# Patient Record
Sex: MALE | Race: WHITE | Employment: OTHER | URBAN - NONMETROPOLITAN AREA
[De-identification: names, ages, dates, MRNs, and addresses within clinical notes are randomized per-mention and may not be internally consistent; named-entity substitution may affect disease eponyms.]

---

## 2020-01-01 ENCOUNTER — APPOINTMENT (OUTPATIENT)
Dept: GENERAL RADIOLOGY | Age: 75
DRG: 853 | End: 2020-01-01
Payer: MEDICARE

## 2020-01-01 ENCOUNTER — APPOINTMENT (OUTPATIENT)
Dept: INTERVENTIONAL RADIOLOGY/VASCULAR | Age: 75
DRG: 853 | End: 2020-01-01
Payer: MEDICARE

## 2020-01-01 ENCOUNTER — APPOINTMENT (OUTPATIENT)
Dept: CT IMAGING | Age: 75
DRG: 853 | End: 2020-01-01
Payer: MEDICARE

## 2020-01-01 ENCOUNTER — HOSPITAL ENCOUNTER (INPATIENT)
Age: 75
LOS: 35 days | DRG: 853 | End: 2020-10-29
Attending: EMERGENCY MEDICINE | Admitting: HOSPITALIST
Payer: MEDICARE

## 2020-01-01 ENCOUNTER — APPOINTMENT (OUTPATIENT)
Dept: MRI IMAGING | Age: 75
DRG: 853 | End: 2020-01-01
Payer: MEDICARE

## 2020-01-01 VITALS
WEIGHT: 235.9 LBS | HEIGHT: 68 IN | DIASTOLIC BLOOD PRESSURE: 19 MMHG | HEART RATE: 57 BPM | RESPIRATION RATE: 23 BRPM | TEMPERATURE: 95.2 F | SYSTOLIC BLOOD PRESSURE: 80 MMHG | BODY MASS INDEX: 35.75 KG/M2 | OXYGEN SATURATION: 69 %

## 2020-01-01 LAB
ABO: NORMAL
ACINETOBACTER CALCOACETICUS-BAUMANNII BY PCR: NOT DETECTED
ADENOVIRUS BY PCR: NOT DETECTED
ALBUMIN SERPL-MCNC: 1.2 G/DL (ref 3.5–5.1)
ALBUMIN SERPL-MCNC: 2 G/DL (ref 3.5–5.1)
ALBUMIN SERPL-MCNC: 2.2 G/DL (ref 3.5–5.1)
ALBUMIN SERPL-MCNC: 2.2 G/DL (ref 3.5–5.1)
ALBUMIN SERPL-MCNC: 2.3 G/DL (ref 3.5–5.1)
ALBUMIN SERPL-MCNC: 2.5 G/DL (ref 3.5–5.1)
ALBUMIN SERPL-MCNC: 2.6 G/DL (ref 3.5–5.1)
ALBUMIN SERPL-MCNC: 2.7 G/DL (ref 3.5–5.1)
ALBUMIN SERPL-MCNC: 2.8 G/DL (ref 3.5–5.1)
ALBUMIN SERPL-MCNC: 2.8 G/DL (ref 3.5–5.1)
ALBUMIN SERPL-MCNC: 2.9 G/DL (ref 3.5–5.1)
ALBUMIN SERPL-MCNC: 3 G/DL (ref 3.5–5.1)
ALBUMIN SERPL-MCNC: 3 G/DL (ref 3.5–5.1)
ALBUMIN SERPL-MCNC: 3.1 GM/DL (ref 3.4–5)
ALBUMIN SERPL-MCNC: 3.2 G/DL (ref 3.5–5.1)
ALDOSTERONE: 30 NG/DL
ALLEN TEST: ABNORMAL
ALLEN TEST: ABNORMAL
ALLEN TEST: POSITIVE
ALP BLD-CCNC: 107 U/L (ref 38–126)
ALP BLD-CCNC: 53 U/L (ref 38–126)
ALP BLD-CCNC: 54 U/L (ref 38–126)
ALP BLD-CCNC: 55 U/L (ref 38–126)
ALP BLD-CCNC: 57 U/L (ref 38–126)
ALP BLD-CCNC: 59 U/L (ref 38–126)
ALP BLD-CCNC: 60 U/L (ref 38–126)
ALP BLD-CCNC: 62 U/L (ref 38–126)
ALP BLD-CCNC: 63 U/L (ref 46–116)
ALP BLD-CCNC: 65 U/L (ref 38–126)
ALP BLD-CCNC: 71 U/L (ref 38–126)
ALP BLD-CCNC: 72 U/L (ref 38–126)
ALP BLD-CCNC: 74 U/L (ref 38–126)
ALP BLD-CCNC: 78 U/L (ref 38–126)
ALP BLD-CCNC: 79 U/L (ref 38–126)
ALP BLD-CCNC: 83 U/L (ref 38–126)
ALP BLD-CCNC: 84 U/L (ref 38–126)
ALP BLD-CCNC: 841 U/L (ref 38–126)
ALP BLD-CCNC: 90 U/L (ref 38–126)
ALP BLD-CCNC: 94 U/L (ref 38–126)
ALP BLD-CCNC: 94 U/L (ref 38–126)
ALT SERPL-CCNC: 102 U/L (ref 11–66)
ALT SERPL-CCNC: 106 U/L (ref 11–66)
ALT SERPL-CCNC: 114 U/L (ref 11–66)
ALT SERPL-CCNC: 124 U/L (ref 11–66)
ALT SERPL-CCNC: 126 U/L (ref 11–66)
ALT SERPL-CCNC: 172 U/L (ref 11–66)
ALT SERPL-CCNC: 236 U/L (ref 11–66)
ALT SERPL-CCNC: 28 U/L (ref 11–66)
ALT SERPL-CCNC: 35 U/L (ref 11–66)
ALT SERPL-CCNC: 46 U/L (ref 11–66)
ALT SERPL-CCNC: 52 U/L (ref 11–66)
ALT SERPL-CCNC: 56 U/L (ref 11–66)
ALT SERPL-CCNC: 57 U/L (ref 11–66)
ALT SERPL-CCNC: 64 U/L (ref 11–66)
ALT SERPL-CCNC: 65 U/L (ref 11–66)
ALT SERPL-CCNC: 66 U/L (ref 11–66)
ALT SERPL-CCNC: 74 U/L (ref 11–66)
ALT SERPL-CCNC: 78 U/L (ref 11–66)
ALT SERPL-CCNC: 90 U/L (ref 14–63)
ALT SERPL-CCNC: 93 U/L (ref 11–66)
ALT SERPL-CCNC: 95 U/L (ref 11–66)
AMORPHOUS: ABNORMAL
AMORPHOUS: ABNORMAL
ANAEROBIC CULTURE: NORMAL
ANAEROBIC CULTURE: NORMAL
ANION GAP SERPL CALCULATED.3IONS-SCNC: 10 MEQ/L (ref 8–16)
ANION GAP SERPL CALCULATED.3IONS-SCNC: 11 MEQ/L (ref 8–16)
ANION GAP SERPL CALCULATED.3IONS-SCNC: 12 MEQ/L (ref 8–16)
ANION GAP SERPL CALCULATED.3IONS-SCNC: 13 MEQ/L (ref 8–16)
ANION GAP SERPL CALCULATED.3IONS-SCNC: 14 MEQ/L (ref 8–16)
ANION GAP SERPL CALCULATED.3IONS-SCNC: 14 MEQ/L (ref 8–16)
ANION GAP SERPL CALCULATED.3IONS-SCNC: 15 MEQ/L (ref 8–16)
ANION GAP SERPL CALCULATED.3IONS-SCNC: 16 MEQ/L (ref 8–16)
ANION GAP SERPL CALCULATED.3IONS-SCNC: 17 MEQ/L (ref 8–16)
ANION GAP SERPL CALCULATED.3IONS-SCNC: 19 MEQ/L (ref 8–16)
ANION GAP SERPL CALCULATED.3IONS-SCNC: 23 MEQ/L (ref 8–16)
ANION GAP SERPL CALCULATED.3IONS-SCNC: 24 MEQ/L (ref 8–16)
ANION GAP SERPL CALCULATED.3IONS-SCNC: 6 MEQ/L (ref 8–16)
ANION GAP SERPL CALCULATED.3IONS-SCNC: 7 MEQ/L (ref 8–16)
ANION GAP SERPL CALCULATED.3IONS-SCNC: 8 MEQ/L (ref 8–16)
ANION GAP SERPL CALCULATED.3IONS-SCNC: 9 MEQ/L (ref 8–16)
ANION GAP: 10 MEQ/L (ref 8–16)
ANISOCYTOSIS: PRESENT
ANTIBODY SCREEN: NORMAL
APTT: 109.3 SECONDS (ref 22–38)
APTT: 24.6 SECONDS (ref 22–38)
APTT: 48.9 SECONDS (ref 22–38)
APTT: 56.8 SECONDS (ref 22–38)
AST SERPL-CCNC: 102 U/L (ref 5–40)
AST SERPL-CCNC: 108 U/L (ref 5–40)
AST SERPL-CCNC: 24 U/L (ref 5–40)
AST SERPL-CCNC: 25 U/L (ref 5–40)
AST SERPL-CCNC: 38 U/L (ref 5–40)
AST SERPL-CCNC: 40 U/L (ref 5–40)
AST SERPL-CCNC: 40 U/L (ref 5–40)
AST SERPL-CCNC: 42 U/L (ref 5–40)
AST SERPL-CCNC: 43 U/L (ref 5–40)
AST SERPL-CCNC: 52 U/L (ref 5–40)
AST SERPL-CCNC: 55 U/L (ref 5–40)
AST SERPL-CCNC: 58 U/L (ref 5–40)
AST SERPL-CCNC: 61 U/L (ref 5–40)
AST SERPL-CCNC: 64 U/L (ref 5–40)
AST SERPL-CCNC: 65 U/L (ref 5–40)
AST SERPL-CCNC: 672 U/L (ref 5–40)
AST SERPL-CCNC: 68 U/L (ref 5–40)
AST SERPL-CCNC: 72 U/L (ref 5–40)
AST SERPL-CCNC: 77 U/L (ref 5–40)
AST SERPL-CCNC: 90 U/L (ref 5–40)
AST SERPL-CCNC: 91 U/L (ref 15–37)
ATYPICAL LYMPHOCYTES: ABNORMAL %
AVERAGE GLUCOSE: 165 MG/DL (ref 70–126)
BACTERIA: ABNORMAL
BAL CHARACTER: ABNORMAL
BAL CHARACTER: ABNORMAL
BAL COLLECTION SITE: ABNORMAL
BAL COLLECTION SITE: ABNORMAL
BAL COLOR: ABNORMAL
BAL COLOR: ABNORMAL
BASE EXCESS (CALCULATED): -0.7 MMOL/L (ref -2.5–2.5)
BASE EXCESS (CALCULATED): -1.9 MMOL/L (ref -2.5–2.5)
BASE EXCESS (CALCULATED): -16.8 MMOL/L (ref -2.5–2.5)
BASE EXCESS (CALCULATED): -4.2 MMOL/L (ref -2.5–2.5)
BASE EXCESS (CALCULATED): -4.2 MMOL/L (ref -2.5–2.5)
BASE EXCESS (CALCULATED): 1.1 MMOL/L (ref -2.5–2.5)
BASE EXCESS (CALCULATED): 3.5 MMOL/L (ref -2.5–2.5)
BASE EXCESS (CALCULATED): 6.9 MMOL/L (ref -2.5–2.5)
BASE EXCESS MIXED: -10.6 MMOL/L (ref -2–3)
BASE EXCESS MIXED: -2.6 MMOL/L (ref -2–3)
BASE EXCESS MIXED: -2.9 MMOL/L (ref -2–3)
BASOPHILIA: ABNORMAL
BASOPHILS # BLD: 0 %
BASOPHILS # BLD: 0 %
BASOPHILS # BLD: 0.1 %
BASOPHILS # BLD: 0.2 %
BASOPHILS # BLD: 0.3 %
BASOPHILS # BLD: 0.4 %
BASOPHILS # BLD: 0.5 %
BASOPHILS # BLD: 0.5 %
BASOPHILS # BLD: 0.5 % (ref 0–3)
BASOPHILS ABSOLUTE: 0 THOU/MM3 (ref 0–0.1)
BASOPHILS ABSOLUTE: 0.1 THOU/MM3 (ref 0–0.1)
BILIRUB SERPL-MCNC: 0.3 MG/DL (ref 0.3–1.2)
BILIRUB SERPL-MCNC: 0.4 MG/DL (ref 0.3–1.2)
BILIRUB SERPL-MCNC: 0.5 MG/DL (ref 0.3–1.2)
BILIRUB SERPL-MCNC: 0.6 MG/DL (ref 0.2–1)
BILIRUB SERPL-MCNC: 0.6 MG/DL (ref 0.3–1.2)
BILIRUB SERPL-MCNC: 0.6 MG/DL (ref 0.3–1.2)
BILIRUB SERPL-MCNC: 0.7 MG/DL (ref 0.3–1.2)
BILIRUB SERPL-MCNC: 0.8 MG/DL (ref 0.3–1.2)
BILIRUB SERPL-MCNC: 0.8 MG/DL (ref 0.3–1.2)
BILIRUBIN DIRECT: 0.4 MG/DL (ref 0–0.3)
BILIRUBIN DIRECT: < 0.2 MG/DL (ref 0–0.3)
BILIRUBIN URINE: NEGATIVE
BLOOD CULTURE, ROUTINE: NORMAL
BLOOD, URINE: ABNORMAL
BODY FLUID CULTURE, STERILE: NORMAL
BODY FLUID CULTURE, STERILE: NORMAL
BODY FLUID RBC: 7000 /CUMM
BODY FLUID RBC: ABNORMAL /CUMM
BUN BLDV-MCNC: 10 MG/DL (ref 7–22)
BUN BLDV-MCNC: 10 MG/DL (ref 7–22)
BUN BLDV-MCNC: 11 MG/DL (ref 7–22)
BUN BLDV-MCNC: 11 MG/DL (ref 7–22)
BUN BLDV-MCNC: 12 MG/DL (ref 7–22)
BUN BLDV-MCNC: 13 MG/DL (ref 7–22)
BUN BLDV-MCNC: 13 MG/DL (ref 7–22)
BUN BLDV-MCNC: 14 MG/DL (ref 7–22)
BUN BLDV-MCNC: 15 MG/DL (ref 7–22)
BUN BLDV-MCNC: 16 MG/DL (ref 7–22)
BUN BLDV-MCNC: 17 MG/DL (ref 7–22)
BUN BLDV-MCNC: 18 MG/DL (ref 7–22)
BUN BLDV-MCNC: 20 MG/DL (ref 7–22)
BUN BLDV-MCNC: 20 MG/DL (ref 7–22)
BUN BLDV-MCNC: 21 MG/DL (ref 7–22)
BUN BLDV-MCNC: 21 MG/DL (ref 7–22)
BUN BLDV-MCNC: 22 MG/DL (ref 7–18)
BUN BLDV-MCNC: 22 MG/DL (ref 7–22)
BUN BLDV-MCNC: 23 MG/DL (ref 7–22)
BUN BLDV-MCNC: 24 MG/DL (ref 7–22)
BUN BLDV-MCNC: 24 MG/DL (ref 7–22)
BUN BLDV-MCNC: 26 MG/DL (ref 7–22)
BUN BLDV-MCNC: 26 MG/DL (ref 7–22)
BUN BLDV-MCNC: 29 MG/DL (ref 7–22)
BUN BLDV-MCNC: 31 MG/DL (ref 7–22)
BUN BLDV-MCNC: 34 MG/DL (ref 7–22)
BUN BLDV-MCNC: 37 MG/DL (ref 7–22)
BUN BLDV-MCNC: 38 MG/DL (ref 7–22)
BUN BLDV-MCNC: 39 MG/DL (ref 7–22)
BUN BLDV-MCNC: 4 MG/DL (ref 7–22)
BUN BLDV-MCNC: 5 MG/DL (ref 7–22)
BUN BLDV-MCNC: 6 MG/DL (ref 7–22)
BUN BLDV-MCNC: 7 MG/DL (ref 7–22)
BUN BLDV-MCNC: 8 MG/DL (ref 7–22)
BUN BLDV-MCNC: 8 MG/DL (ref 7–22)
BUN BLDV-MCNC: 9 MG/DL (ref 7–22)
BUN BLDV-MCNC: 9 MG/DL (ref 7–22)
C-REACTIVE PROTEIN: 13.42 MG/DL (ref 0–1)
C-REACTIVE PROTEIN: 16.82 MG/DL (ref 0–1)
C-REACTIVE PROTEIN: 24.8 MG/DL (ref 0–1)
CALCIUM IONIZED: 0.89 MMOL/L (ref 1.12–1.32)
CALCIUM IONIZED: 0.9 MMOL/L (ref 1.12–1.32)
CALCIUM IONIZED: 0.98 MMOL/L (ref 1.12–1.32)
CALCIUM IONIZED: 0.98 MMOL/L (ref 1.12–1.32)
CALCIUM IONIZED: 0.99 MMOL/L (ref 1.12–1.32)
CALCIUM IONIZED: 1 MMOL/L (ref 1.12–1.32)
CALCIUM IONIZED: 1.01 MMOL/L (ref 1.12–1.32)
CALCIUM IONIZED: 1.02 MMOL/L (ref 1.12–1.32)
CALCIUM IONIZED: 1.03 MMOL/L (ref 1.12–1.32)
CALCIUM IONIZED: 1.04 MMOL/L (ref 1.12–1.32)
CALCIUM IONIZED: 1.04 MMOL/L (ref 1.12–1.32)
CALCIUM IONIZED: 1.05 MMOL/L (ref 1.12–1.32)
CALCIUM IONIZED: 1.05 MMOL/L (ref 1.12–1.32)
CALCIUM IONIZED: 1.07 MMOL/L (ref 1.12–1.32)
CALCIUM IONIZED: 1.07 MMOL/L (ref 1.12–1.32)
CALCIUM IONIZED: 1.09 MMOL/L (ref 1.12–1.32)
CALCIUM SERPL-MCNC: 6.4 MG/DL (ref 8.5–10.5)
CALCIUM SERPL-MCNC: 6.6 MG/DL (ref 8.5–10.5)
CALCIUM SERPL-MCNC: 6.7 MG/DL (ref 8.5–10.5)
CALCIUM SERPL-MCNC: 6.7 MG/DL (ref 8.5–10.5)
CALCIUM SERPL-MCNC: 6.9 MG/DL (ref 8.5–10.5)
CALCIUM SERPL-MCNC: 7 MG/DL (ref 8.5–10.5)
CALCIUM SERPL-MCNC: 7 MG/DL (ref 8.5–10.5)
CALCIUM SERPL-MCNC: 7.1 MG/DL (ref 8.5–10.5)
CALCIUM SERPL-MCNC: 7.2 MG/DL (ref 8.5–10.5)
CALCIUM SERPL-MCNC: 7.3 MG/DL (ref 8.5–10.5)
CALCIUM SERPL-MCNC: 7.4 MG/DL (ref 8.5–10.5)
CALCIUM SERPL-MCNC: 7.5 MG/DL (ref 8.5–10.5)
CALCIUM SERPL-MCNC: 7.6 MG/DL (ref 8.5–10.5)
CALCIUM SERPL-MCNC: 7.6 MG/DL (ref 8.5–10.5)
CALCIUM SERPL-MCNC: 7.7 MG/DL (ref 8.5–10.5)
CALCIUM SERPL-MCNC: 7.8 MG/DL (ref 8.5–10.5)
CALCIUM SERPL-MCNC: 7.9 MG/DL (ref 8.5–10.5)
CALCIUM SERPL-MCNC: 8 MG/DL (ref 8.5–10.5)
CALCIUM SERPL-MCNC: 8.1 MG/DL (ref 8.5–10.5)
CALCIUM SERPL-MCNC: 8.1 MG/DL (ref 8.5–10.5)
CALCIUM SERPL-MCNC: 8.2 MG/DL (ref 8.5–10.5)
CALCIUM SERPL-MCNC: 8.5 MG/DL (ref 8.5–10.5)
CALCIUM SERPL-MCNC: 8.8 MG/DL (ref 8.5–10.5)
CALCIUM SERPL-MCNC: 9 MG/DL (ref 8.5–10.5)
CASTS: ABNORMAL /LPF
CHARACTER, BODY FLUID: ABNORMAL
CHARACTER, BODY FLUID: NORMAL
CHARACTER, URINE: ABNORMAL
CHARACTER, URINE: ABNORMAL
CHARACTER, URINE: CLEAR
CHLAMYDIA PNEUMONIAE BY PCR: NOT DETECTED
CHLORIDE BLD-SCNC: 100 MEQ/L (ref 98–111)
CHLORIDE BLD-SCNC: 101 MEQ/L (ref 98–111)
CHLORIDE BLD-SCNC: 102 MEQ/L (ref 98–111)
CHLORIDE BLD-SCNC: 102 MEQ/L (ref 98–111)
CHLORIDE BLD-SCNC: 103 MEQ/L (ref 98–111)
CHLORIDE BLD-SCNC: 104 MEQ/L (ref 98–111)
CHLORIDE BLD-SCNC: 104 MEQ/L (ref 98–111)
CHLORIDE BLD-SCNC: 105 MEQ/L (ref 98–111)
CHLORIDE BLD-SCNC: 107 MEQ/L (ref 98–111)
CHLORIDE BLD-SCNC: 111 MEQ/L (ref 98–111)
CHLORIDE BLD-SCNC: 87 MEQ/L (ref 98–111)
CHLORIDE BLD-SCNC: 89 MEQ/L (ref 98–111)
CHLORIDE BLD-SCNC: 89 MEQ/L (ref 98–111)
CHLORIDE BLD-SCNC: 90 MEQ/L (ref 98–111)
CHLORIDE BLD-SCNC: 90 MEQ/L (ref 98–111)
CHLORIDE BLD-SCNC: 91 MEQ/L (ref 98–111)
CHLORIDE BLD-SCNC: 92 MEQ/L (ref 98–107)
CHLORIDE BLD-SCNC: 92 MEQ/L (ref 98–111)
CHLORIDE BLD-SCNC: 93 MEQ/L (ref 98–111)
CHLORIDE BLD-SCNC: 94 MEQ/L (ref 98–111)
CHLORIDE BLD-SCNC: 94 MEQ/L (ref 98–111)
CHLORIDE BLD-SCNC: 95 MEQ/L (ref 98–111)
CHLORIDE BLD-SCNC: 96 MEQ/L (ref 98–111)
CHLORIDE BLD-SCNC: 97 MEQ/L (ref 98–111)
CHLORIDE BLD-SCNC: 98 MEQ/L (ref 98–111)
CHLORIDE BLD-SCNC: 99 MEQ/L (ref 98–111)
CHLORIDE, URINE: 50 MEQ/L
CHLORIDE, URINE: < 20 MEQ/L
CMV QUANT IU/ML: < 227 IU/ML
CMV QUANT LOG IU/ML: < 2.4 LOG IU/ML
CMV QUANTITATIVE INTERPRETATION: NOT DETECTED
CMV QUANTITATIVE LOG COPY/ML: < 2.6 LOG CPY/ML
CMVQ COPY/ML: < 390 CPY/ML
CO2: 13 MEQ/L (ref 23–33)
CO2: 14 MEQ/L (ref 23–33)
CO2: 16 MEQ/L (ref 23–33)
CO2: 19 MEQ/L (ref 23–33)
CO2: 20 MEQ/L (ref 23–33)
CO2: 21 MEQ/L (ref 23–33)
CO2: 22 MEQ/L (ref 23–33)
CO2: 23 MEQ/L (ref 23–33)
CO2: 24 MEQ/L (ref 23–33)
CO2: 25 MEQ/L (ref 23–33)
CO2: 26 MEQ/L (ref 23–33)
CO2: 27 MEQ/L (ref 23–33)
CO2: 28 MEQ/L (ref 23–33)
CO2: 28 MEQ/L (ref 23–33)
CO2: 30 MEQ/L (ref 21–32)
CO2: 30 MEQ/L (ref 23–33)
CO2: 31 MEQ/L (ref 23–33)
CO2: 32 MEQ/L (ref 23–33)
CO2: 33 MEQ/L (ref 23–33)
CO2: 34 MEQ/L (ref 23–33)
CO2: 35 MEQ/L (ref 23–33)
CO2: 35 MEQ/L (ref 23–33)
COLLECTED BY:: ABNORMAL
COLLECTED BY:: NORMAL
COLOR: ABNORMAL
COLOR: NORMAL
COLOR: YELLOW
COMMENT: ABNORMAL
CORONAVIRUS PCR: NOT DETECTED
CORTISOL COLLECTION INFO: NORMAL
CORTISOL COLLECTION INFO: NORMAL
CORTISOL: 12.93 UG/DL
CORTISOL: 20.58 UG/DL
CREAT SERPL-MCNC: 0.4 MG/DL (ref 0.4–1.2)
CREAT SERPL-MCNC: 0.5 MG/DL (ref 0.4–1.2)
CREAT SERPL-MCNC: 0.6 MG/DL (ref 0.4–1.2)
CREAT SERPL-MCNC: 0.7 MG/DL (ref 0.4–1.2)
CREAT SERPL-MCNC: 0.8 MG/DL (ref 0.4–1.2)
CREAT SERPL-MCNC: 0.9 MG/DL (ref 0.4–1.2)
CREAT SERPL-MCNC: 1 MG/DL (ref 0.4–1.2)
CREAT SERPL-MCNC: 1 MG/DL (ref 0.4–1.2)
CREAT SERPL-MCNC: 1.1 MG/DL (ref 0.4–1.2)
CREAT SERPL-MCNC: 1.4 MG/DL (ref 0.6–1.3)
CREAT SERPL-MCNC: 1.5 MG/DL (ref 0.4–1.2)
CREAT SERPL-MCNC: 1.6 MG/DL (ref 0.4–1.2)
CREAT SERPL-MCNC: 1.7 MG/DL (ref 0.4–1.2)
CREAT SERPL-MCNC: 1.8 MG/DL (ref 0.4–1.2)
CREAT SERPL-MCNC: 2.2 MG/DL (ref 0.4–1.2)
CREAT SERPL-MCNC: 2.4 MG/DL (ref 0.4–1.2)
CREAT SERPL-MCNC: 2.7 MG/DL (ref 0.4–1.2)
CREAT SERPL-MCNC: 2.8 MG/DL (ref 0.4–1.2)
CREAT SERPL-MCNC: 3.1 MG/DL (ref 0.4–1.2)
CREAT SERPL-MCNC: 3.1 MG/DL (ref 0.4–1.2)
CREATININE URINE: 104.7 MG/DL
CREATININE URINE: 90.1 MG/DL
CRYSTALS: ABNORMAL
D-DIMER QUANTITATIVE: 639 NG/ML FEU (ref 0–500)
DEVICE: ABNORMAL
DEVICE: NORMAL
EKG ATRIAL RATE: 132 BPM
EKG ATRIAL RATE: 135 BPM
EKG ATRIAL RATE: 147 BPM
EKG ATRIAL RATE: 326 BPM
EKG ATRIAL RATE: 375 BPM
EKG ATRIAL RATE: 59 BPM
EKG ATRIAL RATE: 73 BPM
EKG ATRIAL RATE: 80 BPM
EKG ATRIAL RATE: 83 BPM
EKG ATRIAL RATE: 94 BPM
EKG ATRIAL RATE: 98 BPM
EKG P AXIS: 22 DEGREES
EKG P AXIS: 45 DEGREES
EKG P AXIS: 81 DEGREES
EKG P-R INTERVAL: 136 MS
EKG P-R INTERVAL: 148 MS
EKG P-R INTERVAL: 166 MS
EKG P-R INTERVAL: 174 MS
EKG Q-T INTERVAL: 306 MS
EKG Q-T INTERVAL: 312 MS
EKG Q-T INTERVAL: 336 MS
EKG Q-T INTERVAL: 354 MS
EKG Q-T INTERVAL: 364 MS
EKG Q-T INTERVAL: 364 MS
EKG Q-T INTERVAL: 368 MS
EKG Q-T INTERVAL: 386 MS
EKG Q-T INTERVAL: 434 MS
EKG Q-T INTERVAL: 440 MS
EKG Q-T INTERVAL: 452 MS
EKG QRS DURATION: 64 MS
EKG QRS DURATION: 68 MS
EKG QRS DURATION: 70 MS
EKG QRS DURATION: 74 MS
EKG QRS DURATION: 84 MS
EKG QRS DURATION: 88 MS
EKG QRS DURATION: 90 MS
EKG QTC CALCULATION (BAZETT): 384 MS
EKG QTC CALCULATION (BAZETT): 427 MS
EKG QTC CALCULATION (BAZETT): 435 MS
EKG QTC CALCULATION (BAZETT): 442 MS
EKG QTC CALCULATION (BAZETT): 445 MS
EKG QTC CALCULATION (BAZETT): 450 MS
EKG QTC CALCULATION (BAZETT): 453 MS
EKG QTC CALCULATION (BAZETT): 478 MS
EKG QTC CALCULATION (BAZETT): 497 MS
EKG QTC CALCULATION (BAZETT): 600 MS
EKG QTC CALCULATION (BAZETT): 630 MS
EKG R AXIS: 21 DEGREES
EKG R AXIS: 21 DEGREES
EKG R AXIS: 23 DEGREES
EKG R AXIS: 24 DEGREES
EKG R AXIS: 25 DEGREES
EKG R AXIS: 26 DEGREES
EKG R AXIS: 26 DEGREES
EKG R AXIS: 46 DEGREES
EKG R AXIS: 51 DEGREES
EKG R AXIS: 52 DEGREES
EKG R AXIS: 61 DEGREES
EKG T AXIS: -4 DEGREES
EKG T AXIS: -5 DEGREES
EKG T AXIS: -57 DEGREES
EKG T AXIS: -76 DEGREES
EKG T AXIS: -80 DEGREES
EKG T AXIS: -81 DEGREES
EKG T AXIS: 1 DEGREES
EKG T AXIS: 13 DEGREES
EKG T AXIS: 26 DEGREES
EKG T AXIS: 27 DEGREES
EKG VENTRICULAR RATE: 112 BPM
EKG VENTRICULAR RATE: 117 BPM
EKG VENTRICULAR RATE: 117 BPM
EKG VENTRICULAR RATE: 132 BPM
EKG VENTRICULAR RATE: 73 BPM
EKG VENTRICULAR RATE: 83 BPM
EKG VENTRICULAR RATE: 83 BPM
EKG VENTRICULAR RATE: 88 BPM
EKG VENTRICULAR RATE: 92 BPM
EKG VENTRICULAR RATE: 94 BPM
EKG VENTRICULAR RATE: 95 BPM
ENTEROBACTER CLOACAE COMPLEX BY PCR: NOT DETECTED
EOSINOPHIL # BLD: 0 %
EOSINOPHIL # BLD: 0.1 %
EOSINOPHIL # BLD: 0.1 %
EOSINOPHIL # BLD: 0.2 %
EOSINOPHIL # BLD: 0.3 %
EOSINOPHIL # BLD: 0.6 %
EOSINOPHIL # BLD: 0.7 %
EOSINOPHIL # BLD: 0.7 %
EOSINOPHIL # BLD: 0.8 %
EOSINOPHIL # BLD: 0.9 %
EOSINOPHIL # BLD: 1.2 %
EOSINOPHIL # BLD: 1.6 %
EOSINOPHIL # BLD: 2.5 %
EOSINOPHIL # BLD: 2.6 %
EOSINOPHIL # BLD: 2.7 %
EOSINOPHIL # BLD: 3.8 %
EOSINOPHIL # BLD: 4.7 %
EOSINOPHIL # BLD: 5 %
EOSINOPHIL # BLD: 5.3 %
EOSINOPHIL # BLD: 6 %
EOSINOPHIL # BLD: 6.3 %
EOSINOPHIL # BLD: 7.4 %
EOSINOPHIL SMEAR: NORMAL
EOSINOPHILS ABSOLUTE: 0 THOU/MM3 (ref 0–0.4)
EOSINOPHILS ABSOLUTE: 0.1 THOU/MM3 (ref 0–0.4)
EOSINOPHILS ABSOLUTE: 0.3 THOU/MM3 (ref 0–0.4)
EOSINOPHILS ABSOLUTE: 0.4 THOU/MM3 (ref 0–0.4)
EOSINOPHILS ABSOLUTE: 0.4 THOU/MM3 (ref 0–0.4)
EOSINOPHILS ABSOLUTE: 0.5 THOU/MM3 (ref 0–0.4)
EOSINOPHILS ABSOLUTE: 0.5 THOU/MM3 (ref 0–0.4)
EOSINOPHILS ABSOLUTE: 0.6 THOU/MM3 (ref 0–0.4)
EOSINOPHILS ABSOLUTE: 0.8 THOU/MM3 (ref 0–0.4)
EOSINOPHILS ABSOLUTE: 0.8 THOU/MM3 (ref 0–0.4)
EOSINOPHILS RELATIVE PERCENT: 0.8 % (ref 0–4)
EPITHELIAL CELLS, UA: ABNORMAL /HPF
ERYTHROCYTE [DISTWIDTH] IN BLOOD BY AUTOMATED COUNT: 12.6 % (ref 11.5–14.5)
ERYTHROCYTE [DISTWIDTH] IN BLOOD BY AUTOMATED COUNT: 12.6 % (ref 11.5–14.5)
ERYTHROCYTE [DISTWIDTH] IN BLOOD BY AUTOMATED COUNT: 12.7 % (ref 11.5–14.5)
ERYTHROCYTE [DISTWIDTH] IN BLOOD BY AUTOMATED COUNT: 12.9 % (ref 11.5–14.5)
ERYTHROCYTE [DISTWIDTH] IN BLOOD BY AUTOMATED COUNT: 13 % (ref 11.5–14.5)
ERYTHROCYTE [DISTWIDTH] IN BLOOD BY AUTOMATED COUNT: 13.1 % (ref 11.5–14.5)
ERYTHROCYTE [DISTWIDTH] IN BLOOD BY AUTOMATED COUNT: 13.2 % (ref 11.5–14.5)
ERYTHROCYTE [DISTWIDTH] IN BLOOD BY AUTOMATED COUNT: 13.4 % (ref 11.5–14.5)
ERYTHROCYTE [DISTWIDTH] IN BLOOD BY AUTOMATED COUNT: 13.6 % (ref 11.5–14.5)
ERYTHROCYTE [DISTWIDTH] IN BLOOD BY AUTOMATED COUNT: 13.7 % (ref 11.5–14.5)
ERYTHROCYTE [DISTWIDTH] IN BLOOD BY AUTOMATED COUNT: 13.9 % (ref 11.5–14.5)
ERYTHROCYTE [DISTWIDTH] IN BLOOD BY AUTOMATED COUNT: 15.4 % (ref 11.5–14.5)
ERYTHROCYTE [DISTWIDTH] IN BLOOD BY AUTOMATED COUNT: 15.6 % (ref 11.5–14.5)
ERYTHROCYTE [DISTWIDTH] IN BLOOD BY AUTOMATED COUNT: 16.4 % (ref 11.5–14.5)
ERYTHROCYTE [DISTWIDTH] IN BLOOD BY AUTOMATED COUNT: 16.4 % (ref 11.5–14.5)
ERYTHROCYTE [DISTWIDTH] IN BLOOD BY AUTOMATED COUNT: 16.5 % (ref 11.5–14.5)
ERYTHROCYTE [DISTWIDTH] IN BLOOD BY AUTOMATED COUNT: 16.7 % (ref 11.5–14.5)
ERYTHROCYTE [DISTWIDTH] IN BLOOD BY AUTOMATED COUNT: 16.8 % (ref 11.5–14.5)
ERYTHROCYTE [DISTWIDTH] IN BLOOD BY AUTOMATED COUNT: 16.8 % (ref 11.5–14.5)
ERYTHROCYTE [DISTWIDTH] IN BLOOD BY AUTOMATED COUNT: 17 % (ref 11.5–14.5)
ERYTHROCYTE [DISTWIDTH] IN BLOOD BY AUTOMATED COUNT: 17.1 % (ref 11.5–14.5)
ERYTHROCYTE [DISTWIDTH] IN BLOOD BY AUTOMATED COUNT: 17.2 % (ref 11.5–14.5)
ERYTHROCYTE [DISTWIDTH] IN BLOOD BY AUTOMATED COUNT: 17.3 % (ref 11.5–14.5)
ERYTHROCYTE [DISTWIDTH] IN BLOOD BY AUTOMATED COUNT: 17.5 % (ref 11.5–14.5)
ERYTHROCYTE [DISTWIDTH] IN BLOOD BY AUTOMATED COUNT: 17.7 % (ref 11.5–14.5)
ERYTHROCYTE [DISTWIDTH] IN BLOOD BY AUTOMATED COUNT: 17.7 % (ref 11.5–14.5)
ERYTHROCYTE [DISTWIDTH] IN BLOOD BY AUTOMATED COUNT: 44.2 FL (ref 35–45)
ERYTHROCYTE [DISTWIDTH] IN BLOOD BY AUTOMATED COUNT: 44.7 FL (ref 35–45)
ERYTHROCYTE [DISTWIDTH] IN BLOOD BY AUTOMATED COUNT: 44.8 FL (ref 35–45)
ERYTHROCYTE [DISTWIDTH] IN BLOOD BY AUTOMATED COUNT: 44.9 FL (ref 35–45)
ERYTHROCYTE [DISTWIDTH] IN BLOOD BY AUTOMATED COUNT: 45.1 FL (ref 35–45)
ERYTHROCYTE [DISTWIDTH] IN BLOOD BY AUTOMATED COUNT: 45.1 FL (ref 35–45)
ERYTHROCYTE [DISTWIDTH] IN BLOOD BY AUTOMATED COUNT: 45.2 FL (ref 35–45)
ERYTHROCYTE [DISTWIDTH] IN BLOOD BY AUTOMATED COUNT: 45.3 FL (ref 35–45)
ERYTHROCYTE [DISTWIDTH] IN BLOOD BY AUTOMATED COUNT: 45.5 FL (ref 35–45)
ERYTHROCYTE [DISTWIDTH] IN BLOOD BY AUTOMATED COUNT: 45.5 FL (ref 35–45)
ERYTHROCYTE [DISTWIDTH] IN BLOOD BY AUTOMATED COUNT: 45.6 FL (ref 35–45)
ERYTHROCYTE [DISTWIDTH] IN BLOOD BY AUTOMATED COUNT: 46.2 FL (ref 35–45)
ERYTHROCYTE [DISTWIDTH] IN BLOOD BY AUTOMATED COUNT: 46.5 FL (ref 35–45)
ERYTHROCYTE [DISTWIDTH] IN BLOOD BY AUTOMATED COUNT: 46.6 FL (ref 35–45)
ERYTHROCYTE [DISTWIDTH] IN BLOOD BY AUTOMATED COUNT: 46.7 FL (ref 35–45)
ERYTHROCYTE [DISTWIDTH] IN BLOOD BY AUTOMATED COUNT: 47 FL (ref 35–45)
ERYTHROCYTE [DISTWIDTH] IN BLOOD BY AUTOMATED COUNT: 47 FL (ref 35–45)
ERYTHROCYTE [DISTWIDTH] IN BLOOD BY AUTOMATED COUNT: 47.1 FL (ref 35–45)
ERYTHROCYTE [DISTWIDTH] IN BLOOD BY AUTOMATED COUNT: 47.3 FL (ref 35–45)
ERYTHROCYTE [DISTWIDTH] IN BLOOD BY AUTOMATED COUNT: 48.6 FL (ref 35–45)
ERYTHROCYTE [DISTWIDTH] IN BLOOD BY AUTOMATED COUNT: 49.1 FL (ref 35–45)
ERYTHROCYTE [DISTWIDTH] IN BLOOD BY AUTOMATED COUNT: 49.1 FL (ref 35–45)
ERYTHROCYTE [DISTWIDTH] IN BLOOD BY AUTOMATED COUNT: 55.8 FL (ref 35–45)
ERYTHROCYTE [DISTWIDTH] IN BLOOD BY AUTOMATED COUNT: 56.2 FL (ref 35–45)
ERYTHROCYTE [DISTWIDTH] IN BLOOD BY AUTOMATED COUNT: 56.3 FL (ref 35–45)
ERYTHROCYTE [DISTWIDTH] IN BLOOD BY AUTOMATED COUNT: 56.7 FL (ref 35–45)
ERYTHROCYTE [DISTWIDTH] IN BLOOD BY AUTOMATED COUNT: 57 FL (ref 35–45)
ERYTHROCYTE [DISTWIDTH] IN BLOOD BY AUTOMATED COUNT: 57.2 FL (ref 35–45)
ERYTHROCYTE [DISTWIDTH] IN BLOOD BY AUTOMATED COUNT: 57.4 FL (ref 35–45)
ERYTHROCYTE [DISTWIDTH] IN BLOOD BY AUTOMATED COUNT: 57.7 FL (ref 35–45)
ERYTHROCYTE [DISTWIDTH] IN BLOOD BY AUTOMATED COUNT: 57.7 FL (ref 35–45)
ERYTHROCYTE [DISTWIDTH] IN BLOOD BY AUTOMATED COUNT: 58.4 FL (ref 35–45)
ERYTHROCYTE [DISTWIDTH] IN BLOOD BY AUTOMATED COUNT: 58.4 FL (ref 35–45)
ERYTHROCYTE [DISTWIDTH] IN BLOOD BY AUTOMATED COUNT: 58.5 FL (ref 35–45)
ERYTHROCYTE [DISTWIDTH] IN BLOOD BY AUTOMATED COUNT: 58.8 FL (ref 35–45)
ERYTHROCYTE [DISTWIDTH] IN BLOOD BY AUTOMATED COUNT: 59.1 FL (ref 35–45)
ERYTHROCYTE [DISTWIDTH] IN BLOOD BY AUTOMATED COUNT: 59.4 FL (ref 35–45)
ERYTHROCYTE [DISTWIDTH] IN BLOOD BY AUTOMATED COUNT: 59.7 FL (ref 35–45)
ERYTHROCYTE [DISTWIDTH] IN BLOOD BY AUTOMATED COUNT: 60.3 FL (ref 35–45)
ERYTHROCYTE [DISTWIDTH] IN BLOOD BY AUTOMATED COUNT: 60.4 FL (ref 35–45)
ERYTHROCYTE [DISTWIDTH] IN BLOOD BY AUTOMATED COUNT: 61.2 FL (ref 35–45)
ERYTHROCYTE [DISTWIDTH] IN BLOOD BY AUTOMATED COUNT: 61.3 FL (ref 35–45)
ERYTHROCYTE [DISTWIDTH] IN BLOOD BY AUTOMATED COUNT: 61.3 FL (ref 35–45)
ERYTHROCYTE [DISTWIDTH] IN BLOOD BY AUTOMATED COUNT: 62.9 FL (ref 35–45)
ERYTHROCYTE [DISTWIDTH] IN BLOOD BY AUTOMATED COUNT: 64.6 FL (ref 35–45)
ERYTHROCYTE [DISTWIDTH] IN BLOOD BY AUTOMATED COUNT: 66.4 FL (ref 35–45)
ESCHERICHIA COLI BY PCR: NOT DETECTED
FERRITIN: 1259 NG/ML (ref 22–322)
FERRITIN: 1470 NG/ML (ref 22–322)
FERRITIN: 1499 NG/ML (ref 22–322)
FIBRINOGEN: 388 MG/100ML (ref 155–475)
FIBRINOGEN: 616 MG/100ML (ref 155–475)
FIBRINOGEN: 616 MG/100ML (ref 155–475)
FIO2, MIXED VENOUS: 90
FOLATE: 8.9 NG/ML (ref 4.8–24.2)
GFR SERPL CREATININE-BSD FRML MDRD: 20 ML/MIN/1.73M2
GFR SERPL CREATININE-BSD FRML MDRD: 20 ML/MIN/1.73M2
GFR SERPL CREATININE-BSD FRML MDRD: 22 ML/MIN/1.73M2
GFR SERPL CREATININE-BSD FRML MDRD: 23 ML/MIN/1.73M2
GFR SERPL CREATININE-BSD FRML MDRD: 26 ML/MIN/1.73M2
GFR SERPL CREATININE-BSD FRML MDRD: 29 ML/MIN/1.73M2
GFR SERPL CREATININE-BSD FRML MDRD: 37 ML/MIN/1.73M2
GFR SERPL CREATININE-BSD FRML MDRD: 39 ML/MIN/1.73M2
GFR SERPL CREATININE-BSD FRML MDRD: 42 ML/MIN/1.73M2
GFR SERPL CREATININE-BSD FRML MDRD: 45 ML/MIN/1.73M2
GFR SERPL CREATININE-BSD FRML MDRD: 65 ML/MIN/1.73M2
GFR SERPL CREATININE-BSD FRML MDRD: 73 ML/MIN/1.73M2
GFR SERPL CREATININE-BSD FRML MDRD: 73 ML/MIN/1.73M2
GFR SERPL CREATININE-BSD FRML MDRD: 82 ML/MIN/1.73M2
GFR SERPL CREATININE-BSD FRML MDRD: > 90 ML/MIN/1.73M2
GFR, ESTIMATED: 52 ML/MIN/1.73M2
GLUCOSE BLD-MCNC: 100 MG/DL (ref 70–108)
GLUCOSE BLD-MCNC: 101 MG/DL (ref 70–108)
GLUCOSE BLD-MCNC: 104 MG/DL (ref 70–108)
GLUCOSE BLD-MCNC: 105 MG/DL (ref 70–108)
GLUCOSE BLD-MCNC: 107 MG/DL (ref 70–108)
GLUCOSE BLD-MCNC: 107 MG/DL (ref 70–108)
GLUCOSE BLD-MCNC: 110 MG/DL (ref 70–108)
GLUCOSE BLD-MCNC: 111 MG/DL (ref 70–108)
GLUCOSE BLD-MCNC: 112 MG/DL (ref 70–108)
GLUCOSE BLD-MCNC: 113 MG/DL (ref 70–108)
GLUCOSE BLD-MCNC: 113 MG/DL (ref 70–108)
GLUCOSE BLD-MCNC: 114 MG/DL (ref 70–108)
GLUCOSE BLD-MCNC: 116 MG/DL (ref 70–108)
GLUCOSE BLD-MCNC: 117 MG/DL (ref 70–108)
GLUCOSE BLD-MCNC: 118 MG/DL (ref 70–108)
GLUCOSE BLD-MCNC: 119 MG/DL (ref 70–108)
GLUCOSE BLD-MCNC: 120 MG/DL (ref 70–108)
GLUCOSE BLD-MCNC: 120 MG/DL (ref 70–108)
GLUCOSE BLD-MCNC: 121 MG/DL (ref 70–108)
GLUCOSE BLD-MCNC: 121 MG/DL (ref 70–108)
GLUCOSE BLD-MCNC: 122 MG/DL (ref 70–108)
GLUCOSE BLD-MCNC: 125 MG/DL (ref 70–108)
GLUCOSE BLD-MCNC: 127 MG/DL (ref 70–108)
GLUCOSE BLD-MCNC: 128 MG/DL (ref 70–108)
GLUCOSE BLD-MCNC: 128 MG/DL (ref 70–108)
GLUCOSE BLD-MCNC: 130 MG/DL (ref 70–108)
GLUCOSE BLD-MCNC: 132 MG/DL (ref 70–108)
GLUCOSE BLD-MCNC: 133 MG/DL (ref 70–108)
GLUCOSE BLD-MCNC: 134 MG/DL (ref 70–108)
GLUCOSE BLD-MCNC: 135 MG/DL (ref 70–108)
GLUCOSE BLD-MCNC: 135 MG/DL (ref 70–108)
GLUCOSE BLD-MCNC: 136 MG/DL (ref 70–108)
GLUCOSE BLD-MCNC: 137 MG/DL (ref 70–108)
GLUCOSE BLD-MCNC: 137 MG/DL (ref 70–108)
GLUCOSE BLD-MCNC: 139 MG/DL (ref 70–108)
GLUCOSE BLD-MCNC: 140 MG/DL (ref 70–108)
GLUCOSE BLD-MCNC: 141 MG/DL (ref 70–108)
GLUCOSE BLD-MCNC: 142 MG/DL (ref 70–108)
GLUCOSE BLD-MCNC: 142 MG/DL (ref 70–108)
GLUCOSE BLD-MCNC: 144 MG/DL (ref 70–108)
GLUCOSE BLD-MCNC: 145 MG/DL (ref 70–108)
GLUCOSE BLD-MCNC: 146 MG/DL (ref 70–108)
GLUCOSE BLD-MCNC: 147 MG/DL (ref 70–108)
GLUCOSE BLD-MCNC: 149 MG/DL (ref 70–108)
GLUCOSE BLD-MCNC: 149 MG/DL (ref 70–108)
GLUCOSE BLD-MCNC: 150 MG/DL (ref 70–108)
GLUCOSE BLD-MCNC: 151 MG/DL (ref 70–108)
GLUCOSE BLD-MCNC: 153 MG/DL (ref 70–108)
GLUCOSE BLD-MCNC: 155 MG/DL (ref 70–108)
GLUCOSE BLD-MCNC: 155 MG/DL (ref 70–108)
GLUCOSE BLD-MCNC: 156 MG/DL (ref 70–108)
GLUCOSE BLD-MCNC: 157 MG/DL (ref 70–108)
GLUCOSE BLD-MCNC: 158 MG/DL (ref 70–108)
GLUCOSE BLD-MCNC: 159 MG/DL (ref 70–108)
GLUCOSE BLD-MCNC: 160 MG/DL (ref 70–108)
GLUCOSE BLD-MCNC: 160 MG/DL (ref 70–108)
GLUCOSE BLD-MCNC: 161 MG/DL (ref 70–108)
GLUCOSE BLD-MCNC: 163 MG/DL (ref 70–108)
GLUCOSE BLD-MCNC: 163 MG/DL (ref 70–108)
GLUCOSE BLD-MCNC: 164 MG/DL (ref 70–108)
GLUCOSE BLD-MCNC: 166 MG/DL (ref 70–108)
GLUCOSE BLD-MCNC: 166 MG/DL (ref 70–108)
GLUCOSE BLD-MCNC: 168 MG/DL (ref 70–108)
GLUCOSE BLD-MCNC: 169 MG/DL (ref 70–108)
GLUCOSE BLD-MCNC: 169 MG/DL (ref 74–106)
GLUCOSE BLD-MCNC: 171 MG/DL (ref 70–108)
GLUCOSE BLD-MCNC: 171 MG/DL (ref 70–108)
GLUCOSE BLD-MCNC: 172 MG/DL (ref 70–108)
GLUCOSE BLD-MCNC: 175 MG/DL (ref 70–108)
GLUCOSE BLD-MCNC: 175 MG/DL (ref 70–108)
GLUCOSE BLD-MCNC: 177 MG/DL (ref 70–108)
GLUCOSE BLD-MCNC: 179 MG/DL (ref 70–108)
GLUCOSE BLD-MCNC: 180 MG/DL (ref 70–108)
GLUCOSE BLD-MCNC: 182 MG/DL (ref 70–108)
GLUCOSE BLD-MCNC: 183 MG/DL (ref 70–108)
GLUCOSE BLD-MCNC: 184 MG/DL (ref 70–108)
GLUCOSE BLD-MCNC: 184 MG/DL (ref 70–108)
GLUCOSE BLD-MCNC: 186 MG/DL (ref 70–108)
GLUCOSE BLD-MCNC: 187 MG/DL (ref 70–108)
GLUCOSE BLD-MCNC: 189 MG/DL (ref 70–108)
GLUCOSE BLD-MCNC: 190 MG/DL (ref 70–108)
GLUCOSE BLD-MCNC: 191 MG/DL (ref 70–108)
GLUCOSE BLD-MCNC: 193 MG/DL (ref 70–108)
GLUCOSE BLD-MCNC: 193 MG/DL (ref 70–108)
GLUCOSE BLD-MCNC: 194 MG/DL (ref 70–108)
GLUCOSE BLD-MCNC: 196 MG/DL (ref 70–108)
GLUCOSE BLD-MCNC: 196 MG/DL (ref 70–108)
GLUCOSE BLD-MCNC: 197 MG/DL (ref 70–108)
GLUCOSE BLD-MCNC: 198 MG/DL (ref 70–108)
GLUCOSE BLD-MCNC: 199 MG/DL (ref 70–108)
GLUCOSE BLD-MCNC: 200 MG/DL (ref 70–108)
GLUCOSE BLD-MCNC: 200 MG/DL (ref 70–108)
GLUCOSE BLD-MCNC: 201 MG/DL (ref 70–108)
GLUCOSE BLD-MCNC: 201 MG/DL (ref 70–108)
GLUCOSE BLD-MCNC: 202 MG/DL (ref 70–108)
GLUCOSE BLD-MCNC: 202 MG/DL (ref 70–108)
GLUCOSE BLD-MCNC: 203 MG/DL (ref 70–108)
GLUCOSE BLD-MCNC: 204 MG/DL (ref 70–108)
GLUCOSE BLD-MCNC: 204 MG/DL (ref 70–108)
GLUCOSE BLD-MCNC: 207 MG/DL (ref 70–108)
GLUCOSE BLD-MCNC: 208 MG/DL (ref 70–108)
GLUCOSE BLD-MCNC: 209 MG/DL (ref 70–108)
GLUCOSE BLD-MCNC: 209 MG/DL (ref 70–108)
GLUCOSE BLD-MCNC: 210 MG/DL (ref 70–108)
GLUCOSE BLD-MCNC: 211 MG/DL (ref 70–108)
GLUCOSE BLD-MCNC: 213 MG/DL (ref 70–108)
GLUCOSE BLD-MCNC: 213 MG/DL (ref 70–108)
GLUCOSE BLD-MCNC: 214 MG/DL (ref 70–108)
GLUCOSE BLD-MCNC: 215 MG/DL (ref 70–108)
GLUCOSE BLD-MCNC: 216 MG/DL (ref 70–108)
GLUCOSE BLD-MCNC: 217 MG/DL (ref 70–108)
GLUCOSE BLD-MCNC: 219 MG/DL (ref 70–108)
GLUCOSE BLD-MCNC: 220 MG/DL (ref 70–108)
GLUCOSE BLD-MCNC: 221 MG/DL (ref 70–108)
GLUCOSE BLD-MCNC: 222 MG/DL (ref 70–108)
GLUCOSE BLD-MCNC: 224 MG/DL (ref 70–108)
GLUCOSE BLD-MCNC: 224 MG/DL (ref 70–108)
GLUCOSE BLD-MCNC: 225 MG/DL (ref 70–108)
GLUCOSE BLD-MCNC: 226 MG/DL (ref 70–108)
GLUCOSE BLD-MCNC: 230 MG/DL (ref 70–108)
GLUCOSE BLD-MCNC: 233 MG/DL (ref 70–108)
GLUCOSE BLD-MCNC: 234 MG/DL (ref 70–108)
GLUCOSE BLD-MCNC: 236 MG/DL (ref 70–108)
GLUCOSE BLD-MCNC: 237 MG/DL (ref 70–108)
GLUCOSE BLD-MCNC: 237 MG/DL (ref 70–108)
GLUCOSE BLD-MCNC: 239 MG/DL (ref 70–108)
GLUCOSE BLD-MCNC: 239 MG/DL (ref 70–108)
GLUCOSE BLD-MCNC: 240 MG/DL (ref 70–108)
GLUCOSE BLD-MCNC: 243 MG/DL (ref 70–108)
GLUCOSE BLD-MCNC: 245 MG/DL (ref 70–108)
GLUCOSE BLD-MCNC: 250 MG/DL (ref 70–108)
GLUCOSE BLD-MCNC: 252 MG/DL (ref 70–108)
GLUCOSE BLD-MCNC: 254 MG/DL (ref 70–108)
GLUCOSE BLD-MCNC: 255 MG/DL (ref 70–108)
GLUCOSE BLD-MCNC: 256 MG/DL (ref 70–108)
GLUCOSE BLD-MCNC: 257 MG/DL (ref 70–108)
GLUCOSE BLD-MCNC: 258 MG/DL (ref 70–108)
GLUCOSE BLD-MCNC: 261 MG/DL (ref 70–108)
GLUCOSE BLD-MCNC: 266 MG/DL (ref 70–108)
GLUCOSE BLD-MCNC: 266 MG/DL (ref 70–108)
GLUCOSE BLD-MCNC: 274 MG/DL (ref 70–108)
GLUCOSE BLD-MCNC: 274 MG/DL (ref 70–108)
GLUCOSE BLD-MCNC: 283 MG/DL (ref 70–108)
GLUCOSE BLD-MCNC: 287 MG/DL (ref 70–108)
GLUCOSE BLD-MCNC: 287 MG/DL (ref 70–108)
GLUCOSE BLD-MCNC: 298 MG/DL (ref 70–108)
GLUCOSE BLD-MCNC: 314 MG/DL (ref 70–108)
GLUCOSE BLD-MCNC: 395 MG/DL (ref 70–108)
GLUCOSE BLD-MCNC: 51 MG/DL (ref 70–108)
GLUCOSE BLD-MCNC: 59 MG/DL (ref 70–108)
GLUCOSE BLD-MCNC: 67 MG/DL (ref 70–108)
GLUCOSE BLD-MCNC: 71 MG/DL (ref 70–108)
GLUCOSE BLD-MCNC: 74 MG/DL (ref 70–108)
GLUCOSE BLD-MCNC: 75 MG/DL (ref 70–108)
GLUCOSE BLD-MCNC: 75 MG/DL (ref 70–108)
GLUCOSE BLD-MCNC: 76 MG/DL (ref 70–108)
GLUCOSE BLD-MCNC: 76 MG/DL (ref 70–108)
GLUCOSE BLD-MCNC: 78 MG/DL (ref 70–108)
GLUCOSE BLD-MCNC: 79 MG/DL (ref 70–108)
GLUCOSE BLD-MCNC: 80 MG/DL (ref 70–108)
GLUCOSE BLD-MCNC: 81 MG/DL (ref 70–108)
GLUCOSE BLD-MCNC: 85 MG/DL (ref 70–108)
GLUCOSE BLD-MCNC: 92 MG/DL (ref 70–108)
GLUCOSE BLD-MCNC: 98 MG/DL (ref 70–108)
GLUCOSE, FLUID: 157 MG/DL
GLUCOSE, FLUID: 260 MG/DL
GLUCOSE, URINE: 100 MG/DL
GLUCOSE, URINE: 250 MG/DL
GLUCOSE, URINE: NEGATIVE MG/DL
GLUCOSE, WHOLE BLOOD: 109 MG/DL (ref 70–108)
GLUCOSE, WHOLE BLOOD: 112 MG/DL (ref 70–108)
GLUCOSE, WHOLE BLOOD: 115 MG/DL (ref 70–108)
GLUCOSE, WHOLE BLOOD: 119 MG/DL (ref 70–108)
GLUCOSE, WHOLE BLOOD: 133 MG/DL (ref 70–108)
GLUCOSE, WHOLE BLOOD: 152 MG/DL (ref 70–108)
GLUCOSE, WHOLE BLOOD: 172 MG/DL (ref 70–108)
GLUCOSE, WHOLE BLOOD: 174 MG/DL (ref 70–108)
GLUCOSE, WHOLE BLOOD: 192 MG/DL (ref 70–108)
GLUCOSE, WHOLE BLOOD: 208 MG/DL (ref 70–108)
GLUCOSE, WHOLE BLOOD: 211 MG/DL (ref 70–108)
GLUCOSE, WHOLE BLOOD: 231 MG/DL (ref 70–108)
GLUCOSE, WHOLE BLOOD: 56 MG/DL (ref 70–108)
GLUCOSE, WHOLE BLOOD: 60 MG/DL (ref 70–108)
GLUCOSE, WHOLE BLOOD: 63 MG/DL (ref 70–108)
GLUCOSE, WHOLE BLOOD: 65 MG/DL (ref 70–108)
GLUCOSE, WHOLE BLOOD: 80 MG/DL (ref 70–108)
GLUCOSE, WHOLE BLOOD: 98 MG/DL (ref 70–108)
GRAM STAIN RESULT: ABNORMAL
GRAM STAIN RESULT: ABNORMAL
GRAM STAIN RESULT: NORMAL
HAEMOPHILUS INFLUENZAE BY PCR: DETECTED
HAEMOPHILUS INFLUENZAE BY PCR: NOT DETECTED
HAPTOGLOBIN: 443 MG/DL (ref 30–200)
HBA1C MFR BLD: 7.5 % (ref 4.4–6.4)
HCO3, MIXED: 15 MMOL/L (ref 23–28)
HCO3, MIXED: 23 MMOL/L (ref 23–28)
HCO3, MIXED: 26 MMOL/L (ref 23–28)
HCO3: 13 MMOL/L (ref 23–28)
HCO3: 22 MMOL/L (ref 23–28)
HCO3: 24 MMOL/L (ref 23–28)
HCO3: 25 MMOL/L (ref 23–28)
HCO3: 26 MMOL/L (ref 23–28)
HCO3: 27 MMOL/L (ref 23–28)
HCO3: 27 MMOL/L (ref 23–28)
HCO3: 31 MMOL/L (ref 23–28)
HCT VFR BLD CALC: 21.9 % (ref 42–52)
HCT VFR BLD CALC: 23.4 % (ref 42–52)
HCT VFR BLD CALC: 24.5 % (ref 42–52)
HCT VFR BLD CALC: 25.7 % (ref 42–52)
HCT VFR BLD CALC: 26.4 % (ref 42–52)
HCT VFR BLD CALC: 26.7 % (ref 42–52)
HCT VFR BLD CALC: 26.9 % (ref 42–52)
HCT VFR BLD CALC: 27 % (ref 42–52)
HCT VFR BLD CALC: 27.6 % (ref 42–52)
HCT VFR BLD CALC: 27.8 % (ref 42–52)
HCT VFR BLD CALC: 28.3 % (ref 42–52)
HCT VFR BLD CALC: 28.5 % (ref 42–52)
HCT VFR BLD CALC: 28.7 % (ref 42–52)
HCT VFR BLD CALC: 28.8 % (ref 42–52)
HCT VFR BLD CALC: 28.9 % (ref 42–52)
HCT VFR BLD CALC: 29.1 % (ref 42–52)
HCT VFR BLD CALC: 29.4 % (ref 42–52)
HCT VFR BLD CALC: 29.4 % (ref 42–52)
HCT VFR BLD CALC: 29.5 % (ref 42–52)
HCT VFR BLD CALC: 29.6 % (ref 42–52)
HCT VFR BLD CALC: 29.7 % (ref 42–52)
HCT VFR BLD CALC: 29.7 % (ref 42–52)
HCT VFR BLD CALC: 29.8 % (ref 42–52)
HCT VFR BLD CALC: 29.8 % (ref 42–52)
HCT VFR BLD CALC: 30 % (ref 42–52)
HCT VFR BLD CALC: 30.3 % (ref 42–52)
HCT VFR BLD CALC: 30.5 % (ref 42–52)
HCT VFR BLD CALC: 30.6 % (ref 42–52)
HCT VFR BLD CALC: 30.8 % (ref 42–52)
HCT VFR BLD CALC: 31.3 % (ref 42–52)
HCT VFR BLD CALC: 31.6 % (ref 42–52)
HCT VFR BLD CALC: 31.7 % (ref 42–52)
HCT VFR BLD CALC: 31.9 % (ref 42–52)
HCT VFR BLD CALC: 33.5 % (ref 42–52)
HCT VFR BLD CALC: 35.2 % (ref 42–52)
HCT VFR BLD CALC: 35.3 % (ref 42–52)
HCT VFR BLD CALC: 36.4 % (ref 42–52)
HCT VFR BLD CALC: 36.8 % (ref 42–52)
HCT VFR BLD CALC: 36.8 % (ref 42–52)
HCT VFR BLD CALC: 37.5 % (ref 42–52)
HCT VFR BLD CALC: 38.2 % (ref 42–52)
HCT VFR BLD CALC: 38.3 % (ref 42–52)
HCT VFR BLD CALC: 38.9 % (ref 42–52)
HCT VFR BLD CALC: 39.4 % (ref 42–52)
HCT VFR BLD CALC: 39.9 % (ref 42–52)
HCT VFR BLD CALC: 40.4 % (ref 42–52)
HCT VFR BLD CALC: 40.6 % (ref 42–52)
HCT VFR BLD CALC: 44.2 % (ref 42–52)
HEMOGLOBIN: 10 GM/DL (ref 14–18)
HEMOGLOBIN: 10.2 GM/DL (ref 14–18)
HEMOGLOBIN: 10.3 GM/DL (ref 14–18)
HEMOGLOBIN: 11.3 GM/DL (ref 14–18)
HEMOGLOBIN: 11.6 GM/DL (ref 14–18)
HEMOGLOBIN: 11.6 GM/DL (ref 14–18)
HEMOGLOBIN: 11.7 GM/DL (ref 14–18)
HEMOGLOBIN: 12 GM/DL (ref 14–18)
HEMOGLOBIN: 12.1 GM/DL (ref 14–18)
HEMOGLOBIN: 12.2 GM/DL (ref 14–18)
HEMOGLOBIN: 12.2 GM/DL (ref 14–18)
HEMOGLOBIN: 12.8 GM/DL (ref 14–18)
HEMOGLOBIN: 12.9 GM/DL (ref 14–18)
HEMOGLOBIN: 13 GM/DL (ref 14–18)
HEMOGLOBIN: 13.3 GM/DL (ref 14–18)
HEMOGLOBIN: 13.6 GM/DL (ref 14–18)
HEMOGLOBIN: 14.8 GM/DL (ref 14–18)
HEMOGLOBIN: 6.7 GM/DL (ref 14–18)
HEMOGLOBIN: 6.8 GM/DL (ref 14–18)
HEMOGLOBIN: 7.3 GM/DL (ref 14–18)
HEMOGLOBIN: 7.4 GM/DL (ref 14–18)
HEMOGLOBIN: 7.6 GM/DL (ref 14–18)
HEMOGLOBIN: 7.9 GM/DL (ref 14–18)
HEMOGLOBIN: 8.3 GM/DL (ref 14–18)
HEMOGLOBIN: 8.3 GM/DL (ref 14–18)
HEMOGLOBIN: 8.4 GM/DL (ref 14–18)
HEMOGLOBIN: 8.4 GM/DL (ref 14–18)
HEMOGLOBIN: 8.5 GM/DL (ref 14–18)
HEMOGLOBIN: 8.5 GM/DL (ref 14–18)
HEMOGLOBIN: 8.7 GM/DL (ref 14–18)
HEMOGLOBIN: 8.7 GM/DL (ref 14–18)
HEMOGLOBIN: 8.8 GM/DL (ref 14–18)
HEMOGLOBIN: 9 GM/DL (ref 14–18)
HEMOGLOBIN: 9.1 GM/DL (ref 14–18)
HEMOGLOBIN: 9.2 GM/DL (ref 14–18)
HEMOGLOBIN: 9.2 GM/DL (ref 14–18)
HEMOGLOBIN: 9.3 GM/DL (ref 14–18)
HEMOGLOBIN: 9.3 GM/DL (ref 14–18)
HEMOGLOBIN: 9.5 GM/DL (ref 14–18)
HEMOGLOBIN: 9.7 GM/DL (ref 14–18)
HEMOGLOBIN: 9.8 GM/DL (ref 14–18)
HEMOGLOBIN: 9.9 GM/DL (ref 14–18)
HEMOGLOBIN: 9.9 GM/DL (ref 14–18)
HYPOCHROMIA: PRESENT
IFIO2: 100
IFIO2: 50
IFIO2: 65
IFIO2: 65
IFIO2: 90
IMMATURE GRANS (ABS): 0.03 THOU/MM3 (ref 0–0.07)
IMMATURE GRANS (ABS): 0.04 THOU/MM3 (ref 0–0.07)
IMMATURE GRANS (ABS): 0.04 THOU/MM3 (ref 0–0.07)
IMMATURE GRANS (ABS): 0.06 THOU/MM3 (ref 0–0.07)
IMMATURE GRANS (ABS): 0.07 THOU/MM3 (ref 0–0.07)
IMMATURE GRANS (ABS): 0.08 THOU/MM3 (ref 0–0.07)
IMMATURE GRANS (ABS): 0.08 THOU/MM3 (ref 0–0.07)
IMMATURE GRANS (ABS): 0.09 THOU/MM3 (ref 0–0.07)
IMMATURE GRANS (ABS): 0.12 THOU/MM3 (ref 0–0.07)
IMMATURE GRANS (ABS): 0.13 THOU/MM3 (ref 0–0.07)
IMMATURE GRANS (ABS): 0.13 THOU/MM3 (ref 0–0.07)
IMMATURE GRANS (ABS): 0.14 THOU/MM3 (ref 0–0.07)
IMMATURE GRANS (ABS): 0.16 THOU/MM3 (ref 0–0.07)
IMMATURE GRANS (ABS): 0.17 THOU/MM3 (ref 0–0.07)
IMMATURE GRANS (ABS): 0.18 THOU/MM3 (ref 0–0.07)
IMMATURE GRANS (ABS): 0.19 THOU/MM3 (ref 0–0.07)
IMMATURE GRANS (ABS): 0.19 THOU/MM3 (ref 0–0.07)
IMMATURE GRANS (ABS): 0.23 THOU/MM3 (ref 0–0.07)
IMMATURE GRANS (ABS): 0.23 THOU/MM3 (ref 0–0.07)
IMMATURE GRANS (ABS): 0.24 THOU/MM3 (ref 0–0.07)
IMMATURE GRANS (ABS): 0.25 THOU/MM3 (ref 0–0.07)
IMMATURE GRANS (ABS): 0.37 THOU/MM3 (ref 0–0.07)
IMMATURE GRANS (ABS): 1.11 THOU/MM3 (ref 0–0.07)
IMMATURE GRANULOCYTES: 0.4 %
IMMATURE GRANULOCYTES: 0.4 %
IMMATURE GRANULOCYTES: 0.5 %
IMMATURE GRANULOCYTES: 0.6 %
IMMATURE GRANULOCYTES: 0.7 %
IMMATURE GRANULOCYTES: 0.8 %
IMMATURE GRANULOCYTES: 0.9 %
IMMATURE GRANULOCYTES: 0.9 %
IMMATURE GRANULOCYTES: 1 %
IMMATURE GRANULOCYTES: 1.2 %
IMMATURE GRANULOCYTES: 1.3 %
IMMATURE GRANULOCYTES: 1.4 %
IMMATURE GRANULOCYTES: 1.6 %
IMMATURE GRANULOCYTES: 1.7 %
IMMATURE GRANULOCYTES: 1.8 %
IMMATURE GRANULOCYTES: 2.1 %
IMMATURE GRANULOCYTES: 2.5 %
IMMATURE GRANULOCYTES: 4.8 %
INFLUENZA A BY PCR: NOT DETECTED
INFLUENZA B BY PCR: NOT DETECTED
INR BLD: 1.13 (ref 0.85–1.13)
INR BLD: 1.41 (ref 0.85–1.13)
INR BLD: 1.6 (ref 0.85–1.13)
IRON: 20 UG/DL (ref 65–195)
KETONES, URINE: 15
KETONES, URINE: NEGATIVE
KETONES, URINE: NEGATIVE
KLEBSIELLA AEROGENES BY PCR: NOT DETECTED
KLEBSIELLA OXYTOCA BY PCR: NOT DETECTED
KLEBSIELLA PNEUMONIAE GROUP BY PCR: DETECTED
KLEBSIELLA PNEUMONIAE GROUP BY PCR: NOT DETECTED
LACTATE: 2.4 MMOL/L (ref 0.9–1.7)
LACTIC ACID, SEPSIS: 1.4 MMOL/L (ref 0.5–1.9)
LACTIC ACID, SEPSIS: 3.2 MMOL/L (ref 0.5–1.9)
LACTIC ACID, SEPSIS: 6.7 MMOL/L (ref 0.5–1.9)
LACTIC ACID, SEPSIS: 7.5 MMOL/L (ref 0.5–1.9)
LACTIC ACID: 1.3 MMOL/L (ref 0.5–2.2)
LACTIC ACID: 1.4 MMOL/L (ref 0.5–2.2)
LACTIC ACID: 1.6 MMOL/L (ref 0.5–2.2)
LACTIC ACID: 1.7 MMOL/L (ref 0.5–2.2)
LACTIC ACID: 1.9 MMOL/L (ref 0.5–2.2)
LACTIC ACID: 2 MMOL/L (ref 0.5–2.2)
LACTIC ACID: 2.1 MMOL/L (ref 0.5–2.2)
LACTIC ACID: 2.3 MMOL/L (ref 0.5–2.2)
LACTIC ACID: 4.3 MMOL/L (ref 0.5–2.2)
LACTIC ACID: 5.8 MMOL/L (ref 0.5–2.2)
LD, FLUID: 466 U/L
LD, FLUID: 535 U/L
LD: 379 U/L (ref 100–190)
LD: 389 U/L (ref 100–190)
LD: 545 U/L (ref 100–190)
LD: 546 U/L (ref 100–190)
LD: 612 U/L (ref 100–190)
LD: > 2500 U/L (ref 100–190)
LEGIONELLA PNEUMOPHILIA BY PCR: NOT DETECTED
LEUKOCYTE EST, POC: NEGATIVE
LEUKOCYTE ESTERASE, URINE: ABNORMAL
LEUKOCYTE ESTERASE, URINE: NEGATIVE
LV EF: 55 %
LVEF MODALITY: NORMAL
LYMPHOCYTES # BLD: 1.7 %
LYMPHOCYTES # BLD: 2.4 %
LYMPHOCYTES # BLD: 2.5 %
LYMPHOCYTES # BLD: 2.9 %
LYMPHOCYTES # BLD: 3 %
LYMPHOCYTES # BLD: 3.1 %
LYMPHOCYTES # BLD: 3.2 %
LYMPHOCYTES # BLD: 3.3 %
LYMPHOCYTES # BLD: 3.7 %
LYMPHOCYTES # BLD: 3.9 %
LYMPHOCYTES # BLD: 4.1 %
LYMPHOCYTES # BLD: 4.1 %
LYMPHOCYTES # BLD: 4.3 %
LYMPHOCYTES # BLD: 4.3 %
LYMPHOCYTES # BLD: 4.4 %
LYMPHOCYTES # BLD: 4.4 %
LYMPHOCYTES # BLD: 4.5 %
LYMPHOCYTES # BLD: 4.6 %
LYMPHOCYTES # BLD: 4.7 %
LYMPHOCYTES # BLD: 4.7 %
LYMPHOCYTES # BLD: 4.8 %
LYMPHOCYTES # BLD: 4.9 %
LYMPHOCYTES # BLD: 4.9 %
LYMPHOCYTES # BLD: 5 %
LYMPHOCYTES # BLD: 5.1 %
LYMPHOCYTES # BLD: 5.3 %
LYMPHOCYTES # BLD: 5.4 % (ref 15–47)
LYMPHOCYTES # BLD: 6.1 %
LYMPHOCYTES # BLD: 6.4 %
LYMPHOCYTES # BLD: 6.7 %
LYMPHOCYTES # BLD: 6.8 %
LYMPHOCYTES # BLD: 8 %
LYMPHOCYTES # BLD: 8.5 %
LYMPHOCYTES # BLD: 8.9 %
LYMPHOCYTES ABSOLUTE: 0.3 THOU/MM3 (ref 1–4.8)
LYMPHOCYTES ABSOLUTE: 0.4 THOU/MM3 (ref 1–4.8)
LYMPHOCYTES ABSOLUTE: 0.5 THOU/MM3 (ref 1–4.8)
LYMPHOCYTES ABSOLUTE: 0.6 THOU/MM3 (ref 1–4.8)
LYMPHOCYTES ABSOLUTE: 0.7 THOU/MM3 (ref 1–4.8)
LYMPHOCYTES ABSOLUTE: 0.8 THOU/MM3 (ref 1–4.8)
LYMPHOCYTES ABSOLUTE: 0.9 THOU/MM3 (ref 1–4.8)
LYMPHOCYTES ABSOLUTE: 1 THOU/MM3 (ref 1–4.8)
LYMPHOCYTES ABSOLUTE: 1 THOU/MM3 (ref 1–4.8)
LYMPHOCYTES, BAL: 1 % (ref 10–15)
LYMPHOCYTES, BAL: 4 % (ref 10–15)
MACROPHAGE/MONOCYTE BAL: 4 % (ref 86–100)
MACROPHAGE/MONOCYTE BAL: 96 % (ref 86–100)
MAGNESIUM: 1.8 MG/DL (ref 1.6–2.4)
MAGNESIUM: 1.9 MG/DL (ref 1.6–2.4)
MAGNESIUM: 1.9 MG/DL (ref 1.6–2.4)
MAGNESIUM: 2.1 MG/DL (ref 1.6–2.4)
MAGNESIUM: 2.2 MG/DL (ref 1.6–2.4)
MAGNESIUM: 2.3 MG/DL (ref 1.6–2.4)
MAGNESIUM: 2.4 MG/DL (ref 1.6–2.4)
MAGNESIUM: 2.4 MG/DL (ref 1.6–2.4)
MCH RBC QN AUTO: 28.7 PG (ref 26–33)
MCH RBC QN AUTO: 28.7 PG (ref 26–33)
MCH RBC QN AUTO: 28.8 PG (ref 26–33)
MCH RBC QN AUTO: 28.8 PG (ref 26–33)
MCH RBC QN AUTO: 28.9 PG (ref 26–33)
MCH RBC QN AUTO: 29 PG (ref 26–33)
MCH RBC QN AUTO: 29 PG (ref 26–33)
MCH RBC QN AUTO: 29.1 PG (ref 26–33)
MCH RBC QN AUTO: 29.2 PG (ref 26–33)
MCH RBC QN AUTO: 29.2 PG (ref 26–33)
MCH RBC QN AUTO: 29.3 PG (ref 26–33)
MCH RBC QN AUTO: 29.4 PG (ref 26–33)
MCH RBC QN AUTO: 29.4 PG (ref 26–33)
MCH RBC QN AUTO: 29.7 PG (ref 26–33)
MCH RBC QN AUTO: 29.8 PG (ref 26–33)
MCH RBC QN AUTO: 30 PG (ref 26–33)
MCH RBC QN AUTO: 30.4 PG (ref 26–33)
MCH RBC QN AUTO: 30.6 PG (ref 26–33)
MCH RBC QN AUTO: 30.7 PG (ref 26–33)
MCH RBC QN AUTO: 30.7 PG (ref 26–33)
MCH RBC QN AUTO: 30.8 PG (ref 26–33)
MCH RBC QN AUTO: 30.9 PG (ref 26–33)
MCH RBC QN AUTO: 30.9 PG (ref 26–33)
MCH RBC QN AUTO: 31 PG (ref 26–33)
MCH RBC QN AUTO: 31 PG (ref 26–33)
MCH RBC QN AUTO: 31.1 PG (ref 26–33)
MCH RBC QN AUTO: 31.2 PG (ref 26–33)
MCH RBC QN AUTO: 31.2 PG (ref 26–33)
MCH RBC QN AUTO: 31.3 PG (ref 26–33)
MCH RBC QN AUTO: 31.4 PG (ref 26–33)
MCH RBC QN AUTO: 31.7 PG (ref 27–31)
MCH RBC QN AUTO: 32.2 PG (ref 26–33)
MCHC RBC AUTO-ENTMCNC: 27.8 GM/DL (ref 32.2–35.5)
MCHC RBC AUTO-ENTMCNC: 28.1 GM/DL (ref 32.2–35.5)
MCHC RBC AUTO-ENTMCNC: 28.4 GM/DL (ref 32.2–35.5)
MCHC RBC AUTO-ENTMCNC: 28.8 GM/DL (ref 32.2–35.5)
MCHC RBC AUTO-ENTMCNC: 29.2 GM/DL (ref 32.2–35.5)
MCHC RBC AUTO-ENTMCNC: 29.2 GM/DL (ref 32.2–35.5)
MCHC RBC AUTO-ENTMCNC: 29.9 GM/DL (ref 32.2–35.5)
MCHC RBC AUTO-ENTMCNC: 30.2 GM/DL (ref 32.2–35.5)
MCHC RBC AUTO-ENTMCNC: 30.4 GM/DL (ref 32.2–35.5)
MCHC RBC AUTO-ENTMCNC: 30.5 GM/DL (ref 32.2–35.5)
MCHC RBC AUTO-ENTMCNC: 30.6 GM/DL (ref 32.2–35.5)
MCHC RBC AUTO-ENTMCNC: 30.6 GM/DL (ref 32.2–35.5)
MCHC RBC AUTO-ENTMCNC: 30.7 GM/DL (ref 32.2–35.5)
MCHC RBC AUTO-ENTMCNC: 30.8 GM/DL (ref 32.2–35.5)
MCHC RBC AUTO-ENTMCNC: 30.9 GM/DL (ref 32.2–35.5)
MCHC RBC AUTO-ENTMCNC: 31 GM/DL (ref 32.2–35.5)
MCHC RBC AUTO-ENTMCNC: 31 GM/DL (ref 32.2–35.5)
MCHC RBC AUTO-ENTMCNC: 31.1 GM/DL (ref 32.2–35.5)
MCHC RBC AUTO-ENTMCNC: 31.2 GM/DL (ref 32.2–35.5)
MCHC RBC AUTO-ENTMCNC: 31.4 GM/DL (ref 32.2–35.5)
MCHC RBC AUTO-ENTMCNC: 31.4 GM/DL (ref 32.2–35.5)
MCHC RBC AUTO-ENTMCNC: 31.5 GM/DL (ref 32.2–35.5)
MCHC RBC AUTO-ENTMCNC: 31.6 GM/DL (ref 32.2–35.5)
MCHC RBC AUTO-ENTMCNC: 31.8 GM/DL (ref 32.2–35.5)
MCHC RBC AUTO-ENTMCNC: 31.9 GM/DL (ref 32.2–35.5)
MCHC RBC AUTO-ENTMCNC: 32 GM/DL (ref 32.2–35.5)
MCHC RBC AUTO-ENTMCNC: 32 GM/DL (ref 32.2–35.5)
MCHC RBC AUTO-ENTMCNC: 32.1 GM/DL (ref 32.2–35.5)
MCHC RBC AUTO-ENTMCNC: 32.2 GM/DL (ref 32.2–35.5)
MCHC RBC AUTO-ENTMCNC: 32.3 GM/DL (ref 32.2–35.5)
MCHC RBC AUTO-ENTMCNC: 32.4 GM/DL (ref 32.2–35.5)
MCHC RBC AUTO-ENTMCNC: 32.6 GM/DL (ref 32.2–35.5)
MCHC RBC AUTO-ENTMCNC: 32.8 GM/DL (ref 32.2–35.5)
MCHC RBC AUTO-ENTMCNC: 32.9 GM/DL (ref 32.2–35.5)
MCHC RBC AUTO-ENTMCNC: 32.9 GM/DL (ref 32.2–35.5)
MCHC RBC AUTO-ENTMCNC: 33 GM/DL (ref 32.2–35.5)
MCHC RBC AUTO-ENTMCNC: 33 GM/DL (ref 32.2–35.5)
MCHC RBC AUTO-ENTMCNC: 33.2 GM/DL (ref 32.2–35.5)
MCHC RBC AUTO-ENTMCNC: 33.4 GM/DL (ref 33–37)
MCHC RBC AUTO-ENTMCNC: 34.1 GM/DL (ref 32.2–35.5)
MCV RBC AUTO: 101.1 FL (ref 80–94)
MCV RBC AUTO: 101.2 FL (ref 80–94)
MCV RBC AUTO: 103.8 FL (ref 80–94)
MCV RBC AUTO: 104.2 FL (ref 80–94)
MCV RBC AUTO: 93.3 FL (ref 80–94)
MCV RBC AUTO: 93.4 FL (ref 80–94)
MCV RBC AUTO: 93.4 FL (ref 80–94)
MCV RBC AUTO: 93.6 FL (ref 80–94)
MCV RBC AUTO: 93.6 FL (ref 80–94)
MCV RBC AUTO: 93.7 FL (ref 80–94)
MCV RBC AUTO: 94 FL (ref 80–94)
MCV RBC AUTO: 94.3 FL (ref 80–94)
MCV RBC AUTO: 94.4 FL (ref 80–94)
MCV RBC AUTO: 94.4 FL (ref 80–94)
MCV RBC AUTO: 94.8 FL (ref 80–94)
MCV RBC AUTO: 94.9 FL (ref 80–94)
MCV RBC AUTO: 95.1 FL (ref 80–94)
MCV RBC AUTO: 95.1 FL (ref 80–94)
MCV RBC AUTO: 95.3 FL (ref 80–94)
MCV RBC AUTO: 95.5 FL (ref 80–94)
MCV RBC AUTO: 95.7 FL (ref 80–94)
MCV RBC AUTO: 95.8 FL (ref 80–94)
MCV RBC AUTO: 96 FL (ref 80–94)
MCV RBC AUTO: 96.1 FL (ref 80–94)
MCV RBC AUTO: 96.2 FL (ref 80–94)
MCV RBC AUTO: 96.2 FL (ref 80–94)
MCV RBC AUTO: 96.5 FL (ref 80–94)
MCV RBC AUTO: 96.6 FL (ref 80–94)
MCV RBC AUTO: 96.7 FL (ref 80–94)
MCV RBC AUTO: 97.1 FL (ref 80–94)
MCV RBC AUTO: 97.1 FL (ref 80–94)
MCV RBC AUTO: 97.2 FL (ref 80–94)
MCV RBC AUTO: 97.2 FL (ref 80–94)
MCV RBC AUTO: 97.3 FL (ref 80–94)
MCV RBC AUTO: 97.5 FL (ref 80–94)
MCV RBC AUTO: 97.6 FL (ref 80–94)
MCV RBC AUTO: 97.8 FL (ref 80–94)
MCV RBC AUTO: 98.1 FL (ref 80–94)
MCV RBC AUTO: 98.1 FL (ref 80–94)
MCV RBC AUTO: 98.2 FL (ref 80–94)
MCV RBC AUTO: 98.3 FL (ref 80–94)
MCV RBC AUTO: 98.4 FL (ref 80–94)
MCV RBC AUTO: 98.4 FL (ref 80–94)
MCV RBC AUTO: 99.7 FL (ref 80–94)
MESOTHELIAL CELLS BODY FLUID: NORMAL
METAPNEUMOVIRUS BY PCR: NOT DETECTED
MISCELLANEOUS LAB TEST RESULT: ABNORMAL
MISCELLANEOUS LAB TEST RESULT: ABNORMAL
MODE: ABNORMAL
MODE: AC
MODE: AC
MONOCYTES # BLD: 1.4 %
MONOCYTES # BLD: 2.1 %
MONOCYTES # BLD: 2.1 %
MONOCYTES # BLD: 2.3 %
MONOCYTES # BLD: 2.6 %
MONOCYTES # BLD: 2.8 %
MONOCYTES # BLD: 2.9 %
MONOCYTES # BLD: 2.9 %
MONOCYTES # BLD: 3 %
MONOCYTES # BLD: 3.3 %
MONOCYTES # BLD: 3.5 %
MONOCYTES # BLD: 3.6 %
MONOCYTES # BLD: 3.8 %
MONOCYTES # BLD: 3.8 %
MONOCYTES # BLD: 3.9 %
MONOCYTES # BLD: 4.3 %
MONOCYTES # BLD: 4.4 %
MONOCYTES # BLD: 4.5 %
MONOCYTES # BLD: 4.5 %
MONOCYTES # BLD: 4.7 %
MONOCYTES # BLD: 4.8 %
MONOCYTES # BLD: 5 %
MONOCYTES # BLD: 5.2 %
MONOCYTES # BLD: 5.4 %
MONOCYTES # BLD: 5.5 %
MONOCYTES # BLD: 6.1 %
MONOCYTES # BLD: 6.2 %
MONOCYTES # BLD: 6.4 %
MONOCYTES # BLD: 7.1 %
MONOCYTES # BLD: 7.4 %
MONOCYTES ABSOLUTE: 0.2 THOU/MM3 (ref 0.4–1.3)
MONOCYTES ABSOLUTE: 0.3 THOU/MM3 (ref 0.4–1.3)
MONOCYTES ABSOLUTE: 0.4 THOU/MM3 (ref 0.4–1.3)
MONOCYTES ABSOLUTE: 0.5 THOU/MM3 (ref 0.4–1.3)
MONOCYTES ABSOLUTE: 0.6 THOU/MM3 (ref 0.4–1.3)
MONOCYTES ABSOLUTE: 0.7 THOU/MM3 (ref 0.4–1.3)
MONOCYTES ABSOLUTE: 0.8 THOU/MM3 (ref 0.4–1.3)
MONOCYTES ABSOLUTE: 0.9 THOU/MM3 (ref 0.4–1.3)
MONOCYTES ABSOLUTE: 1 THOU/MM3 (ref 0.4–1.3)
MONOCYTES: 5.6 % (ref 0–12)
MONONUCLEAR CELLS BODY FLUID: 22.9 %
MONONUCLEAR CELLS BODY FLUID: 65.4 %
MORAXELLA CATARRHALIS BY PCR: NOT DETECTED
MUCUS: ABNORMAL
MYCOPLASMA PNEUMONIAE BY PCR: NOT DETECTED
NITRITE, URINE: NEGATIVE
NT PRO BNP: 547 PG/ML (ref 0–1800)
NUCLEATED RED BLOOD CELLS: 0 /100 WBC
NUCLEATED RED BLOOD CELLS: 1 /100 WBC
NUCLEATED RED BLOOD CELLS: 3 /100 WBC
NUCLEATED RED BLOOD CELLS: 3 /100 WBC
NUCLEATED RED BLOOD CELLS: 4 /100 WBC
O2 SAT, MIXED: 60 %
O2 SAT, MIXED: 67 %
O2 SAT, MIXED: 73 %
O2 SATURATION: 46 %
O2 SATURATION: 81 %
O2 SATURATION: 84 %
O2 SATURATION: 91 %
O2 SATURATION: 94 %
O2 SATURATION: 95 %
O2 SATURATION: 96 %
O2 SATURATION: 96 %
ORGANISM: ABNORMAL
OSMOLALITY URINE: 319 MOSMOL/KG (ref 250–750)
OSMOLALITY URINE: 540 MOSMOL/KG (ref 250–750)
OSMOLALITY URINE: 663 MOSMOL/KG (ref 250–750)
OSMOLALITY: 277 MOSMOL/KG (ref 275–295)
OSMOLALITY: 282 MOSMOL/KG (ref 275–295)
PARAINFLUENZA VIRUS BY PCR: NOT DETECTED
PATHOLOGIST REVIEW: ABNORMAL
PATHOLOGIST REVIEW: NORMAL
PCO2, MIXED VENOUS: 27 MMHG (ref 41–51)
PCO2, MIXED VENOUS: 44 MMHG (ref 41–51)
PCO2, MIXED VENOUS: 64 MMHG (ref 41–51)
PCO2: 104 MMHG (ref 35–45)
PCO2: 30 MMHG (ref 35–45)
PCO2: 35 MMHG (ref 35–45)
PCO2: 37 MMHG (ref 35–45)
PCO2: 39 MMHG (ref 35–45)
PCO2: 45 MMHG (ref 35–45)
PCO2: 56 MMHG (ref 35–45)
PCO2: 76 MMHG (ref 35–45)
PDW BLD-RTO: 13.2 % (ref 11.5–14.5)
PH BLOOD GAS: 6.98 (ref 7.35–7.45)
PH BLOOD GAS: 7.03 (ref 7.35–7.45)
PH BLOOD GAS: 7.14 (ref 7.35–7.45)
PH BLOOD GAS: 7.34 (ref 7.35–7.45)
PH BLOOD GAS: 7.44 (ref 7.35–7.45)
PH BLOOD GAS: 7.48 (ref 7.35–7.45)
PH BLOOD GAS: 7.49 (ref 7.35–7.45)
PH BLOOD GAS: 7.5 (ref 7.35–7.45)
PH UA: 5 (ref 5–9)
PH UA: 5.5 (ref 5–9)
PH UA: 6 (ref 5–9)
PH, MIXED: 7.21 (ref 7.31–7.41)
PH, MIXED: 7.33 (ref 7.31–7.41)
PH, MIXED: 7.33 (ref 7.31–7.41)
PHOSPHORUS: 1.7 MG/DL (ref 2.4–4.7)
PHOSPHORUS: 1.8 MG/DL (ref 2.4–4.7)
PHOSPHORUS: 1.9 MG/DL (ref 2.4–4.7)
PHOSPHORUS: 14.3 MG/DL (ref 2.4–4.7)
PHOSPHORUS: 2 MG/DL (ref 2.4–4.7)
PHOSPHORUS: 2 MG/DL (ref 2.4–4.7)
PHOSPHORUS: 2.1 MG/DL (ref 2.4–4.7)
PHOSPHORUS: 2.2 MG/DL (ref 2.4–4.7)
PHOSPHORUS: 2.4 MG/DL (ref 2.4–4.7)
PHOSPHORUS: 2.5 MG/DL (ref 2.4–4.7)
PHOSPHORUS: 2.7 MG/DL (ref 2.4–4.7)
PHOSPHORUS: 2.7 MG/DL (ref 2.4–4.7)
PHOSPHORUS: 2.9 MG/DL (ref 2.4–4.7)
PHOSPHORUS: 3 MG/DL (ref 2.4–4.7)
PHOSPHORUS: 3.1 MG/DL (ref 2.4–4.7)
PHOSPHORUS: 3.3 MG/DL (ref 2.4–4.7)
PHOSPHORUS: 4.4 MG/DL (ref 2.4–4.7)
PHOSPHORUS: 6.2 MG/DL (ref 2.4–4.7)
PHOSPHORUS: 8.8 MG/DL (ref 2.4–4.7)
PLATELET # BLD: 151 THOU/MM3 (ref 130–400)
PLATELET # BLD: 163 THOU/MM3 (ref 130–400)
PLATELET # BLD: 168 THOU/MM3 (ref 130–400)
PLATELET # BLD: 173 THOU/MM3 (ref 130–400)
PLATELET # BLD: 174 THOU/MM3 (ref 130–400)
PLATELET # BLD: 178 THOU/MM3 (ref 130–400)
PLATELET # BLD: 187 THOU/MM3 (ref 130–400)
PLATELET # BLD: 193 THOU/MM3 (ref 130–400)
PLATELET # BLD: 194 THOU/MM3 (ref 130–400)
PLATELET # BLD: 220 THOU/MM3 (ref 130–400)
PLATELET # BLD: 222 THOU/MM3 (ref 130–400)
PLATELET # BLD: 235 THOU/MM3 (ref 130–400)
PLATELET # BLD: 240 THOU/MM3 (ref 130–400)
PLATELET # BLD: 242 THOU/MM3 (ref 130–400)
PLATELET # BLD: 242 THOU/MM3 (ref 130–400)
PLATELET # BLD: 243 THOU/MM3 (ref 130–400)
PLATELET # BLD: 259 THOU/MM3 (ref 130–400)
PLATELET # BLD: 269 THOU/MM3 (ref 130–400)
PLATELET # BLD: 274 THOU/MM3 (ref 130–400)
PLATELET # BLD: 276 THOU/MM3 (ref 130–400)
PLATELET # BLD: 283 THOU/MM3 (ref 130–400)
PLATELET # BLD: 283 THOU/MM3 (ref 130–400)
PLATELET # BLD: 286 THOU/MM3 (ref 130–400)
PLATELET # BLD: 289 THOU/MM3 (ref 130–400)
PLATELET # BLD: 294 THOU/MM3 (ref 130–400)
PLATELET # BLD: 296 THOU/MM3 (ref 130–400)
PLATELET # BLD: 298 THOU/MM3 (ref 130–400)
PLATELET # BLD: 304 THOU/MM3 (ref 130–400)
PLATELET # BLD: 306 THOU/MM3 (ref 130–400)
PLATELET # BLD: 313 THOU/MM3 (ref 130–400)
PLATELET # BLD: 319 THOU/MM3 (ref 130–400)
PLATELET # BLD: 333 THOU/MM3 (ref 130–400)
PLATELET # BLD: 334 THOU/MM3 (ref 130–400)
PLATELET # BLD: 338 THOU/MM3 (ref 130–400)
PLATELET # BLD: 338 THOU/MM3 (ref 130–400)
PLATELET # BLD: 339 THOU/MM3 (ref 130–400)
PLATELET # BLD: 348 THOU/MM3 (ref 130–400)
PLATELET # BLD: 353 THOU/MM3 (ref 130–400)
PLATELET # BLD: 353 THOU/MM3 (ref 130–400)
PLATELET # BLD: 360 THOU/MM3 (ref 130–400)
PLATELET # BLD: 366 THOU/MM3 (ref 130–400)
PLATELET # BLD: 367 THOU/MM3 (ref 130–400)
PLATELET # BLD: 383 THOU/MM3 (ref 130–400)
PLATELET # BLD: 401 THOU/MM3 (ref 130–400)
PLATELET # BLD: 404 THOU/MM3 (ref 130–400)
PLATELET # BLD: 437 THOU/MM3 (ref 130–400)
PLATELET # BLD: 472 THOU/MM3 (ref 130–400)
PLATELET # BLD: 534 THOU/MM3 (ref 130–400)
PLATELET # BLD: 549 THOU/MM3 (ref 130–400)
PLATELET # BLD: 561 THOU/MM3 (ref 130–400)
PLATELET # BLD: 575 THOU/MM3 (ref 130–400)
PLATELET # BLD: 663 THOU/MM3 (ref 130–400)
PLATELET ESTIMATE: ABNORMAL
PLATELET ESTIMATE: ADEQUATE
PMV BLD AUTO: 10.1 FL (ref 9.4–12.4)
PMV BLD AUTO: 10.2 FL (ref 9.4–12.4)
PMV BLD AUTO: 10.4 FL (ref 9.4–12.4)
PMV BLD AUTO: 10.4 FL (ref 9.4–12.4)
PMV BLD AUTO: 10.5 FL (ref 9.4–12.4)
PMV BLD AUTO: 7.2 FL (ref 7.4–10.4)
PMV BLD AUTO: 9 FL (ref 9.4–12.4)
PMV BLD AUTO: 9.1 FL (ref 9.4–12.4)
PMV BLD AUTO: 9.2 FL (ref 9.4–12.4)
PMV BLD AUTO: 9.3 FL (ref 9.4–12.4)
PMV BLD AUTO: 9.4 FL (ref 9.4–12.4)
PMV BLD AUTO: 9.5 FL (ref 9.4–12.4)
PMV BLD AUTO: 9.6 FL (ref 9.4–12.4)
PMV BLD AUTO: 9.7 FL (ref 9.4–12.4)
PMV BLD AUTO: 9.8 FL (ref 9.4–12.4)
PMV BLD AUTO: 9.8 FL (ref 9.4–12.4)
PO2 MIXED: 37 MMHG (ref 25–40)
PO2 MIXED: 38 MMHG (ref 25–40)
PO2 MIXED: 40 MMHG (ref 25–40)
PO2: 39 MMHG (ref 71–104)
PO2: 55 MMHG (ref 71–104)
PO2: 66 MMHG (ref 71–104)
PO2: 69 MMHG (ref 71–104)
PO2: 70 MMHG (ref 71–104)
PO2: 72 MMHG (ref 71–104)
PO2: 75 MMHG (ref 71–104)
PO2: 78 MMHG (ref 71–104)
POC CALCIUM: 8.6 MG/DL (ref 8.5–10.1)
POLYMORPHONUCLEAR CELLS BODY FLUID: 34.6 %
POLYMORPHONUCLEAR CELLS BODY FLUID: 77.1 %
POTASSIUM REFLEX MAGNESIUM: 3.7 MEQ/L (ref 3.5–5.2)
POTASSIUM REFLEX MAGNESIUM: 4.1 MEQ/L (ref 3.5–5.2)
POTASSIUM REFLEX MAGNESIUM: 4.5 MEQ/L (ref 3.5–5.2)
POTASSIUM REFLEX MAGNESIUM: 5.8 MEQ/L (ref 3.5–5.2)
POTASSIUM SERPL-SCNC: 2.9 MEQ/L (ref 3.5–5.2)
POTASSIUM SERPL-SCNC: 3.1 MEQ/L (ref 3.5–5.1)
POTASSIUM SERPL-SCNC: 3.3 MEQ/L (ref 3.5–5.2)
POTASSIUM SERPL-SCNC: 3.4 MEQ/L (ref 3.5–5.2)
POTASSIUM SERPL-SCNC: 3.6 MEQ/L (ref 3.5–5.2)
POTASSIUM SERPL-SCNC: 3.6 MEQ/L (ref 3.5–5.2)
POTASSIUM SERPL-SCNC: 3.7 MEQ/L (ref 3.5–5.2)
POTASSIUM SERPL-SCNC: 3.8 MEQ/L (ref 3.5–5.2)
POTASSIUM SERPL-SCNC: 3.9 MEQ/L (ref 3.5–5.2)
POTASSIUM SERPL-SCNC: 4 MEQ/L (ref 3.5–5.2)
POTASSIUM SERPL-SCNC: 4.1 MEQ/L (ref 3.5–5.2)
POTASSIUM SERPL-SCNC: 4.2 MEQ/L (ref 3.5–5.2)
POTASSIUM SERPL-SCNC: 4.3 MEQ/L (ref 3.5–5.2)
POTASSIUM SERPL-SCNC: 4.4 MEQ/L (ref 3.5–5.2)
POTASSIUM SERPL-SCNC: 4.5 MEQ/L (ref 3.5–5.2)
POTASSIUM SERPL-SCNC: 4.6 MEQ/L (ref 3.5–5.2)
POTASSIUM SERPL-SCNC: 4.6 MEQ/L (ref 3.5–5.2)
POTASSIUM SERPL-SCNC: 4.7 MEQ/L (ref 3.5–5.2)
POTASSIUM SERPL-SCNC: 4.7 MEQ/L (ref 3.5–5.2)
POTASSIUM SERPL-SCNC: 4.8 MEQ/L (ref 3.5–5.2)
POTASSIUM SERPL-SCNC: 4.9 MEQ/L (ref 3.5–5.2)
POTASSIUM SERPL-SCNC: 5 MEQ/L (ref 3.5–5.2)
POTASSIUM SERPL-SCNC: 5.1 MEQ/L (ref 3.5–5.2)
POTASSIUM SERPL-SCNC: 5.6 MEQ/L (ref 3.5–5.2)
POTASSIUM SERPL-SCNC: 5.9 MEQ/L (ref 3.5–5.2)
POTASSIUM SERPL-SCNC: 5.9 MEQ/L (ref 3.5–5.2)
POTASSIUM SERPL-SCNC: 6 MEQ/L (ref 3.5–5.2)
POTASSIUM, URINE: 41.7 MEQ/L
POTASSIUM, URINE: 55.5 MEQ/L
PREALBUMIN: < 3 MG/DL (ref 20–40)
PRO-BNP: 2595 PG/ML (ref 0–1800)
PRO-BNP: ABNORMAL PG/ML (ref 0–1800)
PROCALCITONIN: 0.11 NG/ML (ref 0.01–0.09)
PROCALCITONIN: 0.12 NG/ML (ref 0.01–0.09)
PROCALCITONIN: 0.12 NG/ML (ref 0.01–0.09)
PROCALCITONIN: 0.22 NG/ML (ref 0.01–0.09)
PROCALCITONIN: 0.23 NG/ML (ref 0.01–0.09)
PROCALCITONIN: 0.24 NG/ML (ref 0.01–0.09)
PROCALCITONIN: 0.3 NG/ML (ref 0.01–0.09)
PROCALCITONIN: 0.32 NG/ML (ref 0.01–0.09)
PROCALCITONIN: 0.35 NG/ML (ref 0.01–0.09)
PROCALCITONIN: 0.4 NG/ML (ref 0.01–0.09)
PROCALCITONIN: 0.41 NG/ML (ref 0.01–0.09)
PROCALCITONIN: 0.42 NG/ML (ref 0.01–0.09)
PROCALCITONIN: 0.44 NG/ML (ref 0.01–0.09)
PROCALCITONIN: 0.47 NG/ML (ref 0.01–0.09)
PROCALCITONIN: 0.54 NG/ML (ref 0.01–0.09)
PROCALCITONIN: 0.54 NG/ML (ref 0.01–0.09)
PROCALCITONIN: 0.55 NG/ML (ref 0.01–0.09)
PROCALCITONIN: 0.56 NG/ML (ref 0.01–0.09)
PROCALCITONIN: 0.98 NG/ML (ref 0.01–0.09)
PROCALCITONIN: 2.21 NG/ML (ref 0.01–0.09)
PROTEIN FLUID: 2.2 GM/DL
PROTEIN FLUID: 3.6 GM/DL
PROTEIN UA: 100 MG/DL
PROTEIN UA: 30 MG/DL
PROTEIN UA: 30 MG/DL
PROTEUS SPECIES BY PCR: NOT DETECTED
PSEUDOMONAS AERUGINOSA BY PCR: NOT DETECTED
PTH INTACT: 48.2 PG/ML (ref 15–65)
RBC # BLD: 2.25 MILL/MM3 (ref 4.7–6.1)
RBC # BLD: 2.36 MILL/MM3 (ref 4.7–6.1)
RBC # BLD: 2.38 MILL/MM3 (ref 4.7–6.1)
RBC # BLD: 2.54 MILL/MM3 (ref 4.7–6.1)
RBC # BLD: 2.59 MILL/MM3 (ref 4.7–6.1)
RBC # BLD: 2.73 MILL/MM3 (ref 4.7–6.1)
RBC # BLD: 2.75 MILL/MM3 (ref 4.7–6.1)
RBC # BLD: 2.76 MILL/MM3 (ref 4.7–6.1)
RBC # BLD: 2.77 MILL/MM3 (ref 4.7–6.1)
RBC # BLD: 2.83 MILL/MM3 (ref 4.7–6.1)
RBC # BLD: 2.84 MILL/MM3 (ref 4.7–6.1)
RBC # BLD: 2.85 MILL/MM3 (ref 4.7–6.1)
RBC # BLD: 2.89 MILL/MM3 (ref 4.7–6.1)
RBC # BLD: 2.93 MILL/MM3 (ref 4.7–6.1)
RBC # BLD: 2.99 MILL/MM3 (ref 4.7–6.1)
RBC # BLD: 3.04 MILL/MM3 (ref 4.7–6.1)
RBC # BLD: 3.04 MILL/MM3 (ref 4.7–6.1)
RBC # BLD: 3.1 MILL/MM3 (ref 4.7–6.1)
RBC # BLD: 3.11 MILL/MM3 (ref 4.7–6.1)
RBC # BLD: 3.12 MILL/MM3 (ref 4.7–6.1)
RBC # BLD: 3.12 MILL/MM3 (ref 4.7–6.1)
RBC # BLD: 3.13 MILL/MM3 (ref 4.7–6.1)
RBC # BLD: 3.14 MILL/MM3 (ref 4.7–6.1)
RBC # BLD: 3.14 MILL/MM3 (ref 4.7–6.1)
RBC # BLD: 3.15 MILL/MM3 (ref 4.7–6.1)
RBC # BLD: 3.15 MILL/MM3 (ref 4.7–6.1)
RBC # BLD: 3.17 MILL/MM3 (ref 4.7–6.1)
RBC # BLD: 3.18 MILL/MM3 (ref 4.7–6.1)
RBC # BLD: 3.19 MILL/MM3 (ref 4.7–6.1)
RBC # BLD: 3.21 MILL/MM3 (ref 4.7–6.1)
RBC # BLD: 3.27 MILL/MM3 (ref 4.7–6.1)
RBC # BLD: 3.28 MILL/MM3 (ref 4.7–6.1)
RBC # BLD: 3.34 MILL/MM3 (ref 4.7–6.1)
RBC # BLD: 3.49 MILL/MM3 (ref 4.7–6.1)
RBC # BLD: 3.66 MILL/MM3 (ref 4.7–6.1)
RBC # BLD: 3.71 MILL/MM3 (ref 4.7–6.1)
RBC # BLD: 3.71 MILL/MM3 (ref 4.7–6.1)
RBC # BLD: 3.77 MILL/MM3 (ref 4.7–6.1)
RBC # BLD: 3.86 MILL/MM3 (ref 4.7–6.1)
RBC # BLD: 3.88 MILL/MM3 (ref 4.7–6.1)
RBC # BLD: 3.95 MILL/MM3 (ref 4.7–6.1)
RBC # BLD: 3.98 MILL/MM3 (ref 4.7–6.1)
RBC # BLD: 4.12 MILL/MM3 (ref 4.7–6.1)
RBC # BLD: 4.15 MILL/MM3 (ref 4.7–6.1)
RBC # BLD: 4.18 MILL/MM3 (ref 4.7–6.1)
RBC # BLD: 4.23 MILL/MM3 (ref 4.7–6.1)
RBC # BLD: 4.24 MILL/MM3 (ref 4.7–6.1)
RBC # BLD: 4.66 MILL/MM3 (ref 4.7–6.1)
RBC BAL: 210 /CUMM
RBC BAL: 60 /CUMM
RBC URINE: ABNORMAL /HPF
REASON FOR REJECTION: NORMAL
REASON FOR REJECTION: NORMAL
REJECTED TEST: NORMAL
REJECTED TEST: NORMAL
RENAL EPITHELIAL, UA: ABNORMAL
RESISTANT GENE CTX-M BY PCR: ABNORMAL
RESISTANT GENE CTX-M BY PCR: NORMAL
RESISTANT GENE CTX-M BY PCR: NORMAL
RESISTANT GENE CTX-M BY PCR: NOT DETECTED
RESISTANT GENE IMP BY PCR: ABNORMAL
RESISTANT GENE IMP BY PCR: NORMAL
RESISTANT GENE IMP BY PCR: NORMAL
RESISTANT GENE IMP BY PCR: NOT DETECTED
RESISTANT GENE KPC BY PCR: ABNORMAL
RESISTANT GENE KPC BY PCR: NORMAL
RESISTANT GENE KPC BY PCR: NORMAL
RESISTANT GENE KPC BY PCR: NOT DETECTED
RESISTANT GENE MECA/C & MREJ BY PCR: ABNORMAL
RESISTANT GENE MECA/C & MREJ BY PCR: DETECTED
RESISTANT GENE MECA/C & MREJ BY PCR: NORMAL
RESISTANT GENE MECA/C & MREJ BY PCR: NORMAL
RESISTANT GENE NDM BY PCR: ABNORMAL
RESISTANT GENE NDM BY PCR: NORMAL
RESISTANT GENE NDM BY PCR: NORMAL
RESISTANT GENE NDM BY PCR: NOT DETECTED
RESISTANT GENE OXA-48-LIKE BY PCR: ABNORMAL
RESISTANT GENE OXA-48-LIKE BY PCR: NORMAL
RESISTANT GENE OXA-48-LIKE BY PCR: NORMAL
RESISTANT GENE OXA-48-LIKE BY PCR: NOT DETECTED
RESISTANT GENE VIM BY PCR: ABNORMAL
RESISTANT GENE VIM BY PCR: NORMAL
RESISTANT GENE VIM BY PCR: NORMAL
RESISTANT GENE VIM BY PCR: NOT DETECTED
RESPIRATORY CULTURE: ABNORMAL
RESPIRATORY CULTURE: NORMAL
RESPIRATORY CULTURE: NORMAL
RESPIRATORY SYNCYTIAL VIRUS BY PCR: NOT DETECTED
RH FACTOR: NORMAL
RHINOVIRUS ENTEROVIRUS PCR: NOT DETECTED
SARS-COV-2, NAAT: NOT DETECTED
SARS-COV-2: POSITIVE
SCAN OF BLOOD SMEAR: NORMAL
SEG NEUTROPHILS: 79.4 %
SEG NEUTROPHILS: 79.5 %
SEG NEUTROPHILS: 80.8 %
SEG NEUTROPHILS: 81.5 %
SEG NEUTROPHILS: 81.7 %
SEG NEUTROPHILS: 82.7 %
SEG NEUTROPHILS: 82.9 %
SEG NEUTROPHILS: 83.9 %
SEG NEUTROPHILS: 87.6 %
SEG NEUTROPHILS: 87.6 %
SEG NEUTROPHILS: 88.3 %
SEG NEUTROPHILS: 88.5 %
SEG NEUTROPHILS: 88.9 %
SEG NEUTROPHILS: 89.1 %
SEG NEUTROPHILS: 89.2 %
SEG NEUTROPHILS: 89.5 %
SEG NEUTROPHILS: 89.6 %
SEG NEUTROPHILS: 89.7 %
SEG NEUTROPHILS: 89.9 %
SEG NEUTROPHILS: 89.9 %
SEG NEUTROPHILS: 90.2 %
SEG NEUTROPHILS: 90.3 %
SEG NEUTROPHILS: 90.7 %
SEG NEUTROPHILS: 91.5 %
SEG NEUTROPHILS: 91.7 %
SEG NEUTROPHILS: 91.8 %
SEG NEUTROPHILS: 92.1 %
SEG NEUTROPHILS: 92.5 %
SEG NEUTROPHILS: 92.6 %
SEG NEUTROPHILS: 92.7 %
SEG NEUTROPHILS: 93.2 %
SEG NEUTROPHILS: 93.4 %
SEG NEUTROPHILS: 94.7 %
SEGMENTED NEUTROPHILS ABSOLUTE COUNT: 10 THOU/MM3 (ref 1.8–7.7)
SEGMENTED NEUTROPHILS ABSOLUTE COUNT: 10.3 THOU/MM3 (ref 1.8–7.7)
SEGMENTED NEUTROPHILS ABSOLUTE COUNT: 11.2 THOU/MM3 (ref 1.8–7.7)
SEGMENTED NEUTROPHILS ABSOLUTE COUNT: 11.5 THOU/MM3 (ref 1.8–7.7)
SEGMENTED NEUTROPHILS ABSOLUTE COUNT: 11.5 THOU/MM3 (ref 1.8–7.7)
SEGMENTED NEUTROPHILS ABSOLUTE COUNT: 12.7 THOU/MM3 (ref 1.8–7.7)
SEGMENTED NEUTROPHILS ABSOLUTE COUNT: 12.7 THOU/MM3 (ref 1.8–7.7)
SEGMENTED NEUTROPHILS ABSOLUTE COUNT: 13.1 THOU/MM3 (ref 1.8–7.7)
SEGMENTED NEUTROPHILS ABSOLUTE COUNT: 13.2 THOU/MM3 (ref 1.8–7.7)
SEGMENTED NEUTROPHILS ABSOLUTE COUNT: 13.2 THOU/MM3 (ref 1.8–7.7)
SEGMENTED NEUTROPHILS ABSOLUTE COUNT: 13.5 THOU/MM3 (ref 1.8–7.7)
SEGMENTED NEUTROPHILS ABSOLUTE COUNT: 15.6 THOU/MM3 (ref 1.8–7.7)
SEGMENTED NEUTROPHILS ABSOLUTE COUNT: 17.4 THOU/MM3 (ref 1.8–7.7)
SEGMENTED NEUTROPHILS ABSOLUTE COUNT: 17.8 THOU/MM3 (ref 1.8–7.7)
SEGMENTED NEUTROPHILS ABSOLUTE COUNT: 18.5 THOU/MM3 (ref 1.8–7.7)
SEGMENTED NEUTROPHILS ABSOLUTE COUNT: 18.8 THOU/MM3 (ref 1.8–7.7)
SEGMENTED NEUTROPHILS ABSOLUTE COUNT: 20.6 THOU/MM3 (ref 1.8–7.7)
SEGMENTED NEUTROPHILS ABSOLUTE COUNT: 21.1 THOU/MM3 (ref 1.8–7.7)
SEGMENTED NEUTROPHILS ABSOLUTE COUNT: 6.4 THOU/MM3 (ref 1.8–7.7)
SEGMENTED NEUTROPHILS ABSOLUTE COUNT: 6.8 THOU/MM3 (ref 1.8–7.7)
SEGMENTED NEUTROPHILS ABSOLUTE COUNT: 6.9 THOU/MM3 (ref 1.8–7.7)
SEGMENTED NEUTROPHILS ABSOLUTE COUNT: 7.1 THOU/MM3 (ref 1.8–7.7)
SEGMENTED NEUTROPHILS ABSOLUTE COUNT: 7.2 THOU/MM3 (ref 1.8–7.7)
SEGMENTED NEUTROPHILS ABSOLUTE COUNT: 7.4 THOU/MM3 (ref 1.8–7.7)
SEGMENTED NEUTROPHILS ABSOLUTE COUNT: 7.7 THOU/MM3 (ref 1.8–7.7)
SEGMENTED NEUTROPHILS ABSOLUTE COUNT: 8 THOU/MM3 (ref 1.8–7.7)
SEGMENTED NEUTROPHILS ABSOLUTE COUNT: 8.4 THOU/MM3 (ref 1.8–7.7)
SEGMENTED NEUTROPHILS ABSOLUTE COUNT: 8.5 THOU/MM3 (ref 1.8–7.7)
SEGMENTED NEUTROPHILS ABSOLUTE COUNT: 8.8 THOU/MM3 (ref 1.8–7.7)
SEGMENTED NEUTROPHILS ABSOLUTE COUNT: 9.1 THOU/MM3 (ref 1.8–7.7)
SEGMENTED NEUTROPHILS ABSOLUTE COUNT: 9.2 THOU/MM3 (ref 1.8–7.7)
SEGMENTED NEUTROPHILS ABSOLUTE COUNT: 9.4 THOU/MM3 (ref 1.8–7.7)
SEGMENTED NEUTROPHILS ABSOLUTE COUNT: 9.4 THOU/MM3 (ref 1.8–7.7)
SEGMENTED NEUTROPHILS ABSOLUTE COUNT: 9.6 THOU/MM3 (ref 1.8–7.7)
SEGMENTED NEUTROPHILS ABSOLUTE COUNT: 9.7 THOU/MM3 (ref 1.8–7.7)
SEGMENTED NEUTROPHILS, BAL: 95 % (ref 0–3)
SEGS: 87.7 % (ref 43–75)
SERRATIA MARCESCENS BY PCR: NOT DETECTED
SET PEEP: 0 MMHG
SET PEEP: 14 MMHG
SET PEEP: 6 MMHG
SET PRESS SUPP: 10 CMH2O
SET PRESS SUPP: 22 CMH2O
SET PRESS SUPP: 29 CMH2O
SET RESPIRATORY RATE: 12 BPM
SET RESPIRATORY RATE: 18 BPM
SET RESPIRATORY RATE: 25 BPM
SET TIDAL VOLUME: 400 ML
SITE: ABNORMAL
SODIUM BLD-SCNC: 126 MEQ/L (ref 135–145)
SODIUM BLD-SCNC: 126 MEQ/L (ref 135–145)
SODIUM BLD-SCNC: 127 MEQ/L (ref 135–145)
SODIUM BLD-SCNC: 128 MEQ/L (ref 135–145)
SODIUM BLD-SCNC: 129 MEQ/L (ref 135–145)
SODIUM BLD-SCNC: 130 MEQ/L (ref 135–145)
SODIUM BLD-SCNC: 131 MEQ/L (ref 135–145)
SODIUM BLD-SCNC: 132 MEQ/L (ref 135–145)
SODIUM BLD-SCNC: 132 MEQ/L (ref 136–145)
SODIUM BLD-SCNC: 133 MEQ/L (ref 135–145)
SODIUM BLD-SCNC: 134 MEQ/L (ref 135–145)
SODIUM BLD-SCNC: 135 MEQ/L (ref 135–145)
SODIUM BLD-SCNC: 136 MEQ/L (ref 135–145)
SODIUM BLD-SCNC: 136 MEQ/L (ref 135–145)
SODIUM BLD-SCNC: 137 MEQ/L (ref 135–145)
SODIUM BLD-SCNC: 138 MEQ/L (ref 135–145)
SODIUM BLD-SCNC: 139 MEQ/L (ref 135–145)
SODIUM BLD-SCNC: 139 MEQ/L (ref 135–145)
SODIUM BLD-SCNC: 140 MEQ/L (ref 135–145)
SODIUM BLD-SCNC: 140 MEQ/L (ref 135–145)
SODIUM BLD-SCNC: 141 MEQ/L (ref 135–145)
SODIUM BLD-SCNC: 142 MEQ/L (ref 135–145)
SODIUM BLD-SCNC: 143 MEQ/L (ref 135–145)
SODIUM BLD-SCNC: 143 MEQ/L (ref 135–145)
SODIUM BLD-SCNC: 144 MEQ/L (ref 135–145)
SODIUM BLD-SCNC: 145 MEQ/L (ref 135–145)
SODIUM BLD-SCNC: 145 MEQ/L (ref 135–145)
SODIUM BLD-SCNC: 148 MEQ/L (ref 135–145)
SODIUM URINE: 25 MEQ/L
SODIUM URINE: 29 MEQ/L
SODIUM URINE: < 20 MEQ/L
SOURCE, BLOOD GAS: ABNORMAL
SOURCE, BLOOD GAS: NORMAL
SOURCE: ABNORMAL
SOURCE: ABNORMAL
SOURCE: NORMAL
SOURCE: NORMAL
SPECIFIC GRAVITY UA: 1.02 (ref 1–1.03)
SPECIFIC GRAVITY UA: 1.02 (ref 1–1.03)
SPECIFIC GRAVITY UA: 1.03 (ref 1–1.03)
SPECIMEN ACCEPTABILITY: ABNORMAL
SPECIMEN ACCEPTABILITY: ABNORMAL
SPECIMEN ACCEPTABILITY: NORMAL
SPECIMEN ACCEPTABILITY: NORMAL
SPECIMEN: ABNORMAL
SPECIMEN: NORMAL
SPECIMEN: NORMAL
STAPH AUREUS BY PCR: DETECTED
STAPH AUREUS BY PCR: NOT DETECTED
STREP AGALACTIAE BY PCR: NOT DETECTED
STREP PNEUMONIAE BY PCR: NOT DETECTED
STREP PYOGENES BY PCR: NOT DETECTED
TOTAL CK: 113 U/L (ref 55–170)
TOTAL IRON BINDING CAPACITY: 100 UG/DL (ref 171–450)
TOTAL NUCLEATED CELLS BAL: 1660 /CUMM
TOTAL NUCLEATED CELLS BAL: 8410 /CUMM
TOTAL NUCLEATED CELLS BODY FLUID: 3087 /CUMM (ref 0–500)
TOTAL NUCLEATED CELLS BODY FLUID: 379 /CUMM (ref 0–500)
TOTAL PROTEIN: 4.9 G/DL (ref 6.1–8)
TOTAL PROTEIN: 5.1 G/DL (ref 6.1–8)
TOTAL PROTEIN: 5.2 G/DL (ref 6.1–8)
TOTAL PROTEIN: 5.3 G/DL (ref 6.1–8)
TOTAL PROTEIN: 5.3 G/DL (ref 6.1–8)
TOTAL PROTEIN: 5.4 G/DL (ref 6.1–8)
TOTAL PROTEIN: 5.5 G/DL (ref 6.1–8)
TOTAL PROTEIN: 5.6 G/DL (ref 6.1–8)
TOTAL PROTEIN: 5.7 G/DL (ref 6.1–8)
TOTAL PROTEIN: 5.7 G/DL (ref 6.1–8)
TOTAL PROTEIN: 5.8 G/DL (ref 6.1–8)
TOTAL PROTEIN: 6 G/DL (ref 6.1–8)
TOTAL PROTEIN: 6.1 G/DL (ref 6.1–8)
TOTAL PROTEIN: 6.2 G/DL (ref 6.1–8)
TOTAL PROTEIN: 6.3 G/DL (ref 6.1–8)
TOTAL PROTEIN: 6.5 G/DL (ref 6.1–8)
TOTAL PROTEIN: 7 G/DL (ref 6.1–8)
TOTAL PROTEIN: 7.3 GM/DL (ref 6.4–8.2)
TOTAL VOLUME RECEIVED BAL: ABNORMAL ML
TOTAL VOLUME RECEIVED BAL: ABNORMAL ML
TOTAL VOLUME RECEIVED BODY FLUID: 2 ML
TOTAL VOLUME RECEIVED BODY FLUID: 8 ML
TRANSFERRIN: 63 MG/DL (ref 200–360)
TROPONIN T: 0.05 NG/ML
TROPONIN T: < 0.01 NG/ML
TSH SERPL DL<=0.05 MIU/L-ACNC: 1.71 UIU/ML (ref 0.4–4.2)
URIC ACID: 2.7 MG/DL (ref 3.7–7)
URINE CULTURE, ROUTINE: ABNORMAL
URINE CULTURE, ROUTINE: NORMAL
UROBILINOGEN, URINE: 0.2 EU/DL (ref 0–1)
UROBILINOGEN, URINE: 1 EU/DL (ref 0–1)
UROBILINOGEN, URINE: 1 EU/DL (ref 0–1)
VANCOMYCIN TROUGH: 12 UG/ML (ref 5–15)
VANCOMYCIN TROUGH: 22.6 UG/ML (ref 5–15)
VANCOMYCIN TROUGH: 29.2 UG/ML (ref 5–15)
VITAMIN B-12: 895 PG/ML (ref 211–911)
VITAMIN D 25-HYDROXY: 47 NG/ML (ref 30–100)
WBC # BLD: 10 THOU/MM3 (ref 4.8–10.8)
WBC # BLD: 10.1 THOU/MM3 (ref 4.8–10.8)
WBC # BLD: 10.1 THOU/MM3 (ref 4.8–10.8)
WBC # BLD: 10.2 THOU/MM3 (ref 4.8–10.8)
WBC # BLD: 10.4 THOU/MM3 (ref 4.8–10.8)
WBC # BLD: 10.5 THOU/MM3 (ref 4.8–10.8)
WBC # BLD: 10.6 THOU/MM3 (ref 4.8–10.8)
WBC # BLD: 10.7 THOU/MM3 (ref 4.8–10.8)
WBC # BLD: 11.1 THOU/MM3 (ref 4.8–10.8)
WBC # BLD: 11.5 THOU/MM3 (ref 4.8–10.8)
WBC # BLD: 11.8 THOU/MM3 (ref 4.8–10.8)
WBC # BLD: 12.3 THOU/MM3 (ref 4.8–10.8)
WBC # BLD: 12.6 THOU/MM3 (ref 4.8–10.8)
WBC # BLD: 12.8 THOU/MM3 (ref 4.8–10.8)
WBC # BLD: 12.8 THOU/MM3 (ref 4.8–10.8)
WBC # BLD: 13.2 THOU/MM3 (ref 4.8–10.8)
WBC # BLD: 13.4 THOU/MM3 (ref 4.8–10.8)
WBC # BLD: 13.9 THOU/MM3 (ref 4.8–10.8)
WBC # BLD: 14.1 THOU/MM3 (ref 4.8–10.8)
WBC # BLD: 14.7 THOU/MM3 (ref 4.8–10.8)
WBC # BLD: 14.7 THOU/MM3 (ref 4.8–10.8)
WBC # BLD: 15.2 THOU/MM3 (ref 4.8–10.8)
WBC # BLD: 15.3 THOU/MM3 (ref 4.8–10.8)
WBC # BLD: 16.8 THOU/MM3 (ref 4.8–10.8)
WBC # BLD: 17.5 THOU/MM3 (ref 4.8–10.8)
WBC # BLD: 18.9 THOU/MM3 (ref 4.8–10.8)
WBC # BLD: 19 THOU/MM3 (ref 4.8–10.8)
WBC # BLD: 20 THOU/MM3 (ref 4.8–10.8)
WBC # BLD: 20.1 THOU/MM3 (ref 4.8–10.8)
WBC # BLD: 20.2 THOU/MM3 (ref 4.8–10.8)
WBC # BLD: 20.3 THOU/MM3 (ref 4.8–10.8)
WBC # BLD: 20.4 THOU/MM3 (ref 4.8–10.8)
WBC # BLD: 20.8 THOU/MM3 (ref 4.8–10.8)
WBC # BLD: 21.3 THOU/MM3 (ref 4.8–10.8)
WBC # BLD: 21.8 THOU/MM3 (ref 4.8–10.8)
WBC # BLD: 22.2 THOU/MM3 (ref 4.8–10.8)
WBC # BLD: 22.7 THOU/MM3 (ref 4.8–10.8)
WBC # BLD: 23 THOU/MM3 (ref 4.8–10.8)
WBC # BLD: 23.6 THOU/MM3 (ref 4.8–10.8)
WBC # BLD: 23.9 THOU/MM3 (ref 4.8–10.8)
WBC # BLD: 25.4 THOU/MM3 (ref 4.8–10.8)
WBC # BLD: 27 THOU/MM3 (ref 4.8–10.8)
WBC # BLD: 7 THOU/MM3 (ref 4.8–10.8)
WBC # BLD: 7.7 THOU/MM3 (ref 4.8–10.8)
WBC # BLD: 7.7 THOU/MM3 (ref 4.8–10.8)
WBC # BLD: 8.2 THOU/MM3 (ref 4.8–10.8)
WBC # BLD: 8.2 THOU/MM3 (ref 4.8–10.8)
WBC # BLD: 8.7 THOU/MM3 (ref 4.8–10.8)
WBC # BLD: 9 THOU/MM3 (ref 4.8–10.8)
WBC # BLD: 9.5 THOU/MM3 (ref 4.8–10.8)
WBC # BLD: 9.9 THOU/MM3 (ref 4.8–10.8)
WBC # BLD: 9.9 THOU/MM3 (ref 4.8–10.8)
WBC UA: ABNORMAL /HPF
YEAST: ABNORMAL

## 2020-01-01 PROCEDURE — 6360000002 HC RX W HCPCS: Performed by: STUDENT IN AN ORGANIZED HEALTH CARE EDUCATION/TRAINING PROGRAM

## 2020-01-01 PROCEDURE — 2500000003 HC RX 250 WO HCPCS: Performed by: NURSE PRACTITIONER

## 2020-01-01 PROCEDURE — 32551 INSERTION OF CHEST TUBE: CPT | Performed by: INTERNAL MEDICINE

## 2020-01-01 PROCEDURE — 71250 CT THORAX DX C-: CPT

## 2020-01-01 PROCEDURE — 94660 CPAP INITIATION&MGMT: CPT

## 2020-01-01 PROCEDURE — 85018 HEMOGLOBIN: CPT

## 2020-01-01 PROCEDURE — 6370000000 HC RX 637 (ALT 250 FOR IP): Performed by: NURSE PRACTITIONER

## 2020-01-01 PROCEDURE — 6360000002 HC RX W HCPCS: Performed by: NURSE PRACTITIONER

## 2020-01-01 PROCEDURE — 6360000002 HC RX W HCPCS

## 2020-01-01 PROCEDURE — 2500000003 HC RX 250 WO HCPCS: Performed by: INTERNAL MEDICINE

## 2020-01-01 PROCEDURE — 2580000003 HC RX 258: Performed by: NURSE PRACTITIONER

## 2020-01-01 PROCEDURE — 71045 X-RAY EXAM CHEST 1 VIEW: CPT

## 2020-01-01 PROCEDURE — 83605 ASSAY OF LACTIC ACID: CPT

## 2020-01-01 PROCEDURE — 85610 PROTHROMBIN TIME: CPT

## 2020-01-01 PROCEDURE — 83615 LACTATE (LD) (LDH) ENZYME: CPT

## 2020-01-01 PROCEDURE — 87075 CULTR BACTERIA EXCEPT BLOOD: CPT

## 2020-01-01 PROCEDURE — 2580000003 HC RX 258: Performed by: FAMILY MEDICINE

## 2020-01-01 PROCEDURE — 2580000003 HC RX 258: Performed by: INTERNAL MEDICINE

## 2020-01-01 PROCEDURE — 6370000000 HC RX 637 (ALT 250 FOR IP): Performed by: PSYCHIATRY & NEUROLOGY

## 2020-01-01 PROCEDURE — 85025 COMPLETE CBC W/AUTO DIFF WBC: CPT

## 2020-01-01 PROCEDURE — 36556 INSERT NON-TUNNEL CV CATH: CPT | Performed by: INTERNAL MEDICINE

## 2020-01-01 PROCEDURE — 6370000000 HC RX 637 (ALT 250 FOR IP): Performed by: STUDENT IN AN ORGANIZED HEALTH CARE EDUCATION/TRAINING PROGRAM

## 2020-01-01 PROCEDURE — 70498 CT ANGIOGRAPHY NECK: CPT

## 2020-01-01 PROCEDURE — 94770 HC ETCO2 MONITOR DAILY: CPT

## 2020-01-01 PROCEDURE — 2580000003 HC RX 258: Performed by: STUDENT IN AN ORGANIZED HEALTH CARE EDUCATION/TRAINING PROGRAM

## 2020-01-01 PROCEDURE — 6360000002 HC RX W HCPCS: Performed by: INTERNAL MEDICINE

## 2020-01-01 PROCEDURE — 87186 SC STD MICRODIL/AGAR DIL: CPT

## 2020-01-01 PROCEDURE — 70496 CT ANGIOGRAPHY HEAD: CPT

## 2020-01-01 PROCEDURE — 36568 INSJ PICC <5 YR W/O IMAGING: CPT

## 2020-01-01 PROCEDURE — 93010 ELECTROCARDIOGRAM REPORT: CPT | Performed by: NUCLEAR MEDICINE

## 2020-01-01 PROCEDURE — 80048 BASIC METABOLIC PNL TOTAL CA: CPT

## 2020-01-01 PROCEDURE — 84300 ASSAY OF URINE SODIUM: CPT

## 2020-01-01 PROCEDURE — 0BH17EZ INSERTION OF ENDOTRACHEAL AIRWAY INTO TRACHEA, VIA NATURAL OR ARTIFICIAL OPENING: ICD-10-PCS | Performed by: INTERNAL MEDICINE

## 2020-01-01 PROCEDURE — 99291 CRITICAL CARE FIRST HOUR: CPT | Performed by: INTERNAL MEDICINE

## 2020-01-01 PROCEDURE — 93005 ELECTROCARDIOGRAM TRACING: CPT | Performed by: INTERNAL MEDICINE

## 2020-01-01 PROCEDURE — 87798 DETECT AGENT NOS DNA AMP: CPT

## 2020-01-01 PROCEDURE — 82248 BILIRUBIN DIRECT: CPT

## 2020-01-01 PROCEDURE — 36415 COLL VENOUS BLD VENIPUNCTURE: CPT

## 2020-01-01 PROCEDURE — 36620 INSERTION CATHETER ARTERY: CPT

## 2020-01-01 PROCEDURE — 2700000000 HC OXYGEN THERAPY PER DAY

## 2020-01-01 PROCEDURE — 84157 ASSAY OF PROTEIN OTHER: CPT

## 2020-01-01 PROCEDURE — 94761 N-INVAS EAR/PLS OXIMETRY MLT: CPT

## 2020-01-01 PROCEDURE — 82945 GLUCOSE OTHER FLUID: CPT

## 2020-01-01 PROCEDURE — 94003 VENT MGMT INPAT SUBQ DAY: CPT

## 2020-01-01 PROCEDURE — 82948 REAGENT STRIP/BLOOD GLUCOSE: CPT

## 2020-01-01 PROCEDURE — 94640 AIRWAY INHALATION TREATMENT: CPT

## 2020-01-01 PROCEDURE — 94002 VENT MGMT INPAT INIT DAY: CPT

## 2020-01-01 PROCEDURE — 32561 LYSE CHEST FIBRIN INIT DAY: CPT | Performed by: INTERNAL MEDICINE

## 2020-01-01 PROCEDURE — 83540 ASSAY OF IRON: CPT

## 2020-01-01 PROCEDURE — 2580000003 HC RX 258: Performed by: HOSPITALIST

## 2020-01-01 PROCEDURE — C9113 INJ PANTOPRAZOLE SODIUM, VIA: HCPCS | Performed by: FAMILY MEDICINE

## 2020-01-01 PROCEDURE — 99232 SBSQ HOSP IP/OBS MODERATE 35: CPT | Performed by: INTERNAL MEDICINE

## 2020-01-01 PROCEDURE — 2500000003 HC RX 250 WO HCPCS: Performed by: STUDENT IN AN ORGANIZED HEALTH CARE EDUCATION/TRAINING PROGRAM

## 2020-01-01 PROCEDURE — 85014 HEMATOCRIT: CPT

## 2020-01-01 PROCEDURE — 6360000002 HC RX W HCPCS: Performed by: FAMILY MEDICINE

## 2020-01-01 PROCEDURE — 2100000000 HC CCU R&B

## 2020-01-01 PROCEDURE — 82330 ASSAY OF CALCIUM: CPT

## 2020-01-01 PROCEDURE — 36600 WITHDRAWAL OF ARTERIAL BLOOD: CPT

## 2020-01-01 PROCEDURE — 83735 ASSAY OF MAGNESIUM: CPT

## 2020-01-01 PROCEDURE — 90945 DIALYSIS ONE EVALUATION: CPT | Performed by: INTERNAL MEDICINE

## 2020-01-01 PROCEDURE — G0008 ADMIN INFLUENZA VIRUS VAC: HCPCS | Performed by: INTERNAL MEDICINE

## 2020-01-01 PROCEDURE — 85027 COMPLETE CBC AUTOMATED: CPT

## 2020-01-01 PROCEDURE — 84100 ASSAY OF PHOSPHORUS: CPT

## 2020-01-01 PROCEDURE — 88112 CYTOPATH CELL ENHANCE TECH: CPT

## 2020-01-01 PROCEDURE — 84295 ASSAY OF SERUM SODIUM: CPT

## 2020-01-01 PROCEDURE — P9016 RBC LEUKOCYTES REDUCED: HCPCS

## 2020-01-01 PROCEDURE — 2580000003 HC RX 258: Performed by: PHYSICIAN ASSISTANT

## 2020-01-01 PROCEDURE — 6370000000 HC RX 637 (ALT 250 FOR IP): Performed by: HOSPITALIST

## 2020-01-01 PROCEDURE — 99223 1ST HOSP IP/OBS HIGH 75: CPT | Performed by: INTERNAL MEDICINE

## 2020-01-01 PROCEDURE — 89051 BODY FLUID CELL COUNT: CPT

## 2020-01-01 PROCEDURE — 93010 ELECTROCARDIOGRAM REPORT: CPT | Performed by: INTERNAL MEDICINE

## 2020-01-01 PROCEDURE — 84145 PROCALCITONIN (PCT): CPT

## 2020-01-01 PROCEDURE — 86901 BLOOD TYPING SEROLOGIC RH(D): CPT

## 2020-01-01 PROCEDURE — 36592 COLLECT BLOOD FROM PICC: CPT

## 2020-01-01 PROCEDURE — 93880 EXTRACRANIAL BILAT STUDY: CPT

## 2020-01-01 PROCEDURE — 83880 ASSAY OF NATRIURETIC PEPTIDE: CPT

## 2020-01-01 PROCEDURE — 70450 CT HEAD/BRAIN W/O DYE: CPT

## 2020-01-01 PROCEDURE — 94669 MECHANICAL CHEST WALL OSCILL: CPT

## 2020-01-01 PROCEDURE — 82310 ASSAY OF CALCIUM: CPT

## 2020-01-01 PROCEDURE — 0W9B30Z DRAINAGE OF LEFT PLEURAL CAVITY WITH DRAINAGE DEVICE, PERCUTANEOUS APPROACH: ICD-10-PCS | Performed by: INTERNAL MEDICINE

## 2020-01-01 PROCEDURE — 2500000003 HC RX 250 WO HCPCS: Performed by: HOSPITALIST

## 2020-01-01 PROCEDURE — 99284 EMERGENCY DEPT VISIT MOD MDM: CPT

## 2020-01-01 PROCEDURE — 86900 BLOOD TYPING SEROLOGIC ABO: CPT

## 2020-01-01 PROCEDURE — 80053 COMPREHEN METABOLIC PANEL: CPT

## 2020-01-01 PROCEDURE — 6360000002 HC RX W HCPCS: Performed by: HOSPITALIST

## 2020-01-01 PROCEDURE — 76937 US GUIDE VASCULAR ACCESS: CPT

## 2020-01-01 PROCEDURE — 87150 DNA/RNA AMPLIFIED PROBE: CPT

## 2020-01-01 PROCEDURE — 87147 CULTURE TYPE IMMUNOLOGIC: CPT

## 2020-01-01 PROCEDURE — 99233 SBSQ HOSP IP/OBS HIGH 50: CPT | Performed by: INTERNAL MEDICINE

## 2020-01-01 PROCEDURE — 2500000003 HC RX 250 WO HCPCS: Performed by: PHYSICIAN ASSISTANT

## 2020-01-01 PROCEDURE — 82803 BLOOD GASES ANY COMBINATION: CPT

## 2020-01-01 PROCEDURE — U0002 COVID-19 LAB TEST NON-CDC: HCPCS

## 2020-01-01 PROCEDURE — 87070 CULTURE OTHR SPECIMN AEROBIC: CPT

## 2020-01-01 PROCEDURE — 82947 ASSAY GLUCOSE BLOOD QUANT: CPT

## 2020-01-01 PROCEDURE — 82533 TOTAL CORTISOL: CPT

## 2020-01-01 PROCEDURE — 97110 THERAPEUTIC EXERCISES: CPT

## 2020-01-01 PROCEDURE — 6370000000 HC RX 637 (ALT 250 FOR IP)

## 2020-01-01 PROCEDURE — 82728 ASSAY OF FERRITIN: CPT

## 2020-01-01 PROCEDURE — 89050 BODY FLUID CELL COUNT: CPT

## 2020-01-01 PROCEDURE — 86140 C-REACTIVE PROTEIN: CPT

## 2020-01-01 PROCEDURE — 84484 ASSAY OF TROPONIN QUANT: CPT

## 2020-01-01 PROCEDURE — 74176 CT ABD & PELVIS W/O CONTRAST: CPT

## 2020-01-01 PROCEDURE — 3609013300 HC EGD TUBE PLACEMENT: Performed by: SURGERY

## 2020-01-01 PROCEDURE — 99233 SBSQ HOSP IP/OBS HIGH 50: CPT | Performed by: PSYCHIATRY & NEUROLOGY

## 2020-01-01 PROCEDURE — 86850 RBC ANTIBODY SCREEN: CPT

## 2020-01-01 PROCEDURE — 82746 ASSAY OF FOLIC ACID SERUM: CPT

## 2020-01-01 PROCEDURE — P9059 PLASMA, FRZ BETWEEN 8-24HOUR: HCPCS

## 2020-01-01 PROCEDURE — 90945 DIALYSIS ONE EVALUATION: CPT

## 2020-01-01 PROCEDURE — 2140000000 HC CCU INTERMEDIATE R&B

## 2020-01-01 PROCEDURE — 87086 URINE CULTURE/COLONY COUNT: CPT

## 2020-01-01 PROCEDURE — 87486 CHLMYD PNEUM DNA AMP PROBE: CPT

## 2020-01-01 PROCEDURE — 88305 TISSUE EXAM BY PATHOLOGIST: CPT

## 2020-01-01 PROCEDURE — 97162 PT EVAL MOD COMPLEX 30 MIN: CPT

## 2020-01-01 PROCEDURE — 51798 US URINE CAPACITY MEASURE: CPT

## 2020-01-01 PROCEDURE — 2500000003 HC RX 250 WO HCPCS

## 2020-01-01 PROCEDURE — 87040 BLOOD CULTURE FOR BACTERIA: CPT

## 2020-01-01 PROCEDURE — 2500000003 HC RX 250 WO HCPCS: Performed by: FAMILY MEDICINE

## 2020-01-01 PROCEDURE — 93970 EXTREMITY STUDY: CPT

## 2020-01-01 PROCEDURE — 83935 ASSAY OF URINE OSMOLALITY: CPT

## 2020-01-01 PROCEDURE — 87631 RESP VIRUS 3-5 TARGETS: CPT

## 2020-01-01 PROCEDURE — 99223 1ST HOSP IP/OBS HIGH 75: CPT | Performed by: PSYCHIATRY & NEUROLOGY

## 2020-01-01 PROCEDURE — 85730 THROMBOPLASTIN TIME PARTIAL: CPT

## 2020-01-01 PROCEDURE — 81001 URINALYSIS AUTO W/SCOPE: CPT

## 2020-01-01 PROCEDURE — 6370000000 HC RX 637 (ALT 250 FOR IP): Performed by: INTERNAL MEDICINE

## 2020-01-01 PROCEDURE — 93307 TTE W/O DOPPLER COMPLETE: CPT

## 2020-01-01 PROCEDURE — 93005 ELECTROCARDIOGRAM TRACING: CPT | Performed by: FAMILY MEDICINE

## 2020-01-01 PROCEDURE — 87541 LEGION PNEUMO DNA AMP PROB: CPT

## 2020-01-01 PROCEDURE — 84466 ASSAY OF TRANSFERRIN: CPT

## 2020-01-01 PROCEDURE — 6370000000 HC RX 637 (ALT 250 FOR IP): Performed by: EMERGENCY MEDICINE

## 2020-01-01 PROCEDURE — 51702 INSERT TEMP BLADDER CATH: CPT

## 2020-01-01 PROCEDURE — 0BNK3ZZ RELEASE RIGHT LUNG, PERCUTANEOUS APPROACH: ICD-10-PCS | Performed by: INTERNAL MEDICINE

## 2020-01-01 PROCEDURE — 83930 ASSAY OF BLOOD OSMOLALITY: CPT

## 2020-01-01 PROCEDURE — 99222 1ST HOSP IP/OBS MODERATE 55: CPT | Performed by: INTERNAL MEDICINE

## 2020-01-01 PROCEDURE — 0W9930Z DRAINAGE OF RIGHT PLEURAL CAVITY WITH DRAINAGE DEVICE, PERCUTANEOUS APPROACH: ICD-10-PCS | Performed by: INTERNAL MEDICINE

## 2020-01-01 PROCEDURE — 31500 INSERT EMERGENCY AIRWAY: CPT | Performed by: NURSE PRACTITIONER

## 2020-01-01 PROCEDURE — 0B9F8ZX DRAINAGE OF RIGHT LOWER LUNG LOBE, VIA NATURAL OR ARTIFICIAL OPENING ENDOSCOPIC, DIAGNOSTIC: ICD-10-PCS | Performed by: INTERNAL MEDICINE

## 2020-01-01 PROCEDURE — 87077 CULTURE AEROBIC IDENTIFY: CPT

## 2020-01-01 PROCEDURE — 99221 1ST HOSP IP/OBS SF/LOW 40: CPT | Performed by: SURGERY

## 2020-01-01 PROCEDURE — 2060000000 HC ICU INTERMEDIATE R&B

## 2020-01-01 PROCEDURE — 82088 ASSAY OF ALDOSTERONE: CPT

## 2020-01-01 PROCEDURE — 87581 M.PNEUMON DNA AMP PROBE: CPT

## 2020-01-01 PROCEDURE — 89190 NASAL SMEAR FOR EOSINOPHILS: CPT

## 2020-01-01 PROCEDURE — 82436 ASSAY OF URINE CHLORIDE: CPT

## 2020-01-01 PROCEDURE — 84132 ASSAY OF SERUM POTASSIUM: CPT

## 2020-01-01 PROCEDURE — 02HV33Z INSERTION OF INFUSION DEVICE INTO SUPERIOR VENA CAVA, PERCUTANEOUS APPROACH: ICD-10-PCS | Performed by: INTERNAL MEDICINE

## 2020-01-01 PROCEDURE — 70551 MRI BRAIN STEM W/O DYE: CPT

## 2020-01-01 PROCEDURE — 84443 ASSAY THYROID STIM HORMONE: CPT

## 2020-01-01 PROCEDURE — 85385 FIBRINOGEN ANTIGEN: CPT

## 2020-01-01 PROCEDURE — 99291 CRITICAL CARE FIRST HOUR: CPT | Performed by: PSYCHIATRY & NEUROLOGY

## 2020-01-01 PROCEDURE — 6360000004 HC RX CONTRAST MEDICATION: Performed by: SURGERY

## 2020-01-01 PROCEDURE — 92526 ORAL FUNCTION THERAPY: CPT

## 2020-01-01 PROCEDURE — 99239 HOSP IP/OBS DSCHRG MGMT >30: CPT | Performed by: NURSE PRACTITIONER

## 2020-01-01 PROCEDURE — 84155 ASSAY OF PROTEIN SERUM: CPT

## 2020-01-01 PROCEDURE — 87205 SMEAR GRAM STAIN: CPT

## 2020-01-01 PROCEDURE — 36556 INSERT NON-TUNNEL CV CATH: CPT | Performed by: NURSE PRACTITIONER

## 2020-01-01 PROCEDURE — 32551 INSERTION OF CHEST TUBE: CPT

## 2020-01-01 PROCEDURE — 71260 CT THORAX DX C+: CPT

## 2020-01-01 PROCEDURE — 0WH933Z INSERTION OF INFUSION DEVICE INTO RIGHT PLEURAL CAVITY, PERCUTANEOUS APPROACH: ICD-10-PCS | Performed by: INTERNAL MEDICINE

## 2020-01-01 PROCEDURE — 80202 ASSAY OF VANCOMYCIN: CPT

## 2020-01-01 PROCEDURE — 99232 SBSQ HOSP IP/OBS MODERATE 35: CPT | Performed by: FAMILY MEDICINE

## 2020-01-01 PROCEDURE — 2580000003 HC RX 258: Performed by: EMERGENCY MEDICINE

## 2020-01-01 PROCEDURE — 6360000004 HC RX CONTRAST MEDICATION: Performed by: NURSE PRACTITIONER

## 2020-01-01 PROCEDURE — 84133 ASSAY OF URINE POTASSIUM: CPT

## 2020-01-01 PROCEDURE — 85379 FIBRIN DEGRADATION QUANT: CPT

## 2020-01-01 PROCEDURE — 31624 DX BRONCHOSCOPE/LAVAGE: CPT | Performed by: INTERNAL MEDICINE

## 2020-01-01 PROCEDURE — 74018 RADEX ABDOMEN 1 VIEW: CPT

## 2020-01-01 PROCEDURE — 93005 ELECTROCARDIOGRAM TRACING: CPT | Performed by: NURSE PRACTITIONER

## 2020-01-01 PROCEDURE — 99451 NTRPROF PH1/NTRNET/EHR 5/>: CPT | Performed by: INTERNAL MEDICINE

## 2020-01-01 PROCEDURE — 97167 OT EVAL HIGH COMPLEX 60 MIN: CPT

## 2020-01-01 PROCEDURE — 87116 MYCOBACTERIA CULTURE: CPT

## 2020-01-01 PROCEDURE — 84244 ASSAY OF RENIN: CPT

## 2020-01-01 PROCEDURE — 97530 THERAPEUTIC ACTIVITIES: CPT

## 2020-01-01 PROCEDURE — 96374 THER/PROPH/DIAG INJ IV PUSH: CPT

## 2020-01-01 PROCEDURE — 49465 FLUORO EXAM OF G/COLON TUBE: CPT

## 2020-01-01 PROCEDURE — 36620 INSERTION CATHETER ARTERY: CPT | Performed by: NURSE PRACTITIONER

## 2020-01-01 PROCEDURE — 6360000002 HC RX W HCPCS: Performed by: PHARMACIST

## 2020-01-01 PROCEDURE — 6360000004 HC RX CONTRAST MEDICATION: Performed by: STUDENT IN AN ORGANIZED HEALTH CARE EDUCATION/TRAINING PROGRAM

## 2020-01-01 PROCEDURE — 74177 CT ABD & PELVIS W/CONTRAST: CPT

## 2020-01-01 PROCEDURE — 82306 VITAMIN D 25 HYDROXY: CPT

## 2020-01-01 PROCEDURE — 6360000002 HC RX W HCPCS: Performed by: EMERGENCY MEDICINE

## 2020-01-01 PROCEDURE — 37799 UNLISTED PX VASCULAR SURGERY: CPT

## 2020-01-01 PROCEDURE — 84134 ASSAY OF PREALBUMIN: CPT

## 2020-01-01 PROCEDURE — 84550 ASSAY OF BLOOD/URIC ACID: CPT

## 2020-01-01 PROCEDURE — 99291 CRITICAL CARE FIRST HOUR: CPT | Performed by: NURSE PRACTITIONER

## 2020-01-01 PROCEDURE — 0DH68UZ INSERTION OF FEEDING DEVICE INTO STOMACH, VIA NATURAL OR ARTIFICIAL OPENING ENDOSCOPIC: ICD-10-PCS | Performed by: SURGERY

## 2020-01-01 PROCEDURE — 3E0G76Z INTRODUCTION OF NUTRITIONAL SUBSTANCE INTO UPPER GI, VIA NATURAL OR ARTIFICIAL OPENING: ICD-10-PCS | Performed by: SURGERY

## 2020-01-01 PROCEDURE — 83970 ASSAY OF PARATHORMONE: CPT

## 2020-01-01 PROCEDURE — 82570 ASSAY OF URINE CREATININE: CPT

## 2020-01-01 PROCEDURE — 86923 COMPATIBILITY TEST ELECTRIC: CPT

## 2020-01-01 PROCEDURE — 99223 1ST HOSP IP/OBS HIGH 75: CPT | Performed by: HOSPITALIST

## 2020-01-01 PROCEDURE — 83036 HEMOGLOBIN GLYCOSYLATED A1C: CPT

## 2020-01-01 PROCEDURE — 83010 ASSAY OF HAPTOGLOBIN QUANT: CPT

## 2020-01-01 PROCEDURE — 93306 TTE W/DOPPLER COMPLETE: CPT

## 2020-01-01 PROCEDURE — P9047 ALBUMIN (HUMAN), 25%, 50ML: HCPCS | Performed by: STUDENT IN AN ORGANIZED HEALTH CARE EDUCATION/TRAINING PROGRAM

## 2020-01-01 PROCEDURE — 93005 ELECTROCARDIOGRAM TRACING: CPT | Performed by: STUDENT IN AN ORGANIZED HEALTH CARE EDUCATION/TRAINING PROGRAM

## 2020-01-01 PROCEDURE — 36430 TRANSFUSION BLD/BLD COMPNT: CPT

## 2020-01-01 PROCEDURE — 2709999900 HC NON-CHARGEABLE SUPPLY

## 2020-01-01 PROCEDURE — 36584 COMPL RPLCMT PICC RS&I: CPT | Performed by: INTERNAL MEDICINE

## 2020-01-01 PROCEDURE — 82607 VITAMIN B-12: CPT

## 2020-01-01 PROCEDURE — 2720000010 HC SURG SUPPLY STERILE: Performed by: SURGERY

## 2020-01-01 PROCEDURE — 31720 CLEARANCE OF AIRWAYS: CPT

## 2020-01-01 PROCEDURE — 6360000004 HC RX CONTRAST MEDICATION: Performed by: INTERNAL MEDICINE

## 2020-01-01 PROCEDURE — 82550 ASSAY OF CK (CPK): CPT

## 2020-01-01 PROCEDURE — 97164 PT RE-EVAL EST PLAN CARE: CPT

## 2020-01-01 PROCEDURE — 90686 IIV4 VACC NO PRSV 0.5 ML IM: CPT | Performed by: INTERNAL MEDICINE

## 2020-01-01 PROCEDURE — 5A1955Z RESPIRATORY VENTILATION, GREATER THAN 96 CONSECUTIVE HOURS: ICD-10-PCS | Performed by: INTERNAL MEDICINE

## 2020-01-01 PROCEDURE — APPNB60 APP NON BILLABLE TIME 46-60 MINS: Performed by: NURSE PRACTITIONER

## 2020-01-01 PROCEDURE — 92610 EVALUATE SWALLOWING FUNCTION: CPT

## 2020-01-01 PROCEDURE — 89220 SPUTUM SPECIMEN COLLECTION: CPT

## 2020-01-01 PROCEDURE — C1751 CATH, INF, PER/CENT/MIDLINE: HCPCS

## 2020-01-01 PROCEDURE — 2709999900 HC NON-CHARGEABLE SUPPLY: Performed by: SURGERY

## 2020-01-01 PROCEDURE — 83550 IRON BINDING TEST: CPT

## 2020-01-01 PROCEDURE — 2720000010 HC SURG SUPPLY STERILE

## 2020-01-01 RX ORDER — ASPIRIN 81 MG/1
81 TABLET, CHEWABLE ORAL DAILY
Status: DISCONTINUED | OUTPATIENT
Start: 2020-01-01 | End: 2020-01-01

## 2020-01-01 RX ORDER — ONDANSETRON 2 MG/ML
4 INJECTION INTRAMUSCULAR; INTRAVENOUS EVERY 6 HOURS PRN
Status: DISCONTINUED | OUTPATIENT
Start: 2020-01-01 | End: 2020-01-01 | Stop reason: SDUPTHER

## 2020-01-01 RX ORDER — LEVOFLOXACIN 5 MG/ML
500 INJECTION, SOLUTION INTRAVENOUS EVERY 24 HOURS
Status: DISCONTINUED | OUTPATIENT
Start: 2020-01-01 | End: 2020-01-01

## 2020-01-01 RX ORDER — SODIUM CHLORIDE 9 MG/ML
INJECTION, SOLUTION INTRAVENOUS CONTINUOUS
Status: DISCONTINUED | OUTPATIENT
Start: 2020-01-01 | End: 2020-01-01

## 2020-01-01 RX ORDER — MIDAZOLAM HYDROCHLORIDE 1 MG/ML
1 INJECTION INTRAMUSCULAR; INTRAVENOUS
Status: DISCONTINUED | OUTPATIENT
Start: 2020-01-01 | End: 2020-01-01

## 2020-01-01 RX ORDER — MAGNESIUM SULFATE HEPTAHYDRATE 40 MG/ML
2000 INJECTION, SOLUTION INTRAVENOUS ONCE
Status: COMPLETED | OUTPATIENT
Start: 2020-01-01 | End: 2020-01-01

## 2020-01-01 RX ORDER — GLIMEPIRIDE 4 MG/1
4 TABLET ORAL
Status: DISCONTINUED | OUTPATIENT
Start: 2020-01-01 | End: 2020-01-01

## 2020-01-01 RX ORDER — DEXAMETHASONE SODIUM PHOSPHATE 4 MG/ML
4 INJECTION, SOLUTION INTRA-ARTICULAR; INTRALESIONAL; INTRAMUSCULAR; INTRAVENOUS; SOFT TISSUE EVERY 24 HOURS
Status: DISCONTINUED | OUTPATIENT
Start: 2020-01-01 | End: 2020-01-01

## 2020-01-01 RX ORDER — MIDAZOLAM HYDROCHLORIDE 1 MG/ML
2 INJECTION INTRAMUSCULAR; INTRAVENOUS ONCE
Status: COMPLETED | OUTPATIENT
Start: 2020-01-01 | End: 2020-01-01

## 2020-01-01 RX ORDER — PROMETHAZINE HYDROCHLORIDE 25 MG/1
12.5 TABLET ORAL EVERY 6 HOURS PRN
Status: DISCONTINUED | OUTPATIENT
Start: 2020-01-01 | End: 2020-01-01

## 2020-01-01 RX ORDER — POTASSIUM CHLORIDE 29.8 MG/ML
20 INJECTION INTRAVENOUS PRN
Status: DISCONTINUED | OUTPATIENT
Start: 2020-01-01 | End: 2020-01-01

## 2020-01-01 RX ORDER — FUROSEMIDE 10 MG/ML
40 INJECTION INTRAMUSCULAR; INTRAVENOUS 2 TIMES DAILY
Status: DISCONTINUED | OUTPATIENT
Start: 2020-01-01 | End: 2020-01-01

## 2020-01-01 RX ORDER — ETOMIDATE 2 MG/ML
INJECTION INTRAVENOUS
Status: COMPLETED | OUTPATIENT
Start: 2020-01-01 | End: 2020-01-01

## 2020-01-01 RX ORDER — 0.9 % SODIUM CHLORIDE 0.9 %
1000 INTRAVENOUS SOLUTION INTRAVENOUS ONCE
Status: COMPLETED | OUTPATIENT
Start: 2020-01-01 | End: 2020-01-01

## 2020-01-01 RX ORDER — MAGNESIUM SULFATE IN WATER 40 MG/ML
2 INJECTION, SOLUTION INTRAVENOUS PRN
Status: DISCONTINUED | OUTPATIENT
Start: 2020-01-01 | End: 2020-01-01

## 2020-01-01 RX ORDER — FENTANYL CITRATE 50 UG/ML
INJECTION, SOLUTION INTRAMUSCULAR; INTRAVENOUS
Status: DISPENSED
Start: 2020-01-01 | End: 2020-01-01

## 2020-01-01 RX ORDER — ACETAMINOPHEN 500 MG
1000 TABLET ORAL ONCE
Status: COMPLETED | OUTPATIENT
Start: 2020-01-01 | End: 2020-01-01

## 2020-01-01 RX ORDER — ACETAMINOPHEN 650 MG/1
650 SUPPOSITORY RECTAL EVERY 6 HOURS PRN
Status: DISCONTINUED | OUTPATIENT
Start: 2020-01-01 | End: 2020-01-01 | Stop reason: SDUPTHER

## 2020-01-01 RX ORDER — ATORVASTATIN CALCIUM 10 MG/1
10 TABLET, FILM COATED ORAL NIGHTLY
Status: DISCONTINUED | OUTPATIENT
Start: 2020-01-01 | End: 2020-01-01

## 2020-01-01 RX ORDER — CISATRACURIUM BESYLATE 2 MG/ML
INJECTION, SOLUTION INTRAVENOUS
Status: COMPLETED | OUTPATIENT
Start: 2020-01-01 | End: 2020-01-01

## 2020-01-01 RX ORDER — DEXTROSE MONOHYDRATE 25 G/50ML
12.5 INJECTION, SOLUTION INTRAVENOUS
Status: DISCONTINUED | OUTPATIENT
Start: 2020-01-01 | End: 2020-01-01 | Stop reason: SDUPTHER

## 2020-01-01 RX ORDER — PROPOFOL 10 MG/ML
10 INJECTION, EMULSION INTRAVENOUS
Status: DISCONTINUED | OUTPATIENT
Start: 2020-01-01 | End: 2020-01-01

## 2020-01-01 RX ORDER — CHLORHEXIDINE GLUCONATE 0.12 MG/ML
15 RINSE ORAL 2 TIMES DAILY
Status: DISCONTINUED | OUTPATIENT
Start: 2020-01-01 | End: 2020-01-01

## 2020-01-01 RX ORDER — SODIUM CHLORIDE 0.9 % (FLUSH) 0.9 %
10 SYRINGE (ML) INJECTION EVERY 12 HOURS SCHEDULED
Status: DISCONTINUED | OUTPATIENT
Start: 2020-01-01 | End: 2020-01-01 | Stop reason: HOSPADM

## 2020-01-01 RX ORDER — ALBUMIN (HUMAN) 12.5 G/50ML
25 SOLUTION INTRAVENOUS EVERY 6 HOURS
Status: DISCONTINUED | OUTPATIENT
Start: 2020-01-01 | End: 2020-01-01

## 2020-01-01 RX ORDER — FENTANYL CITRATE 50 UG/ML
50 INJECTION, SOLUTION INTRAMUSCULAR; INTRAVENOUS ONCE
Status: COMPLETED | OUTPATIENT
Start: 2020-01-01 | End: 2020-01-01

## 2020-01-01 RX ORDER — MIDAZOLAM HYDROCHLORIDE 1 MG/ML
INJECTION INTRAMUSCULAR; INTRAVENOUS
Status: DISPENSED
Start: 2020-01-01 | End: 2020-01-01

## 2020-01-01 RX ORDER — FENTANYL CITRATE 50 UG/ML
INJECTION, SOLUTION INTRAMUSCULAR; INTRAVENOUS
Status: COMPLETED | OUTPATIENT
Start: 2020-01-01 | End: 2020-01-01

## 2020-01-01 RX ORDER — SODIUM CHLORIDE 9 MG/ML
INJECTION, SOLUTION INTRAVENOUS ONCE
Status: COMPLETED | OUTPATIENT
Start: 2020-01-01 | End: 2020-01-01

## 2020-01-01 RX ORDER — MIDAZOLAM HYDROCHLORIDE 1 MG/ML
INJECTION INTRAMUSCULAR; INTRAVENOUS
Status: COMPLETED | OUTPATIENT
Start: 2020-01-01 | End: 2020-01-01

## 2020-01-01 RX ORDER — DEXMEDETOMIDINE HYDROCHLORIDE 4 UG/ML
0.2 INJECTION, SOLUTION INTRAVENOUS CONTINUOUS
Status: DISPENSED | OUTPATIENT
Start: 2020-01-01 | End: 2020-01-01

## 2020-01-01 RX ORDER — DEXAMETHASONE SODIUM PHOSPHATE 4 MG/ML
6 INJECTION, SOLUTION INTRA-ARTICULAR; INTRALESIONAL; INTRAMUSCULAR; INTRAVENOUS; SOFT TISSUE EVERY 24 HOURS
Status: DISCONTINUED | OUTPATIENT
Start: 2020-01-01 | End: 2020-01-01

## 2020-01-01 RX ORDER — FUROSEMIDE 10 MG/ML
20 INJECTION INTRAMUSCULAR; INTRAVENOUS 2 TIMES DAILY
Status: DISCONTINUED | OUTPATIENT
Start: 2020-01-01 | End: 2020-01-01

## 2020-01-01 RX ORDER — BISACODYL 10 MG
10 SUPPOSITORY, RECTAL RECTAL DAILY
Status: DISCONTINUED | OUTPATIENT
Start: 2020-01-01 | End: 2020-01-01

## 2020-01-01 RX ORDER — 0.9 % SODIUM CHLORIDE 0.9 %
50 INTRAVENOUS SOLUTION INTRAVENOUS ONCE
Status: COMPLETED | OUTPATIENT
Start: 2020-01-01 | End: 2020-01-01

## 2020-01-01 RX ORDER — ETOMIDATE 2 MG/ML
20 INJECTION INTRAVENOUS ONCE
Status: DISCONTINUED | OUTPATIENT
Start: 2020-01-01 | End: 2020-01-01

## 2020-01-01 RX ORDER — 0.9 % SODIUM CHLORIDE 0.9 %
500 INTRAVENOUS SOLUTION INTRAVENOUS ONCE
Status: COMPLETED | OUTPATIENT
Start: 2020-01-01 | End: 2020-01-01

## 2020-01-01 RX ORDER — HYDRALAZINE HYDROCHLORIDE 20 MG/ML
5 INJECTION INTRAMUSCULAR; INTRAVENOUS ONCE
Status: COMPLETED | OUTPATIENT
Start: 2020-01-01 | End: 2020-01-01

## 2020-01-01 RX ORDER — POTASSIUM CHLORIDE 29.8 MG/ML
20 INJECTION INTRAVENOUS 2 TIMES DAILY
Status: DISCONTINUED | OUTPATIENT
Start: 2020-01-01 | End: 2020-01-01

## 2020-01-01 RX ORDER — METOPROLOL TARTRATE 5 MG/5ML
5 INJECTION INTRAVENOUS EVERY 6 HOURS
Status: DISCONTINUED | OUTPATIENT
Start: 2020-01-01 | End: 2020-01-01

## 2020-01-01 RX ORDER — HEPARIN SODIUM 1000 [USP'U]/ML
40 INJECTION, SOLUTION INTRAVENOUS; SUBCUTANEOUS PRN
Status: DISCONTINUED | OUTPATIENT
Start: 2020-01-01 | End: 2020-01-01

## 2020-01-01 RX ORDER — POTASSIUM CHLORIDE 20 MEQ/1
40 TABLET, EXTENDED RELEASE ORAL PRN
Status: DISCONTINUED | OUTPATIENT
Start: 2020-01-01 | End: 2020-01-01

## 2020-01-01 RX ORDER — 0.9 % SODIUM CHLORIDE 0.9 %
30 INTRAVENOUS SOLUTION INTRAVENOUS DAILY
Status: ACTIVE | OUTPATIENT
Start: 2020-01-01 | End: 2020-01-01

## 2020-01-01 RX ORDER — ETOMIDATE 2 MG/ML
30 INJECTION INTRAVENOUS ONCE
Status: COMPLETED | OUTPATIENT
Start: 2020-01-01 | End: 2020-01-01

## 2020-01-01 RX ORDER — DEXAMETHASONE SODIUM PHOSPHATE 4 MG/ML
10 INJECTION, SOLUTION INTRA-ARTICULAR; INTRALESIONAL; INTRAMUSCULAR; INTRAVENOUS; SOFT TISSUE EVERY 6 HOURS
Status: DISCONTINUED | OUTPATIENT
Start: 2020-01-01 | End: 2020-01-01 | Stop reason: ALTCHOICE

## 2020-01-01 RX ORDER — DEXAMETHASONE SODIUM PHOSPHATE 4 MG/ML
6 INJECTION, SOLUTION INTRA-ARTICULAR; INTRALESIONAL; INTRAMUSCULAR; INTRAVENOUS; SOFT TISSUE DAILY
Status: COMPLETED | OUTPATIENT
Start: 2020-01-01 | End: 2020-01-01

## 2020-01-01 RX ORDER — CALCIUM CHLORIDE, MAGNESIUM CHLORIDE, DEXTROSE MONOHYDRATE, LACTIC ACID, SODIUM CHLORIDE, SODIUM BICARBONATE AND POTASSIUM CHLORIDE 5.15; 2.03; 22; 5.4; 6.46; 3.09; .157 G/L; G/L; G/L; G/L; G/L; G/L; G/L
INJECTION INTRAVENOUS CONTINUOUS
Status: DISCONTINUED | OUTPATIENT
Start: 2020-01-01 | End: 2020-01-01

## 2020-01-01 RX ORDER — SODIUM POLYSTYRENE SULFONATE 15 G/60ML
15 SUSPENSION ORAL; RECTAL ONCE
Status: COMPLETED | OUTPATIENT
Start: 2020-01-01 | End: 2020-01-01

## 2020-01-01 RX ORDER — HYDROCHLOROTHIAZIDE 12.5 MG/1
12.5 TABLET ORAL DAILY
COMMUNITY
Start: 2020-01-01 | End: 2021-03-08

## 2020-01-01 RX ORDER — FENTANYL CITRATE 50 UG/ML
INJECTION, SOLUTION INTRAMUSCULAR; INTRAVENOUS
Status: COMPLETED
Start: 2020-01-01 | End: 2020-01-01

## 2020-01-01 RX ORDER — 0.9 % SODIUM CHLORIDE 0.9 %
20 INTRAVENOUS SOLUTION INTRAVENOUS ONCE
Status: DISCONTINUED | OUTPATIENT
Start: 2020-01-01 | End: 2020-01-01

## 2020-01-01 RX ORDER — CLOPIDOGREL BISULFATE 75 MG/1
75 TABLET ORAL DAILY
Status: DISCONTINUED | OUTPATIENT
Start: 2020-01-01 | End: 2020-01-01

## 2020-01-01 RX ORDER — 0.9 % SODIUM CHLORIDE 0.9 %
20 INTRAVENOUS SOLUTION INTRAVENOUS ONCE
Status: COMPLETED | OUTPATIENT
Start: 2020-01-01 | End: 2020-01-01

## 2020-01-01 RX ORDER — SODIUM CHLORIDE 0.9 % (FLUSH) 0.9 %
10 SYRINGE (ML) INJECTION EVERY 12 HOURS SCHEDULED
Status: DISCONTINUED | OUTPATIENT
Start: 2020-01-01 | End: 2020-01-01 | Stop reason: SDUPTHER

## 2020-01-01 RX ORDER — INSULIN GLARGINE 100 [IU]/ML
10 INJECTION, SOLUTION SUBCUTANEOUS NIGHTLY
Status: DISCONTINUED | OUTPATIENT
Start: 2020-01-01 | End: 2020-01-01

## 2020-01-01 RX ORDER — 0.9 % SODIUM CHLORIDE 0.9 %
2000 INTRAVENOUS SOLUTION INTRAVENOUS
Status: ACTIVE | OUTPATIENT
Start: 2020-01-01 | End: 2020-01-01

## 2020-01-01 RX ORDER — FENTANYL CITRATE 50 UG/ML
25 INJECTION, SOLUTION INTRAMUSCULAR; INTRAVENOUS
Status: DISCONTINUED | OUTPATIENT
Start: 2020-01-01 | End: 2020-01-01

## 2020-01-01 RX ORDER — POTASSIUM CHLORIDE 750 MG/1
40 TABLET, FILM COATED, EXTENDED RELEASE ORAL ONCE
Status: COMPLETED | OUTPATIENT
Start: 2020-01-01 | End: 2020-01-01

## 2020-01-01 RX ORDER — PANTOPRAZOLE SODIUM 40 MG/10ML
40 INJECTION, POWDER, LYOPHILIZED, FOR SOLUTION INTRAVENOUS DAILY
Status: DISCONTINUED | OUTPATIENT
Start: 2020-01-01 | End: 2020-01-01

## 2020-01-01 RX ORDER — VITAMIN B COMPLEX
1000 TABLET ORAL DAILY
Status: DISCONTINUED | OUTPATIENT
Start: 2020-01-01 | End: 2020-01-01

## 2020-01-01 RX ORDER — ALBUTEROL SULFATE 2.5 MG/3ML
2.5 SOLUTION RESPIRATORY (INHALATION) EVERY 6 HOURS PRN
Status: DISCONTINUED | OUTPATIENT
Start: 2020-01-01 | End: 2020-01-01

## 2020-01-01 RX ORDER — AMLODIPINE BESYLATE 10 MG/1
10 TABLET ORAL DAILY
Status: DISCONTINUED | OUTPATIENT
Start: 2020-01-01 | End: 2020-01-01

## 2020-01-01 RX ORDER — ACETAMINOPHEN 325 MG/1
650 TABLET ORAL EVERY 6 HOURS PRN
Status: DISCONTINUED | OUTPATIENT
Start: 2020-01-01 | End: 2020-01-01 | Stop reason: HOSPADM

## 2020-01-01 RX ORDER — DANAZOL 200 MG/1
400 CAPSULE ORAL 2 TIMES DAILY
Status: DISCONTINUED | OUTPATIENT
Start: 2020-01-01 | End: 2020-01-01

## 2020-01-01 RX ORDER — SODIUM CHLORIDE 9 MG/ML
10 INJECTION INTRAVENOUS DAILY
Status: DISCONTINUED | OUTPATIENT
Start: 2020-01-01 | End: 2020-01-01

## 2020-01-01 RX ORDER — DEXAMETHASONE SODIUM PHOSPHATE 4 MG/ML
10 INJECTION, SOLUTION INTRA-ARTICULAR; INTRALESIONAL; INTRAMUSCULAR; INTRAVENOUS; SOFT TISSUE ONCE
Status: COMPLETED | OUTPATIENT
Start: 2020-01-01 | End: 2020-01-01

## 2020-01-01 RX ORDER — HEPARIN SODIUM 5000 [USP'U]/ML
5000 INJECTION, SOLUTION INTRAVENOUS; SUBCUTANEOUS EVERY 8 HOURS SCHEDULED
Status: DISCONTINUED | OUTPATIENT
Start: 2020-01-01 | End: 2020-01-01

## 2020-01-01 RX ORDER — DEXTROSE MONOHYDRATE 25 G/50ML
12.5 INJECTION, SOLUTION INTRAVENOUS PRN
Status: DISCONTINUED | OUTPATIENT
Start: 2020-01-01 | End: 2020-01-01

## 2020-01-01 RX ORDER — ROCURONIUM BROMIDE 10 MG/ML
50 INJECTION, SOLUTION INTRAVENOUS ONCE
Status: COMPLETED | OUTPATIENT
Start: 2020-01-01 | End: 2020-01-01

## 2020-01-01 RX ORDER — CALCIUM GLUCONATE 94 MG/ML
1 INJECTION, SOLUTION INTRAVENOUS ONCE
Status: COMPLETED | OUTPATIENT
Start: 2020-01-01 | End: 2020-01-01

## 2020-01-01 RX ORDER — DEXTROSE MONOHYDRATE 25 G/50ML
INJECTION, SOLUTION INTRAVENOUS
Status: DISPENSED
Start: 2020-01-01 | End: 2020-01-01

## 2020-01-01 RX ORDER — METOPROLOL TARTRATE 5 MG/5ML
2.5 INJECTION INTRAVENOUS ONCE
Status: COMPLETED | OUTPATIENT
Start: 2020-01-01 | End: 2020-01-01

## 2020-01-01 RX ORDER — SODIUM CHLORIDE 0.9 % (FLUSH) 0.9 %
10 SYRINGE (ML) INJECTION PRN
Status: DISCONTINUED | OUTPATIENT
Start: 2020-01-01 | End: 2020-01-01 | Stop reason: SDUPTHER

## 2020-01-01 RX ORDER — DEXAMETHASONE SODIUM PHOSPHATE 4 MG/ML
INJECTION, SOLUTION INTRA-ARTICULAR; INTRALESIONAL; INTRAMUSCULAR; INTRAVENOUS; SOFT TISSUE
Status: DISCONTINUED
Start: 2020-01-01 | End: 2020-01-01

## 2020-01-01 RX ORDER — KETAMINE HCL IN NACL, ISO-OSM 100MG/10ML
SYRINGE (ML) INJECTION
Status: COMPLETED
Start: 2020-01-01 | End: 2020-01-01

## 2020-01-01 RX ORDER — ACETAMINOPHEN 650 MG/1
650 SUPPOSITORY RECTAL EVERY 6 HOURS PRN
Status: DISCONTINUED | OUTPATIENT
Start: 2020-01-01 | End: 2020-01-01 | Stop reason: HOSPADM

## 2020-01-01 RX ORDER — POTASSIUM CHLORIDE 7.45 MG/ML
10 INJECTION INTRAVENOUS PRN
Status: DISCONTINUED | OUTPATIENT
Start: 2020-01-01 | End: 2020-01-01

## 2020-01-01 RX ORDER — SODIUM CHLORIDE 0.9 % (FLUSH) 0.9 %
10 SYRINGE (ML) INJECTION PRN
Status: DISCONTINUED | OUTPATIENT
Start: 2020-01-01 | End: 2020-01-01 | Stop reason: HOSPADM

## 2020-01-01 RX ORDER — NICOTINE POLACRILEX 4 MG
15 LOZENGE BUCCAL PRN
Status: DISCONTINUED | OUTPATIENT
Start: 2020-01-01 | End: 2020-01-01

## 2020-01-01 RX ORDER — PROMETHAZINE HYDROCHLORIDE 25 MG/1
12.5 TABLET ORAL EVERY 6 HOURS PRN
Status: DISCONTINUED | OUTPATIENT
Start: 2020-01-01 | End: 2020-01-01 | Stop reason: SDUPTHER

## 2020-01-01 RX ORDER — POLYETHYLENE GLYCOL 3350 17 G/17G
17 POWDER, FOR SOLUTION ORAL DAILY PRN
Status: DISCONTINUED | OUTPATIENT
Start: 2020-01-01 | End: 2020-01-01

## 2020-01-01 RX ORDER — PROPOFOL 10 MG/ML
INJECTION, EMULSION INTRAVENOUS CONTINUOUS PRN
Status: COMPLETED | OUTPATIENT
Start: 2020-01-01 | End: 2020-01-01

## 2020-01-01 RX ORDER — FUROSEMIDE 10 MG/ML
40 INJECTION INTRAMUSCULAR; INTRAVENOUS ONCE
Status: COMPLETED | OUTPATIENT
Start: 2020-01-01 | End: 2020-01-01

## 2020-01-01 RX ORDER — HEPARIN SODIUM 1000 [USP'U]/ML
80 INJECTION, SOLUTION INTRAVENOUS; SUBCUTANEOUS PRN
Status: DISCONTINUED | OUTPATIENT
Start: 2020-01-01 | End: 2020-01-01

## 2020-01-01 RX ORDER — CISATRACURIUM BESYLATE 2 MG/ML
10 INJECTION, SOLUTION INTRAVENOUS ONCE
Status: COMPLETED | OUTPATIENT
Start: 2020-01-01 | End: 2020-01-01

## 2020-01-01 RX ORDER — DEXAMETHASONE SODIUM PHOSPHATE 4 MG/ML
6 INJECTION, SOLUTION INTRA-ARTICULAR; INTRALESIONAL; INTRAMUSCULAR; INTRAVENOUS; SOFT TISSUE ONCE
Status: COMPLETED | OUTPATIENT
Start: 2020-01-01 | End: 2020-01-01

## 2020-01-01 RX ORDER — AMLODIPINE BESYLATE 10 MG/1
10 TABLET ORAL DAILY
COMMUNITY
Start: 2020-01-01

## 2020-01-01 RX ORDER — SODIUM CHLORIDE 450 MG/100ML
INJECTION, SOLUTION INTRAVENOUS CONTINUOUS
Status: DISCONTINUED | OUTPATIENT
Start: 2020-01-01 | End: 2020-01-01

## 2020-01-01 RX ORDER — FUROSEMIDE 10 MG/ML
60 INJECTION INTRAMUSCULAR; INTRAVENOUS 2 TIMES DAILY
Status: DISCONTINUED | OUTPATIENT
Start: 2020-01-01 | End: 2020-01-01

## 2020-01-01 RX ORDER — INSULIN GLARGINE 100 [IU]/ML
7 INJECTION, SOLUTION SUBCUTANEOUS NIGHTLY
Status: DISCONTINUED | OUTPATIENT
Start: 2020-01-01 | End: 2020-01-01

## 2020-01-01 RX ORDER — CISATRACURIUM BESYLATE 2 MG/ML
10 INJECTION, SOLUTION INTRAVENOUS ONCE
Status: DISCONTINUED | OUTPATIENT
Start: 2020-01-01 | End: 2020-01-01

## 2020-01-01 RX ORDER — HEPARIN SODIUM 1000 [USP'U]/ML
80 INJECTION, SOLUTION INTRAVENOUS; SUBCUTANEOUS ONCE
Status: DISCONTINUED | OUTPATIENT
Start: 2020-01-01 | End: 2020-01-01

## 2020-01-01 RX ORDER — ONDANSETRON 2 MG/ML
4 INJECTION INTRAMUSCULAR; INTRAVENOUS EVERY 6 HOURS PRN
Status: DISCONTINUED | OUTPATIENT
Start: 2020-01-01 | End: 2020-01-01

## 2020-01-01 RX ORDER — ETOMIDATE 2 MG/ML
30 INJECTION INTRAVENOUS ONCE
Status: DISCONTINUED | OUTPATIENT
Start: 2020-01-01 | End: 2020-01-01

## 2020-01-01 RX ORDER — GLIMEPIRIDE 2 MG/1
3 TABLET ORAL 2 TIMES DAILY
COMMUNITY
Start: 2020-01-01 | End: 2020-12-27

## 2020-01-01 RX ORDER — HEPARIN SODIUM 1000 [USP'U]/ML
INJECTION, SOLUTION INTRAVENOUS; SUBCUTANEOUS
Status: DISCONTINUED
Start: 2020-01-01 | End: 2020-01-01 | Stop reason: WASHOUT

## 2020-01-01 RX ORDER — ROSUVASTATIN CALCIUM 20 MG/1
40 TABLET, COATED ORAL NIGHTLY
Status: DISCONTINUED | OUTPATIENT
Start: 2020-01-01 | End: 2020-01-01

## 2020-01-01 RX ORDER — FENTANYL CITRATE 50 UG/ML
100 INJECTION, SOLUTION INTRAMUSCULAR; INTRAVENOUS ONCE
Status: DISCONTINUED | OUTPATIENT
Start: 2020-01-01 | End: 2020-01-01

## 2020-01-01 RX ORDER — DEXTROSE MONOHYDRATE 50 MG/ML
100 INJECTION, SOLUTION INTRAVENOUS PRN
Status: DISCONTINUED | OUTPATIENT
Start: 2020-01-01 | End: 2020-01-01

## 2020-01-01 RX ORDER — FLUDROCORTISONE ACETATE 0.1 MG/1
0.1 TABLET ORAL DAILY
Status: DISCONTINUED | OUTPATIENT
Start: 2020-01-01 | End: 2020-01-01

## 2020-01-01 RX ORDER — BISACODYL 10 MG
10 SUPPOSITORY, RECTAL RECTAL DAILY PRN
Status: DISCONTINUED | OUTPATIENT
Start: 2020-01-01 | End: 2020-01-01

## 2020-01-01 RX ORDER — LIDOCAINE HYDROCHLORIDE 10 MG/ML
5 INJECTION, SOLUTION EPIDURAL; INFILTRATION; INTRACAUDAL; PERINEURAL ONCE
Status: DISCONTINUED | OUTPATIENT
Start: 2020-01-01 | End: 2020-01-01

## 2020-01-01 RX ORDER — INSULIN GLARGINE 100 [IU]/ML
15 INJECTION, SOLUTION SUBCUTANEOUS NIGHTLY
Status: DISCONTINUED | OUTPATIENT
Start: 2020-01-01 | End: 2020-01-01

## 2020-01-01 RX ORDER — DEXTROSE MONOHYDRATE 50 MG/ML
INJECTION, SOLUTION INTRAVENOUS CONTINUOUS
Status: DISCONTINUED | OUTPATIENT
Start: 2020-01-01 | End: 2020-01-01

## 2020-01-01 RX ORDER — LINEZOLID 2 MG/ML
600 INJECTION, SOLUTION INTRAVENOUS EVERY 12 HOURS
Status: DISCONTINUED | OUTPATIENT
Start: 2020-01-01 | End: 2020-01-01

## 2020-01-01 RX ORDER — PROPOFOL 10 MG/ML
INJECTION, EMULSION INTRAVENOUS
Status: DISPENSED
Start: 2020-01-01 | End: 2020-01-01

## 2020-01-01 RX ORDER — MIDAZOLAM HYDROCHLORIDE 1 MG/ML
INJECTION INTRAMUSCULAR; INTRAVENOUS
Status: COMPLETED
Start: 2020-01-01 | End: 2020-01-01

## 2020-01-01 RX ORDER — CLOPIDOGREL 300 MG/1
300 TABLET, FILM COATED ORAL ONCE
Status: COMPLETED | OUTPATIENT
Start: 2020-01-01 | End: 2020-01-01

## 2020-01-01 RX ORDER — HEPARIN SODIUM 10000 [USP'U]/100ML
INJECTION, SOLUTION INTRAVENOUS
Status: DISCONTINUED
Start: 2020-01-01 | End: 2020-01-01

## 2020-01-01 RX ORDER — POTASSIUM CHLORIDE 7.45 MG/ML
10 INJECTION INTRAVENOUS 2 TIMES DAILY
Status: DISCONTINUED | OUTPATIENT
Start: 2020-01-01 | End: 2020-01-01

## 2020-01-01 RX ORDER — METOPROLOL TARTRATE 5 MG/5ML
5 INJECTION INTRAVENOUS EVERY 8 HOURS
Status: DISCONTINUED | OUTPATIENT
Start: 2020-01-01 | End: 2020-01-01

## 2020-01-01 RX ORDER — ALBUTEROL SULFATE 90 UG/1
2 AEROSOL, METERED RESPIRATORY (INHALATION) EVERY 6 HOURS PRN
Status: DISCONTINUED | OUTPATIENT
Start: 2020-01-01 | End: 2020-01-01

## 2020-01-01 RX ORDER — ACETAMINOPHEN 325 MG/1
650 TABLET ORAL EVERY 6 HOURS PRN
Status: DISCONTINUED | OUTPATIENT
Start: 2020-01-01 | End: 2020-01-01 | Stop reason: SDUPTHER

## 2020-01-01 RX ORDER — ATORVASTATIN CALCIUM 10 MG/1
10 TABLET, FILM COATED ORAL NIGHTLY
COMMUNITY
Start: 2020-01-01

## 2020-01-01 RX ORDER — DEXAMETHASONE SODIUM PHOSPHATE 4 MG/ML
6 INJECTION, SOLUTION INTRA-ARTICULAR; INTRALESIONAL; INTRAMUSCULAR; INTRAVENOUS; SOFT TISSUE DAILY
Status: DISCONTINUED | OUTPATIENT
Start: 2020-01-01 | End: 2020-01-01

## 2020-01-01 RX ORDER — HEPARIN SODIUM 10000 [USP'U]/100ML
10 INJECTION, SOLUTION INTRAVENOUS CONTINUOUS
Status: DISCONTINUED | OUTPATIENT
Start: 2020-01-01 | End: 2020-01-01

## 2020-01-01 RX ORDER — DEXTROSE MONOHYDRATE 100 MG/ML
INJECTION, SOLUTION INTRAVENOUS CONTINUOUS
Status: ACTIVE | OUTPATIENT
Start: 2020-01-01 | End: 2020-01-01

## 2020-01-01 RX ADMIN — MIDAZOLAM 1 MG: 1 INJECTION INTRAMUSCULAR; INTRAVENOUS at 08:13

## 2020-01-01 RX ADMIN — ACETAMINOPHEN 650 MG: 325 TABLET ORAL at 08:51

## 2020-01-01 RX ADMIN — FUROSEMIDE 60 MG: 10 INJECTION, SOLUTION INTRAMUSCULAR; INTRAVENOUS at 17:42

## 2020-01-01 RX ADMIN — SODIUM PHOSPHATE, MONOBASIC, MONOHYDRATE 18 MMOL: 276; 142 INJECTION, SOLUTION INTRAVENOUS at 02:27

## 2020-01-01 RX ADMIN — DEXTROSE MONOHYDRATE: 50 INJECTION, SOLUTION INTRAVENOUS at 22:53

## 2020-01-01 RX ADMIN — Medication: at 20:11

## 2020-01-01 RX ADMIN — Medication: at 12:50

## 2020-01-01 RX ADMIN — POTASSIUM CHLORIDE 20 MEQ: 29.8 INJECTION, SOLUTION INTRAVENOUS at 06:19

## 2020-01-01 RX ADMIN — POTASSIUM CHLORIDE 10 MEQ: 7.46 INJECTION, SOLUTION INTRAVENOUS at 20:26

## 2020-01-01 RX ADMIN — FENTANYL CITRATE 200 MCG/HR: 50 INJECTION, SOLUTION INTRAMUSCULAR; INTRAVENOUS at 14:54

## 2020-01-01 RX ADMIN — CLOPIDOGREL BISULFATE 75 MG: 75 TABLET ORAL at 08:31

## 2020-01-01 RX ADMIN — DEXMEDETOMIDINE 1.2 MCG/KG/HR: 100 INJECTION, SOLUTION, CONCENTRATE INTRAVENOUS at 09:26

## 2020-01-01 RX ADMIN — DEXMEDETOMIDINE 1.3 MCG/KG/HR: 100 INJECTION, SOLUTION, CONCENTRATE INTRAVENOUS at 10:24

## 2020-01-01 RX ADMIN — FENTANYL CITRATE 200 MCG/HR: 50 INJECTION, SOLUTION INTRAMUSCULAR; INTRAVENOUS at 08:14

## 2020-01-01 RX ADMIN — DEXMEDETOMIDINE 1.4 MCG/KG/HR: 100 INJECTION, SOLUTION, CONCENTRATE INTRAVENOUS at 04:45

## 2020-01-01 RX ADMIN — Medication 15 ML: at 20:12

## 2020-01-01 RX ADMIN — FENTANYL CITRATE 175 MCG/HR: 50 INJECTION, SOLUTION INTRAMUSCULAR; INTRAVENOUS at 00:26

## 2020-01-01 RX ADMIN — Medication 15 ML: at 09:27

## 2020-01-01 RX ADMIN — FAMOTIDINE 20 MG: 10 INJECTION INTRAVENOUS at 20:07

## 2020-01-01 RX ADMIN — POTASSIUM CHLORIDE 20 MEQ: 400 INJECTION, SOLUTION INTRAVENOUS at 09:01

## 2020-01-01 RX ADMIN — PANTOPRAZOLE SODIUM 40 MG: 40 INJECTION, POWDER, FOR SOLUTION INTRAVENOUS at 09:57

## 2020-01-01 RX ADMIN — Medication 1000 ML/HR: at 07:46

## 2020-01-01 RX ADMIN — INSULIN GLARGINE 7 UNITS: 100 INJECTION, SOLUTION SUBCUTANEOUS at 21:23

## 2020-01-01 RX ADMIN — FAMOTIDINE 20 MG: 10 INJECTION INTRAVENOUS at 21:19

## 2020-01-01 RX ADMIN — AMIODARONE HYDROCHLORIDE 0.5 MG/MIN: 1.8 INJECTION, SOLUTION INTRAVENOUS at 11:06

## 2020-01-01 RX ADMIN — LEUCINE, PHENYLALANINE, LYSINE, METHIONINE, ISOLEUCINE, VALINE, HISTIDINE, THREONINE, TRYPTOPHAN, ALANINE, GLYCINE, ARGININE, PROLINE, SERINE, TYROSINE, DEXTROSE: 365; 280; 290; 200; 300; 290; 240; 210; 90; 1035; 515; 575; 340; 250; 20; 15 INJECTION INTRAVENOUS at 17:12

## 2020-01-01 RX ADMIN — SODIUM CHLORIDE, PRESERVATIVE FREE 10 ML: 5 INJECTION INTRAVENOUS at 20:14

## 2020-01-01 RX ADMIN — ACETAMINOPHEN 650 MG: 325 TABLET ORAL at 14:04

## 2020-01-01 RX ADMIN — FENTANYL CITRATE 200 MCG/HR: 50 INJECTION, SOLUTION INTRAMUSCULAR; INTRAVENOUS at 19:26

## 2020-01-01 RX ADMIN — Medication 15 ML: at 22:40

## 2020-01-01 RX ADMIN — Medication 15 ML: at 07:53

## 2020-01-01 RX ADMIN — ASPIRIN 81 MG: 81 TABLET, CHEWABLE ORAL at 11:51

## 2020-01-01 RX ADMIN — Medication 1000 ML/HR: at 19:20

## 2020-01-01 RX ADMIN — PHENYLEPHRINE HYDROCHLORIDE 100 MCG/MIN: 10 INJECTION INTRAVENOUS at 20:03

## 2020-01-01 RX ADMIN — Medication 50 MCG/MIN: at 04:23

## 2020-01-01 RX ADMIN — SODIUM CHLORIDE: 9 INJECTION, SOLUTION INTRAVENOUS at 01:41

## 2020-01-01 RX ADMIN — SODIUM CHLORIDE: 9 INJECTION, SOLUTION INTRAVENOUS at 01:47

## 2020-01-01 RX ADMIN — Medication 1000 UNITS: at 08:42

## 2020-01-01 RX ADMIN — DEXMEDETOMIDINE 1.4 MCG/KG/HR: 100 INJECTION, SOLUTION, CONCENTRATE INTRAVENOUS at 03:45

## 2020-01-01 RX ADMIN — SODIUM CHLORIDE, PRESERVATIVE FREE 10 ML: 5 INJECTION INTRAVENOUS at 09:06

## 2020-01-01 RX ADMIN — Medication: at 14:10

## 2020-01-01 RX ADMIN — INSULIN LISPRO 2 UNITS: 100 INJECTION, SOLUTION INTRAVENOUS; SUBCUTANEOUS at 16:37

## 2020-01-01 RX ADMIN — FAMOTIDINE 20 MG: 10 INJECTION, SOLUTION INTRAVENOUS at 08:37

## 2020-01-01 RX ADMIN — PROPOFOL 50 MCG/KG/MIN: 10 INJECTION, EMULSION INTRAVENOUS at 07:29

## 2020-01-01 RX ADMIN — GLYCOPYRROLATE AND FORMOTEROL FUMARATE 2 PUFF: 9; 4.8 AEROSOL, METERED RESPIRATORY (INHALATION) at 20:26

## 2020-01-01 RX ADMIN — DEXMEDETOMIDINE 1 MCG/KG/HR: 100 INJECTION, SOLUTION, CONCENTRATE INTRAVENOUS at 22:18

## 2020-01-01 RX ADMIN — DEXMEDETOMIDINE 1.4 MCG/KG/HR: 100 INJECTION, SOLUTION, CONCENTRATE INTRAVENOUS at 06:41

## 2020-01-01 RX ADMIN — SODIUM CHLORIDE: 9 INJECTION, SOLUTION INTRAVENOUS at 03:43

## 2020-01-01 RX ADMIN — AMLODIPINE BESYLATE 10 MG: 10 TABLET ORAL at 08:47

## 2020-01-01 RX ADMIN — Medication: at 21:48

## 2020-01-01 RX ADMIN — Medication 15 ML: at 21:38

## 2020-01-01 RX ADMIN — Medication 1000 UNITS: at 08:52

## 2020-01-01 RX ADMIN — DEXMEDETOMIDINE 0.9 MCG/KG/HR: 100 INJECTION, SOLUTION, CONCENTRATE INTRAVENOUS at 13:05

## 2020-01-01 RX ADMIN — DEXMEDETOMIDINE 0.9 MCG/KG/HR: 100 INJECTION, SOLUTION, CONCENTRATE INTRAVENOUS at 01:46

## 2020-01-01 RX ADMIN — FUROSEMIDE 20 MG/HR: 10 INJECTION, SOLUTION INTRAMUSCULAR; INTRAVENOUS at 03:10

## 2020-01-01 RX ADMIN — FAMOTIDINE 20 MG: 10 INJECTION, SOLUTION INTRAVENOUS at 21:38

## 2020-01-01 RX ADMIN — Medication 1000 UNITS: at 22:50

## 2020-01-01 RX ADMIN — Medication: at 19:19

## 2020-01-01 RX ADMIN — Medication 1000 UNITS: at 09:24

## 2020-01-01 RX ADMIN — FUROSEMIDE 60 MG: 10 INJECTION, SOLUTION INTRAMUSCULAR; INTRAVENOUS at 19:21

## 2020-01-01 RX ADMIN — INSULIN GLARGINE 15 UNITS: 100 INJECTION, SOLUTION SUBCUTANEOUS at 21:19

## 2020-01-01 RX ADMIN — SODIUM BICARBONATE 50 MEQ: 84 INJECTION, SOLUTION INTRAVENOUS at 22:16

## 2020-01-01 RX ADMIN — FUROSEMIDE 20 MG: 10 INJECTION, SOLUTION INTRAMUSCULAR; INTRAVENOUS at 17:12

## 2020-01-01 RX ADMIN — PIPERACILLIN AND TAZOBACTAM 3.38 G: 3; .375 INJECTION, POWDER, LYOPHILIZED, FOR SOLUTION INTRAVENOUS at 22:14

## 2020-01-01 RX ADMIN — PIPERACILLIN AND TAZOBACTAM 3.38 G: 3; .375 INJECTION, POWDER, LYOPHILIZED, FOR SOLUTION INTRAVENOUS at 09:29

## 2020-01-01 RX ADMIN — POTASSIUM CHLORIDE 20 MEQ: 400 INJECTION, SOLUTION INTRAVENOUS at 09:30

## 2020-01-01 RX ADMIN — FENTANYL CITRATE 50 MCG: 50 INJECTION, SOLUTION INTRAMUSCULAR; INTRAVENOUS at 00:08

## 2020-01-01 RX ADMIN — Medication 100 MCG/HR: at 15:06

## 2020-01-01 RX ADMIN — IOHEXOL 50 ML: 240 INJECTION, SOLUTION INTRATHECAL; INTRAVASCULAR; INTRAVENOUS; ORAL at 20:52

## 2020-01-01 RX ADMIN — DEXMEDETOMIDINE 1.4 MCG/KG/HR: 100 INJECTION, SOLUTION, CONCENTRATE INTRAVENOUS at 08:08

## 2020-01-01 RX ADMIN — FLUDROCORTISONE ACETATE 0.1 MG: 0.1 TABLET ORAL at 12:10

## 2020-01-01 RX ADMIN — ENOXAPARIN SODIUM 100 MG: 100 INJECTION SUBCUTANEOUS at 10:52

## 2020-01-01 RX ADMIN — INSULIN LISPRO 3 UNITS: 100 INJECTION, SOLUTION INTRAVENOUS; SUBCUTANEOUS at 15:11

## 2020-01-01 RX ADMIN — Medication: at 02:11

## 2020-01-01 RX ADMIN — Medication 1000 ML/HR: at 15:08

## 2020-01-01 RX ADMIN — FAMOTIDINE 20 MG: 10 INJECTION INTRAVENOUS at 10:40

## 2020-01-01 RX ADMIN — Medication 35 MCG/MIN: at 09:07

## 2020-01-01 RX ADMIN — Medication 125 MCG/HR: at 14:05

## 2020-01-01 RX ADMIN — DEXMEDETOMIDINE 1.1 MCG/KG/HR: 100 INJECTION, SOLUTION, CONCENTRATE INTRAVENOUS at 19:39

## 2020-01-01 RX ADMIN — Medication 25 MCG/HR: at 22:30

## 2020-01-01 RX ADMIN — ACETAMINOPHEN 650 MG: 650 SUPPOSITORY RECTAL at 20:14

## 2020-01-01 RX ADMIN — DEXAMETHASONE SODIUM PHOSPHATE 6 MG: 4 INJECTION, SOLUTION INTRA-ARTICULAR; INTRALESIONAL; INTRAMUSCULAR; INTRAVENOUS; SOFT TISSUE at 09:45

## 2020-01-01 RX ADMIN — SODIUM CHLORIDE, PRESERVATIVE FREE 10 ML: 5 INJECTION INTRAVENOUS at 20:10

## 2020-01-01 RX ADMIN — SODIUM CHLORIDE: 9 INJECTION, SOLUTION INTRAVENOUS at 05:13

## 2020-01-01 RX ADMIN — LINEZOLID 600 MG: 600 INJECTION, SOLUTION INTRAVENOUS at 23:54

## 2020-01-01 RX ADMIN — Medication 10 ML: at 20:43

## 2020-01-01 RX ADMIN — PIPERACILLIN AND TAZOBACTAM 3.38 G: 3; .375 INJECTION, POWDER, LYOPHILIZED, FOR SOLUTION INTRAVENOUS at 05:46

## 2020-01-01 RX ADMIN — INSULIN LISPRO 9 UNITS: 100 INJECTION, SOLUTION INTRAVENOUS; SUBCUTANEOUS at 02:02

## 2020-01-01 RX ADMIN — Medication 15 ML: at 21:19

## 2020-01-01 RX ADMIN — SODIUM CHLORIDE 50 ML: 9 INJECTION, SOLUTION INTRAVENOUS at 06:20

## 2020-01-01 RX ADMIN — SODIUM CHLORIDE, PRESERVATIVE FREE 10 ML: 5 INJECTION INTRAVENOUS at 21:54

## 2020-01-01 RX ADMIN — GLYCOPYRROLATE AND FORMOTEROL FUMARATE 2 PUFF: 9; 4.8 AEROSOL, METERED RESPIRATORY (INHALATION) at 20:36

## 2020-01-01 RX ADMIN — CALCIUM GLUCONATE 1 G: 98 INJECTION, SOLUTION INTRAVENOUS at 08:19

## 2020-01-01 RX ADMIN — LINEZOLID 600 MG: 600 INJECTION, SOLUTION INTRAVENOUS at 01:10

## 2020-01-01 RX ADMIN — DEXMEDETOMIDINE 1 MCG/KG/HR: 100 INJECTION, SOLUTION, CONCENTRATE INTRAVENOUS at 21:08

## 2020-01-01 RX ADMIN — Medication 1000 UNITS: at 08:33

## 2020-01-01 RX ADMIN — Medication 50 MCG/MIN: at 10:39

## 2020-01-01 RX ADMIN — LEVOFLOXACIN 500 MG: 5 INJECTION, SOLUTION INTRAVENOUS at 17:25

## 2020-01-01 RX ADMIN — Medication 22 MCG/MIN: at 12:23

## 2020-01-01 RX ADMIN — ASPIRIN 81 MG: 81 TABLET, CHEWABLE ORAL at 08:31

## 2020-01-01 RX ADMIN — VANCOMYCIN HYDROCHLORIDE 1500 MG: 5 INJECTION, POWDER, LYOPHILIZED, FOR SOLUTION INTRAVENOUS at 16:15

## 2020-01-01 RX ADMIN — ATORVASTATIN CALCIUM 10 MG: 10 TABLET, FILM COATED ORAL at 20:53

## 2020-01-01 RX ADMIN — SODIUM CHLORIDE 1000 ML: 9 INJECTION, SOLUTION INTRAVENOUS at 00:15

## 2020-01-01 RX ADMIN — Medication 1000 ML/HR: at 18:29

## 2020-01-01 RX ADMIN — GLYCOPYRROLATE AND FORMOTEROL FUMARATE 2 PUFF: 9; 4.8 AEROSOL, METERED RESPIRATORY (INHALATION) at 17:26

## 2020-01-01 RX ADMIN — PIPERACILLIN AND TAZOBACTAM 3.38 G: 3; .375 INJECTION, POWDER, LYOPHILIZED, FOR SOLUTION INTRAVENOUS at 05:47

## 2020-01-01 RX ADMIN — POTASSIUM CHLORIDE 20 MEQ: 400 INJECTION, SOLUTION INTRAVENOUS at 09:18

## 2020-01-01 RX ADMIN — GLIMEPIRIDE 4 MG: 4 TABLET ORAL at 08:47

## 2020-01-01 RX ADMIN — PIPERACILLIN AND TAZOBACTAM 3.38 G: 3; .375 INJECTION, POWDER, LYOPHILIZED, FOR SOLUTION INTRAVENOUS at 17:03

## 2020-01-01 RX ADMIN — DEXMEDETOMIDINE 1.2 MCG/KG/HR: 100 INJECTION, SOLUTION, CONCENTRATE INTRAVENOUS at 18:16

## 2020-01-01 RX ADMIN — Medication 1000 ML/HR: at 11:41

## 2020-01-01 RX ADMIN — INSULIN GLARGINE 15 UNITS: 100 INJECTION, SOLUTION SUBCUTANEOUS at 22:58

## 2020-01-01 RX ADMIN — CALCIUM GLUCONATE 2 G: 98 INJECTION, SOLUTION INTRAVENOUS at 08:03

## 2020-01-01 RX ADMIN — Medication 15 ML: at 10:57

## 2020-01-01 RX ADMIN — Medication 175 MCG/HR: at 23:21

## 2020-01-01 RX ADMIN — INSULIN GLARGINE 10 UNITS: 100 INJECTION, SOLUTION SUBCUTANEOUS at 20:02

## 2020-01-01 RX ADMIN — DEXAMETHASONE SODIUM PHOSPHATE 6 MG: 4 INJECTION, SOLUTION INTRA-ARTICULAR; INTRALESIONAL; INTRAMUSCULAR; INTRAVENOUS; SOFT TISSUE at 08:36

## 2020-01-01 RX ADMIN — DEXAMETHASONE SODIUM PHOSPHATE 6 MG: 4 INJECTION, SOLUTION INTRA-ARTICULAR; INTRALESIONAL; INTRAMUSCULAR; INTRAVENOUS; SOFT TISSUE at 09:02

## 2020-01-01 RX ADMIN — Medication: at 17:57

## 2020-01-01 RX ADMIN — SODIUM BICARBONATE 50 MEQ: 84 INJECTION, SOLUTION INTRAVENOUS at 21:14

## 2020-01-01 RX ADMIN — DANAZOL 400 MG: 200 CAPSULE ORAL at 16:33

## 2020-01-01 RX ADMIN — GLYCOPYRROLATE AND FORMOTEROL FUMARATE 2 PUFF: 9; 4.8 AEROSOL, METERED RESPIRATORY (INHALATION) at 08:45

## 2020-01-01 RX ADMIN — Medication 10 ML: at 08:38

## 2020-01-01 RX ADMIN — MIDAZOLAM HYDROCHLORIDE 2 MG: 1 INJECTION INTRAMUSCULAR; INTRAVENOUS at 12:33

## 2020-01-01 RX ADMIN — ETOMIDATE 30 MG: 2 INJECTION INTRAVENOUS at 20:47

## 2020-01-01 RX ADMIN — POTASSIUM CHLORIDE 10 MEQ: 7.46 INJECTION, SOLUTION INTRAVENOUS at 11:39

## 2020-01-01 RX ADMIN — Medication 175 MCG/HR: at 04:41

## 2020-01-01 RX ADMIN — FAMOTIDINE 20 MG: 10 INJECTION, SOLUTION INTRAVENOUS at 10:52

## 2020-01-01 RX ADMIN — Medication 1000 ML/HR: at 13:03

## 2020-01-01 RX ADMIN — SODIUM CHLORIDE, PRESERVATIVE FREE 10 ML: 5 INJECTION INTRAVENOUS at 08:49

## 2020-01-01 RX ADMIN — INSULIN LISPRO 3 UNITS: 100 INJECTION, SOLUTION INTRAVENOUS; SUBCUTANEOUS at 23:17

## 2020-01-01 RX ADMIN — ENOXAPARIN SODIUM 40 MG: 40 INJECTION SUBCUTANEOUS at 05:11

## 2020-01-01 RX ADMIN — PIPERACILLIN AND TAZOBACTAM 3.38 G: 3; .375 INJECTION, POWDER, LYOPHILIZED, FOR SOLUTION INTRAVENOUS at 13:58

## 2020-01-01 RX ADMIN — Medication 15 ML: at 08:56

## 2020-01-01 RX ADMIN — ACETAMINOPHEN 650 MG: 650 SUPPOSITORY RECTAL at 13:30

## 2020-01-01 RX ADMIN — Medication: at 19:39

## 2020-01-01 RX ADMIN — CALCIUM GLUCONATE 1 G: 98 INJECTION, SOLUTION INTRAVENOUS at 09:28

## 2020-01-01 RX ADMIN — FAMOTIDINE 20 MG: 10 INJECTION INTRAVENOUS at 08:31

## 2020-01-01 RX ADMIN — Medication 24 MCG/MIN: at 19:15

## 2020-01-01 RX ADMIN — POTASSIUM CHLORIDE 20 MEQ: 400 INJECTION, SOLUTION INTRAVENOUS at 09:20

## 2020-01-01 RX ADMIN — Medication 1000 UNITS: at 11:00

## 2020-01-01 RX ADMIN — ASPIRIN 81 MG: 81 TABLET, CHEWABLE ORAL at 09:10

## 2020-01-01 RX ADMIN — ATORVASTATIN CALCIUM 10 MG: 10 TABLET, FILM COATED ORAL at 20:50

## 2020-01-01 RX ADMIN — SODIUM CHLORIDE: 9 INJECTION, SOLUTION INTRAVENOUS at 21:18

## 2020-01-01 RX ADMIN — ACETAMINOPHEN 650 MG: 650 SUPPOSITORY RECTAL at 01:50

## 2020-01-01 RX ADMIN — ACETAMINOPHEN 650 MG: 650 SUPPOSITORY RECTAL at 05:30

## 2020-01-01 RX ADMIN — ACETAMINOPHEN 650 MG: 325 TABLET ORAL at 18:20

## 2020-01-01 RX ADMIN — METOPROLOL TARTRATE 5 MG: 5 INJECTION INTRAVENOUS at 23:18

## 2020-01-01 RX ADMIN — SODIUM CHLORIDE: 9 INJECTION, SOLUTION INTRAVENOUS at 06:44

## 2020-01-01 RX ADMIN — SODIUM CHLORIDE 20 ML: 9 INJECTION, SOLUTION INTRAVENOUS at 17:15

## 2020-01-01 RX ADMIN — INSULIN LISPRO 3 UNITS: 100 INJECTION, SOLUTION INTRAVENOUS; SUBCUTANEOUS at 12:11

## 2020-01-01 RX ADMIN — Medication 10 ML: at 09:02

## 2020-01-01 RX ADMIN — INSULIN GLARGINE 15 UNITS: 100 INJECTION, SOLUTION SUBCUTANEOUS at 20:22

## 2020-01-01 RX ADMIN — HEPARIN SODIUM 5000 UNITS: 5000 INJECTION INTRAVENOUS; SUBCUTANEOUS at 14:42

## 2020-01-01 RX ADMIN — SODIUM PHOSPHATE, MONOBASIC, MONOHYDRATE AND SODIUM PHOSPHATE, DIBASIC, ANHYDROUS 16 MMOL: 276; 142 INJECTION, SOLUTION INTRAVENOUS at 15:21

## 2020-01-01 RX ADMIN — SODIUM CHLORIDE, PRESERVATIVE FREE 10 ML: 5 INJECTION INTRAVENOUS at 21:22

## 2020-01-01 RX ADMIN — SODIUM PHOSPHATE, MONOBASIC, MONOHYDRATE 6 MMOL: 276; 142 INJECTION, SOLUTION INTRAVENOUS at 00:22

## 2020-01-01 RX ADMIN — ACETAMINOPHEN 650 MG: 650 SUPPOSITORY RECTAL at 02:22

## 2020-01-01 RX ADMIN — POTASSIUM CHLORIDE 10 MEQ: 7.46 INJECTION, SOLUTION INTRAVENOUS at 20:45

## 2020-01-01 RX ADMIN — DEXMEDETOMIDINE 1 MCG/KG/HR: 100 INJECTION, SOLUTION, CONCENTRATE INTRAVENOUS at 12:10

## 2020-01-01 RX ADMIN — SODIUM CHLORIDE 1000 ML: 9 INJECTION, SOLUTION INTRAVENOUS at 10:25

## 2020-01-01 RX ADMIN — Medication 150 MCG/HR: at 06:58

## 2020-01-01 RX ADMIN — SODIUM CHLORIDE, PRESERVATIVE FREE 10 ML: 5 INJECTION INTRAVENOUS at 19:58

## 2020-01-01 RX ADMIN — PIPERACILLIN AND TAZOBACTAM 3.38 G: 3; .375 INJECTION, POWDER, LYOPHILIZED, FOR SOLUTION INTRAVENOUS at 21:52

## 2020-01-01 RX ADMIN — POTASSIUM CHLORIDE 20 MEQ: 400 INJECTION, SOLUTION INTRAVENOUS at 21:35

## 2020-01-01 RX ADMIN — ENOXAPARIN SODIUM 40 MG: 40 INJECTION SUBCUTANEOUS at 14:38

## 2020-01-01 RX ADMIN — SODIUM CHLORIDE, PRESERVATIVE FREE 10 ML: 5 INJECTION INTRAVENOUS at 08:51

## 2020-01-01 RX ADMIN — FAMOTIDINE 20 MG: 10 INJECTION, SOLUTION INTRAVENOUS at 09:57

## 2020-01-01 RX ADMIN — SODIUM CHLORIDE 20 ML: 9 INJECTION, SOLUTION INTRAVENOUS at 18:40

## 2020-01-01 RX ADMIN — Medication 1000 ML/HR: at 16:43

## 2020-01-01 RX ADMIN — DOXYCYCLINE 100 MG: 100 INJECTION, POWDER, LYOPHILIZED, FOR SOLUTION INTRAVENOUS at 09:25

## 2020-01-01 RX ADMIN — FENTANYL CITRATE 150 MCG/HR: 50 INJECTION, SOLUTION INTRAMUSCULAR; INTRAVENOUS at 17:56

## 2020-01-01 RX ADMIN — DEXMEDETOMIDINE 0.2 MCG/KG/HR: 100 INJECTION, SOLUTION, CONCENTRATE INTRAVENOUS at 03:01

## 2020-01-01 RX ADMIN — DEXMEDETOMIDINE 0.9 MCG/KG/HR: 100 INJECTION, SOLUTION, CONCENTRATE INTRAVENOUS at 12:46

## 2020-01-01 RX ADMIN — DEXMEDETOMIDINE 1.4 MCG/KG/HR: 100 INJECTION, SOLUTION, CONCENTRATE INTRAVENOUS at 09:57

## 2020-01-01 RX ADMIN — Medication 1000 ML/HR: at 05:54

## 2020-01-01 RX ADMIN — Medication 10 ML: at 08:52

## 2020-01-01 RX ADMIN — INSULIN LISPRO 6 UNITS: 100 INJECTION, SOLUTION INTRAVENOUS; SUBCUTANEOUS at 22:10

## 2020-01-01 RX ADMIN — ASPIRIN 81 MG: 81 TABLET, CHEWABLE ORAL at 09:34

## 2020-01-01 RX ADMIN — INSULIN LISPRO 3 UNITS: 100 INJECTION, SOLUTION INTRAVENOUS; SUBCUTANEOUS at 18:00

## 2020-01-01 RX ADMIN — FAMOTIDINE 20 MG: 10 INJECTION INTRAVENOUS at 09:06

## 2020-01-01 RX ADMIN — LINEZOLID 600 MG: 600 INJECTION, SOLUTION INTRAVENOUS at 11:34

## 2020-01-01 RX ADMIN — FUROSEMIDE 40 MG: 10 INJECTION, SOLUTION INTRAMUSCULAR; INTRAVENOUS at 12:11

## 2020-01-01 RX ADMIN — Medication 10 ML: at 09:21

## 2020-01-01 RX ADMIN — ALTEPLASE 8.6 MG: KIT at 05:16

## 2020-01-01 RX ADMIN — INSULIN GLARGINE 15 UNITS: 100 INJECTION, SOLUTION SUBCUTANEOUS at 20:34

## 2020-01-01 RX ADMIN — HEPARIN SODIUM 5000 UNITS: 5000 INJECTION INTRAVENOUS; SUBCUTANEOUS at 21:47

## 2020-01-01 RX ADMIN — Medication 15 ML: at 09:14

## 2020-01-01 RX ADMIN — SODIUM CHLORIDE 30 ML: 9 INJECTION, SOLUTION INTRAVENOUS at 17:19

## 2020-01-01 RX ADMIN — PROPOFOL 50 MCG/KG/MIN: 10 INJECTION, EMULSION INTRAVENOUS at 04:02

## 2020-01-01 RX ADMIN — DEXMEDETOMIDINE 1 MCG/KG/HR: 100 INJECTION, SOLUTION, CONCENTRATE INTRAVENOUS at 08:09

## 2020-01-01 RX ADMIN — LINEZOLID 600 MG: 600 INJECTION, SOLUTION INTRAVENOUS at 11:03

## 2020-01-01 RX ADMIN — FAMOTIDINE 20 MG: 10 INJECTION, SOLUTION INTRAVENOUS at 22:25

## 2020-01-01 RX ADMIN — POTASSIUM CHLORIDE 10 MEQ: 7.46 INJECTION, SOLUTION INTRAVENOUS at 11:09

## 2020-01-01 RX ADMIN — ASPIRIN 81 MG: 81 TABLET, CHEWABLE ORAL at 09:41

## 2020-01-01 RX ADMIN — VANCOMYCIN HYDROCHLORIDE 1500 MG: 5 INJECTION, POWDER, LYOPHILIZED, FOR SOLUTION INTRAVENOUS at 01:43

## 2020-01-01 RX ADMIN — Medication 10 ML: at 20:54

## 2020-01-01 RX ADMIN — Medication 45 MCG/MIN: at 16:48

## 2020-01-01 RX ADMIN — INSULIN GLARGINE 15 UNITS: 100 INJECTION, SOLUTION SUBCUTANEOUS at 20:56

## 2020-01-01 RX ADMIN — PIPERACILLIN AND TAZOBACTAM 3.38 G: 3; .375 INJECTION, POWDER, LYOPHILIZED, FOR SOLUTION INTRAVENOUS at 11:11

## 2020-01-01 RX ADMIN — HYDRALAZINE HYDROCHLORIDE 5 MG: 20 INJECTION, SOLUTION INTRAMUSCULAR; INTRAVENOUS at 23:21

## 2020-01-01 RX ADMIN — SODIUM BICARBONATE: 84 INJECTION, SOLUTION INTRAVENOUS at 20:09

## 2020-01-01 RX ADMIN — DEXMEDETOMIDINE 1 MCG/KG/HR: 100 INJECTION, SOLUTION, CONCENTRATE INTRAVENOUS at 12:41

## 2020-01-01 RX ADMIN — PIPERACILLIN AND TAZOBACTAM 3.38 G: 3; .375 INJECTION, POWDER, LYOPHILIZED, FOR SOLUTION INTRAVENOUS at 01:26

## 2020-01-01 RX ADMIN — CALCIUM GLUCONATE 1 G: 98 INJECTION, SOLUTION INTRAVENOUS at 13:57

## 2020-01-01 RX ADMIN — FENTANYL CITRATE 200 MCG/HR: 50 INJECTION, SOLUTION INTRAMUSCULAR; INTRAVENOUS at 02:06

## 2020-01-01 RX ADMIN — CLOPIDOGREL BISULFATE 75 MG: 75 TABLET ORAL at 11:51

## 2020-01-01 RX ADMIN — PHENYLEPHRINE HYDROCHLORIDE 300 MCG/MIN: 10 INJECTION INTRAVENOUS at 16:10

## 2020-01-01 RX ADMIN — Medication 1000 UNITS: at 09:09

## 2020-01-01 RX ADMIN — DEXMEDETOMIDINE 1.1 MCG/KG/HR: 100 INJECTION, SOLUTION, CONCENTRATE INTRAVENOUS at 23:18

## 2020-01-01 RX ADMIN — ASPIRIN 81 MG: 81 TABLET, CHEWABLE ORAL at 09:24

## 2020-01-01 RX ADMIN — Medication 10 ML: at 09:31

## 2020-01-01 RX ADMIN — Medication 1000 ML/HR: at 13:43

## 2020-01-01 RX ADMIN — PROPOFOL 40 MCG/KG/MIN: 10 INJECTION, EMULSION INTRAVENOUS at 01:46

## 2020-01-01 RX ADMIN — VASOPRESSIN 0.04 UNITS/MIN: 20 INJECTION INTRAVENOUS at 01:15

## 2020-01-01 RX ADMIN — DEXMEDETOMIDINE 0.9 MCG/KG/HR: 100 INJECTION, SOLUTION, CONCENTRATE INTRAVENOUS at 23:11

## 2020-01-01 RX ADMIN — POTASSIUM CHLORIDE 20 MEQ: 400 INJECTION, SOLUTION INTRAVENOUS at 08:33

## 2020-01-01 RX ADMIN — Medication 1000 ML/HR: at 20:27

## 2020-01-01 RX ADMIN — INSULIN LISPRO 3 UNITS: 100 INJECTION, SOLUTION INTRAVENOUS; SUBCUTANEOUS at 15:40

## 2020-01-01 RX ADMIN — SODIUM CHLORIDE: 9 INJECTION, SOLUTION INTRAVENOUS at 07:29

## 2020-01-01 RX ADMIN — DEXAMETHASONE SODIUM PHOSPHATE 6 MG: 4 INJECTION, SOLUTION INTRA-ARTICULAR; INTRALESIONAL; INTRAMUSCULAR; INTRAVENOUS; SOFT TISSUE at 08:04

## 2020-01-01 RX ADMIN — POTASSIUM CHLORIDE 10 MEQ: 7.46 INJECTION, SOLUTION INTRAVENOUS at 10:20

## 2020-01-01 RX ADMIN — Medication: at 21:35

## 2020-01-01 RX ADMIN — ALTEPLASE 20 MG: 2.2 INJECTION, POWDER, LYOPHILIZED, FOR SOLUTION INTRAVENOUS at 15:09

## 2020-01-01 RX ADMIN — SODIUM CHLORIDE: 9 INJECTION, SOLUTION INTRAVENOUS at 17:20

## 2020-01-01 RX ADMIN — INSULIN GLARGINE 7 UNITS: 100 INJECTION, SOLUTION SUBCUTANEOUS at 21:53

## 2020-01-01 RX ADMIN — DEXMEDETOMIDINE 1 MCG/KG/HR: 100 INJECTION, SOLUTION, CONCENTRATE INTRAVENOUS at 18:42

## 2020-01-01 RX ADMIN — SODIUM CHLORIDE: 9 INJECTION, SOLUTION INTRAVENOUS at 04:38

## 2020-01-01 RX ADMIN — ENOXAPARIN SODIUM 100 MG: 100 INJECTION SUBCUTANEOUS at 13:07

## 2020-01-01 RX ADMIN — FAMOTIDINE 20 MG: 10 INJECTION, SOLUTION INTRAVENOUS at 21:22

## 2020-01-01 RX ADMIN — PHENYLEPHRINE HYDROCHLORIDE 50 MCG/MIN: 10 INJECTION INTRAVENOUS at 04:58

## 2020-01-01 RX ADMIN — HEPARIN SODIUM 5000 UNITS: 5000 INJECTION INTRAVENOUS; SUBCUTANEOUS at 14:34

## 2020-01-01 RX ADMIN — VASOPRESSIN 0.04 UNITS/MIN: 20 INJECTION INTRAVENOUS at 19:12

## 2020-01-01 RX ADMIN — FUROSEMIDE 20 MG/HR: 10 INJECTION, SOLUTION INTRAMUSCULAR; INTRAVENOUS at 13:32

## 2020-01-01 RX ADMIN — FAMOTIDINE 20 MG: 10 INJECTION INTRAVENOUS at 08:03

## 2020-01-01 RX ADMIN — AMIODARONE HYDROCHLORIDE 150 MG: 1.5 INJECTION, SOLUTION INTRAVENOUS at 16:34

## 2020-01-01 RX ADMIN — Medication 1000 ML/HR: at 21:29

## 2020-01-01 RX ADMIN — ASPIRIN 81 MG: 81 TABLET, CHEWABLE ORAL at 09:33

## 2020-01-01 RX ADMIN — ROSUVASTATIN CALCIUM 40 MG: 20 TABLET, FILM COATED ORAL at 20:09

## 2020-01-01 RX ADMIN — ASPIRIN 81 MG: 81 TABLET, CHEWABLE ORAL at 09:45

## 2020-01-01 RX ADMIN — FENTANYL CITRATE 50 MCG: 50 INJECTION, SOLUTION INTRAMUSCULAR; INTRAVENOUS at 12:01

## 2020-01-01 RX ADMIN — Medication: at 14:46

## 2020-01-01 RX ADMIN — Medication 1000 UNITS: at 09:08

## 2020-01-01 RX ADMIN — INSULIN LISPRO 3 UNITS: 100 INJECTION, SOLUTION INTRAVENOUS; SUBCUTANEOUS at 14:06

## 2020-01-01 RX ADMIN — Medication 15 ML: at 20:54

## 2020-01-01 RX ADMIN — Medication 15 ML: at 08:18

## 2020-01-01 RX ADMIN — DEXMEDETOMIDINE 0.9 MCG/KG/HR: 100 INJECTION, SOLUTION, CONCENTRATE INTRAVENOUS at 23:33

## 2020-01-01 RX ADMIN — INSULIN GLARGINE 15 UNITS: 100 INJECTION, SOLUTION SUBCUTANEOUS at 21:36

## 2020-01-01 RX ADMIN — DEXMEDETOMIDINE 0.3 MCG/KG/HR: 100 INJECTION, SOLUTION, CONCENTRATE INTRAVENOUS at 09:12

## 2020-01-01 RX ADMIN — AMIODARONE HYDROCHLORIDE 0.5 MG/MIN: 1.8 INJECTION, SOLUTION INTRAVENOUS at 21:53

## 2020-01-01 RX ADMIN — Medication 1 MCG/MIN: at 06:25

## 2020-01-01 RX ADMIN — INSULIN LISPRO 3 UNITS: 100 INJECTION, SOLUTION INTRAVENOUS; SUBCUTANEOUS at 17:26

## 2020-01-01 RX ADMIN — POTASSIUM CHLORIDE 20 MEQ: 400 INJECTION, SOLUTION INTRAVENOUS at 23:15

## 2020-01-01 RX ADMIN — Medication 10 ML: at 23:21

## 2020-01-01 RX ADMIN — DANAZOL 400 MG: 200 CAPSULE ORAL at 11:09

## 2020-01-01 RX ADMIN — Medication 15 ML: at 20:43

## 2020-01-01 RX ADMIN — Medication 1000 UNITS: at 11:02

## 2020-01-01 RX ADMIN — Medication 10 ML: at 10:46

## 2020-01-01 RX ADMIN — INSULIN LISPRO 3 UNITS: 100 INJECTION, SOLUTION INTRAVENOUS; SUBCUTANEOUS at 06:02

## 2020-01-01 RX ADMIN — VASOPRESSIN 0.04 UNITS/MIN: 20 INJECTION INTRAVENOUS at 21:33

## 2020-01-01 RX ADMIN — DEXMEDETOMIDINE 1.1 MCG/KG/HR: 100 INJECTION, SOLUTION, CONCENTRATE INTRAVENOUS at 06:58

## 2020-01-01 RX ADMIN — VANCOMYCIN HYDROCHLORIDE 1500 MG: 5 INJECTION, POWDER, LYOPHILIZED, FOR SOLUTION INTRAVENOUS at 14:13

## 2020-01-01 RX ADMIN — ENOXAPARIN SODIUM 100 MG: 100 INJECTION SUBCUTANEOUS at 23:37

## 2020-01-01 RX ADMIN — FAMOTIDINE 20 MG: 10 INJECTION, SOLUTION INTRAVENOUS at 09:30

## 2020-01-01 RX ADMIN — Medication 100 MCG/HR: at 10:44

## 2020-01-01 RX ADMIN — Medication 1000 UNITS: at 08:17

## 2020-01-01 RX ADMIN — DEXMEDETOMIDINE 1 MCG/KG/HR: 100 INJECTION, SOLUTION, CONCENTRATE INTRAVENOUS at 06:46

## 2020-01-01 RX ADMIN — Medication: at 17:35

## 2020-01-01 RX ADMIN — METOPROLOL TARTRATE 5 MG: 5 INJECTION INTRAVENOUS at 12:11

## 2020-01-01 RX ADMIN — Medication 100 MCG/HR: at 11:44

## 2020-01-01 RX ADMIN — POTASSIUM BICARBONATE 30 MEQ: 391 TABLET, EFFERVESCENT ORAL at 11:00

## 2020-01-01 RX ADMIN — Medication 15 ML: at 09:57

## 2020-01-01 RX ADMIN — PIPERACILLIN AND TAZOBACTAM 3.38 G: 3; .375 INJECTION, POWDER, LYOPHILIZED, FOR SOLUTION INTRAVENOUS at 05:43

## 2020-01-01 RX ADMIN — FAMOTIDINE 20 MG: 10 INJECTION, SOLUTION INTRAVENOUS at 19:52

## 2020-01-01 RX ADMIN — INSULIN LISPRO 3 UNITS: 100 INJECTION, SOLUTION INTRAVENOUS; SUBCUTANEOUS at 17:33

## 2020-01-01 RX ADMIN — FAMOTIDINE 20 MG: 10 INJECTION INTRAVENOUS at 08:34

## 2020-01-01 RX ADMIN — FENTANYL CITRATE 25 MCG: 50 INJECTION, SOLUTION INTRAMUSCULAR; INTRAVENOUS at 05:35

## 2020-01-01 RX ADMIN — SODIUM PHOSPHATE, MONOBASIC, MONOHYDRATE 12 MMOL: 276; 142 INJECTION, SOLUTION INTRAVENOUS at 17:39

## 2020-01-01 RX ADMIN — INSULIN GLARGINE 7 UNITS: 100 INJECTION, SOLUTION SUBCUTANEOUS at 22:48

## 2020-01-01 RX ADMIN — CALCIUM GLUCONATE 1 G: 98 INJECTION, SOLUTION INTRAVENOUS at 09:06

## 2020-01-01 RX ADMIN — ALBUTEROL SULFATE 2.5 MG: 2.5 SOLUTION RESPIRATORY (INHALATION) at 16:41

## 2020-01-01 RX ADMIN — FAMOTIDINE 20 MG: 10 INJECTION INTRAVENOUS at 08:08

## 2020-01-01 RX ADMIN — CALCIUM GLUCONATE 1 G: 98 INJECTION, SOLUTION INTRAVENOUS at 14:32

## 2020-01-01 RX ADMIN — DEXMEDETOMIDINE 1 MCG/KG/HR: 100 INJECTION, SOLUTION, CONCENTRATE INTRAVENOUS at 03:44

## 2020-01-01 RX ADMIN — DEXMEDETOMIDINE 1.2 MCG/KG/HR: 100 INJECTION, SOLUTION, CONCENTRATE INTRAVENOUS at 08:21

## 2020-01-01 RX ADMIN — FLUDROCORTISONE ACETATE 0.1 MG: 0.1 TABLET ORAL at 10:38

## 2020-01-01 RX ADMIN — Medication 2 MCG/MIN: at 21:57

## 2020-01-01 RX ADMIN — Medication 15 ML: at 14:32

## 2020-01-01 RX ADMIN — Medication 15 ML: at 08:31

## 2020-01-01 RX ADMIN — PANTOPRAZOLE SODIUM 40 MG: 40 INJECTION, POWDER, FOR SOLUTION INTRAVENOUS at 09:24

## 2020-01-01 RX ADMIN — VASOPRESSIN 0.04 UNITS/MIN: 20 INJECTION INTRAVENOUS at 16:02

## 2020-01-01 RX ADMIN — Medication 1000 ML/HR: at 01:09

## 2020-01-01 RX ADMIN — MAGNESIUM SULFATE IN WATER 2000 MG: 40 INJECTION, SOLUTION INTRAVENOUS at 10:49

## 2020-01-01 RX ADMIN — POTASSIUM CHLORIDE 20 MEQ: 400 INJECTION, SOLUTION INTRAVENOUS at 20:05

## 2020-01-01 RX ADMIN — ETOMIDATE 30 MCG: 2 INJECTION INTRAVENOUS at 11:48

## 2020-01-01 RX ADMIN — Medication 1000 ML/HR: at 13:42

## 2020-01-01 RX ADMIN — PIPERACILLIN AND TAZOBACTAM 3.38 G: 3; .375 INJECTION, POWDER, LYOPHILIZED, FOR SOLUTION INTRAVENOUS at 14:42

## 2020-01-01 RX ADMIN — MIDAZOLAM 1 MG: 1 INJECTION INTRAMUSCULAR; INTRAVENOUS at 06:13

## 2020-01-01 RX ADMIN — Medication 37 MCG/MIN: at 22:48

## 2020-01-01 RX ADMIN — AMLODIPINE BESYLATE 10 MG: 10 TABLET ORAL at 09:34

## 2020-01-01 RX ADMIN — Medication 10 ML: at 20:29

## 2020-01-01 RX ADMIN — FAMOTIDINE 20 MG: 10 INJECTION INTRAVENOUS at 09:33

## 2020-01-01 RX ADMIN — AMIODARONE HYDROCHLORIDE 0.5 MG/MIN: 1.8 INJECTION, SOLUTION INTRAVENOUS at 11:21

## 2020-01-01 RX ADMIN — LEVOFLOXACIN 500 MG: 5 INJECTION, SOLUTION INTRAVENOUS at 17:14

## 2020-01-01 RX ADMIN — SODIUM CHLORIDE 1000 ML: 9 INJECTION, SOLUTION INTRAVENOUS at 05:24

## 2020-01-01 RX ADMIN — CLOPIDOGREL BISULFATE 75 MG: 75 TABLET ORAL at 09:33

## 2020-01-01 RX ADMIN — Medication: at 19:56

## 2020-01-01 RX ADMIN — Medication 10 ML: at 19:56

## 2020-01-01 RX ADMIN — INSULIN LISPRO 3 UNITS: 100 INJECTION, SOLUTION INTRAVENOUS; SUBCUTANEOUS at 10:02

## 2020-01-01 RX ADMIN — Medication 10 ML: at 17:35

## 2020-01-01 RX ADMIN — PIPERACILLIN AND TAZOBACTAM 3.38 G: 3; .375 INJECTION, POWDER, LYOPHILIZED, FOR SOLUTION INTRAVENOUS at 14:30

## 2020-01-01 RX ADMIN — SODIUM BICARBONATE 50 MEQ: 84 INJECTION, SOLUTION INTRAVENOUS at 22:15

## 2020-01-01 RX ADMIN — Medication 175 MCG/HR: at 13:47

## 2020-01-01 RX ADMIN — Medication 150 MCG/HR: at 00:43

## 2020-01-01 RX ADMIN — POTASSIUM CHLORIDE 10 MEQ: 7.46 INJECTION, SOLUTION INTRAVENOUS at 10:30

## 2020-01-01 RX ADMIN — ACETAMINOPHEN 650 MG: 325 TABLET ORAL at 12:56

## 2020-01-01 RX ADMIN — ENOXAPARIN SODIUM 100 MG: 100 INJECTION SUBCUTANEOUS at 23:01

## 2020-01-01 RX ADMIN — Medication 22 MCG/MIN: at 12:02

## 2020-01-01 RX ADMIN — DEXMEDETOMIDINE 1.3 MCG/KG/HR: 100 INJECTION, SOLUTION, CONCENTRATE INTRAVENOUS at 17:37

## 2020-01-01 RX ADMIN — POTASSIUM CHLORIDE 20 MEQ: 400 INJECTION, SOLUTION INTRAVENOUS at 08:51

## 2020-01-01 RX ADMIN — Medication 150 MCG/HR: at 14:06

## 2020-01-01 RX ADMIN — INSULIN LISPRO 3 UNITS: 100 INJECTION, SOLUTION INTRAVENOUS; SUBCUTANEOUS at 11:06

## 2020-01-01 RX ADMIN — POTASSIUM CHLORIDE 10 MEQ: 7.46 INJECTION, SOLUTION INTRAVENOUS at 09:01

## 2020-01-01 RX ADMIN — Medication: at 18:22

## 2020-01-01 RX ADMIN — GLYCOPYRROLATE AND FORMOTEROL FUMARATE 2 PUFF: 9; 4.8 AEROSOL, METERED RESPIRATORY (INHALATION) at 18:02

## 2020-01-01 RX ADMIN — ALBUMIN (HUMAN) 25 G: 0.25 INJECTION, SOLUTION INTRAVENOUS at 17:57

## 2020-01-01 RX ADMIN — SODIUM CHLORIDE 20 ML: 9 INJECTION, SOLUTION INTRAVENOUS at 22:03

## 2020-01-01 RX ADMIN — PANTOPRAZOLE SODIUM 40 MG: 40 INJECTION, POWDER, FOR SOLUTION INTRAVENOUS at 09:29

## 2020-01-01 RX ADMIN — ENOXAPARIN SODIUM 40 MG: 40 INJECTION SUBCUTANEOUS at 14:14

## 2020-01-01 RX ADMIN — Medication 125 MCG/HR: at 03:38

## 2020-01-01 RX ADMIN — ROSUVASTATIN CALCIUM 40 MG: 20 TABLET, FILM COATED ORAL at 20:54

## 2020-01-01 RX ADMIN — POTASSIUM BICARBONATE 30 MEQ: 391 TABLET, EFFERVESCENT ORAL at 08:52

## 2020-01-01 RX ADMIN — Medication 100 MCG/HR: at 13:09

## 2020-01-01 RX ADMIN — SODIUM PHOSPHATE, MONOBASIC, MONOHYDRATE 6 MMOL: 276; 142 INJECTION, SOLUTION INTRAVENOUS at 08:36

## 2020-01-01 RX ADMIN — FUROSEMIDE 60 MG: 10 INJECTION, SOLUTION INTRAMUSCULAR; INTRAVENOUS at 09:41

## 2020-01-01 RX ADMIN — DEXTROSE MONOHYDRATE 12.5 G: 500 INJECTION PARENTERAL at 17:47

## 2020-01-01 RX ADMIN — Medication: at 13:41

## 2020-01-01 RX ADMIN — SODIUM CHLORIDE, PRESERVATIVE FREE 10 ML: 5 INJECTION INTRAVENOUS at 19:52

## 2020-01-01 RX ADMIN — Medication 125 MCG/HR: at 08:18

## 2020-01-01 RX ADMIN — GLYCOPYRROLATE AND FORMOTEROL FUMARATE 2 PUFF: 9; 4.8 AEROSOL, METERED RESPIRATORY (INHALATION) at 07:55

## 2020-01-01 RX ADMIN — Medication 10 ML: at 20:03

## 2020-01-01 RX ADMIN — FENTANYL CITRATE 150 MCG/HR: 50 INJECTION, SOLUTION INTRAMUSCULAR; INTRAVENOUS at 12:19

## 2020-01-01 RX ADMIN — REMDESIVIR 100 MG: 100 INJECTION, POWDER, LYOPHILIZED, FOR SOLUTION INTRAVENOUS at 17:19

## 2020-01-01 RX ADMIN — ACETAMINOPHEN 650 MG: 325 TABLET ORAL at 12:43

## 2020-01-01 RX ADMIN — POTASSIUM CHLORIDE 20 MEQ: 400 INJECTION, SOLUTION INTRAVENOUS at 23:30

## 2020-01-01 RX ADMIN — FENTANYL CITRATE 50 MCG: 50 INJECTION INTRAMUSCULAR; INTRAVENOUS at 21:41

## 2020-01-01 RX ADMIN — DOXYCYCLINE 100 MG: 100 INJECTION, POWDER, LYOPHILIZED, FOR SOLUTION INTRAVENOUS at 09:57

## 2020-01-01 RX ADMIN — DEXMEDETOMIDINE 0.6 MCG/KG/HR: 100 INJECTION, SOLUTION, CONCENTRATE INTRAVENOUS at 01:31

## 2020-01-01 RX ADMIN — METOPROLOL TARTRATE 5 MG: 5 INJECTION INTRAVENOUS at 06:37

## 2020-01-01 RX ADMIN — FAMOTIDINE 20 MG: 10 INJECTION, SOLUTION INTRAVENOUS at 20:36

## 2020-01-01 RX ADMIN — ROSUVASTATIN CALCIUM 40 MG: 20 TABLET, FILM COATED ORAL at 21:22

## 2020-01-01 RX ADMIN — DEXMEDETOMIDINE 1.2 MCG/KG/HR: 100 INJECTION, SOLUTION, CONCENTRATE INTRAVENOUS at 06:16

## 2020-01-01 RX ADMIN — ENOXAPARIN SODIUM 100 MG: 100 INJECTION SUBCUTANEOUS at 10:46

## 2020-01-01 RX ADMIN — Medication 15 ML: at 20:09

## 2020-01-01 RX ADMIN — GLYCOPYRROLATE AND FORMOTEROL FUMARATE 2 PUFF: 9; 4.8 AEROSOL, METERED RESPIRATORY (INHALATION) at 20:54

## 2020-01-01 RX ADMIN — CALCIUM GLUCONATE 2 G: 98 INJECTION, SOLUTION INTRAVENOUS at 15:21

## 2020-01-01 RX ADMIN — INSULIN GLARGINE 15 UNITS: 100 INJECTION, SOLUTION SUBCUTANEOUS at 20:33

## 2020-01-01 RX ADMIN — Medication 125 MCG/HR: at 18:45

## 2020-01-01 RX ADMIN — VANCOMYCIN HYDROCHLORIDE 1500 MG: 5 INJECTION, POWDER, LYOPHILIZED, FOR SOLUTION INTRAVENOUS at 02:03

## 2020-01-01 RX ADMIN — MIDAZOLAM 1 MG: 1 INJECTION INTRAMUSCULAR; INTRAVENOUS at 04:38

## 2020-01-01 RX ADMIN — ACETAMINOPHEN 650 MG: 325 TABLET ORAL at 10:33

## 2020-01-01 RX ADMIN — FENTANYL CITRATE 150 MCG/HR: 50 INJECTION, SOLUTION INTRAMUSCULAR; INTRAVENOUS at 19:10

## 2020-01-01 RX ADMIN — ENOXAPARIN SODIUM 40 MG: 40 INJECTION SUBCUTANEOUS at 14:43

## 2020-01-01 RX ADMIN — DEXMEDETOMIDINE 1.4 MCG/KG/HR: 100 INJECTION, SOLUTION, CONCENTRATE INTRAVENOUS at 08:06

## 2020-01-01 RX ADMIN — Medication 10 ML: at 08:43

## 2020-01-01 RX ADMIN — CALCIUM GLUCONATE 1 G: 98 INJECTION, SOLUTION INTRAVENOUS at 20:04

## 2020-01-01 RX ADMIN — ASPIRIN 81 MG: 81 TABLET, CHEWABLE ORAL at 09:09

## 2020-01-01 RX ADMIN — MIDAZOLAM 1 MG: 1 INJECTION INTRAMUSCULAR; INTRAVENOUS at 21:50

## 2020-01-01 RX ADMIN — ACETAMINOPHEN 650 MG: 325 TABLET ORAL at 18:24

## 2020-01-01 RX ADMIN — ROSUVASTATIN CALCIUM 40 MG: 20 TABLET, FILM COATED ORAL at 23:47

## 2020-01-01 RX ADMIN — Medication: at 16:18

## 2020-01-01 RX ADMIN — Medication: at 22:03

## 2020-01-01 RX ADMIN — PIPERACILLIN AND TAZOBACTAM 3.38 G: 3; .375 INJECTION, POWDER, LYOPHILIZED, FOR SOLUTION INTRAVENOUS at 08:56

## 2020-01-01 RX ADMIN — Medication 1000 UNITS: at 21:38

## 2020-01-01 RX ADMIN — PROPOFOL 40 MCG/KG/MIN: 10 INJECTION, EMULSION INTRAVENOUS at 00:30

## 2020-01-01 RX ADMIN — Medication 1000 UNITS: at 10:37

## 2020-01-01 RX ADMIN — DEXMEDETOMIDINE 1.4 MCG/KG/HR: 100 INJECTION, SOLUTION, CONCENTRATE INTRAVENOUS at 17:05

## 2020-01-01 RX ADMIN — FENTANYL CITRATE 200 MCG/HR: 50 INJECTION, SOLUTION INTRAMUSCULAR; INTRAVENOUS at 21:07

## 2020-01-01 RX ADMIN — DEXMEDETOMIDINE 0.8 MCG/KG/HR: 100 INJECTION, SOLUTION, CONCENTRATE INTRAVENOUS at 02:57

## 2020-01-01 RX ADMIN — PROPOFOL 40 MCG/KG/MIN: 10 INJECTION, EMULSION INTRAVENOUS at 20:59

## 2020-01-01 RX ADMIN — GLYCOPYRROLATE AND FORMOTEROL FUMARATE 2 PUFF: 9; 4.8 AEROSOL, METERED RESPIRATORY (INHALATION) at 09:43

## 2020-01-01 RX ADMIN — Medication 175 MCG/HR: at 17:21

## 2020-01-01 RX ADMIN — AMIODARONE HYDROCHLORIDE 0.5 MG/MIN: 1.8 INJECTION, SOLUTION INTRAVENOUS at 23:03

## 2020-01-01 RX ADMIN — PIPERACILLIN AND TAZOBACTAM 3.38 G: 3; .375 INJECTION, POWDER, LYOPHILIZED, FOR SOLUTION INTRAVENOUS at 22:44

## 2020-01-01 RX ADMIN — SODIUM CHLORIDE, PRESERVATIVE FREE 10 ML: 5 INJECTION INTRAVENOUS at 19:36

## 2020-01-01 RX ADMIN — FENTANYL CITRATE 200 MCG/HR: 50 INJECTION, SOLUTION INTRAMUSCULAR; INTRAVENOUS at 11:39

## 2020-01-01 RX ADMIN — SODIUM CHLORIDE: 4.5 INJECTION, SOLUTION INTRAVENOUS at 06:40

## 2020-01-01 RX ADMIN — ACETAMINOPHEN 650 MG: 325 TABLET ORAL at 22:14

## 2020-01-01 RX ADMIN — VANCOMYCIN HYDROCHLORIDE 1500 MG: 5 INJECTION, POWDER, LYOPHILIZED, FOR SOLUTION INTRAVENOUS at 15:37

## 2020-01-01 RX ADMIN — Medication: at 15:04

## 2020-01-01 RX ADMIN — Medication 15 ML: at 08:38

## 2020-01-01 RX ADMIN — Medication 10 ML: at 07:45

## 2020-01-01 RX ADMIN — DEXAMETHASONE SODIUM PHOSPHATE 6 MG: 4 INJECTION, SOLUTION INTRA-ARTICULAR; INTRALESIONAL; INTRAMUSCULAR; INTRAVENOUS; SOFT TISSUE at 08:34

## 2020-01-01 RX ADMIN — SODIUM PHOSPHATE, MONOBASIC, MONOHYDRATE AND SODIUM PHOSPHATE, DIBASIC, ANHYDROUS 15 MMOL: 276; 142 INJECTION, SOLUTION INTRAVENOUS at 15:35

## 2020-01-01 RX ADMIN — Medication 15 ML: at 08:48

## 2020-01-01 RX ADMIN — PROPOFOL 40 MCG/KG/MIN: 10 INJECTION, EMULSION INTRAVENOUS at 08:36

## 2020-01-01 RX ADMIN — FLUDROCORTISONE ACETATE 0.1 MG: 0.1 TABLET ORAL at 08:49

## 2020-01-01 RX ADMIN — PROPOFOL 20 MCG/KG/MIN: 10 INJECTION, EMULSION INTRAVENOUS at 21:30

## 2020-01-01 RX ADMIN — INSULIN LISPRO 6 UNITS: 100 INJECTION, SOLUTION INTRAVENOUS; SUBCUTANEOUS at 21:22

## 2020-01-01 RX ADMIN — ACETAMINOPHEN 650 MG: 650 SUPPOSITORY RECTAL at 02:11

## 2020-01-01 RX ADMIN — SODIUM BICARBONATE 50 MEQ: 84 INJECTION, SOLUTION INTRAVENOUS at 20:18

## 2020-01-01 RX ADMIN — Medication 15 ML: at 10:53

## 2020-01-01 RX ADMIN — PIPERACILLIN AND TAZOBACTAM 3.38 G: 3; .375 INJECTION, POWDER, LYOPHILIZED, FOR SOLUTION INTRAVENOUS at 21:19

## 2020-01-01 RX ADMIN — DEXMEDETOMIDINE 1.4 MCG/KG/HR: 100 INJECTION, SOLUTION, CONCENTRATE INTRAVENOUS at 16:19

## 2020-01-01 RX ADMIN — ASPIRIN 81 MG: 81 TABLET, CHEWABLE ORAL at 08:59

## 2020-01-01 RX ADMIN — DEXMEDETOMIDINE 1.1 MCG/KG/HR: 100 INJECTION, SOLUTION, CONCENTRATE INTRAVENOUS at 03:07

## 2020-01-01 RX ADMIN — ENOXAPARIN SODIUM 100 MG: 100 INJECTION SUBCUTANEOUS at 13:30

## 2020-01-01 RX ADMIN — VASOPRESSIN 0.04 UNITS/MIN: 20 INJECTION INTRAVENOUS at 12:51

## 2020-01-01 RX ADMIN — Medication: at 10:11

## 2020-01-01 RX ADMIN — Medication 1000 ML/HR: at 22:56

## 2020-01-01 RX ADMIN — GLYCOPYRROLATE AND FORMOTEROL FUMARATE 2 PUFF: 9; 4.8 AEROSOL, METERED RESPIRATORY (INHALATION) at 20:45

## 2020-01-01 RX ADMIN — HEPARIN SODIUM 8.7 UNITS/KG/HR: 10000 INJECTION, SOLUTION INTRAVENOUS at 17:46

## 2020-01-01 RX ADMIN — ACETAMINOPHEN 650 MG: 325 TABLET ORAL at 22:25

## 2020-01-01 RX ADMIN — DEXMEDETOMIDINE 0.9 MCG/KG/HR: 100 INJECTION, SOLUTION, CONCENTRATE INTRAVENOUS at 15:24

## 2020-01-01 RX ADMIN — INSULIN LISPRO 6 UNITS: 100 INJECTION, SOLUTION INTRAVENOUS; SUBCUTANEOUS at 19:40

## 2020-01-01 RX ADMIN — POTASSIUM CHLORIDE 10 MEQ: 7.46 INJECTION, SOLUTION INTRAVENOUS at 09:53

## 2020-01-01 RX ADMIN — Medication 15 ML: at 08:03

## 2020-01-01 RX ADMIN — ACETAMINOPHEN 650 MG: 325 TABLET ORAL at 15:07

## 2020-01-01 RX ADMIN — INSULIN GLARGINE 7 UNITS: 100 INJECTION, SOLUTION SUBCUTANEOUS at 21:59

## 2020-01-01 RX ADMIN — SODIUM POLYSTYRENE SULFONATE 15 G: 15 SUSPENSION ORAL; RECTAL at 08:06

## 2020-01-01 RX ADMIN — POTASSIUM CHLORIDE 10 MEQ: 7.46 INJECTION, SOLUTION INTRAVENOUS at 20:13

## 2020-01-01 RX ADMIN — ENOXAPARIN SODIUM 100 MG: 100 INJECTION SUBCUTANEOUS at 23:36

## 2020-01-01 RX ADMIN — GLYCOPYRROLATE AND FORMOTEROL FUMARATE 2 PUFF: 9; 4.8 AEROSOL, METERED RESPIRATORY (INHALATION) at 20:01

## 2020-01-01 RX ADMIN — CALCIUM CHLORIDE, MAGNESIUM CHLORIDE, DEXTROSE MONOHYDRATE, LACTIC ACID, SODIUM CHLORIDE, SODIUM BICARBONATE AND POTASSIUM CHLORIDE 1000 ML/HR: 5.15; 2.03; 22; 5.4; 6.46; 3.09; .157 INJECTION INTRAVENOUS at 15:04

## 2020-01-01 RX ADMIN — INSULIN LISPRO 2 UNITS: 100 INJECTION, SOLUTION INTRAVENOUS; SUBCUTANEOUS at 13:57

## 2020-01-01 RX ADMIN — GLYCOPYRROLATE AND FORMOTEROL FUMARATE 2 PUFF: 9; 4.8 AEROSOL, METERED RESPIRATORY (INHALATION) at 09:44

## 2020-01-01 RX ADMIN — Medication: at 12:45

## 2020-01-01 RX ADMIN — Medication 1000 ML/HR: at 02:35

## 2020-01-01 RX ADMIN — Medication 15 ML: at 21:59

## 2020-01-01 RX ADMIN — FENTANYL CITRATE 150 MCG/HR: 50 INJECTION, SOLUTION INTRAMUSCULAR; INTRAVENOUS at 09:30

## 2020-01-01 RX ADMIN — FUROSEMIDE 60 MG: 10 INJECTION, SOLUTION INTRAMUSCULAR; INTRAVENOUS at 10:40

## 2020-01-01 RX ADMIN — METOPROLOL TARTRATE 5 MG: 5 INJECTION INTRAVENOUS at 18:25

## 2020-01-01 RX ADMIN — POTASSIUM BICARBONATE 30 MEQ: 391 TABLET, EFFERVESCENT ORAL at 20:12

## 2020-01-01 RX ADMIN — POTASSIUM CHLORIDE 20 MEQ: 400 INJECTION, SOLUTION INTRAVENOUS at 21:19

## 2020-01-01 RX ADMIN — Medication 10 ML: at 08:47

## 2020-01-01 RX ADMIN — MIDAZOLAM 3 MG/HR: 5 INJECTION INTRAMUSCULAR; INTRAVENOUS at 07:29

## 2020-01-01 RX ADMIN — Medication 1000 UNITS: at 09:41

## 2020-01-01 RX ADMIN — ENOXAPARIN SODIUM 100 MG: 100 INJECTION SUBCUTANEOUS at 12:47

## 2020-01-01 RX ADMIN — Medication 1000 ML/HR: at 07:48

## 2020-01-01 RX ADMIN — DEXMEDETOMIDINE 1.3 MCG/KG/HR: 100 INJECTION, SOLUTION, CONCENTRATE INTRAVENOUS at 20:55

## 2020-01-01 RX ADMIN — Medication 100 MCG/HR: at 21:42

## 2020-01-01 RX ADMIN — PIPERACILLIN AND TAZOBACTAM 3.38 G: 3; .375 INJECTION, POWDER, LYOPHILIZED, FOR SOLUTION INTRAVENOUS at 06:04

## 2020-01-01 RX ADMIN — ATORVASTATIN CALCIUM 10 MG: 10 TABLET, FILM COATED ORAL at 20:25

## 2020-01-01 RX ADMIN — FUROSEMIDE 40 MG: 10 INJECTION, SOLUTION INTRAMUSCULAR; INTRAVENOUS at 10:42

## 2020-01-01 RX ADMIN — Medication 100 MCG/HR: at 07:58

## 2020-01-01 RX ADMIN — FENTANYL CITRATE 25 MCG: 50 INJECTION, SOLUTION INTRAMUSCULAR; INTRAVENOUS at 22:14

## 2020-01-01 RX ADMIN — HEPARIN SODIUM: 1000 INJECTION INTRAVENOUS; SUBCUTANEOUS at 13:08

## 2020-01-01 RX ADMIN — CLOPIDOGREL BISULFATE 75 MG: 75 TABLET ORAL at 09:29

## 2020-01-01 RX ADMIN — Medication: at 05:54

## 2020-01-01 RX ADMIN — FENTANYL CITRATE 50 MCG/HR: 50 INJECTION, SOLUTION INTRAMUSCULAR; INTRAVENOUS at 00:09

## 2020-01-01 RX ADMIN — DEXMEDETOMIDINE 1.3 MCG/KG/HR: 100 INJECTION, SOLUTION, CONCENTRATE INTRAVENOUS at 01:07

## 2020-01-01 RX ADMIN — METOPROLOL TARTRATE 5 MG: 5 INJECTION INTRAVENOUS at 17:03

## 2020-01-01 RX ADMIN — CISATRACURIUM BESYLATE 10 MG: 2 INJECTION INTRAVENOUS at 20:48

## 2020-01-01 RX ADMIN — INFLUENZA A VIRUS A/VICTORIA/2454/2019 IVR-207 (H1N1) ANTIGEN (PROPIOLACTONE INACTIVATED), INFLUENZA A VIRUS A/HONG KONG/2671/2019 IVR-208 (H3N2) ANTIGEN (PROPIOLACTONE INACTIVATED), INFLUENZA B VIRUS B/VICTORIA/705/2018 BVR-11 ANTIGEN (PROPIOLACTONE INACTIVATED), INFLUENZA B VIRUS B/PHUKET/3073/2013 BVR-1B ANTIGEN (PROPIOLACTONE INACTIVATED) 0.5 ML: 15; 15; 15; 15 INJECTION, SUSPENSION INTRAMUSCULAR at 23:57

## 2020-01-01 RX ADMIN — VASOPRESSIN 0.04 UNITS/MIN: 20 INJECTION INTRAVENOUS at 12:15

## 2020-01-01 RX ADMIN — INSULIN LISPRO 9 UNITS: 100 INJECTION, SOLUTION INTRAVENOUS; SUBCUTANEOUS at 03:49

## 2020-01-01 RX ADMIN — INSULIN LISPRO 6 UNITS: 100 INJECTION, SOLUTION INTRAVENOUS; SUBCUTANEOUS at 05:38

## 2020-01-01 RX ADMIN — GLYCOPYRROLATE AND FORMOTEROL FUMARATE 2 PUFF: 9; 4.8 AEROSOL, METERED RESPIRATORY (INHALATION) at 18:14

## 2020-01-01 RX ADMIN — Medication 10 ML: at 20:26

## 2020-01-01 RX ADMIN — INSULIN LISPRO 3 UNITS: 100 INJECTION, SOLUTION INTRAVENOUS; SUBCUTANEOUS at 22:48

## 2020-01-01 RX ADMIN — GLYCOPYRROLATE AND FORMOTEROL FUMARATE 2 PUFF: 9; 4.8 AEROSOL, METERED RESPIRATORY (INHALATION) at 09:46

## 2020-01-01 RX ADMIN — POTASSIUM BICARBONATE 30 MEQ: 391 TABLET, EFFERVESCENT ORAL at 11:03

## 2020-01-01 RX ADMIN — DEXMEDETOMIDINE 1.4 MCG/KG/HR: 100 INJECTION, SOLUTION, CONCENTRATE INTRAVENOUS at 04:47

## 2020-01-01 RX ADMIN — DEXTROSE MONOHYDRATE: 100 INJECTION, SOLUTION INTRAVENOUS at 09:15

## 2020-01-01 RX ADMIN — FAMOTIDINE 20 MG: 10 INJECTION, SOLUTION INTRAVENOUS at 09:09

## 2020-01-01 RX ADMIN — SODIUM CHLORIDE, PRESERVATIVE FREE 10 ML: 5 INJECTION INTRAVENOUS at 21:40

## 2020-01-01 RX ADMIN — PROPOFOL 20 MCG/KG/MIN: 10 INJECTION, EMULSION INTRAVENOUS at 09:10

## 2020-01-01 RX ADMIN — SODIUM CHLORIDE, PRESERVATIVE FREE 10 ML: 5 INJECTION INTRAVENOUS at 09:16

## 2020-01-01 RX ADMIN — Medication 1000 ML/HR: at 15:16

## 2020-01-01 RX ADMIN — FENTANYL CITRATE 50 MCG: 50 INJECTION INTRAMUSCULAR; INTRAVENOUS at 22:08

## 2020-01-01 RX ADMIN — Medication 15 ML: at 22:24

## 2020-01-01 RX ADMIN — LEVOFLOXACIN 500 MG: 5 INJECTION, SOLUTION INTRAVENOUS at 20:13

## 2020-01-01 RX ADMIN — Medication 100 MCG/HR: at 04:57

## 2020-01-01 RX ADMIN — FENTANYL CITRATE 150 MCG/HR: 50 INJECTION, SOLUTION INTRAMUSCULAR; INTRAVENOUS at 07:09

## 2020-01-01 RX ADMIN — REMDESIVIR 100 MG: 100 INJECTION, POWDER, LYOPHILIZED, FOR SOLUTION INTRAVENOUS at 17:51

## 2020-01-01 RX ADMIN — PIPERACILLIN AND TAZOBACTAM 3.38 G: 3; .375 INJECTION, POWDER, LYOPHILIZED, FOR SOLUTION INTRAVENOUS at 13:43

## 2020-01-01 RX ADMIN — LINEZOLID 600 MG: 600 INJECTION, SOLUTION INTRAVENOUS at 00:16

## 2020-01-01 RX ADMIN — HEPARIN SODIUM 5000 UNITS: 5000 INJECTION INTRAVENOUS; SUBCUTANEOUS at 15:07

## 2020-01-01 RX ADMIN — POTASSIUM CHLORIDE 20 MEQ: 400 INJECTION, SOLUTION INTRAVENOUS at 10:41

## 2020-01-01 RX ADMIN — GLYCOPYRROLATE AND FORMOTEROL FUMARATE 2 PUFF: 9; 4.8 AEROSOL, METERED RESPIRATORY (INHALATION) at 21:11

## 2020-01-01 RX ADMIN — HEPARIN SODIUM: 1000 INJECTION INTRAVENOUS; SUBCUTANEOUS at 15:17

## 2020-01-01 RX ADMIN — Medication 1000 UNITS: at 08:47

## 2020-01-01 RX ADMIN — HEPARIN SODIUM: 1000 INJECTION INTRAVENOUS; SUBCUTANEOUS at 10:47

## 2020-01-01 RX ADMIN — HEPARIN SODIUM: 1000 INJECTION INTRAVENOUS; SUBCUTANEOUS at 10:25

## 2020-01-01 RX ADMIN — CALCIUM CHLORIDE, MAGNESIUM CHLORIDE, DEXTROSE MONOHYDRATE, LACTIC ACID, SODIUM CHLORIDE, SODIUM BICARBONATE AND POTASSIUM CHLORIDE 1000 ML/HR: 5.15; 2.03; 22; 5.4; 6.46; 3.09; .157 INJECTION INTRAVENOUS at 15:07

## 2020-01-01 RX ADMIN — DEXMEDETOMIDINE 1 MCG/KG/HR: 100 INJECTION, SOLUTION, CONCENTRATE INTRAVENOUS at 03:13

## 2020-01-01 RX ADMIN — SODIUM PHOSPHATE, MONOBASIC, MONOHYDRATE 6 MMOL: 276; 142 INJECTION, SOLUTION INTRAVENOUS at 14:04

## 2020-01-01 RX ADMIN — INSULIN LISPRO 6 UNITS: 100 INJECTION, SOLUTION INTRAVENOUS; SUBCUTANEOUS at 06:26

## 2020-01-01 RX ADMIN — SODIUM CHLORIDE 1000 ML: 9 INJECTION, SOLUTION INTRAVENOUS at 22:51

## 2020-01-01 RX ADMIN — ASPIRIN 81 MG: 81 TABLET, CHEWABLE ORAL at 11:07

## 2020-01-01 RX ADMIN — DEXMEDETOMIDINE 1 MCG/KG/HR: 100 INJECTION, SOLUTION, CONCENTRATE INTRAVENOUS at 19:40

## 2020-01-01 RX ADMIN — SODIUM BICARBONATE: 84 INJECTION, SOLUTION INTRAVENOUS at 08:29

## 2020-01-01 RX ADMIN — Medication 125 MCG/HR: at 20:32

## 2020-01-01 RX ADMIN — PROPOFOL 15 MCG/KG/MIN: 10 INJECTION, EMULSION INTRAVENOUS at 13:27

## 2020-01-01 RX ADMIN — Medication 10 ML: at 20:21

## 2020-01-01 RX ADMIN — Medication 125 MCG/HR: at 10:27

## 2020-01-01 RX ADMIN — FAMOTIDINE 20 MG: 10 INJECTION INTRAVENOUS at 09:46

## 2020-01-01 RX ADMIN — POTASSIUM CHLORIDE 10 MEQ: 7.46 INJECTION, SOLUTION INTRAVENOUS at 21:08

## 2020-01-01 RX ADMIN — SODIUM BICARBONATE: 84 INJECTION, SOLUTION INTRAVENOUS at 18:15

## 2020-01-01 RX ADMIN — FAMOTIDINE 20 MG: 10 INJECTION, SOLUTION INTRAVENOUS at 21:59

## 2020-01-01 RX ADMIN — IOPAMIDOL 80 ML: 755 INJECTION, SOLUTION INTRAVENOUS at 20:50

## 2020-01-01 RX ADMIN — SODIUM CHLORIDE, PRESERVATIVE FREE 10 ML: 5 INJECTION INTRAVENOUS at 09:40

## 2020-01-01 RX ADMIN — GLIMEPIRIDE 4 MG: 4 TABLET ORAL at 16:35

## 2020-01-01 RX ADMIN — DANAZOL 400 MG: 200 CAPSULE ORAL at 20:55

## 2020-01-01 RX ADMIN — PIPERACILLIN AND TAZOBACTAM 3.38 G: 3; .375 INJECTION, POWDER, LYOPHILIZED, FOR SOLUTION INTRAVENOUS at 17:58

## 2020-01-01 RX ADMIN — Medication 1000 ML/HR: at 00:27

## 2020-01-01 RX ADMIN — VASOPRESSIN 0.04 UNITS/MIN: 20 INJECTION INTRAVENOUS at 00:24

## 2020-01-01 RX ADMIN — PIPERACILLIN AND TAZOBACTAM 3.38 G: 3; .375 INJECTION, POWDER, LYOPHILIZED, FOR SOLUTION INTRAVENOUS at 01:10

## 2020-01-01 RX ADMIN — ENOXAPARIN SODIUM 100 MG: 100 INJECTION SUBCUTANEOUS at 22:43

## 2020-01-01 RX ADMIN — Medication 15 ML: at 08:05

## 2020-01-01 RX ADMIN — Medication 15 ML: at 09:10

## 2020-01-01 RX ADMIN — BISACODYL 10 MG: 10 SUPPOSITORY RECTAL at 12:11

## 2020-01-01 RX ADMIN — DEXMEDETOMIDINE 1.4 MCG/KG/HR: 100 INJECTION, SOLUTION, CONCENTRATE INTRAVENOUS at 20:02

## 2020-01-01 RX ADMIN — GLYCOPYRROLATE AND FORMOTEROL FUMARATE 2 PUFF: 9; 4.8 AEROSOL, METERED RESPIRATORY (INHALATION) at 11:26

## 2020-01-01 RX ADMIN — Medication 10 ML: at 09:47

## 2020-01-01 RX ADMIN — POTASSIUM CHLORIDE 20 MEQ: 29.8 INJECTION, SOLUTION INTRAVENOUS at 05:23

## 2020-01-01 RX ADMIN — PIPERACILLIN AND TAZOBACTAM 3.38 G: 3; .375 INJECTION, POWDER, LYOPHILIZED, FOR SOLUTION INTRAVENOUS at 09:49

## 2020-01-01 RX ADMIN — PIPERACILLIN AND TAZOBACTAM 3.38 G: 3; .375 INJECTION, POWDER, LYOPHILIZED, FOR SOLUTION INTRAVENOUS at 18:23

## 2020-01-01 RX ADMIN — FAMOTIDINE 20 MG: 10 INJECTION, SOLUTION INTRAVENOUS at 19:34

## 2020-01-01 RX ADMIN — METOROPROLOL TARTRATE 2.5 MG: 5 INJECTION, SOLUTION INTRAVENOUS at 21:37

## 2020-01-01 RX ADMIN — Medication 1000 UNITS: at 08:49

## 2020-01-01 RX ADMIN — Medication 1000 UNITS: at 08:35

## 2020-01-01 RX ADMIN — INSULIN LISPRO 6 UNITS: 100 INJECTION, SOLUTION INTRAVENOUS; SUBCUTANEOUS at 17:44

## 2020-01-01 RX ADMIN — INSULIN LISPRO 3 UNITS: 100 INJECTION, SOLUTION INTRAVENOUS; SUBCUTANEOUS at 23:08

## 2020-01-01 RX ADMIN — Medication 1000 ML/HR: at 01:10

## 2020-01-01 RX ADMIN — SODIUM PHOSPHATE, MONOBASIC, MONOHYDRATE AND SODIUM PHOSPHATE, DIBASIC, ANHYDROUS 16 MMOL: 276; 142 INJECTION, SOLUTION INTRAVENOUS at 12:41

## 2020-01-01 RX ADMIN — Medication 150 MCG/HR: at 20:56

## 2020-01-01 RX ADMIN — METOPROLOL TARTRATE 5 MG: 5 INJECTION INTRAVENOUS at 01:44

## 2020-01-01 RX ADMIN — Medication: at 23:39

## 2020-01-01 RX ADMIN — Medication 1000 ML/HR: at 08:24

## 2020-01-01 RX ADMIN — REMDESIVIR 100 MG: 100 INJECTION, POWDER, LYOPHILIZED, FOR SOLUTION INTRAVENOUS at 17:04

## 2020-01-01 RX ADMIN — REMDESIVIR 100 MG: 100 INJECTION, POWDER, LYOPHILIZED, FOR SOLUTION INTRAVENOUS at 17:15

## 2020-01-01 RX ADMIN — MIDAZOLAM 1 MG/HR: 5 INJECTION INTRAMUSCULAR; INTRAVENOUS at 08:07

## 2020-01-01 RX ADMIN — SODIUM CHLORIDE: 9 INJECTION, SOLUTION INTRAVENOUS at 10:30

## 2020-01-01 RX ADMIN — INSULIN LISPRO 6 UNITS: 100 INJECTION, SOLUTION INTRAVENOUS; SUBCUTANEOUS at 13:04

## 2020-01-01 RX ADMIN — PANTOPRAZOLE SODIUM 40 MG: 40 INJECTION, POWDER, FOR SOLUTION INTRAVENOUS at 08:53

## 2020-01-01 RX ADMIN — ATORVASTATIN CALCIUM 10 MG: 10 TABLET, FILM COATED ORAL at 20:54

## 2020-01-01 RX ADMIN — SODIUM CHLORIDE, PRESERVATIVE FREE 10 ML: 5 INJECTION INTRAVENOUS at 09:27

## 2020-01-01 RX ADMIN — FAMOTIDINE 20 MG: 10 INJECTION INTRAVENOUS at 09:10

## 2020-01-01 RX ADMIN — Medication 1000 ML/HR: at 13:02

## 2020-01-01 RX ADMIN — Medication 30 MCG/MIN: at 12:40

## 2020-01-01 RX ADMIN — Medication 50 MCG/MIN: at 22:21

## 2020-01-01 RX ADMIN — Medication: at 14:22

## 2020-01-01 RX ADMIN — Medication 150 MCG/HR: at 17:25

## 2020-01-01 RX ADMIN — DEXMEDETOMIDINE 1 MCG/KG/HR: 100 INJECTION, SOLUTION, CONCENTRATE INTRAVENOUS at 15:28

## 2020-01-01 RX ADMIN — HEPARIN SODIUM: 1000 INJECTION INTRAVENOUS; SUBCUTANEOUS at 04:29

## 2020-01-01 RX ADMIN — ACETAMINOPHEN 650 MG: 325 TABLET ORAL at 03:10

## 2020-01-01 RX ADMIN — CISATRACURIUM BESYLATE 10 MG: 2 INJECTION INTRAVENOUS at 11:50

## 2020-01-01 RX ADMIN — SODIUM CHLORIDE, PRESERVATIVE FREE 10 ML: 5 INJECTION INTRAVENOUS at 23:24

## 2020-01-01 RX ADMIN — INSULIN LISPRO 3 UNITS: 100 INJECTION, SOLUTION INTRAVENOUS; SUBCUTANEOUS at 17:37

## 2020-01-01 RX ADMIN — PIPERACILLIN AND TAZOBACTAM 3.38 G: 3; .375 INJECTION, POWDER, LYOPHILIZED, FOR SOLUTION INTRAVENOUS at 06:09

## 2020-01-01 RX ADMIN — POTASSIUM CHLORIDE 20 MEQ: 400 INJECTION, SOLUTION INTRAVENOUS at 22:36

## 2020-01-01 RX ADMIN — ACETAMINOPHEN 650 MG: 325 TABLET ORAL at 00:34

## 2020-01-01 RX ADMIN — VANCOMYCIN HYDROCHLORIDE 1500 MG: 5 INJECTION, POWDER, LYOPHILIZED, FOR SOLUTION INTRAVENOUS at 16:14

## 2020-01-01 RX ADMIN — FUROSEMIDE 60 MG: 10 INJECTION, SOLUTION INTRAMUSCULAR; INTRAVENOUS at 17:34

## 2020-01-01 RX ADMIN — PIPERACILLIN AND TAZOBACTAM 3.38 G: 3; .375 INJECTION, POWDER, LYOPHILIZED, FOR SOLUTION INTRAVENOUS at 17:01

## 2020-01-01 RX ADMIN — DORNASE ALFA 5 MG: 1 SOLUTION RESPIRATORY (INHALATION) at 15:09

## 2020-01-01 RX ADMIN — LINEZOLID 600 MG: 600 INJECTION, SOLUTION INTRAVENOUS at 23:11

## 2020-01-01 RX ADMIN — CALCIUM GLUCONATE: 98 INJECTION, SOLUTION INTRAVENOUS at 17:32

## 2020-01-01 RX ADMIN — PIPERACILLIN AND TAZOBACTAM 3.38 G: 3; .375 INJECTION, POWDER, LYOPHILIZED, FOR SOLUTION INTRAVENOUS at 05:23

## 2020-01-01 RX ADMIN — Medication 1 MCG/MIN: at 14:38

## 2020-01-01 RX ADMIN — ENOXAPARIN SODIUM 40 MG: 40 INJECTION SUBCUTANEOUS at 15:13

## 2020-01-01 RX ADMIN — GLYCOPYRROLATE AND FORMOTEROL FUMARATE 2 PUFF: 9; 4.8 AEROSOL, METERED RESPIRATORY (INHALATION) at 08:35

## 2020-01-01 RX ADMIN — POTASSIUM CHLORIDE 10 MEQ: 7.46 INJECTION, SOLUTION INTRAVENOUS at 22:50

## 2020-01-01 RX ADMIN — DIATRIZOATE MEGLUMINE AND DIATRIZOATE SODIUM 30 ML: 600; 100 SOLUTION ORAL; RECTAL at 09:50

## 2020-01-01 RX ADMIN — PROPOFOL 35 MCG/KG/MIN: 10 INJECTION, EMULSION INTRAVENOUS at 12:45

## 2020-01-01 RX ADMIN — Medication: at 17:34

## 2020-01-01 RX ADMIN — SODIUM CHLORIDE, PRESERVATIVE FREE 10 ML: 5 INJECTION INTRAVENOUS at 09:10

## 2020-01-01 RX ADMIN — DEXMEDETOMIDINE 1.4 MCG/KG/HR: 100 INJECTION, SOLUTION, CONCENTRATE INTRAVENOUS at 13:57

## 2020-01-01 RX ADMIN — PROPOFOL 40 MCG/KG/MIN: 10 INJECTION, EMULSION INTRAVENOUS at 12:23

## 2020-01-01 RX ADMIN — CLOPIDOGREL BISULFATE 75 MG: 75 TABLET ORAL at 09:12

## 2020-01-01 RX ADMIN — CALCIUM GLUCONATE 1 G: 98 INJECTION, SOLUTION INTRAVENOUS at 03:55

## 2020-01-01 RX ADMIN — VASOPRESSIN 0.04 UNITS/MIN: 20 INJECTION INTRAVENOUS at 04:28

## 2020-01-01 RX ADMIN — DEXAMETHASONE SODIUM PHOSPHATE 6 MG: 4 INJECTION, SOLUTION INTRA-ARTICULAR; INTRALESIONAL; INTRAMUSCULAR; INTRAVENOUS; SOFT TISSUE at 08:42

## 2020-01-01 RX ADMIN — Medication 10 ML: at 09:01

## 2020-01-01 RX ADMIN — DEXMEDETOMIDINE 1 MCG/KG/HR: 100 INJECTION, SOLUTION, CONCENTRATE INTRAVENOUS at 16:40

## 2020-01-01 RX ADMIN — METOPROLOL TARTRATE 5 MG: 5 INJECTION INTRAVENOUS at 15:51

## 2020-01-01 RX ADMIN — Medication: at 03:52

## 2020-01-01 RX ADMIN — SODIUM PHOSPHATE, MONOBASIC, MONOHYDRATE 6 MMOL: 276; 142 INJECTION, SOLUTION INTRAVENOUS at 04:07

## 2020-01-01 RX ADMIN — ALTEPLASE 77.8 MG: KIT at 05:17

## 2020-01-01 RX ADMIN — Medication 1000 ML/HR: at 02:36

## 2020-01-01 RX ADMIN — HEPARIN SODIUM 5000 UNITS: 5000 INJECTION INTRAVENOUS; SUBCUTANEOUS at 13:33

## 2020-01-01 RX ADMIN — LEVOFLOXACIN 500 MG: 5 INJECTION, SOLUTION INTRAVENOUS at 17:15

## 2020-01-01 RX ADMIN — FAMOTIDINE 20 MG: 10 INJECTION, SOLUTION INTRAVENOUS at 21:53

## 2020-01-01 RX ADMIN — FAMOTIDINE 20 MG: 10 INJECTION INTRAVENOUS at 14:32

## 2020-01-01 RX ADMIN — ROSUVASTATIN CALCIUM 40 MG: 20 TABLET, FILM COATED ORAL at 22:42

## 2020-01-01 RX ADMIN — POTASSIUM BICARBONATE 30 MEQ: 391 TABLET, EFFERVESCENT ORAL at 23:13

## 2020-01-01 RX ADMIN — PIPERACILLIN AND TAZOBACTAM 3.38 G: 3; .375 INJECTION, POWDER, LYOPHILIZED, FOR SOLUTION INTRAVENOUS at 14:57

## 2020-01-01 RX ADMIN — ATORVASTATIN CALCIUM 10 MG: 10 TABLET, FILM COATED ORAL at 21:00

## 2020-01-01 RX ADMIN — POTASSIUM CHLORIDE 20 MEQ: 400 INJECTION, SOLUTION INTRAVENOUS at 09:47

## 2020-01-01 RX ADMIN — FENTANYL CITRATE 150 MCG/HR: 50 INJECTION, SOLUTION INTRAMUSCULAR; INTRAVENOUS at 21:47

## 2020-01-01 RX ADMIN — CALCIUM GLUCONATE 1 G: 98 INJECTION, SOLUTION INTRAVENOUS at 02:27

## 2020-01-01 RX ADMIN — MIDAZOLAM 1 MG: 1 INJECTION INTRAMUSCULAR; INTRAVENOUS at 16:41

## 2020-01-01 RX ADMIN — ENOXAPARIN SODIUM 40 MG: 40 INJECTION SUBCUTANEOUS at 15:21

## 2020-01-01 RX ADMIN — CLOPIDOGREL BISULFATE 75 MG: 75 TABLET ORAL at 08:48

## 2020-01-01 RX ADMIN — MIDAZOLAM 2 MG: 1 INJECTION INTRAMUSCULAR; INTRAVENOUS at 21:10

## 2020-01-01 RX ADMIN — Medication 200 MCG/HR: at 09:27

## 2020-01-01 RX ADMIN — VASOPRESSIN 0.04 UNITS/MIN: 20 INJECTION INTRAVENOUS at 16:32

## 2020-01-01 RX ADMIN — ALBUMIN (HUMAN) 25 G: 0.25 INJECTION, SOLUTION INTRAVENOUS at 21:08

## 2020-01-01 RX ADMIN — IOPAMIDOL 80 ML: 755 INJECTION, SOLUTION INTRAVENOUS at 10:08

## 2020-01-01 RX ADMIN — DEXMEDETOMIDINE 1.2 MCG/KG/HR: 100 INJECTION, SOLUTION, CONCENTRATE INTRAVENOUS at 12:10

## 2020-01-01 RX ADMIN — LINEZOLID 600 MG: 600 INJECTION, SOLUTION INTRAVENOUS at 11:40

## 2020-01-01 RX ADMIN — SODIUM PHOSPHATE, MONOBASIC, MONOHYDRATE 12 MMOL: 276; 142 INJECTION, SOLUTION INTRAVENOUS at 02:14

## 2020-01-01 RX ADMIN — DEXMEDETOMIDINE 1.3 MCG/KG/HR: 100 INJECTION, SOLUTION, CONCENTRATE INTRAVENOUS at 04:54

## 2020-01-01 RX ADMIN — Medication 15 ML: at 19:35

## 2020-01-01 RX ADMIN — Medication 10 ML: at 10:54

## 2020-01-01 RX ADMIN — AMLODIPINE BESYLATE 10 MG: 10 TABLET ORAL at 08:42

## 2020-01-01 RX ADMIN — HEPARIN SODIUM 5000 UNITS: 5000 INJECTION INTRAVENOUS; SUBCUTANEOUS at 18:17

## 2020-01-01 RX ADMIN — LINEZOLID 600 MG: 600 INJECTION, SOLUTION INTRAVENOUS at 14:25

## 2020-01-01 RX ADMIN — INSULIN LISPRO 3 UNITS: 100 INJECTION, SOLUTION INTRAVENOUS; SUBCUTANEOUS at 19:28

## 2020-01-01 RX ADMIN — Medication 15 ML: at 09:29

## 2020-01-01 RX ADMIN — DEXMEDETOMIDINE 1 MCG/HR: 100 INJECTION, SOLUTION, CONCENTRATE INTRAVENOUS at 01:32

## 2020-01-01 RX ADMIN — Medication 15 ML: at 20:02

## 2020-01-01 RX ADMIN — POTASSIUM CHLORIDE 10 MEQ: 7.46 INJECTION, SOLUTION INTRAVENOUS at 09:50

## 2020-01-01 RX ADMIN — CALCIUM GLUCONATE 1 G: 98 INJECTION, SOLUTION INTRAVENOUS at 13:28

## 2020-01-01 RX ADMIN — FAMOTIDINE 20 MG: 10 INJECTION INTRAVENOUS at 08:48

## 2020-01-01 RX ADMIN — ATORVASTATIN CALCIUM 10 MG: 10 TABLET, FILM COATED ORAL at 20:43

## 2020-01-01 RX ADMIN — ASPIRIN 81 MG: 81 TABLET, CHEWABLE ORAL at 09:08

## 2020-01-01 RX ADMIN — PROPOFOL 20 MCG/KG/MIN: 10 INJECTION, EMULSION INTRAVENOUS at 06:31

## 2020-01-01 RX ADMIN — Medication 150 MCG/HR: at 00:41

## 2020-01-01 RX ADMIN — POTASSIUM BICARBONATE 30 MEQ: 391 TABLET, EFFERVESCENT ORAL at 20:10

## 2020-01-01 RX ADMIN — POTASSIUM CHLORIDE 20 MEQ: 400 INJECTION, SOLUTION INTRAVENOUS at 09:14

## 2020-01-01 RX ADMIN — SODIUM CHLORIDE 500 ML: 9 INJECTION, SOLUTION INTRAVENOUS at 18:05

## 2020-01-01 RX ADMIN — DEXMEDETOMIDINE 1.1 MCG/KG/HR: 100 INJECTION, SOLUTION, CONCENTRATE INTRAVENOUS at 23:29

## 2020-01-01 RX ADMIN — FAMOTIDINE 20 MG: 10 INJECTION, SOLUTION INTRAVENOUS at 20:54

## 2020-01-01 RX ADMIN — INSULIN LISPRO 3 UNITS: 100 INJECTION, SOLUTION INTRAVENOUS; SUBCUTANEOUS at 03:21

## 2020-01-01 RX ADMIN — ENOXAPARIN SODIUM 40 MG: 40 INJECTION SUBCUTANEOUS at 15:02

## 2020-01-01 RX ADMIN — Medication 10 ML: at 10:59

## 2020-01-01 RX ADMIN — VASOPRESSIN 0.04 UNITS/MIN: 20 INJECTION INTRAVENOUS at 21:20

## 2020-01-01 RX ADMIN — PROPOFOL 10 MCG/KG/MIN: 10 INJECTION, EMULSION INTRAVENOUS at 09:52

## 2020-01-01 RX ADMIN — Medication 1000 ML/HR: at 18:30

## 2020-01-01 RX ADMIN — ENOXAPARIN SODIUM 100 MG: 100 INJECTION SUBCUTANEOUS at 22:22

## 2020-01-01 RX ADMIN — Medication: at 01:49

## 2020-01-01 RX ADMIN — Medication: at 08:25

## 2020-01-01 RX ADMIN — GLYCOPYRROLATE AND FORMOTEROL FUMARATE 2 PUFF: 9; 4.8 AEROSOL, METERED RESPIRATORY (INHALATION) at 08:22

## 2020-01-01 RX ADMIN — Medication 1000 UNITS: at 11:51

## 2020-01-01 RX ADMIN — Medication 10 ML: at 08:03

## 2020-01-01 RX ADMIN — CLOPIDOGREL BISULFATE 75 MG: 75 TABLET ORAL at 12:40

## 2020-01-01 RX ADMIN — PIPERACILLIN AND TAZOBACTAM 3.38 G: 3; .375 INJECTION, POWDER, LYOPHILIZED, FOR SOLUTION INTRAVENOUS at 09:37

## 2020-01-01 RX ADMIN — POTASSIUM CHLORIDE 10 MEQ: 7.46 INJECTION, SOLUTION INTRAVENOUS at 10:33

## 2020-01-01 RX ADMIN — FENTANYL CITRATE 150 MCG/HR: 50 INJECTION, SOLUTION INTRAMUSCULAR; INTRAVENOUS at 15:57

## 2020-01-01 RX ADMIN — LINEZOLID 600 MG: 600 INJECTION, SOLUTION INTRAVENOUS at 11:33

## 2020-01-01 RX ADMIN — DEXMEDETOMIDINE 1.4 MCG/KG/HR: 100 INJECTION, SOLUTION, CONCENTRATE INTRAVENOUS at 19:56

## 2020-01-01 RX ADMIN — FUROSEMIDE 60 MG: 10 INJECTION, SOLUTION INTRAMUSCULAR; INTRAVENOUS at 08:47

## 2020-01-01 RX ADMIN — CALCIUM GLUCONATE 1 G: 98 INJECTION, SOLUTION INTRAVENOUS at 15:04

## 2020-01-01 RX ADMIN — LINEZOLID 600 MG: 600 INJECTION, SOLUTION INTRAVENOUS at 12:45

## 2020-01-01 RX ADMIN — INSULIN LISPRO 9 UNITS: 100 INJECTION, SOLUTION INTRAVENOUS; SUBCUTANEOUS at 00:23

## 2020-01-01 RX ADMIN — SODIUM CHLORIDE: 9 INJECTION, SOLUTION INTRAVENOUS at 11:44

## 2020-01-01 RX ADMIN — SODIUM CHLORIDE, PRESERVATIVE FREE 10 ML: 5 INJECTION INTRAVENOUS at 21:00

## 2020-01-01 RX ADMIN — GLYCOPYRROLATE AND FORMOTEROL FUMARATE 2 PUFF: 9; 4.8 AEROSOL, METERED RESPIRATORY (INHALATION) at 08:26

## 2020-01-01 RX ADMIN — PIPERACILLIN AND TAZOBACTAM 3.38 G: 3; .375 INJECTION, POWDER, LYOPHILIZED, FOR SOLUTION INTRAVENOUS at 06:02

## 2020-01-01 RX ADMIN — LINEZOLID 600 MG: 600 INJECTION, SOLUTION INTRAVENOUS at 00:22

## 2020-01-01 RX ADMIN — ROCURONIUM BROMIDE 25 MG: 10 INJECTION, SOLUTION INTRAVENOUS at 15:02

## 2020-01-01 RX ADMIN — PROPOFOL 50 MCG/KG/MIN: 10 INJECTION, EMULSION INTRAVENOUS at 00:34

## 2020-01-01 RX ADMIN — Medication: at 16:47

## 2020-01-01 RX ADMIN — METOPROLOL TARTRATE 5 MG: 5 INJECTION INTRAVENOUS at 10:36

## 2020-01-01 RX ADMIN — Medication 150 MCG/HR: at 17:37

## 2020-01-01 RX ADMIN — HEPARIN SODIUM 5000 UNITS: 5000 INJECTION INTRAVENOUS; SUBCUTANEOUS at 23:32

## 2020-01-01 RX ADMIN — CALCIUM GLUCONATE 1 G: 98 INJECTION, SOLUTION INTRAVENOUS at 15:09

## 2020-01-01 RX ADMIN — ALBUTEROL SULFATE 10 MG: 2.5 SOLUTION RESPIRATORY (INHALATION) at 17:19

## 2020-01-01 RX ADMIN — SODIUM CHLORIDE, PRESERVATIVE FREE 10 ML: 5 INJECTION INTRAVENOUS at 09:30

## 2020-01-01 RX ADMIN — Medication 15 ML: at 20:28

## 2020-01-01 RX ADMIN — AMIODARONE HYDROCHLORIDE 1 MG/MIN: 1.8 INJECTION, SOLUTION INTRAVENOUS at 17:03

## 2020-01-01 RX ADMIN — FUROSEMIDE 60 MG: 10 INJECTION, SOLUTION INTRAMUSCULAR; INTRAVENOUS at 09:01

## 2020-01-01 RX ADMIN — POTASSIUM CHLORIDE 10 MEQ: 7.46 INJECTION, SOLUTION INTRAVENOUS at 22:36

## 2020-01-01 RX ADMIN — Medication 1000 ML/HR: at 05:43

## 2020-01-01 RX ADMIN — CLOPIDOGREL BISULFATE 75 MG: 75 TABLET ORAL at 08:05

## 2020-01-01 RX ADMIN — PROPOFOL 20 MCG/KG/MIN: 10 INJECTION, EMULSION INTRAVENOUS at 05:13

## 2020-01-01 RX ADMIN — PIPERACILLIN AND TAZOBACTAM 3.38 G: 3; .375 INJECTION, POWDER, LYOPHILIZED, FOR SOLUTION INTRAVENOUS at 22:22

## 2020-01-01 RX ADMIN — Medication 10 ML: at 09:29

## 2020-01-01 RX ADMIN — FENTANYL CITRATE 50 MCG: 50 INJECTION, SOLUTION INTRAMUSCULAR; INTRAVENOUS at 12:00

## 2020-01-01 RX ADMIN — AMIODARONE HYDROCHLORIDE 0.5 MG/MIN: 1.8 INJECTION, SOLUTION INTRAVENOUS at 22:07

## 2020-01-01 RX ADMIN — POTASSIUM CHLORIDE 40 MEQ: 750 TABLET, FILM COATED, EXTENDED RELEASE ORAL at 11:00

## 2020-01-01 RX ADMIN — POTASSIUM CHLORIDE 20 MEQ: 400 INJECTION, SOLUTION INTRAVENOUS at 10:06

## 2020-01-01 RX ADMIN — GLYCOPYRROLATE AND FORMOTEROL FUMARATE 2 PUFF: 9; 4.8 AEROSOL, METERED RESPIRATORY (INHALATION) at 18:38

## 2020-01-01 RX ADMIN — FENTANYL CITRATE 200 MCG/HR: 50 INJECTION, SOLUTION INTRAMUSCULAR; INTRAVENOUS at 08:39

## 2020-01-01 RX ADMIN — Medication 175 MCG/HR: at 10:35

## 2020-01-01 RX ADMIN — LINEZOLID 600 MG: 600 INJECTION, SOLUTION INTRAVENOUS at 23:25

## 2020-01-01 RX ADMIN — ENOXAPARIN SODIUM 100 MG: 100 INJECTION SUBCUTANEOUS at 10:55

## 2020-01-01 RX ADMIN — DEXMEDETOMIDINE 1.2 MCG/KG/HR: 100 INJECTION, SOLUTION, CONCENTRATE INTRAVENOUS at 15:28

## 2020-01-01 RX ADMIN — ALBUTEROL SULFATE 5 MG: 2.5 SOLUTION RESPIRATORY (INHALATION) at 06:37

## 2020-01-01 RX ADMIN — Medication 2 MCG/MIN: at 08:18

## 2020-01-01 RX ADMIN — PHENYLEPHRINE HYDROCHLORIDE 250 MCG/MIN: 10 INJECTION INTRAVENOUS at 22:58

## 2020-01-01 RX ADMIN — Medication 15 ML: at 23:22

## 2020-01-01 RX ADMIN — METOPROLOL TARTRATE 5 MG: 5 INJECTION INTRAVENOUS at 05:11

## 2020-01-01 RX ADMIN — MIDAZOLAM HYDROCHLORIDE 1 MG/HR: 5 INJECTION, SOLUTION INTRAMUSCULAR; INTRAVENOUS at 13:30

## 2020-01-01 RX ADMIN — DEXAMETHASONE SODIUM PHOSPHATE 6 MG: 4 INJECTION, SOLUTION INTRAMUSCULAR; INTRAVENOUS at 09:41

## 2020-01-01 RX ADMIN — INSULIN GLARGINE 7 UNITS: 100 INJECTION, SOLUTION SUBCUTANEOUS at 23:07

## 2020-01-01 RX ADMIN — Medication 1000 ML/HR: at 16:34

## 2020-01-01 RX ADMIN — GLYCOPYRROLATE AND FORMOTEROL FUMARATE 2 PUFF: 9; 4.8 AEROSOL, METERED RESPIRATORY (INHALATION) at 19:50

## 2020-01-01 RX ADMIN — ENOXAPARIN SODIUM 40 MG: 40 INJECTION SUBCUTANEOUS at 14:37

## 2020-01-01 RX ADMIN — PIPERACILLIN AND TAZOBACTAM 3.38 G: 3; .375 INJECTION, POWDER, LYOPHILIZED, FOR SOLUTION INTRAVENOUS at 11:01

## 2020-01-01 RX ADMIN — Medication 175 MCG/HR: at 07:36

## 2020-01-01 RX ADMIN — DEXMEDETOMIDINE 1 MCG/KG/HR: 100 INJECTION, SOLUTION, CONCENTRATE INTRAVENOUS at 04:09

## 2020-01-01 RX ADMIN — Medication 27 MCG/MIN: at 23:42

## 2020-01-01 RX ADMIN — Medication 75 MCG/HR: at 04:44

## 2020-01-01 RX ADMIN — INSULIN GLARGINE 10 UNITS: 100 INJECTION, SOLUTION SUBCUTANEOUS at 21:34

## 2020-01-01 RX ADMIN — PHENYLEPHRINE HYDROCHLORIDE 50 MCG/MIN: 10 INJECTION INTRAVENOUS at 12:13

## 2020-01-01 RX ADMIN — CALCIUM GLUCONATE 1 G: 98 INJECTION, SOLUTION INTRAVENOUS at 17:32

## 2020-01-01 RX ADMIN — SODIUM BICARBONATE: 84 INJECTION, SOLUTION INTRAVENOUS at 12:07

## 2020-01-01 RX ADMIN — POTASSIUM BICARBONATE 30 MEQ: 391 TABLET, EFFERVESCENT ORAL at 10:49

## 2020-01-01 RX ADMIN — Medication 75 MCG/HR: at 03:03

## 2020-01-01 RX ADMIN — INSULIN LISPRO 1 UNITS: 100 INJECTION, SOLUTION INTRAVENOUS; SUBCUTANEOUS at 08:56

## 2020-01-01 RX ADMIN — POTASSIUM CHLORIDE 10 MEQ: 7.46 INJECTION, SOLUTION INTRAVENOUS at 10:41

## 2020-01-01 RX ADMIN — ACETAMINOPHEN 650 MG: 325 TABLET ORAL at 11:10

## 2020-01-01 RX ADMIN — ACETAMINOPHEN 650 MG: 325 TABLET ORAL at 21:04

## 2020-01-01 RX ADMIN — MIDAZOLAM 2 MG: 1 INJECTION INTRAMUSCULAR; INTRAVENOUS at 13:48

## 2020-01-01 RX ADMIN — DEXMEDETOMIDINE 0.9 MCG/KG/HR: 100 INJECTION, SOLUTION, CONCENTRATE INTRAVENOUS at 22:17

## 2020-01-01 RX ADMIN — SODIUM CHLORIDE, PRESERVATIVE FREE 10 ML: 5 INJECTION INTRAVENOUS at 11:02

## 2020-01-01 RX ADMIN — METOPROLOL TARTRATE 5 MG: 5 INJECTION INTRAVENOUS at 12:37

## 2020-01-01 RX ADMIN — SODIUM CHLORIDE, PRESERVATIVE FREE 10 ML: 5 INJECTION INTRAVENOUS at 20:55

## 2020-01-01 RX ADMIN — Medication 10 ML: at 08:59

## 2020-01-01 RX ADMIN — FENTANYL CITRATE 50 MCG: 50 INJECTION, SOLUTION INTRAMUSCULAR; INTRAVENOUS at 22:08

## 2020-01-01 RX ADMIN — INSULIN LISPRO 3 UNITS: 100 INJECTION, SOLUTION INTRAVENOUS; SUBCUTANEOUS at 02:13

## 2020-01-01 RX ADMIN — FAMOTIDINE 20 MG: 10 INJECTION INTRAVENOUS at 20:55

## 2020-01-01 RX ADMIN — DEXAMETHASONE SODIUM PHOSPHATE 6 MG: 4 INJECTION, SOLUTION INTRAMUSCULAR; INTRAVENOUS at 09:33

## 2020-01-01 RX ADMIN — SODIUM BICARBONATE: 84 INJECTION, SOLUTION INTRAVENOUS at 18:25

## 2020-01-01 RX ADMIN — Medication 175 MCG/HR: at 05:39

## 2020-01-01 RX ADMIN — SODIUM CHLORIDE, PRESERVATIVE FREE 10 ML: 5 INJECTION INTRAVENOUS at 22:17

## 2020-01-01 RX ADMIN — CALCIUM GLUCONATE 1 G: 98 INJECTION, SOLUTION INTRAVENOUS at 22:04

## 2020-01-01 RX ADMIN — Medication 10 ML: at 00:05

## 2020-01-01 RX ADMIN — PIPERACILLIN AND TAZOBACTAM 3.38 G: 3; .375 INJECTION, POWDER, LYOPHILIZED, FOR SOLUTION INTRAVENOUS at 15:45

## 2020-01-01 RX ADMIN — CALCIUM GLUCONATE 1 G: 98 INJECTION, SOLUTION INTRAVENOUS at 21:33

## 2020-01-01 RX ADMIN — GLYCOPYRROLATE AND FORMOTEROL FUMARATE 2 PUFF: 9; 4.8 AEROSOL, METERED RESPIRATORY (INHALATION) at 09:21

## 2020-01-01 RX ADMIN — ROSUVASTATIN CALCIUM 40 MG: 20 TABLET, FILM COATED ORAL at 20:39

## 2020-01-01 RX ADMIN — CALCIUM GLUCONATE 1 G: 98 INJECTION, SOLUTION INTRAVENOUS at 21:50

## 2020-01-01 RX ADMIN — MIDAZOLAM 1 MG: 1 INJECTION INTRAMUSCULAR; INTRAVENOUS at 01:01

## 2020-01-01 RX ADMIN — ROSUVASTATIN CALCIUM 40 MG: 20 TABLET, FILM COATED ORAL at 19:58

## 2020-01-01 RX ADMIN — DEXMEDETOMIDINE 1 MCG/KG/HR: 100 INJECTION, SOLUTION, CONCENTRATE INTRAVENOUS at 11:25

## 2020-01-01 RX ADMIN — POTASSIUM CHLORIDE 20 MEQ: 400 INJECTION, SOLUTION INTRAVENOUS at 11:34

## 2020-01-01 RX ADMIN — HEPARIN SODIUM 5000 UNITS: 5000 INJECTION INTRAVENOUS; SUBCUTANEOUS at 09:33

## 2020-01-01 RX ADMIN — ACETAMINOPHEN 650 MG: 325 TABLET ORAL at 12:53

## 2020-01-01 RX ADMIN — Medication 1000 UNITS: at 10:52

## 2020-01-01 RX ADMIN — ASPIRIN 81 MG: 81 TABLET, CHEWABLE ORAL at 08:52

## 2020-01-01 RX ADMIN — SODIUM CHLORIDE, PRESERVATIVE FREE 10 ML: 5 INJECTION INTRAVENOUS at 09:57

## 2020-01-01 RX ADMIN — ASPIRIN 81 MG: 81 TABLET, CHEWABLE ORAL at 09:29

## 2020-01-01 RX ADMIN — Medication 10 ML: at 00:16

## 2020-01-01 RX ADMIN — SODIUM POLYSTYRENE SULFONATE 15 G: 15 SUSPENSION ORAL; RECTAL at 18:15

## 2020-01-01 RX ADMIN — Medication 1000 UNITS: at 08:03

## 2020-01-01 RX ADMIN — Medication 1000 ML/HR: at 01:12

## 2020-01-01 RX ADMIN — SODIUM CHLORIDE 54 MCG: 9 INJECTION, SOLUTION INTRAVENOUS at 21:25

## 2020-01-01 RX ADMIN — INSULIN LISPRO 3 UNITS: 100 INJECTION, SOLUTION INTRAVENOUS; SUBCUTANEOUS at 05:59

## 2020-01-01 RX ADMIN — FENTANYL CITRATE 25 MCG: 50 INJECTION, SOLUTION INTRAMUSCULAR; INTRAVENOUS at 23:21

## 2020-01-01 RX ADMIN — Medication 10 ML: at 15:55

## 2020-01-01 RX ADMIN — DEXAMETHASONE SODIUM PHOSPHATE 10 MG: 4 INJECTION, SOLUTION INTRA-ARTICULAR; INTRALESIONAL; INTRAMUSCULAR; INTRAVENOUS; SOFT TISSUE at 22:24

## 2020-01-01 RX ADMIN — Medication 10 ML: at 20:45

## 2020-01-01 RX ADMIN — DEXMEDETOMIDINE 1 MCG/KG/HR: 100 INJECTION, SOLUTION, CONCENTRATE INTRAVENOUS at 09:14

## 2020-01-01 RX ADMIN — DEXAMETHASONE SODIUM PHOSPHATE 6 MG: 4 INJECTION, SOLUTION INTRAMUSCULAR; INTRAVENOUS at 11:00

## 2020-01-01 RX ADMIN — DEXMEDETOMIDINE 0.5 MCG/KG/HR: 100 INJECTION, SOLUTION, CONCENTRATE INTRAVENOUS at 12:52

## 2020-01-01 RX ADMIN — VASOPRESSIN 0.04 UNITS/MIN: 20 INJECTION INTRAVENOUS at 08:40

## 2020-01-01 RX ADMIN — FAMOTIDINE 20 MG: 10 INJECTION, SOLUTION INTRAVENOUS at 09:29

## 2020-01-01 RX ADMIN — Medication: at 00:08

## 2020-01-01 RX ADMIN — PHENYLEPHRINE HYDROCHLORIDE 300 MCG/MIN: 10 INJECTION INTRAVENOUS at 22:38

## 2020-01-01 RX ADMIN — AMIODARONE HYDROCHLORIDE 0.5 MG/MIN: 1.8 INJECTION, SOLUTION INTRAVENOUS at 22:51

## 2020-01-01 RX ADMIN — Medication 1000 UNITS: at 08:37

## 2020-01-01 RX ADMIN — ENOXAPARIN SODIUM 40 MG: 40 INJECTION SUBCUTANEOUS at 16:38

## 2020-01-01 RX ADMIN — PROPOFOL 50 MCG/KG/MIN: 10 INJECTION, EMULSION INTRAVENOUS at 21:32

## 2020-01-01 RX ADMIN — PIPERACILLIN AND TAZOBACTAM 3.38 G: 3; .375 INJECTION, POWDER, LYOPHILIZED, FOR SOLUTION INTRAVENOUS at 01:33

## 2020-01-01 RX ADMIN — VASOPRESSIN 0.04 UNITS/MIN: 20 INJECTION INTRAVENOUS at 09:57

## 2020-01-01 RX ADMIN — Medication 15 ML: at 20:50

## 2020-01-01 RX ADMIN — ACETAMINOPHEN 650 MG: 650 SUPPOSITORY RECTAL at 21:32

## 2020-01-01 RX ADMIN — Medication 10 MCG/MIN: at 19:28

## 2020-01-01 RX ADMIN — Medication 15 ML: at 09:01

## 2020-01-01 RX ADMIN — SODIUM CHLORIDE: 9 INJECTION, SOLUTION INTRAVENOUS at 12:45

## 2020-01-01 RX ADMIN — DEXMEDETOMIDINE 1.4 MCG/KG/HR: 100 INJECTION, SOLUTION, CONCENTRATE INTRAVENOUS at 01:14

## 2020-01-01 RX ADMIN — Medication 1000 ML/HR: at 11:40

## 2020-01-01 RX ADMIN — ACETAMINOPHEN 650 MG: 650 SUPPOSITORY RECTAL at 04:32

## 2020-01-01 RX ADMIN — FAMOTIDINE 20 MG: 10 INJECTION INTRAVENOUS at 20:25

## 2020-01-01 RX ADMIN — SODIUM CHLORIDE, PRESERVATIVE FREE 10 ML: 5 INJECTION INTRAVENOUS at 09:34

## 2020-01-01 RX ADMIN — DEXMEDETOMIDINE 1.2 MCG/KG/HR: 100 INJECTION, SOLUTION, CONCENTRATE INTRAVENOUS at 19:23

## 2020-01-01 RX ADMIN — LINEZOLID 600 MG: 600 INJECTION, SOLUTION INTRAVENOUS at 01:01

## 2020-01-01 RX ADMIN — PANTOPRAZOLE SODIUM 40 MG: 40 INJECTION, POWDER, FOR SOLUTION INTRAVENOUS at 10:57

## 2020-01-01 RX ADMIN — HEPARIN SODIUM 5000 UNITS: 5000 INJECTION INTRAVENOUS; SUBCUTANEOUS at 00:18

## 2020-01-01 RX ADMIN — DEXMEDETOMIDINE 1.3 MCG/KG/HR: 100 INJECTION, SOLUTION, CONCENTRATE INTRAVENOUS at 01:41

## 2020-01-01 RX ADMIN — ASPIRIN 81 MG: 81 TABLET, CHEWABLE ORAL at 10:57

## 2020-01-01 RX ADMIN — AMIODARONE HYDROCHLORIDE 0.5 MG/MIN: 1.8 INJECTION, SOLUTION INTRAVENOUS at 23:21

## 2020-01-01 RX ADMIN — DEXMEDETOMIDINE 1.3 MCG/KG/HR: 100 INJECTION, SOLUTION, CONCENTRATE INTRAVENOUS at 21:32

## 2020-01-01 RX ADMIN — INSULIN GLARGINE 15 UNITS: 100 INJECTION, SOLUTION SUBCUTANEOUS at 20:44

## 2020-01-01 RX ADMIN — SODIUM CHLORIDE, PRESERVATIVE FREE 10 ML: 5 INJECTION INTRAVENOUS at 21:59

## 2020-01-01 RX ADMIN — PROPOFOL 20 MCG/KG/MIN: 10 INJECTION, EMULSION INTRAVENOUS at 12:01

## 2020-01-01 RX ADMIN — POTASSIUM CHLORIDE 10 MEQ: 7.46 INJECTION, SOLUTION INTRAVENOUS at 10:43

## 2020-01-01 RX ADMIN — Medication 100 MCG/HR: at 16:08

## 2020-01-01 RX ADMIN — LEUCINE, PHENYLALANINE, LYSINE, METHIONINE, ISOLEUCINE, VALINE, HISTIDINE, THREONINE, TRYPTOPHAN, ALANINE, GLYCINE, ARGININE, PROLINE, SERINE, TYROSINE, DEXTROSE: 365; 280; 290; 200; 300; 290; 240; 210; 90; 1035; 515; 575; 340; 250; 20; 15 INJECTION INTRAVENOUS at 17:05

## 2020-01-01 RX ADMIN — FAMOTIDINE 20 MG: 10 INJECTION INTRAVENOUS at 20:29

## 2020-01-01 RX ADMIN — MIDAZOLAM 1 MG: 1 INJECTION INTRAMUSCULAR; INTRAVENOUS at 04:58

## 2020-01-01 RX ADMIN — SODIUM PHOSPHATE, MONOBASIC, MONOHYDRATE 12 MMOL: 276; 142 INJECTION, SOLUTION INTRAVENOUS at 17:37

## 2020-01-01 RX ADMIN — METOPROLOL TARTRATE 5 MG: 5 INJECTION INTRAVENOUS at 11:10

## 2020-01-01 RX ADMIN — HEPARIN SODIUM 5000 UNITS: 5000 INJECTION INTRAVENOUS; SUBCUTANEOUS at 06:33

## 2020-01-01 RX ADMIN — DOXYCYCLINE 100 MG: 100 INJECTION, POWDER, LYOPHILIZED, FOR SOLUTION INTRAVENOUS at 20:57

## 2020-01-01 RX ADMIN — INSULIN GLARGINE 15 UNITS: 100 INJECTION, SOLUTION SUBCUTANEOUS at 21:37

## 2020-01-01 RX ADMIN — CLOPIDOGREL BISULFATE 75 MG: 75 TABLET ORAL at 10:52

## 2020-01-01 RX ADMIN — PIPERACILLIN AND TAZOBACTAM 3.38 G: 3; .375 INJECTION, POWDER, LYOPHILIZED, FOR SOLUTION INTRAVENOUS at 15:01

## 2020-01-01 RX ADMIN — Medication 100 MCG/HR: at 17:52

## 2020-01-01 RX ADMIN — Medication: at 11:26

## 2020-01-01 RX ADMIN — Medication 15 ML: at 21:22

## 2020-01-01 RX ADMIN — Medication 3 MCG/MIN: at 09:03

## 2020-01-01 RX ADMIN — DEXMEDETOMIDINE 1 MCG/KG/HR: 100 INJECTION, SOLUTION, CONCENTRATE INTRAVENOUS at 05:51

## 2020-01-01 RX ADMIN — Medication 15 ML: at 19:58

## 2020-01-01 RX ADMIN — ENOXAPARIN SODIUM 100 MG: 100 INJECTION SUBCUTANEOUS at 22:59

## 2020-01-01 RX ADMIN — INSULIN LISPRO 1 UNITS: 100 INJECTION, SOLUTION INTRAVENOUS; SUBCUTANEOUS at 16:50

## 2020-01-01 RX ADMIN — Medication 15 ML: at 20:48

## 2020-01-01 RX ADMIN — Medication 50 MCG/MIN: at 16:09

## 2020-01-01 RX ADMIN — Medication 1000 ML/HR: at 10:52

## 2020-01-01 RX ADMIN — Medication 1000 ML/HR: at 06:10

## 2020-01-01 RX ADMIN — ENOXAPARIN SODIUM 40 MG: 40 INJECTION SUBCUTANEOUS at 14:55

## 2020-01-01 RX ADMIN — FAMOTIDINE 20 MG: 10 INJECTION INTRAVENOUS at 09:17

## 2020-01-01 RX ADMIN — FUROSEMIDE 60 MG: 10 INJECTION, SOLUTION INTRAMUSCULAR; INTRAVENOUS at 08:03

## 2020-01-01 RX ADMIN — Medication 10 ML: at 09:11

## 2020-01-01 RX ADMIN — FAMOTIDINE 20 MG: 10 INJECTION, SOLUTION INTRAVENOUS at 20:12

## 2020-01-01 RX ADMIN — FAMOTIDINE 20 MG: 10 INJECTION INTRAVENOUS at 08:36

## 2020-01-01 RX ADMIN — CLOPIDOGREL BISULFATE 75 MG: 75 TABLET ORAL at 09:24

## 2020-01-01 RX ADMIN — DEXTROSE MONOHYDRATE 12.5 G: 500 INJECTION PARENTERAL at 06:15

## 2020-01-01 RX ADMIN — POTASSIUM CHLORIDE 20 MEQ: 400 INJECTION, SOLUTION INTRAVENOUS at 07:46

## 2020-01-01 RX ADMIN — SODIUM BICARBONATE: 84 INJECTION, SOLUTION INTRAVENOUS at 08:38

## 2020-01-01 RX ADMIN — MIDAZOLAM 3 MG/HR: 5 INJECTION INTRAMUSCULAR; INTRAVENOUS at 13:46

## 2020-01-01 RX ADMIN — ENOXAPARIN SODIUM 100 MG: 100 INJECTION SUBCUTANEOUS at 10:59

## 2020-01-01 RX ADMIN — PIPERACILLIN AND TAZOBACTAM 3.38 G: 3; .375 INJECTION, POWDER, LYOPHILIZED, FOR SOLUTION INTRAVENOUS at 15:21

## 2020-01-01 RX ADMIN — PROPOFOL 20 MCG/KG/MIN: 10 INJECTION, EMULSION INTRAVENOUS at 00:03

## 2020-01-01 RX ADMIN — CALCIUM GLUCONATE 1 G: 98 INJECTION, SOLUTION INTRAVENOUS at 00:24

## 2020-01-01 RX ADMIN — ACETAMINOPHEN 650 MG: 325 TABLET ORAL at 12:47

## 2020-01-01 RX ADMIN — PANTOPRAZOLE SODIUM 40 MG: 40 INJECTION, POWDER, FOR SOLUTION INTRAVENOUS at 08:43

## 2020-01-01 RX ADMIN — AMLODIPINE BESYLATE 10 MG: 10 TABLET ORAL at 09:41

## 2020-01-01 RX ADMIN — PROPOFOL 20 MCG/KG/MIN: 10 INJECTION, EMULSION INTRAVENOUS at 18:45

## 2020-01-01 RX ADMIN — FENTANYL CITRATE 200 MCG/KG/HR: 50 INJECTION, SOLUTION INTRAMUSCULAR; INTRAVENOUS at 03:17

## 2020-01-01 RX ADMIN — DEXMEDETOMIDINE 0.9 MCG/KG/HR: 100 INJECTION, SOLUTION, CONCENTRATE INTRAVENOUS at 17:41

## 2020-01-01 RX ADMIN — ACETAMINOPHEN 650 MG: 325 TABLET ORAL at 03:41

## 2020-01-01 RX ADMIN — ACETAMINOPHEN 650 MG: 325 TABLET ORAL at 21:38

## 2020-01-01 RX ADMIN — GLYCOPYRROLATE AND FORMOTEROL FUMARATE 2 PUFF: 9; 4.8 AEROSOL, METERED RESPIRATORY (INHALATION) at 22:10

## 2020-01-01 RX ADMIN — Medication 10 ML: at 20:07

## 2020-01-01 RX ADMIN — Medication 1000 UNITS: at 08:59

## 2020-01-01 RX ADMIN — INSULIN LISPRO 3 UNITS: 100 INJECTION, SOLUTION INTRAVENOUS; SUBCUTANEOUS at 22:01

## 2020-01-01 RX ADMIN — ASPIRIN 81 MG: 81 TABLET, CHEWABLE ORAL at 08:03

## 2020-01-01 RX ADMIN — DEXMEDETOMIDINE 0.9 MCG/KG/HR: 100 INJECTION, SOLUTION, CONCENTRATE INTRAVENOUS at 08:30

## 2020-01-01 RX ADMIN — VANCOMYCIN HYDROCHLORIDE 1500 MG: 5 INJECTION, POWDER, LYOPHILIZED, FOR SOLUTION INTRAVENOUS at 03:18

## 2020-01-01 RX ADMIN — Medication: at 06:10

## 2020-01-01 RX ADMIN — DEXMEDETOMIDINE 1.4 MCG/KG/HR: 100 INJECTION, SOLUTION, CONCENTRATE INTRAVENOUS at 13:03

## 2020-01-01 RX ADMIN — ACETAMINOPHEN 650 MG: 650 SUPPOSITORY RECTAL at 13:58

## 2020-01-01 RX ADMIN — DEXMEDETOMIDINE 1 MCG/KG/HR: 100 INJECTION, SOLUTION, CONCENTRATE INTRAVENOUS at 15:32

## 2020-01-01 RX ADMIN — FUROSEMIDE 20 MG: 10 INJECTION, SOLUTION INTRAMUSCULAR; INTRAVENOUS at 08:54

## 2020-01-01 RX ADMIN — GLYCOPYRROLATE AND FORMOTEROL FUMARATE 2 PUFF: 9; 4.8 AEROSOL, METERED RESPIRATORY (INHALATION) at 08:34

## 2020-01-01 RX ADMIN — Medication 8 MCG/MIN: at 08:16

## 2020-01-01 RX ADMIN — Medication 10 ML: at 20:14

## 2020-01-01 RX ADMIN — PHENYLEPHRINE HYDROCHLORIDE 300 MCG/MIN: 10 INJECTION INTRAVENOUS at 12:56

## 2020-01-01 RX ADMIN — FAMOTIDINE 20 MG: 10 INJECTION, SOLUTION INTRAVENOUS at 08:49

## 2020-01-01 RX ADMIN — Medication 1000 ML/HR: at 20:10

## 2020-01-01 RX ADMIN — PROPOFOL 50 MCG/KG/MIN: 10 INJECTION, EMULSION INTRAVENOUS at 17:00

## 2020-01-01 RX ADMIN — INSULIN LISPRO 9 UNITS: 100 INJECTION, SOLUTION INTRAVENOUS; SUBCUTANEOUS at 06:58

## 2020-01-01 RX ADMIN — GLYCOPYRROLATE AND FORMOTEROL FUMARATE 2 PUFF: 9; 4.8 AEROSOL, METERED RESPIRATORY (INHALATION) at 20:14

## 2020-01-01 RX ADMIN — PIPERACILLIN AND TAZOBACTAM 3.38 G: 3; .375 INJECTION, POWDER, LYOPHILIZED, FOR SOLUTION INTRAVENOUS at 01:00

## 2020-01-01 RX ADMIN — PROPOFOL 50 MCG/KG/MIN: 10 INJECTION, EMULSION INTRAVENOUS at 18:20

## 2020-01-01 RX ADMIN — PROPOFOL 40 MCG/KG/MIN: 10 INJECTION, EMULSION INTRAVENOUS at 17:24

## 2020-01-01 RX ADMIN — POTASSIUM CHLORIDE 20 MEQ: 400 INJECTION, SOLUTION INTRAVENOUS at 20:03

## 2020-01-01 RX ADMIN — PIPERACILLIN AND TAZOBACTAM 3.38 G: 3; .375 INJECTION, POWDER, LYOPHILIZED, FOR SOLUTION INTRAVENOUS at 22:08

## 2020-01-01 RX ADMIN — INSULIN LISPRO 6 UNITS: 100 INJECTION, SOLUTION INTRAVENOUS; SUBCUTANEOUS at 01:37

## 2020-01-01 RX ADMIN — MIDAZOLAM 2 MG: 1 INJECTION INTRAMUSCULAR; INTRAVENOUS at 12:06

## 2020-01-01 RX ADMIN — Medication: at 23:43

## 2020-01-01 RX ADMIN — POTASSIUM CHLORIDE 10 MEQ: 7.46 INJECTION, SOLUTION INTRAVENOUS at 21:52

## 2020-01-01 RX ADMIN — LINEZOLID 600 MG: 600 INJECTION, SOLUTION INTRAVENOUS at 12:10

## 2020-01-01 RX ADMIN — DEXMEDETOMIDINE 0.9 MCG/KG/HR: 100 INJECTION, SOLUTION, CONCENTRATE INTRAVENOUS at 18:41

## 2020-01-01 RX ADMIN — LEVOFLOXACIN 500 MG: 5 INJECTION, SOLUTION INTRAVENOUS at 17:40

## 2020-01-01 RX ADMIN — Medication 1000 ML/HR: at 05:42

## 2020-01-01 RX ADMIN — INSULIN GLARGINE 10 UNITS: 100 INJECTION, SOLUTION SUBCUTANEOUS at 20:53

## 2020-01-01 RX ADMIN — Medication 30 MCG/MIN: at 01:19

## 2020-01-01 RX ADMIN — DOXYCYCLINE 100 MG: 100 INJECTION, POWDER, LYOPHILIZED, FOR SOLUTION INTRAVENOUS at 21:38

## 2020-01-01 RX ADMIN — Medication 100 MCG/HR: at 00:30

## 2020-01-01 RX ADMIN — GLYCOPYRROLATE AND FORMOTEROL FUMARATE 2 PUFF: 9; 4.8 AEROSOL, METERED RESPIRATORY (INHALATION) at 19:15

## 2020-01-01 RX ADMIN — DEXMEDETOMIDINE 1 MCG/KG/HR: 100 INJECTION, SOLUTION, CONCENTRATE INTRAVENOUS at 08:24

## 2020-01-01 RX ADMIN — LINEZOLID 600 MG: 600 INJECTION, SOLUTION INTRAVENOUS at 13:07

## 2020-01-01 RX ADMIN — GLYCOPYRROLATE AND FORMOTEROL FUMARATE 2 PUFF: 9; 4.8 AEROSOL, METERED RESPIRATORY (INHALATION) at 08:21

## 2020-01-01 RX ADMIN — Medication 1000 ML/HR: at 16:45

## 2020-01-01 RX ADMIN — PROPOFOL 40 MCG/KG/MIN: 10 INJECTION, EMULSION INTRAVENOUS at 10:16

## 2020-01-01 RX ADMIN — FUROSEMIDE 60 MG: 10 INJECTION, SOLUTION INTRAMUSCULAR; INTRAVENOUS at 17:39

## 2020-01-01 RX ADMIN — HEPARIN SODIUM: 1000 INJECTION INTRAVENOUS; SUBCUTANEOUS at 15:00

## 2020-01-01 RX ADMIN — Medication 15 ML: at 22:01

## 2020-01-01 RX ADMIN — FUROSEMIDE 20 MG/HR: 10 INJECTION, SOLUTION INTRAMUSCULAR; INTRAVENOUS at 07:18

## 2020-01-01 RX ADMIN — POTASSIUM CHLORIDE 10 MEQ: 7.46 INJECTION, SOLUTION INTRAVENOUS at 21:00

## 2020-01-01 RX ADMIN — Medication: at 10:09

## 2020-01-01 RX ADMIN — Medication 50 MCG/MIN: at 23:07

## 2020-01-01 RX ADMIN — Medication 10 ML: at 20:55

## 2020-01-01 RX ADMIN — Medication 50 MEQ: at 21:14

## 2020-01-01 RX ADMIN — PROPOFOL 20 MCG/KG/MIN: 10 INJECTION, EMULSION INTRAVENOUS at 15:32

## 2020-01-01 RX ADMIN — SODIUM CHLORIDE 20 ML: 9 INJECTION, SOLUTION INTRAVENOUS at 06:43

## 2020-01-01 RX ADMIN — Medication 30 MCG/MIN: at 22:31

## 2020-01-01 RX ADMIN — Medication: at 15:45

## 2020-01-01 RX ADMIN — Medication 175 MCG/HR: at 00:54

## 2020-01-01 RX ADMIN — DEXMEDETOMIDINE 1.4 MCG/KG/HR: 100 INJECTION, SOLUTION, CONCENTRATE INTRAVENOUS at 10:39

## 2020-01-01 RX ADMIN — POTASSIUM CHLORIDE 10 MEQ: 7.46 INJECTION, SOLUTION INTRAVENOUS at 22:34

## 2020-01-01 RX ADMIN — DEXMEDETOMIDINE 1.3 MCG/KG/HR: 100 INJECTION, SOLUTION, CONCENTRATE INTRAVENOUS at 00:08

## 2020-01-01 RX ADMIN — CLOPIDOGREL BISULFATE 75 MG: 75 TABLET ORAL at 09:08

## 2020-01-01 RX ADMIN — FUROSEMIDE 60 MG: 10 INJECTION, SOLUTION INTRAMUSCULAR; INTRAVENOUS at 08:05

## 2020-01-01 RX ADMIN — ACETAMINOPHEN 650 MG: 650 SUPPOSITORY RECTAL at 22:50

## 2020-01-01 RX ADMIN — DEXTROSE MONOHYDRATE 12.5 G: 500 INJECTION PARENTERAL at 20:43

## 2020-01-01 RX ADMIN — CALCIUM GLUCONATE 1 G: 98 INJECTION, SOLUTION INTRAVENOUS at 04:07

## 2020-01-01 RX ADMIN — Medication: at 16:49

## 2020-01-01 RX ADMIN — Medication 1000 ML/HR: at 16:32

## 2020-01-01 RX ADMIN — PANTOPRAZOLE SODIUM 40 MG: 40 INJECTION, POWDER, FOR SOLUTION INTRAVENOUS at 11:20

## 2020-01-01 RX ADMIN — Medication 1000 UNITS: at 09:39

## 2020-01-01 RX ADMIN — Medication 15 ML: at 20:37

## 2020-01-01 RX ADMIN — PIPERACILLIN AND TAZOBACTAM 3.38 G: 3; .375 INJECTION, POWDER, LYOPHILIZED, FOR SOLUTION INTRAVENOUS at 00:41

## 2020-01-01 RX ADMIN — ACETAMINOPHEN 650 MG: 650 SUPPOSITORY RECTAL at 07:30

## 2020-01-01 RX ADMIN — Medication 100 MG: at 06:10

## 2020-01-01 RX ADMIN — SODIUM BICARBONATE 50 MEQ: 84 INJECTION, SOLUTION INTRAVENOUS at 14:44

## 2020-01-01 RX ADMIN — Medication 150 MCG/HR: at 10:41

## 2020-01-01 RX ADMIN — INSULIN LISPRO 6 UNITS: 100 INJECTION, SOLUTION INTRAVENOUS; SUBCUTANEOUS at 17:51

## 2020-01-01 RX ADMIN — ASPIRIN 81 MG: 81 TABLET, CHEWABLE ORAL at 08:17

## 2020-01-01 RX ADMIN — Medication 1000 UNITS: at 16:15

## 2020-01-01 RX ADMIN — ASPIRIN 81 MG: 81 TABLET, CHEWABLE ORAL at 08:05

## 2020-01-01 RX ADMIN — MIDAZOLAM 1 MG: 1 INJECTION INTRAMUSCULAR; INTRAVENOUS at 20:18

## 2020-01-01 RX ADMIN — FUROSEMIDE 60 MG: 10 INJECTION, SOLUTION INTRAMUSCULAR; INTRAVENOUS at 18:29

## 2020-01-01 RX ADMIN — DEXAMETHASONE SODIUM PHOSPHATE 6 MG: 4 INJECTION, SOLUTION INTRAMUSCULAR; INTRAVENOUS at 08:47

## 2020-01-01 RX ADMIN — POTASSIUM CHLORIDE 20 MEQ: 400 INJECTION, SOLUTION INTRAVENOUS at 20:54

## 2020-01-01 RX ADMIN — Medication 1000 ML/HR: at 20:25

## 2020-01-01 RX ADMIN — Medication 1000 ML/HR: at 08:23

## 2020-01-01 RX ADMIN — SODIUM CHLORIDE, PRESERVATIVE FREE 10 ML: 5 INJECTION INTRAVENOUS at 08:30

## 2020-01-01 RX ADMIN — Medication 15 ML: at 09:24

## 2020-01-01 RX ADMIN — Medication: at 03:07

## 2020-01-01 RX ADMIN — DEXMEDETOMIDINE 0.6 MCG/KG/HR: 100 INJECTION, SOLUTION, CONCENTRATE INTRAVENOUS at 18:28

## 2020-01-01 RX ADMIN — ACETAMINOPHEN 650 MG: 325 TABLET ORAL at 05:09

## 2020-01-01 RX ADMIN — ASPIRIN 81 MG: 81 TABLET, CHEWABLE ORAL at 16:49

## 2020-01-01 RX ADMIN — Medication 15 ML: at 09:05

## 2020-01-01 RX ADMIN — Medication 175 MCG/HR: at 08:11

## 2020-01-01 RX ADMIN — Medication 175 MCG/HR: at 07:32

## 2020-01-01 RX ADMIN — POTASSIUM BICARBONATE 30 MEQ: 391 TABLET, EFFERVESCENT ORAL at 19:52

## 2020-01-01 RX ADMIN — PHENYLEPHRINE HYDROCHLORIDE 250 MCG/MIN: 10 INJECTION INTRAVENOUS at 03:36

## 2020-01-01 RX ADMIN — ENOXAPARIN SODIUM 40 MG: 40 INJECTION SUBCUTANEOUS at 06:35

## 2020-01-01 RX ADMIN — Medication 1000 UNITS: at 09:03

## 2020-01-01 RX ADMIN — ASPIRIN 81 MG: 81 TABLET, CHEWABLE ORAL at 10:52

## 2020-01-01 RX ADMIN — PROPOFOL 40 MCG/KG/MIN: 10 INJECTION, EMULSION INTRAVENOUS at 22:26

## 2020-01-01 RX ADMIN — PIPERACILLIN AND TAZOBACTAM 3.38 G: 3; .375 INJECTION, POWDER, LYOPHILIZED, FOR SOLUTION INTRAVENOUS at 21:34

## 2020-01-01 RX ADMIN — ROSUVASTATIN CALCIUM 40 MG: 20 TABLET, FILM COATED ORAL at 20:37

## 2020-01-01 RX ADMIN — ALBUTEROL SULFATE 2.5 MG: 2.5 SOLUTION RESPIRATORY (INHALATION) at 01:36

## 2020-01-01 RX ADMIN — PIPERACILLIN AND TAZOBACTAM 3.38 G: 3; .375 INJECTION, POWDER, LYOPHILIZED, FOR SOLUTION INTRAVENOUS at 17:28

## 2020-01-01 RX ADMIN — Medication 150 MCG/HR: at 03:43

## 2020-01-01 RX ADMIN — DEXMEDETOMIDINE 1 MCG/KG/HR: 100 INJECTION, SOLUTION, CONCENTRATE INTRAVENOUS at 16:39

## 2020-01-01 RX ADMIN — DEXMEDETOMIDINE 1 MCG/KG/HR: 100 INJECTION, SOLUTION, CONCENTRATE INTRAVENOUS at 10:00

## 2020-01-01 RX ADMIN — Medication 150 MCG/HR: at 21:03

## 2020-01-01 RX ADMIN — Medication 10 ML: at 08:28

## 2020-01-01 RX ADMIN — Medication 125 MCG/HR: at 22:18

## 2020-01-01 RX ADMIN — PIPERACILLIN AND TAZOBACTAM 3.38 G: 3; .375 INJECTION, POWDER, LYOPHILIZED, FOR SOLUTION INTRAVENOUS at 14:18

## 2020-01-01 RX ADMIN — DEXMEDETOMIDINE 1 MCG/KG/HR: 100 INJECTION, SOLUTION, CONCENTRATE INTRAVENOUS at 14:35

## 2020-01-01 RX ADMIN — SODIUM CHLORIDE, PRESERVATIVE FREE 10 ML: 5 INJECTION INTRAVENOUS at 20:49

## 2020-01-01 RX ADMIN — DEXMEDETOMIDINE 1.3 MCG/KG/HR: 100 INJECTION, SOLUTION, CONCENTRATE INTRAVENOUS at 15:21

## 2020-01-01 RX ADMIN — Medication 175 MCG/HR: at 20:30

## 2020-01-01 RX ADMIN — PIPERACILLIN AND TAZOBACTAM 3.38 G: 3; .375 INJECTION, POWDER, LYOPHILIZED, FOR SOLUTION INTRAVENOUS at 22:52

## 2020-01-01 RX ADMIN — ASPIRIN 81 MG: 81 TABLET, CHEWABLE ORAL at 08:48

## 2020-01-01 RX ADMIN — AMIODARONE HYDROCHLORIDE 0.5 MG/MIN: 1.8 INJECTION, SOLUTION INTRAVENOUS at 09:08

## 2020-01-01 RX ADMIN — DEXMEDETOMIDINE 1.2 MCG/KG/HR: 100 INJECTION, SOLUTION, CONCENTRATE INTRAVENOUS at 11:49

## 2020-01-01 RX ADMIN — POTASSIUM BICARBONATE 30 MEQ: 391 TABLET, EFFERVESCENT ORAL at 09:29

## 2020-01-01 RX ADMIN — AMLODIPINE BESYLATE 10 MG: 10 TABLET ORAL at 08:17

## 2020-01-01 RX ADMIN — ASPIRIN 81 MG: 81 TABLET, CHEWABLE ORAL at 08:37

## 2020-01-01 RX ADMIN — Medication 15 ML: at 20:55

## 2020-01-01 RX ADMIN — ACETAMINOPHEN 1000 MG: 500 TABLET ORAL at 11:00

## 2020-01-01 RX ADMIN — INSULIN LISPRO 3 UNITS: 100 INJECTION, SOLUTION INTRAVENOUS; SUBCUTANEOUS at 18:32

## 2020-01-01 RX ADMIN — HEPARIN SODIUM: 1000 INJECTION INTRAVENOUS; SUBCUTANEOUS at 04:24

## 2020-01-01 RX ADMIN — DEXAMETHASONE SODIUM PHOSPHATE 4 MG: 4 INJECTION, SOLUTION INTRAMUSCULAR; INTRAVENOUS at 17:32

## 2020-01-01 RX ADMIN — Medication 10 ML: at 15:52

## 2020-01-01 RX ADMIN — Medication 150 MCG/HR: at 00:02

## 2020-01-01 RX ADMIN — ROSUVASTATIN CALCIUM 40 MG: 20 TABLET, FILM COATED ORAL at 20:21

## 2020-01-01 RX ADMIN — ASPIRIN 81 MG: 81 TABLET, CHEWABLE ORAL at 09:00

## 2020-01-01 RX ADMIN — DEXMEDETOMIDINE HYDROCHLORIDE 0.2 MCG/KG/HR: 4 INJECTION, SOLUTION INTRAVENOUS at 13:28

## 2020-01-01 RX ADMIN — METOPROLOL TARTRATE 5 MG: 5 INJECTION INTRAVENOUS at 09:10

## 2020-01-01 RX ADMIN — SODIUM CHLORIDE: 9 INJECTION, SOLUTION INTRAVENOUS at 22:51

## 2020-01-01 RX ADMIN — SODIUM CHLORIDE 1000 ML: 9 INJECTION, SOLUTION INTRAVENOUS at 21:50

## 2020-01-01 RX ADMIN — CALCIUM GLUCONATE 1 G: 98 INJECTION, SOLUTION INTRAVENOUS at 10:02

## 2020-01-01 RX ADMIN — GLYCOPYRROLATE AND FORMOTEROL FUMARATE 2 PUFF: 9; 4.8 AEROSOL, METERED RESPIRATORY (INHALATION) at 08:02

## 2020-01-01 RX ADMIN — Medication 1000 ML/HR: at 20:11

## 2020-01-01 RX ADMIN — DEXMEDETOMIDINE 0.9 MCG/KG/HR: 100 INJECTION, SOLUTION, CONCENTRATE INTRAVENOUS at 19:46

## 2020-01-01 RX ADMIN — Medication 175 MCG/HR: at 02:35

## 2020-01-01 RX ADMIN — HEPARIN SODIUM: 1000 INJECTION INTRAVENOUS; SUBCUTANEOUS at 11:10

## 2020-01-01 RX ADMIN — Medication 15 ML: at 08:39

## 2020-01-01 RX ADMIN — Medication 1000 UNITS: at 09:33

## 2020-01-01 RX ADMIN — DEXMEDETOMIDINE 1.3 MCG/KG/HR: 100 INJECTION, SOLUTION, CONCENTRATE INTRAVENOUS at 22:32

## 2020-01-01 RX ADMIN — Medication 1000 ML/HR: at 10:54

## 2020-01-01 RX ADMIN — FAMOTIDINE 20 MG: 10 INJECTION, SOLUTION INTRAVENOUS at 20:09

## 2020-01-01 RX ADMIN — ENOXAPARIN SODIUM 40 MG: 40 INJECTION SUBCUTANEOUS at 15:00

## 2020-01-01 RX ADMIN — INSULIN LISPRO 6 UNITS: 100 INJECTION, SOLUTION INTRAVENOUS; SUBCUTANEOUS at 21:59

## 2020-01-01 RX ADMIN — FAMOTIDINE 20 MG: 10 INJECTION INTRAVENOUS at 20:54

## 2020-01-01 RX ADMIN — FUROSEMIDE 60 MG: 10 INJECTION, SOLUTION INTRAMUSCULAR; INTRAVENOUS at 18:07

## 2020-01-01 RX ADMIN — POTASSIUM CHLORIDE 20 MEQ: 400 INJECTION, SOLUTION INTRAVENOUS at 21:10

## 2020-01-01 RX ADMIN — Medication: at 10:50

## 2020-01-01 RX ADMIN — POTASSIUM BICARBONATE 30 MEQ: 391 TABLET, EFFERVESCENT ORAL at 09:09

## 2020-01-01 RX ADMIN — AMLODIPINE BESYLATE 10 MG: 10 TABLET ORAL at 08:59

## 2020-01-01 RX ADMIN — FUROSEMIDE 60 MG: 10 INJECTION, SOLUTION INTRAMUSCULAR; INTRAVENOUS at 09:49

## 2020-01-01 RX ADMIN — PIPERACILLIN AND TAZOBACTAM 3.38 G: 3; .375 INJECTION, POWDER, LYOPHILIZED, FOR SOLUTION INTRAVENOUS at 21:30

## 2020-01-01 RX ADMIN — FUROSEMIDE 60 MG: 10 INJECTION, SOLUTION INTRAMUSCULAR; INTRAVENOUS at 08:35

## 2020-01-01 RX ADMIN — INSULIN LISPRO 3 UNITS: 100 INJECTION, SOLUTION INTRAVENOUS; SUBCUTANEOUS at 06:28

## 2020-01-01 RX ADMIN — REMDESIVIR 200 MG: 100 INJECTION, POWDER, LYOPHILIZED, FOR SOLUTION INTRAVENOUS at 17:23

## 2020-01-01 RX ADMIN — MIDAZOLAM 2 MG: 1 INJECTION INTRAMUSCULAR; INTRAVENOUS at 12:33

## 2020-01-01 RX ADMIN — PANTOPRAZOLE SODIUM 40 MG: 40 INJECTION, POWDER, FOR SOLUTION INTRAVENOUS at 09:09

## 2020-01-01 RX ADMIN — SODIUM CHLORIDE: 9 INJECTION, SOLUTION INTRAVENOUS at 17:52

## 2020-01-01 RX ADMIN — ENOXAPARIN SODIUM 100 MG: 100 INJECTION SUBCUTANEOUS at 22:33

## 2020-01-01 RX ADMIN — LINEZOLID 600 MG: 600 INJECTION, SOLUTION INTRAVENOUS at 00:29

## 2020-01-01 RX ADMIN — FAMOTIDINE 20 MG: 10 INJECTION, SOLUTION INTRAVENOUS at 09:24

## 2020-01-01 RX ADMIN — DEXMEDETOMIDINE 0.2 MCG/KG/HR: 100 INJECTION, SOLUTION, CONCENTRATE INTRAVENOUS at 21:32

## 2020-01-01 RX ADMIN — ROSUVASTATIN CALCIUM 40 MG: 20 TABLET, FILM COATED ORAL at 21:59

## 2020-01-01 RX ADMIN — DEXMEDETOMIDINE 1.4 MCG/KG/HR: 100 INJECTION, SOLUTION, CONCENTRATE INTRAVENOUS at 22:44

## 2020-01-01 RX ADMIN — FUROSEMIDE 40 MG: 10 INJECTION, SOLUTION INTRAMUSCULAR; INTRAVENOUS at 17:35

## 2020-01-01 RX ADMIN — ENOXAPARIN SODIUM 40 MG: 40 INJECTION SUBCUTANEOUS at 14:19

## 2020-01-01 RX ADMIN — MIDAZOLAM 1 MG: 1 INJECTION INTRAMUSCULAR; INTRAVENOUS at 03:10

## 2020-01-01 RX ADMIN — AMIODARONE HYDROCHLORIDE 0.5 MG/MIN: 1.8 INJECTION, SOLUTION INTRAVENOUS at 10:42

## 2020-01-01 RX ADMIN — INSULIN GLARGINE 15 UNITS: 100 INJECTION, SOLUTION SUBCUTANEOUS at 20:26

## 2020-01-01 RX ADMIN — ACETAMINOPHEN 650 MG: 325 TABLET ORAL at 21:30

## 2020-01-01 RX ADMIN — POTASSIUM CHLORIDE 10 MEQ: 7.46 INJECTION, SOLUTION INTRAVENOUS at 23:15

## 2020-01-01 RX ADMIN — FLUDROCORTISONE ACETATE 0.1 MG: 0.1 TABLET ORAL at 09:09

## 2020-01-01 RX ADMIN — ASPIRIN 81 MG: 81 TABLET, CHEWABLE ORAL at 12:40

## 2020-01-01 RX ADMIN — FLUDROCORTISONE ACETATE 0.1 MG: 0.1 TABLET ORAL at 11:51

## 2020-01-01 RX ADMIN — ACETAMINOPHEN 650 MG: 325 TABLET ORAL at 09:32

## 2020-01-01 RX ADMIN — DEXAMETHASONE SODIUM PHOSPHATE 6 MG: 4 INJECTION, SOLUTION INTRAMUSCULAR; INTRAVENOUS at 07:57

## 2020-01-01 RX ADMIN — ASPIRIN 81 MG: 81 TABLET, CHEWABLE ORAL at 16:16

## 2020-01-01 RX ADMIN — PHENYLEPHRINE HYDROCHLORIDE 300 MCG/MIN: 10 INJECTION INTRAVENOUS at 09:57

## 2020-01-01 RX ADMIN — DEXMEDETOMIDINE 0.2 MCG/KG/HR: 100 INJECTION, SOLUTION, CONCENTRATE INTRAVENOUS at 08:14

## 2020-01-01 RX ADMIN — INSULIN LISPRO 6 UNITS: 100 INJECTION, SOLUTION INTRAVENOUS; SUBCUTANEOUS at 10:28

## 2020-01-01 RX ADMIN — SODIUM CHLORIDE, PRESERVATIVE FREE 10 ML: 5 INJECTION INTRAVENOUS at 08:38

## 2020-01-01 RX ADMIN — Medication: at 22:19

## 2020-01-01 RX ADMIN — Medication: at 20:53

## 2020-01-01 RX ADMIN — ATORVASTATIN CALCIUM 10 MG: 10 TABLET, FILM COATED ORAL at 22:22

## 2020-01-01 RX ADMIN — INSULIN LISPRO 3 UNITS: 100 INJECTION, SOLUTION INTRAVENOUS; SUBCUTANEOUS at 05:48

## 2020-01-01 RX ADMIN — FENTANYL CITRATE 25 MCG: 50 INJECTION, SOLUTION INTRAMUSCULAR; INTRAVENOUS at 23:19

## 2020-01-01 RX ADMIN — POLYETHYLENE GLYCOL 3350 17 G: 17 POWDER, FOR SOLUTION ORAL at 09:32

## 2020-01-01 RX ADMIN — IOPAMIDOL 80 ML: 755 INJECTION, SOLUTION INTRAVENOUS at 04:53

## 2020-01-01 RX ADMIN — PHENYLEPHRINE HYDROCHLORIDE 300 MCG/MIN: 10 INJECTION INTRAVENOUS at 06:53

## 2020-01-01 RX ADMIN — ENOXAPARIN SODIUM 40 MG: 40 INJECTION SUBCUTANEOUS at 15:06

## 2020-01-01 RX ADMIN — Medication 150 MCG/HR: at 04:11

## 2020-01-01 RX ADMIN — PANTOPRAZOLE SODIUM 40 MG: 40 INJECTION, POWDER, FOR SOLUTION INTRAVENOUS at 15:55

## 2020-01-01 RX ADMIN — FAMOTIDINE 20 MG: 10 INJECTION INTRAVENOUS at 09:01

## 2020-01-01 RX ADMIN — FAMOTIDINE 20 MG: 10 INJECTION, SOLUTION INTRAVENOUS at 08:53

## 2020-01-01 RX ADMIN — Medication 10 ML: at 10:45

## 2020-01-01 RX ADMIN — INSULIN GLARGINE 7 UNITS: 100 INJECTION, SOLUTION SUBCUTANEOUS at 22:09

## 2020-01-01 RX ADMIN — SODIUM PHOSPHATE, MONOBASIC, MONOHYDRATE 6 MMOL: 276; 142 INJECTION, SOLUTION INTRAVENOUS at 22:05

## 2020-01-01 RX ADMIN — SODIUM CHLORIDE, PRESERVATIVE FREE 10 ML: 5 INJECTION INTRAVENOUS at 20:36

## 2020-01-01 RX ADMIN — DEXMEDETOMIDINE 1.4 MCG/KG/HR: 100 INJECTION, SOLUTION, CONCENTRATE INTRAVENOUS at 23:23

## 2020-01-01 RX ADMIN — Medication 20 MCG/MIN: at 14:41

## 2020-01-01 RX ADMIN — DEXAMETHASONE SODIUM PHOSPHATE 10 MG: 4 INJECTION, SOLUTION INTRAMUSCULAR; INTRAVENOUS at 20:20

## 2020-01-01 RX ADMIN — AMIODARONE HYDROCHLORIDE 0.5 MG/MIN: 1.8 INJECTION, SOLUTION INTRAVENOUS at 11:11

## 2020-01-01 RX ADMIN — Medication 150 MCG/HR: at 10:24

## 2020-01-01 RX ADMIN — Medication: at 05:14

## 2020-01-01 RX ADMIN — MIDAZOLAM 1 MG: 1 INJECTION INTRAMUSCULAR; INTRAVENOUS at 23:48

## 2020-01-01 RX ADMIN — VASOPRESSIN 0.04 UNITS/MIN: 20 INJECTION INTRAVENOUS at 06:33

## 2020-01-01 RX ADMIN — FUROSEMIDE 60 MG: 10 INJECTION, SOLUTION INTRAMUSCULAR; INTRAVENOUS at 17:23

## 2020-01-01 RX ADMIN — POTASSIUM CHLORIDE 20 MEQ: 400 INJECTION, SOLUTION INTRAVENOUS at 20:13

## 2020-01-01 RX ADMIN — SODIUM CHLORIDE: 9 INJECTION, SOLUTION INTRAVENOUS at 15:25

## 2020-01-01 RX ADMIN — CLOPIDOGREL BISULFATE 75 MG: 75 TABLET ORAL at 09:09

## 2020-01-01 RX ADMIN — Medication 10 ML: at 10:49

## 2020-01-01 RX ADMIN — VASOPRESSIN 0.04 UNITS/MIN: 20 INJECTION INTRAVENOUS at 05:53

## 2020-01-01 RX ADMIN — DEXMEDETOMIDINE 1.4 MCG/KG/HR: 100 INJECTION, SOLUTION, CONCENTRATE INTRAVENOUS at 08:51

## 2020-01-01 RX ADMIN — ACETAMINOPHEN 650 MG: 325 TABLET ORAL at 08:48

## 2020-01-01 RX ADMIN — POTASSIUM BICARBONATE 30 MEQ: 391 TABLET, EFFERVESCENT ORAL at 20:37

## 2020-01-01 RX ADMIN — Medication 10 ML: at 10:05

## 2020-01-01 RX ADMIN — INSULIN GLARGINE 10 UNITS: 100 INJECTION, SOLUTION SUBCUTANEOUS at 20:42

## 2020-01-01 RX ADMIN — ASPIRIN 81 MG: 81 TABLET, CHEWABLE ORAL at 08:33

## 2020-01-01 RX ADMIN — Medication 1000 UNITS: at 09:46

## 2020-01-01 RX ADMIN — DEXTROSE MONOHYDRATE 12.5 G: 500 INJECTION PARENTERAL at 07:52

## 2020-01-01 RX ADMIN — GLYCOPYRROLATE AND FORMOTEROL FUMARATE 2 PUFF: 9; 4.8 AEROSOL, METERED RESPIRATORY (INHALATION) at 20:31

## 2020-01-01 RX ADMIN — DEXMEDETOMIDINE 1.1 MCG/KG/HR: 100 INJECTION, SOLUTION, CONCENTRATE INTRAVENOUS at 02:14

## 2020-01-01 RX ADMIN — METOPROLOL TARTRATE 5 MG: 5 INJECTION INTRAVENOUS at 17:27

## 2020-01-01 RX ADMIN — LEVOFLOXACIN 500 MG: 5 INJECTION, SOLUTION INTRAVENOUS at 17:48

## 2020-01-01 RX ADMIN — GLIMEPIRIDE 4 MG: 4 TABLET ORAL at 09:40

## 2020-01-01 RX ADMIN — METOPROLOL TARTRATE 5 MG: 5 INJECTION INTRAVENOUS at 22:33

## 2020-01-01 RX ADMIN — PIPERACILLIN AND TAZOBACTAM 3.38 G: 3; .375 INJECTION, POWDER, LYOPHILIZED, FOR SOLUTION INTRAVENOUS at 10:45

## 2020-01-01 RX ADMIN — ATORVASTATIN CALCIUM 10 MG: 10 TABLET, FILM COATED ORAL at 22:50

## 2020-01-01 RX ADMIN — CLOPIDOGREL BISULFATE 300 MG: 300 TABLET, FILM COATED ORAL at 01:40

## 2020-01-01 RX ADMIN — Medication 1000 UNITS: at 09:29

## 2020-01-01 RX ADMIN — ACETAMINOPHEN 650 MG: 650 SUPPOSITORY RECTAL at 13:14

## 2020-01-01 RX ADMIN — MIDAZOLAM HYDROCHLORIDE 1 MG/HR: 5 INJECTION, SOLUTION INTRAMUSCULAR; INTRAVENOUS at 17:33

## 2020-01-01 RX ADMIN — MIDAZOLAM 3 MG/HR: 5 INJECTION INTRAMUSCULAR; INTRAVENOUS at 00:08

## 2020-01-01 RX ADMIN — INSULIN LISPRO 6 UNITS: 100 INJECTION, SOLUTION INTRAVENOUS; SUBCUTANEOUS at 17:48

## 2020-01-01 RX ADMIN — PROPOFOL 15 MCG/KG/MIN: 10 INJECTION, EMULSION INTRAVENOUS at 01:40

## 2020-01-01 RX ADMIN — Medication 175 MCG/HR: at 07:26

## 2020-01-01 RX ADMIN — ATORVASTATIN CALCIUM 10 MG: 10 TABLET, FILM COATED ORAL at 20:44

## 2020-01-01 RX ADMIN — Medication: at 00:45

## 2020-01-01 RX ADMIN — PROPOFOL 20 MCG/KG/MIN: 10 INJECTION, EMULSION INTRAVENOUS at 04:10

## 2020-01-01 RX ADMIN — Medication: at 15:06

## 2020-01-01 RX ADMIN — ROSUVASTATIN CALCIUM 40 MG: 20 TABLET, FILM COATED ORAL at 19:52

## 2020-01-01 RX ADMIN — Medication: at 09:30

## 2020-01-01 RX ADMIN — PROPOFOL 35 MCG/KG/MIN: 10 INJECTION, EMULSION INTRAVENOUS at 06:34

## 2020-01-01 RX ADMIN — CLOPIDOGREL BISULFATE 75 MG: 75 TABLET ORAL at 11:00

## 2020-01-01 RX ADMIN — ATORVASTATIN CALCIUM 10 MG: 10 TABLET, FILM COATED ORAL at 20:55

## 2020-01-01 RX ADMIN — VASOPRESSIN 0.04 UNITS/MIN: 20 INJECTION INTRAVENOUS at 04:07

## 2020-01-01 RX ADMIN — DEXMEDETOMIDINE 0.8 MCG/KG/HR: 100 INJECTION, SOLUTION, CONCENTRATE INTRAVENOUS at 06:56

## 2020-01-01 RX ADMIN — POTASSIUM BICARBONATE 30 MEQ: 391 TABLET, EFFERVESCENT ORAL at 20:39

## 2020-01-01 RX ADMIN — AMIODARONE HYDROCHLORIDE 0.5 MG/MIN: 1.8 INJECTION, SOLUTION INTRAVENOUS at 00:02

## 2020-01-01 RX ADMIN — POTASSIUM CHLORIDE 20 MEQ: 400 INJECTION, SOLUTION INTRAVENOUS at 22:52

## 2020-01-01 RX ADMIN — CALCIUM GLUCONATE 1 G: 98 INJECTION, SOLUTION INTRAVENOUS at 20:49

## 2020-01-01 RX ADMIN — Medication 1000 UNITS: at 09:37

## 2020-01-01 RX ADMIN — FUROSEMIDE 60 MG: 10 INJECTION, SOLUTION INTRAMUSCULAR; INTRAVENOUS at 19:01

## 2020-01-01 RX ADMIN — Medication: at 07:46

## 2020-01-01 RX ADMIN — LINEZOLID 600 MG: 600 INJECTION, SOLUTION INTRAVENOUS at 14:27

## 2020-01-01 RX ADMIN — ACETAMINOPHEN 650 MG: 325 TABLET ORAL at 20:58

## 2020-01-01 RX ADMIN — SODIUM CHLORIDE 500 ML: 9 INJECTION, SOLUTION INTRAVENOUS at 16:22

## 2020-01-01 RX ADMIN — POTASSIUM CHLORIDE 20 MEQ: 29.8 INJECTION, SOLUTION INTRAVENOUS at 08:04

## 2020-01-01 RX ADMIN — CALCIUM GLUCONATE 1 G: 98 INJECTION, SOLUTION INTRAVENOUS at 08:12

## 2020-01-01 RX ADMIN — PIPERACILLIN AND TAZOBACTAM 3.38 G: 3; .375 INJECTION, POWDER, LYOPHILIZED, FOR SOLUTION INTRAVENOUS at 05:37

## 2020-01-01 RX ADMIN — INSULIN LISPRO 9 UNITS: 100 INJECTION, SOLUTION INTRAVENOUS; SUBCUTANEOUS at 15:20

## 2020-01-01 RX ADMIN — Medication: at 03:50

## 2020-01-01 RX ADMIN — FENTANYL CITRATE 150 MCG/HR: 50 INJECTION, SOLUTION INTRAMUSCULAR; INTRAVENOUS at 03:26

## 2020-01-01 RX ADMIN — PANTOPRAZOLE SODIUM 40 MG: 40 INJECTION, POWDER, FOR SOLUTION INTRAVENOUS at 10:45

## 2020-01-01 RX ADMIN — ASPIRIN 81 MG: 81 TABLET, CHEWABLE ORAL at 08:36

## 2020-01-01 RX ADMIN — DANAZOL 400 MG: 200 CAPSULE ORAL at 08:35

## 2020-01-01 RX ADMIN — HEPARIN SODIUM: 1000 INJECTION INTRAVENOUS; SUBCUTANEOUS at 10:45

## 2020-01-01 RX ADMIN — HEPARIN SODIUM: 1000 INJECTION INTRAVENOUS; SUBCUTANEOUS at 13:01

## 2020-01-01 RX ADMIN — FENTANYL CITRATE 200 MCG/HR: 50 INJECTION, SOLUTION INTRAMUSCULAR; INTRAVENOUS at 10:56

## 2020-01-01 RX ADMIN — Medication 125 MCG/HR: at 16:55

## 2020-01-01 RX ADMIN — CALCIUM GLUCONATE 1 G: 98 INJECTION, SOLUTION INTRAVENOUS at 01:17

## 2020-01-01 RX ADMIN — METOPROLOL TARTRATE 5 MG: 5 INJECTION INTRAVENOUS at 16:10

## 2020-01-01 RX ADMIN — POTASSIUM CHLORIDE 20 MEQ: 400 INJECTION, SOLUTION INTRAVENOUS at 09:31

## 2020-01-01 RX ADMIN — SODIUM CHLORIDE, PRESERVATIVE FREE 10 ML: 5 INJECTION INTRAVENOUS at 10:53

## 2020-01-01 RX ADMIN — SODIUM CHLORIDE, PRESERVATIVE FREE 10 ML: 5 INJECTION INTRAVENOUS at 22:26

## 2020-01-01 RX ADMIN — SODIUM POLYSTYRENE SULFONATE 15 G: 15 SUSPENSION ORAL; RECTAL at 14:03

## 2020-01-01 RX ADMIN — Medication: at 10:41

## 2020-01-01 RX ADMIN — MIDAZOLAM 1 MG: 1 INJECTION INTRAMUSCULAR; INTRAVENOUS at 00:16

## 2020-01-01 RX ADMIN — POTASSIUM BICARBONATE 30 MEQ: 391 TABLET, EFFERVESCENT ORAL at 10:53

## 2020-01-01 RX ADMIN — ASPIRIN 81 MG: 81 TABLET, CHEWABLE ORAL at 08:47

## 2020-01-01 RX ADMIN — Medication 175 MCG/HR: at 04:19

## 2020-01-01 RX ADMIN — Medication 150 MCG/HR: at 21:33

## 2020-01-01 RX ADMIN — PHENYLEPHRINE HYDROCHLORIDE 300 MCG/MIN: 10 INJECTION INTRAVENOUS at 19:34

## 2020-01-01 RX ADMIN — POTASSIUM CHLORIDE 20 MEQ: 400 INJECTION, SOLUTION INTRAVENOUS at 20:31

## 2020-01-01 RX ADMIN — FAMOTIDINE 20 MG: 10 INJECTION INTRAVENOUS at 20:43

## 2020-01-01 RX ADMIN — METOPROLOL TARTRATE 5 MG: 5 INJECTION INTRAVENOUS at 18:21

## 2020-01-01 RX ADMIN — INSULIN LISPRO 3 UNITS: 100 INJECTION, SOLUTION INTRAVENOUS; SUBCUTANEOUS at 09:27

## 2020-01-01 RX ADMIN — ENOXAPARIN SODIUM 100 MG: 100 INJECTION SUBCUTANEOUS at 22:47

## 2020-01-01 RX ADMIN — GLYCOPYRROLATE AND FORMOTEROL FUMARATE 2 PUFF: 9; 4.8 AEROSOL, METERED RESPIRATORY (INHALATION) at 19:33

## 2020-01-01 RX ADMIN — GLYCOPYRROLATE AND FORMOTEROL FUMARATE 2 PUFF: 9; 4.8 AEROSOL, METERED RESPIRATORY (INHALATION) at 21:18

## 2020-01-01 RX ADMIN — Medication: at 07:08

## 2020-01-01 RX ADMIN — POTASSIUM CHLORIDE 10 MEQ: 7.46 INJECTION, SOLUTION INTRAVENOUS at 22:07

## 2020-01-01 RX ADMIN — Medication 15 ML: at 21:42

## 2020-01-01 RX ADMIN — POTASSIUM CHLORIDE 10 MEQ: 7.46 INJECTION, SOLUTION INTRAVENOUS at 22:51

## 2020-01-01 RX ADMIN — INSULIN HUMAN 10 UNITS: 100 INJECTION, SOLUTION PARENTERAL at 08:49

## 2020-01-01 RX ADMIN — ENOXAPARIN SODIUM 100 MG: 100 INJECTION SUBCUTANEOUS at 23:47

## 2020-01-01 RX ADMIN — DEXMEDETOMIDINE 1.4 MCG/KG/HR: 100 INJECTION, SOLUTION, CONCENTRATE INTRAVENOUS at 02:00

## 2020-01-01 RX ADMIN — DEXMEDETOMIDINE 1.2 MCG/KG/HR: 100 INJECTION, SOLUTION, CONCENTRATE INTRAVENOUS at 04:58

## 2020-01-01 RX ADMIN — FENTANYL CITRATE 200 MCG/HR: 50 INJECTION, SOLUTION INTRAMUSCULAR; INTRAVENOUS at 01:14

## 2020-01-01 RX ADMIN — DEXMEDETOMIDINE 1.2 MCG/KG/HR: 100 INJECTION, SOLUTION, CONCENTRATE INTRAVENOUS at 14:04

## 2020-01-01 RX ADMIN — POTASSIUM PHOSPHATE, MONOBASIC POTASSIUM PHOSPHATE, DIBASIC 15 MMOL: 224; 236 INJECTION, SOLUTION, CONCENTRATE INTRAVENOUS at 13:37

## 2020-01-01 RX ADMIN — METOPROLOL TARTRATE 5 MG: 5 INJECTION INTRAVENOUS at 22:39

## 2020-01-01 RX ADMIN — POTASSIUM CHLORIDE 10 MEQ: 7.46 INJECTION, SOLUTION INTRAVENOUS at 10:13

## 2020-01-01 RX ADMIN — Medication 1000 ML/HR: at 21:28

## 2020-01-01 RX ADMIN — MIDAZOLAM HYDROCHLORIDE 1 MG/HR: 5 INJECTION, SOLUTION INTRAMUSCULAR; INTRAVENOUS at 09:53

## 2020-01-01 RX ADMIN — HEPARIN SODIUM 5000 UNITS: 5000 INJECTION INTRAVENOUS; SUBCUTANEOUS at 06:30

## 2020-01-01 RX ADMIN — ROSUVASTATIN CALCIUM 40 MG: 20 TABLET, FILM COATED ORAL at 20:49

## 2020-01-01 RX ADMIN — Medication: at 08:09

## 2020-01-01 RX ADMIN — DEXMEDETOMIDINE 1.1 MCG/KG/HR: 100 INJECTION, SOLUTION, CONCENTRATE INTRAVENOUS at 10:43

## 2020-01-01 RX ADMIN — INSULIN LISPRO 9 UNITS: 100 INJECTION, SOLUTION INTRAVENOUS; SUBCUTANEOUS at 09:49

## 2020-01-01 RX ADMIN — CLOPIDOGREL BISULFATE 75 MG: 75 TABLET ORAL at 08:38

## 2020-01-01 RX ADMIN — ALBUMIN (HUMAN) 25 G: 0.25 INJECTION, SOLUTION INTRAVENOUS at 09:46

## 2020-01-01 RX ADMIN — SODIUM CHLORIDE: 9 INJECTION, SOLUTION INTRAVENOUS at 16:49

## 2020-01-01 RX ADMIN — ENOXAPARIN SODIUM 40 MG: 40 INJECTION SUBCUTANEOUS at 14:32

## 2020-01-01 RX ADMIN — PIPERACILLIN AND TAZOBACTAM 3.38 G: 3; .375 INJECTION, POWDER, LYOPHILIZED, FOR SOLUTION INTRAVENOUS at 01:44

## 2020-01-01 RX ADMIN — DEXMEDETOMIDINE 0.9 MCG/KG/HR: 100 INJECTION, SOLUTION, CONCENTRATE INTRAVENOUS at 20:37

## 2020-01-01 RX ADMIN — Medication 15 ML: at 08:53

## 2020-01-01 RX ADMIN — ROSUVASTATIN CALCIUM 40 MG: 20 TABLET, FILM COATED ORAL at 22:41

## 2020-01-01 RX ADMIN — PANTOPRAZOLE SODIUM 40 MG: 40 INJECTION, POWDER, FOR SOLUTION INTRAVENOUS at 08:52

## 2020-01-01 RX ADMIN — GLYCOPYRROLATE AND FORMOTEROL FUMARATE 2 PUFF: 9; 4.8 AEROSOL, METERED RESPIRATORY (INHALATION) at 07:48

## 2020-01-01 RX ADMIN — PANTOPRAZOLE SODIUM 40 MG: 40 INJECTION, POWDER, FOR SOLUTION INTRAVENOUS at 10:42

## 2020-01-01 RX ADMIN — POTASSIUM CHLORIDE 20 MEQ: 400 INJECTION, SOLUTION INTRAVENOUS at 22:12

## 2020-01-01 SDOH — HEALTH STABILITY: MENTAL HEALTH: HOW OFTEN DO YOU HAVE A DRINK CONTAINING ALCOHOL?: NEVER

## 2020-01-01 ASSESSMENT — PAIN SCALES - WONG BAKER

## 2020-01-01 ASSESSMENT — PAIN SCALES - GENERAL
PAINLEVEL_OUTOF10: 0
PAINLEVEL_OUTOF10: 1
PAINLEVEL_OUTOF10: 0
PAINLEVEL_OUTOF10: 10
PAINLEVEL_OUTOF10: 0
PAINLEVEL_OUTOF10: 4
PAINLEVEL_OUTOF10: 0

## 2020-01-01 ASSESSMENT — PULMONARY FUNCTION TESTS
PIF_VALUE: 33
PIF_VALUE: 34
PIF_VALUE: 34
PIF_VALUE: 38
PIF_VALUE: 27
PIF_VALUE: 27
PIF_VALUE: 22
PIF_VALUE: 19
PIF_VALUE: 32
PIF_VALUE: 27
PIF_VALUE: 34
PIF_VALUE: 33
PIF_VALUE: 27
PIF_VALUE: 33
PIF_VALUE: 35
PIF_VALUE: 29
PIF_VALUE: 19
PIF_VALUE: 27
PIF_VALUE: 33
PIF_VALUE: 33
PIF_VALUE: 30
PIF_VALUE: 33
PIF_VALUE: 33
PIF_VALUE: 30
PIF_VALUE: 14
PIF_VALUE: 32
PIF_VALUE: 32
PIF_VALUE: 29
PIF_VALUE: 31
PIF_VALUE: 17
PIF_VALUE: 34
PIF_VALUE: 28
PIF_VALUE: 26
PIF_VALUE: 33
PIF_VALUE: 25
PIF_VALUE: 29
PIF_VALUE: 29
PIF_VALUE: 34
PIF_VALUE: 28
PIF_VALUE: 29
PIF_VALUE: 27
PIF_VALUE: 27
PIF_VALUE: 29
PIF_VALUE: 34
PIF_VALUE: 30
PIF_VALUE: 34
PIF_VALUE: 34
PIF_VALUE: 33
PIF_VALUE: 33
PIF_VALUE: 28
PIF_VALUE: 27
PIF_VALUE: 22
PIF_VALUE: 27
PIF_VALUE: 23
PIF_VALUE: 39
PIF_VALUE: 23
PIF_VALUE: 28
PIF_VALUE: 28
PIF_VALUE: 26
PIF_VALUE: 25
PIF_VALUE: 31
PIF_VALUE: 37
PIF_VALUE: 33
PIF_VALUE: 31
PIF_VALUE: 34
PIF_VALUE: 34
PIF_VALUE: 27
PIF_VALUE: 28
PIF_VALUE: 33
PIF_VALUE: 27
PIF_VALUE: 32
PIF_VALUE: 46
PIF_VALUE: 28
PIF_VALUE: 33
PIF_VALUE: 30
PIF_VALUE: 33
PIF_VALUE: 37
PIF_VALUE: 35
PIF_VALUE: 24
PIF_VALUE: 23
PIF_VALUE: 27
PIF_VALUE: 37
PIF_VALUE: 26
PIF_VALUE: 29
PIF_VALUE: 27
PIF_VALUE: 37
PIF_VALUE: 25
PIF_VALUE: 35
PIF_VALUE: 27
PIF_VALUE: 36
PIF_VALUE: 24
PIF_VALUE: 35
PIF_VALUE: 19
PIF_VALUE: 39
PIF_VALUE: 27
PIF_VALUE: 26
PIF_VALUE: 34
PIF_VALUE: 36
PIF_VALUE: 34
PIF_VALUE: 29
PIF_VALUE: 34
PIF_VALUE: 30
PIF_VALUE: 21
PIF_VALUE: 33
PIF_VALUE: 32
PIF_VALUE: 31
PIF_VALUE: 24
PIF_VALUE: 36
PIF_VALUE: 27
PIF_VALUE: 27
PIF_VALUE: 33
PIF_VALUE: 38
PIF_VALUE: 27
PIF_VALUE: 34
PIF_VALUE: 37
PIF_VALUE: 29
PIF_VALUE: 37
PIF_VALUE: 20
PIF_VALUE: 32
PIF_VALUE: 17
PIF_VALUE: 30
PIF_VALUE: 30
PIF_VALUE: 28
PIF_VALUE: 43
PIF_VALUE: 31
PIF_VALUE: 31
PIF_VALUE: 19
PIF_VALUE: 34
PIF_VALUE: 33
PIF_VALUE: 43
PIF_VALUE: 38
PIF_VALUE: 23
PIF_VALUE: 36

## 2020-01-01 ASSESSMENT — PAIN DESCRIPTION - ONSET
ONSET: GRADUAL
ONSET: GRADUAL

## 2020-01-01 ASSESSMENT — PAIN DESCRIPTION - PROGRESSION

## 2020-01-01 ASSESSMENT — PAIN DESCRIPTION - DESCRIPTORS
DESCRIPTORS: ACHING
DESCRIPTORS: ACHING

## 2020-01-01 ASSESSMENT — PAIN DESCRIPTION - LOCATION
LOCATION: BACK
LOCATION: BACK

## 2020-01-01 ASSESSMENT — PAIN DESCRIPTION - PAIN TYPE
TYPE: ACUTE PAIN
TYPE: ACUTE PAIN

## 2020-01-01 ASSESSMENT — PAIN DESCRIPTION - ORIENTATION
ORIENTATION: MID;LOWER
ORIENTATION: LOWER

## 2020-01-01 ASSESSMENT — PAIN DESCRIPTION - FREQUENCY
FREQUENCY: CONTINUOUS
FREQUENCY: CONTINUOUS

## 2020-01-01 ASSESSMENT — PAIN - FUNCTIONAL ASSESSMENT: PAIN_FUNCTIONAL_ASSESSMENT: PREVENTS OR INTERFERES WITH MANY ACTIVE NOT PASSIVE ACTIVITIES

## 2020-09-24 PROBLEM — U07.1 COVID-19: Status: ACTIVE | Noted: 2020-01-01

## 2020-09-24 NOTE — ED TRIAGE NOTES
Pt brought in by daughter-in-law for confusion and cough w/ known COVID 19, tested on 9/22/20. Pt is alert and oriented at present. Denies any complaints, family voices that that he never complains. Pt is found to be tachypnic w/ RR 33 and hypoxic w/ PO 70% on roomair. 100% NRB mask applied and PO raises to 96%. Respirations are regular and shallow w/o use of accessory muscles noted at this time. Lungs sound diminished t/o. Congested cough noted. Skin is pink, warm and dry.

## 2020-09-24 NOTE — H&P
history. Review of Systems   Negative except otherwise discussed in HPI      Physical Exam   BP (!) 125/58   Pulse 72   Temp 98.6 °F (37 °C) (Oral)   Resp 26   Ht 5' 8\" (1.727 m)   Wt 220 lb 10.9 oz (100.1 kg)   SpO2 96%   BMI 33.55 kg/m²     General appearance: Obese, A&O x3, Not ill or toxic, in no apparent distress  HEENT:  Normal cephalic, atraumatic without obvious deformity. Pupils equal, round, and reactive to light. Extra ocular muscles intact. Conjunctivae/corneas clear. Neck: Supple, with full range of motion. No jugular venous distention. Trachea midline. Respiratory:  Normal respiratory effort. NL A/E bilat with no adventitious sounds   Cardiovascular:  normal S1/S2 with no murmurs/gallops  Abdomen: Soft, non-tender, non-distended, no rigidity or peritoneal signs  Musculoskeletal: NL symmetrical A/PROM bilat U/L extremities   Skin: No rashes or lesions. No edema  Neurologic:  CN II-XII intact. NL symmetrical reflexes. NL gait and stance. NL Cerebellar exam. Power 5/5 all muscle groups U/L extremities.  Toes downgoing  Psychiatric: Alert and oriented, thought content appropriate, normal insight  Capillary Refill: Brisk,< 3 seconds   Peripheral Pulses: +2 palpable, equal bilaterally       Labs      Recent Results (from the past 24 hour(s))   CBC Auto Differential    Collection Time: 09/24/20 10:05 AM   Result Value Ref Range    WBC 8.7 4.8 - 10.8 thou/mm3    RBC 4.66 (L) 4.70 - 6.10 mill/mm3    Hemoglobin 14.8 14.0 - 18.0 gm/dl    Hematocrit 44.2 42.0 - 52.0 %    MCV 94.9 (H) 80.0 - 94.0 fL    MCH 31.7 (H) 27.0 - 31.0 pg    MCHC 33.4 33.0 - 37.0 gm/dl    RDW 13.2 11.5 - 14.5 %    Platelets 946 235 - 741 thou/mm3    MPV 7.2 (L) 7.4 - 10.4 fL    SEGS 87.7 (H) 43.0 - 75.0 %    Lymphocytes 5.4 (L) 15.0 - 47.0 %    Monocytes 5.6 0.0 - 12.0 %    Eosinophils % 0.8 0.0 - 4.0 %    Basophils 0.5 0.0 - 3.0 %   Comprehensive Metabolic Panel    Collection Time: 09/24/20 10:05 AM   Result Value Ref Range Glucose 169 (H) 74 - 106 mg/dl    CREATININE 1.4 (H) 0.6 - 1.3 mg/dl    BUN 22 (H) 7 - 18 mg/dl    Sodium 132 (L) 136 - 145 meq/l    Potassium 3.1 (L) 3.5 - 5.1 meq/l    Chloride 92 (L) 98 - 107 meq/l    CO2 30 21 - 32 meq/l    POC CALCIUM 8.6 8.5 - 10.1 mg/dl    AST 91 (H) 15 - 37 U/L    Alkaline Phosphatase 63 46 - 116 U/L    Total Protein 7.3 6.4 - 8.2 gm/dl    Alb 3.1 (L) 3.4 - 5.0 gm/dl    Total Bilirubin 0.6 0.2 - 1.0 mg/dl    ALT 90 (H) 14 - 63 U/L   Lactic Acid    Collection Time: 09/24/20 10:05 AM   Result Value Ref Range    Lactate 2.40 (H) 0.90 - 1.70 mmol/L   ANION GAP    Collection Time: 09/24/20 10:05 AM   Result Value Ref Range    Anion Gap 10.0 8.0 - 16.0 meq/l   Glomerular Filtration Rate, Estimated    Collection Time: 09/24/20 10:05 AM   Result Value Ref Range    GFR, Estimated 52 (A) ml/min/1.73m2   Brain Natriuretic Peptide    Collection Time: 09/24/20 10:10 AM   Result Value Ref Range    NT Pro-.0 0.0 - 1800.0 pg/mL        Imaging/Diagnostics Last 24 Hours   Xr Chest Portable    Result Date: 9/24/2020  PROCEDURE: XR CHEST PORTABLE CLINICAL INFORMATION: cough + covid. COMPARISON: No prior study. TECHNIQUE: AP upright view of the chest. FINDINGS: There is slightly increased density in both lung fields. There are no effusions. There is borderline cardiomegaly. There is thoracic spondylosis. 1. Slightly increased density in both lung fields. 2. Borderline cardiomegaly. 3. Thoracic spondylosis. **This report has been created using voice recognition software. It may contain minor errors which are inherent in voice recognition technology. ** Final report electronically signed by DR Bill Cadena on 9/24/2020 10:50 AM      Assessment      Hospital Problems           Last Modified POA    COVID-19 9/24/2020 Yes          Plan   1. COVID 19: on NRB, will switch to HFO; RN aware to wean as tolerated to SaO2 greater than 92%. Will add Remdesivir, given Decadron once; will continue. Added ASA.  CXR unremarkable. Inflammatory and prothrombotic markers sent. No need for Abx at this time. Will consider ASA. Avoid volume overload. Incentive spirometry, PT/OT to see  2. Lactic acidosis: mild likely d/t decreased PO intake and dehydration; IVF and trend lactic acid  3. SANTI: likely pre-renal; will treat w/ IVF and reassess response w/ serial BMP. Will consider further W/U only if no recovery w/ current management  4. HypoNa: mild, Na 133, d/t hypovolemia and HCTZ which is now held; IVF and trend  5. HypoK: likely 2/2 poor PO intake and diuretic; will replace and monitor   6. Hx of HTN: well-controlled now; continue w/ Amlodipine w/ hold parameters; hold HCTZ. Will monitor  7. DM II: on OHAs; resumed Glimepiride and held Mteformin d/t#2/3 plus SSI#1. Will monitor Accuchecks. Sent A1C Hypoglycemia protocol in place. 8. Hx of GLD: on statin  9. Elevated transaminases: unclear chronicity; SHETTY vs statin-induced vs viral hepatitis? Will trend for now. 10. Obersity w/ BMI 33.5 : Counseling offered   11. AMS: likely metabolic/hypoxic rencephalopathy; will monitor; judicious use of narcotics, beznos, antihistamines/antiememtics/Ach post-op. 12. PT/OT to see    Consultations Ordered:  None  With RN in room, patient was updated about and agreed upon the treatment plan, all the questions and concerns were addressed. Patient was seen in PPE (PERSONAL PROTECTIVE EQUIPMENT). Patient was interviewed in Strict Airborne and Droplet Precautions.     Electronically signed by Yury Knutson MD on 9/24/20 at 2:19 PM EDT

## 2020-09-24 NOTE — PROGRESS NOTES
Patient arrived to unit from [facility] via strecher. Patient transferred to bed and placed on continuous ICU bedside monitor. Patient admitted for COVID-19 [U07.1]. Vitals obtained. See flowsheets. Patient's IV access includes 20g RAC. Current infusions and rates of infusion include 0.69 at 100ml/hr. Assessment completed by Annalisa Sutton RN. See flowsheets for assessment details. Policies and procedures of ICU able to be explained to patient at this time. Family member(s)/representative(s) present at time of admission include none, son and wife are both patients at this time. Patient rights explained to family member(s)/representatives and patient, as able. Patient/patient's family member(s)/representative(s) Requested to have physician notified of their admission. All questions posed by patient's family member(s)/representative(s) and patient answered at this time.

## 2020-09-24 NOTE — ED PROVIDER NOTES
time.  Decadron as noted was been prescribed. Potassium prescribed. FINAL IMPRESSION    1. COVID-19    2. Acute respiratory failure with hypoxia (HCC)    3. Lactic acidosis         PATIENT REFERRED TO:  No follow-up provider specified.     DISCHARGE MEDICATIONS:  New Prescriptions    No medications on file           Adina Cates MD  09/24/20 3227

## 2020-09-25 NOTE — PROGRESS NOTES
[facility]  INPATIENT PHYSICAL THERAPY  EVALUATION  [facility] CVICU 4B - 4B-05/005-A    Time In: 8136  Time Out: 1048  Timed Code Treatment Minutes: 11 Minutes  Minutes: 26          Date: 2020  Patient Name: Ana Jaimes,  Gender:  male        MRN: [id number]  : 1945  (67 y.o.)      Referring Practitioner: Karina Arias MD  Diagnosis: COVID-19  Additional Pertinent Hx: Per ED note, pt \"is a 67 y.o. male who presents above-noted complaint (altered mental status, cough). Patient presents with generalized weakness not feeling good. He has been having symptoms this week with his wife and both are positive for COVID. He denies chest pain abdominal pain or other issues although had some generalized fatigue and some confusion. He himself is without complaint such as headache neck pain chest pain or abdominal pain. \"     Restrictions/Precautions:  Restrictions/Precautions: Fall Risk, General Precautions, Isolation  Position Activity Restriction  Other position/activity restrictions: COVID-19 +    Subjective:  Chart Reviewed: Yes  Patient assessed for rehabilitation services?: Yes  Subjective: Pt sitting in bed with High-flow O2 in place. Pt agreed to try some activity and RN approved to what pt feels upto trying and O2 sats allow.     General:  Overall Orientation Status: Within Functional Limits    Vision: Impaired  Vision Exceptions: Wears glasses at all times    Hearing: Within functional limits         Pain: Just coughing     Social/Functional History:    Lives With: Spouse  Type of Home: House  Home Layout: One level  Home Access: Stairs to enter with rails  Entrance Stairs - Number of Steps: 4 steps with 1 rail  Home Equipment: Cane, Rolling walker                   Ambulation Assistance: Independent  Transfer Assistance: Independent               OBJECTIVE:  Range of Motion:  Bilateral Lower Extremity: WFL    Strength:  Bilateral Lower Extremity: grossly 4-/5    Balance:  Static Sitting Balance:  Supervision  Dynamic Sitting Balance: Stand By Assistance    Bed Mobility:  Supine to Sit: Moderate Assistance  Sit to Supine: Contact Guard Assistance     Transfers:  Not Tested    Ambulation:  Not Tested    Exercise:  Patient was guided in 1 set(s) 5-7 reps of exercise to both lower extremities. Ankle pumps, Glut sets, Quad sets, Heelslides, Hip abduction/adduction and Long arc quads. Exercises were completed for increased independence with functional mobility. Functional Outcome Measures: Completed  AM-PAC Inpatient Mobility without Stair Climbing Raw Score : 9  AM-PAC Inpatient without Stair Climbing T-Scale Score : 32.44    ASSESSMENT:  Activity Tolerance:  Patient tolerance of  treatment: fair. Due to O2 satuations staying 82-87% while sitting edge of bed. Treatment Initiated: Treatment and education initiated within context of evaluation. Evaluation time included review of current medical information, gathering information related to past medical, social and functional history, completion of standardized testing, formal and informal observation of tasks, assessment of data and development of plan of care and goals. Treatment time included skilled education and facilitation of tasks to increase safety and independence with functional mobility for improved independence and quality of life. Assessment: Body structures, Functions, Activity limitations: Decreased functional mobility , Decreased endurance, Decreased strength  Assessment: Pt is a 67 y.o. male that is quite deconditioned and requiring high-flow O2. Pt was Mod I for mobility in PLOF. Pt required Mod A for supine to sit and CGA for sit to supine. Pt O2 sats were not stable enough to attempt beyond edge of bed sitting. Pt would benefit from continued skilled PT to address strengthening, endurance building, and assess OOB mobility.   Prognosis: Good    REQUIRES PT FOLLOW UP: Yes    Discharge Recommendations:  Discharge Recommendations: Continue to assess pending progress, Subacute/Skilled Nursing Facility    Patient Education:  PT Education: Goals, PT Role, Plan of Care, Home Exercise Program    Equipment Recommendations: Other: monitor for needs    Plan:  Times per week: 3-5x GM  Specific instructions for Next Treatment: PT to assess transfers and gait  Current Treatment Recommendations: Strengthening, Functional Mobility Training, Endurance Training, Transfer Training, Safety Education & Training, Home Exercise Program, Patient/Caregiver Education & Training    Goals:  Patient goals : go home  Short term goals  Time Frame for Short term goals: at discharge  Short term goal 1: Pt to be Mod I for supine <> sit to get in/out of bed  Short term goal 2: Pt to tolerate assessment of transfers and gait by LPT. Long term goals  Time Frame for Long term goals : not set due to short ELOS    Following session, patient left in safe position with all fall risk precautions in place. Fidelina Ordonez.  [address], [place]

## 2020-09-25 NOTE — CARE COORDINATION
9/25/20, 8:56 AM EDT  DISCHARGE PLANNING EVALUATION:    [address]       Admitted from: ED 9/24/2020/ 1520 [facility] day: 1   Location: -05/005-A Reason for admit: COVID-19 [U07.1] Status: IP  Admit order signed?: yes  PMH:  has a past medical history of Diabetes mellitus ([address] Hyperlipidemia, and Hypertension. Procedure:   9/24 CXR: Slightly increased density in both lung fields; Borderline cardiomegaly; Thoracic spondylosis  9/24 Vapotherm initiated  Medications:  Scheduled Meds:   calcium replacement protocol   Other RX Placeholder    phosphorus replacement protocol   Other RX Placeholder    insulin glargine  10 Units Subcutaneous Nightly    potassium phosphate IVPB  15 mmol Intravenous Once    sodium chloride flush  10 mL Intravenous 2 times per day    enoxaparin  40 mg Subcutaneous Q24H    glimepiride  4 mg Oral Daily with breakfast    insulin lispro  0-6 Units Subcutaneous TID WC    insulin lispro  0-3 Units Subcutaneous Nightly    amLODIPine  10 mg Oral Daily    atorvastatin  10 mg Oral Nightly    Vitamin D  1,000 Units Oral Daily    aspirin  81 mg Oral Daily    dexamethasone  6 mg Intravenous Daily    remdesivir IVPB  100 mg Intravenous Q24H    sodium chloride  30 mL Intravenous Daily     Continuous Infusions:   sodium chloride 50 mL/hr at 09/25/20 0442    dextrose        Pertinent Info/Orders/Treatment Plan: Presented to [facility] with c/o SOB, fever, chills, generalized weakness, cough. Slightly confused. Was exposed to Rosa Tripp +family member. +COVID 19 - was tested on 9/22. Admitted to  COVID unit. Arrived on NRB mask and was transitioned to vapotherm. On vapotherm, 60L, 100% FIO2 with sats 93%. Tmax 101.7. NSR. Ox4. PT/OT. Telemetry, up with assist. IVF, norvasc, asa, lipitor, IV decadron 6 mg daily, lovenox, amaryl, lantus, SSI ACHS, IV remdesivir #2/5, vit d, Electrolyte replacement protocols. Received 1L fld bolus, IV decadron 6 mg x1.  Na+ 132 - now 135, K+ 3.1 -now 3.6, Cl

## 2020-09-25 NOTE — FLOWSHEET NOTE
09/25/20 0439   Provider Notification   Reason for Communication Evaluate   Provider Name Henok Harper   Provider Notification Advance Practice Clinician (CNS, NP, CNM, CRNA, PA)   Method of Communication Secure Message   Response Waiting for response   Notification Time : 4B05: Patient on heated high flow. Now maxed at 60L 100% current SAT 93 was 86% prior. Lung sounds increased from fine crackles in bases to now in middle lobe as well. Saline running at 100ml/hr. Do you just want me to monitor for now and try to decrease Fio2 and L/min? He doesn't have a PRN treatment available from respiratory and has a nasty cough.     0440: Orders to turn fluids down to 50ml/hr

## 2020-09-25 NOTE — PROGRESS NOTES
spoke with pt's nurse, who stated pt is too confused at this time to complete an AD. Nurse suggested waiting a couple of days for pt's condition to improve.   Candida Ortega

## 2020-09-25 NOTE — PROGRESS NOTES
Physician Progress Note      PATIENT:               Marisabel Sidhu  CSN #:                  [id number]  :                       1945  ADMIT DATE:       2020 10:05 AM  DISCH DATE:  RESPONDING  PROVIDER #:        Halima GARDUNO DO          QUERY TEXT:    Attending,    Pt admitted with COVID-19 and noted to have T 101.7, P 94, R 33, procal 0.42,   CRP 16.82, , acute resp failure, lactic acidosis, metabolic   encephalopathy, SANTI. If possible, please document in the progress notes and   discharge summary if you are evaluating and/or treating: The medical record reflects the following:  Risk Factors: COVID-19, DM 2, advanced age  Clinical Indicators: T 101.7, P 94, R 33, procal 0.42, CRP 16.82, ,   acute resp failure, lactic acidosis, metabolic encephalopathy, SANTI  Treatment: Remdesivir, O2, IVF, Tylenol, Decadron, labs, isolation    Thank you! Mauro Quan RN, BSN, RHIT, CCDS, [facility]  RN Clinical   [phone]  Options provided:  -- Sepsis due to COVID-19, POA  -- COVID-19 without sepsis  -- Other - I will add my own diagnosis  -- Disagree - Not applicable / Not valid  -- Disagree - Clinically unable to determine / Unknown  -- Refer to Clinical Documentation Reviewer    PROVIDER RESPONSE TEXT:    This patient has sepsis due to COVID-19, POA.     Query created by: Debi Hidalgo on 2020 11:11 AM      Electronically signed by:  Angel Martinez DO 2020 11:20 AM

## 2020-09-25 NOTE — PROGRESS NOTES
Spoke to Marquise Dyer daughter in law per patient verbal approval to give HIPAA code. Overview on patient stability and oxygen needs. Mentioned that confusion has improved since hypoxia has gotten better with the high flow. No further questions at this time. HIPAA code given per patient verbal permission for futher updates at a later time.

## 2020-09-25 NOTE — PROGRESS NOTES
Patient on High flow nasal canula at 100% FiO2 with pulse ox 86-89%. Fever 101. Sent Dr. Gilberto De Paz a message regarding the fever, and oxygen needs. Crackles to lung bases. Dr. Gilberto De Paz gave orders for Bipap.

## 2020-09-25 NOTE — PROGRESS NOTES
CRITICAL CARE PROGRESS NOTE      Patient:  Susan Martino    Unit/Bed:4B-05/005-A  YOB: 1945  MRN: [id number]   PCP: Belen Horan  Date of Admission: 9/24/2020  Chief Complaint:-COVID-19      Assessment and Plan:    1. Acute hypoxic respiratory failure: Patient requiring high flow nasal cannula 60 L/min via FiO2 of 100%. Patient has no known underlying lung diseases or processes. proBNP within normal limits. Hypoxic respiratory failure due to COVID-19 infection   2. COVID-19: Decadron 6 mg was initiated 9/24/2020 (day 2 of 3), steroids are the only therapy proven to decrease mortality risk in severe COVID-19 infections. Remdesivir was initiated 9/24/20, will continue therapy. Continue aspirin 81 mg daily decrease risk of microthrombi which has been noted and COVID-19. Inflammatory markers and prothrombotic markers were sent. Patient currently on 0.9% NS at 50 mL/hr to avoid volume overload. Will start danazol 400 mg twice daily to decrease bradykinin effect from COVID-19 infection, which is likely the cause of leaky capillaries not distal lower extremities but also in the lungs. 3. Elevated transaminase: Transient. Unsure of etiology. Has since resolved aside from mildly elevated AST at 72. In the setting of initiating danazol therapy we will continue to trend and monitor for now. 4. History of hypertension: Well-controlled now. Continue amlodipine 10 mg dailywith holding parameters; hold hydrochlorothiazide we will continue to monitor    5. Type 2 diabetes: A1c 9/24/2020 7.5 continue low-dose correction scale at mealtime and before bed. Continue glimepiride 4 mg with breakfast.  Steroids are likely the cause of patient's increasing blood sugars. Will start Lantus 10 units nightly. 6. History of hyperlipidemia: Continue Lipitor 10 mg nightly  7. Hypocalcemia: Replace per protocol  8. Vitamin D deficiency: Continue home vitamin D supplementation 1000 units daily.   9. Altered mental status: Metabolic/hypoxic encephalopathy, will continue to monitor. Noted judicious use of narcotics/benzos/antihistamines/antiemetics/ Ach post -op. Resolved   10. SANTI: Prerenal etiology, due to volume depletion. Resolved with IVF. We will continue to monitor with serial BMPs daily. Resolved     Spoke at length with patient about initiating danazol therapy to help minimize the effects of the bradykinin storm that is a direct result from COVID-19. I expressed that there is no published literature stating that danazol therapy will improve outcome or decrease mortality however there have been case that is reporting benefit of danazol therapy. Benefits and risks were discussed with patient. Patient demonstrated clear understanding of possible risks and wants to move forward with danazol treatment. The possible use of convalescent plasma was also discussed with the patient. Patient states that he would also like to undergo convalescent plasma therapy at the time that we feel is necessary. Patient states his son has COVID 23 and also underwent this therapy. Patient expressed that he understood that there was limited data and research out regarding COVID-19 treatment methods, however patient would like to move forward with any therapy that has showed any benefit. Risks and benefits of receiving convalescent plasma were expressed, patient demonstrated clear understanding. INITIAL H AND P AND ICU COURSE:  39-year-old male with significant past medical history of type 2 diabetes, hyperlipidemia, hypertension and vitamin D deficiency presented to [facility] with confusion and generalized weakness. Patient was recently diagnosed by his PCP with COVID-19 9/23/20, at this time PCP placed patient on hydroxychloroquine 200 mg twice daily for 7 days, zinc glycinate 20 mg daily and azithromycin 500 mg daily.  Patient denies fever, chills, sore throat or cough patient admits taste is intact patient denies nausea vomiting diarrhea, chest pain, shortness of breath, fever, chills, urinary or nausea vomiting or recent sick contacts or recent travel. On arrival to [facility] emergency room department patient was confused      Past Medical History: Hypertension, hyperlipidemia, type 2 diabetes  Family History:  Father:  at age 67 Alzheimer's disease, prostate cancer. Mother:   Social History: Former smoker quit in .  1 can of beer per week,  denies illicit drugs. Patient     ROS    Patient admits to shortness of breath and Febrile 101.5  All other systems reviewed negative    Scheduled Meds:   calcium replacement protocol   Other RX Placeholder    phosphorus replacement protocol   Other RX Placeholder    insulin glargine  10 Units Subcutaneous Nightly    sodium chloride flush  10 mL Intravenous 2 times per day    enoxaparin  40 mg Subcutaneous Q24H    glimepiride  4 mg Oral Daily with breakfast    insulin lispro  0-6 Units Subcutaneous TID WC    insulin lispro  0-3 Units Subcutaneous Nightly    amLODIPine  10 mg Oral Daily    atorvastatin  10 mg Oral Nightly    Vitamin D  1,000 Units Oral Daily    aspirin  81 mg Oral Daily    dexamethasone  6 mg Intravenous Daily    remdesivir IVPB  100 mg Intravenous Q24H    sodium chloride  30 mL Intravenous Daily     Continuous Infusions:   sodium chloride 50 mL/hr at 20 0442    dextrose         PHYSICAL EXAMINATION:  T: 101.5.  P: 90. RR: 24. B/P: 137/64. FiO2: 100%. O2 Sat: 94.  I/O:  1899/400  Body mass index is 33.55 kg/m². GCS:   15  High flow 60 L/min with FiO2 of 100%  General:   Acutely ill male on high flow nasal cannula laying in bed  HEENT:  normocephalic and atraumatic. No scleral icterus. PERR  Neck: supple. No Thyromegaly. Lungs: Significantly diminished breath sounds bilaterally. No retractions  Cardiac: RRR. No JVD. Abdomen: soft. Nontender. Positive bowel sounds  Extremities:  No clubbing or cyanosis.

## 2020-09-25 NOTE — PROGRESS NOTES
Spoke with son from room 4B 11 per Patient permission. Just wanted updated on condition and oxygen demand.  Questions answered and none further at this time

## 2020-09-25 NOTE — PLAN OF CARE
Problem: Falls - Risk of:  Goal: Will remain free from falls  Description: Will remain free from falls  Outcome: Met This Shift  Note: Continue fall precautions, turn and reposition. Problem: Falls - Risk of:  Goal: Absence of physical injury  Description: Absence of physical injury  Outcome: Met This Shift     Problem: Skin Integrity:  Goal: Absence of new skin breakdown  Description: Absence of new skin breakdown  Outcome: Met This Shift     Problem: Skin Integrity:  Goal: Will show no infection signs and symptoms  Description: Will show no infection signs and symptoms  Outcome: Ongoing  Note: Continue to assess skin, turn and reposition patient. Problem: Airway Clearance - Ineffective  Goal: Achieve or maintain patent airway  Outcome: Ongoing  Note: Patient was on high flow nasal canula 100% FiO2 with pulse ox 86-89%, then placed on Bipap per physician order. Problem: Gas Exchange - Impaired  Goal: Absence of hypoxia  Outcome: Ongoing  Note: Pulse ox 86-89% on high flow nasal canula, patient placed on Bipap per physician order to maintain pulse ox >90%. Problem: Breathing Pattern - Ineffective  Goal: Ability to achieve and maintain a regular respiratory rate  Outcome: Ongoing  Note: Patient SOB with exertion. Placed on Bipap today     Problem: Isolation Precautions - Risk of Spread of Infection  Goal: Prevent transmission of infection  Outcome: Ongoing  Note: Continue droplet precautions for COVID19     Problem: Nutrition Deficits  Goal: Optimize nutrtional status  Outcome: Ongoing  Note: Patient able to eat 25-50% of meals. Problem: Loneliness or Risk for Loneliness  Goal: Demonstrate positive use of time alone when socialization is not possible  Outcome: Ongoing     Problem: Fatigue  Goal: Verbalize increase energy and improved vitality  Outcome: Ongoing  Note: Patient SOB with exertion. PT/OT working with patient. Problem:  Body Temperature -  Risk of, Imbalanced  Goal: Ability to maintain a body temperature within defined limits  Outcome: Not Met This Shift  Note: Fever 101 today. Given tylenol with improvement in fever. Continue to monitor temperature. Care plan reviewed with patient. Patient  verbalize understanding of the plan of care and contribute to goal setting.

## 2020-09-25 NOTE — PLAN OF CARE
Spencer Rayo 59  OCCUPATIONAL THERAPY MISSED TREATMENT NOTE  [facility] 4B  4B-05/005-A      Date: 2020  Patient Name: Ezra Wood        CSN: [id number]   : 1945  (67 y.o.)  Gender: male                REASON FOR MISSED TREATMENT: Hold Treatment per Nursing   Pt was not able to be seen secondary to being weaned from use of high flow meter. Per respiratory, it would be best to allow pt to rest at this time.

## 2020-09-26 NOTE — PROGRESS NOTES
CRITICAL CARE PROGRESS NOTE      Patient:  Zuhair Humphrey    Unit/Bed:4B-05/005-A  YOB: 1945  MRN: [id number]   PCP: Glen Navarro MD  Date of Admission: 9/24/2020  Chief Complaint:-COVID-19 infection    Assessment and Plan:    1. Acute hypoxic respiratory failure: Patient requiring BiPAP FiO2 of 100%. Patient has no known underlying lung diseases or processes. proBNP within normal limits. Hypoxic respiratory failure due to COVID-19 infection will likely require intubation if increased work of breathing and hypoxia continues  2. COVID-19: Decadron 6 mg was initiated 9/24/2020 (day 2 of 3), steroids are the only therapy proven to decrease mortality risk in severe COVID-19 infections. Remdesivir was initiated 9/24/20, will continue therapy. Continue aspirin 81 mg daily decrease risk of microthrombi which has been noted and COVID-19. Inflammatory markers and prothrombotic markers were sent. Patient currently on 0.9% NS at 50 mL/hr to avoid volume overload. Will start danazol 400 mg twice daily to decrease bradykinin effect from COVID-19 infection, which is likely the cause of leaky capillaries not distal lower extremities but also in the lungs. 3. Elevated transaminase: Transient. Unsure of etiology. Has since resolved aside from mildly elevated AST at 55. In the setting of initiating danazol therapy we will continue to trend and monitor for now. 4. History of hypertension: Well-controlled now. Continue amlodipine 10 mg dailywith holding parameters; hold hydrochlorothiazide we will continue to monitor    5. Type 2 diabetes: A1c 9/24/2020 7.5 continue low-dose correction scale at mealtime and before bed. Continue glimepiride 4 mg with breakfast.  Steroids are likely the cause of patient's increasing blood sugars. Continue Lantus 10 units nightly. 6. History of hyperlipidemia: Continue Lipitor 10 mg nightly  7. Hypocalcemia: Replace per protocol  8.  Vitamin D deficiency: Other RX Placeholder    phosphorus replacement protocol   Other RX Placeholder    insulin glargine  10 Units Subcutaneous Nightly    sodium chloride flush  10 mL Intravenous 2 times per day    enoxaparin  40 mg Subcutaneous Q24H    glimepiride  4 mg Oral Daily with breakfast    insulin lispro  0-6 Units Subcutaneous TID WC    insulin lispro  0-3 Units Subcutaneous Nightly    amLODIPine  10 mg Oral Daily    atorvastatin  10 mg Oral Nightly    Vitamin D  1,000 Units Oral Daily    aspirin  81 mg Oral Daily    dexamethasone  6 mg Intravenous Daily    remdesivir IVPB  100 mg Intravenous Q24H    sodium chloride  30 mL Intravenous Daily     Continuous Infusions:   sodium chloride 50 mL/hr at 09/26/20 0402    dextrose          PHYSICAL EXAMINATION:  T: 98.8. P: 72. RR: 24. B/P: 130/65. FiO2: 90%. O2 Sat: 97%. I/O:  125/125    Body mass index is 33.55 kg/m². GCS:   15  General: Acutely ill male on BiPAP therapy laying in bed with head of bed elevated  HEENT:  normocephalic and atraumatic. No scleral icterus. PERR  Neck: supple. No Thyromegaly. Lungs: diminished breath sounds bilaterally. Accessory muscle use noted. No retractions  Cardiac: RRR. No JVD. Abdomen: soft. Nontender. Bowel sounds present  Extremities:  No clubbing or cyanosis. Trace lower extremity edema  Vasculature: capillary refill < 3 seconds. Palpable dorsalis pedis pulses. Skin:  warm and dry. Psych:  Alert and oriented x3. Affect appropriate  Lymph:  No supraclavicular adenopathy. Neurologic:  No focal deficit. No seizures. Data: (All radiographs, tracings, PFTs, and imaging are personally viewed and interpreted unless otherwise noted).     Sodium 136, potassium 3.9, chloride 101, CO2 25, BUN 13, creatinine 0.6, lactic acid 1.7, calcium 7.7, total protein 6.0   Albumin 2.8, alk phos 62, ALT 56, AST 55, bilirubin 0.3   WBC 7.0, hemoglobin 13.0, hematocrit 39.4, platelets 306   Telemetry shows normal sinus rhythm   Blood cultures no growth x2 preliminary results   Chest x-ray 9/24/2020 reports: Slightly increased density in both lung fields. Borderline cardiomegaly. Thoracic spondylolysis. Meets Continued ICU Level Care Criteria:    [x] Yes   [] No - Transfer Planned to listed location:  [] HOSPITALIST CONTACTED-      Case and plan discussed with Dr. Candie Callejas.         Electronically signed by Andree Michelle DO  CRITICAL CARE SPECIALIST

## 2020-09-26 NOTE — PLAN OF CARE
Problem: Breathing Pattern - Ineffective  Goal: Ability to achieve and maintain a regular respiratory rate  9/26/2020 0445 by David Sheriff RCP  Outcome: Ongoing  Note: Patient currently on BiPAP 24/10, 90%

## 2020-09-26 NOTE — PROGRESS NOTES
Pt is listed as an AD consult. Pt remains on a vent and completion is not possible. Chaplain jin for the pt outside the room.

## 2020-09-26 NOTE — PROGRESS NOTES
Pt watching tv. RR is 21. Pt wife on opposite side of wu and sending messages back and forth via nurse. Pt in good spirits. Denies feeling hungry. See flowsheet for vent settings.

## 2020-09-26 NOTE — PROGRESS NOTES
Pt is not going to be intubated at this point. All meds pulled from pyxis, Propofol, Levophed, Etomidate, Fentanyl and Rocuronium, were wasted with garry De Anda RN. See MAR for meds given and resumed.

## 2020-09-26 NOTE — FLOWSHEET NOTE
PAtient's wife and son are also in [address] [place] [address] unit. Patient asleep at this time; offered prayer fro family outside his room.

## 2020-09-27 NOTE — PROCEDURES
Hospitalist of [address] [place] [address]    Patient:  Marcello Harrington  YOB: 1945    MRN: [id number]   Acct:  [de-identified]      9/27/20  1230    PERFORMED BY: Sushant Hu    PRE PROCEDURE DIAGNOSES: Acute respiratory failure hypoxia, failed BIPAP, Covid 19-positive    INDICATIONS:vascular access, poor peripheral access, central venous monitoring, centrally administered medications and need for frequent blood draws. SITE: right  internal jugular vein    CONSENT:  Consent obtained from patient and/or next of kin after explaining indication/risks    TIME OUT: Completed. Hand washing completed prior to procedure. STERILE PREP: Full maximum sterile field/barrier technique was followed (with cap and mask, gloves and sterile gown, as well as broad field sterile drapes). Local disinfection was performed with broad field application of orange dyed chloraprep solution, which was allowed to dry fully prior to the initiation of the procedure. LOCAL ANESTHETIC: Numbed with 1 percent lidocaine, total estimated 0 ml. ESTIMATED BLOOD LOSS:  3 ml. PROCEDURE:  internal jugular vein  easily accessed with a 23-gauge needle and with an 18-gauge needle. Guidewire was passed without difficulty or resistance. Both needles removed, dilator over guidewire with skin snip, dilator removed. 3 lumen 16 CVC catheter advanced over the guidewire without difficulty or resistance to full extent. The guidewire was withdrawn and discarded and good return of dark venous blood. Positive blood aspiration from all lumens and flushed easily. Sutured at 15 cm, Biopatch applied. Chest x-ray is ordered. Chest x-ray confirms position, line in SVC and ready for use. Pneumothorax No, other complications No.      Procedure well tolerated. Critical Care Time: Less than 30 minutes. Plan:  1.  Levophed gtt was started to maintain MAP >65            Electronically signed by Sushant Barkley ARABELLA Hu - CNP on 9/27/2020 at 12:47 PM

## 2020-09-27 NOTE — PROGRESS NOTES
deficit. No seizures. DATA:  CBC:   Lab Results   Component Value Date    WBC 10.0 09/27/2020    RBC 4.12 09/27/2020    HGB 12.8 09/27/2020    HCT 38.9 09/27/2020    MCV 94.4 09/27/2020    RDW 13.2 09/24/2020     09/27/2020     CMP:    Lab Results   Component Value Date     09/26/2020    K 4.0 09/26/2020     09/26/2020    CO2 23 09/26/2020    BUN 16 09/26/2020    CREATININE 0.6 09/26/2020    LABGLOM >90 09/26/2020    GLUCOSE 197 09/26/2020    PROT 6.0 09/26/2020    CALCIUM 7.9 09/26/2020    BILITOT 0.3 09/26/2020    ALKPHOS 62 09/26/2020    AST 55 09/26/2020    ALT 56 09/26/2020       KEY ISSUES/FINDINGS/ASSESSMENT AND PLAN:    Assessment and Plan:    1. Acute hypoxic respiratory failure: On BiPAP, requires high FiO2, JOANNE score  less than 3.8 which is compatible with high possibility of intubation so patient was intubated and central line inserted for hemodynamic stability. 2. COVID-19: Decadron 6 mg was initiated 9/24/2020 ,   Remdesivir was initiated 9/24/20, will continue therapy.  Continue aspirin 81 mg daily decrease risk of microthrombi which has been noted and COVID-19.  Inflammatory markers and prothrombotic markers were sent. danazol 400 mg twice daily. 3. Elevated transaminase: Transient.  Unsure of etiology.  Has since resolved aside from mildly elevated AST at 55.  In the setting of initiating danazol therapy we will continue to trend and monitor for now.    4. History of hypertension: Well-controlled now. Continue amlodipine 10 mg daily with holding parameters; hold hydrochlorothiazide we will continue to monitor    5. Type 2 diabetes: A1c 9/24/2020 7.5 continue low-dose correction scale at mealtime and before bed.  Continue glimepiride 4 mg with breakfast.  Steroids are likely the cause of patient's increasing blood sugars.    Continue Lantus 10 units nightly.     6. History of hyperlipidemia: Continue Lipitor 10 mg nightly  7. Hypocalcemia: Replace per protocol  8.  Vitamin D deficiency: Continue home vitamin D supplementation 1000 units daily. 9. Altered mental status: Metabolic/hypoxic encephalopathy, will continue to monitor.  Noted judicious use of narcotics/benzos/antihistamines/antiemetics/ Ach post -op.  Resolved   10. SANTI: Prerenal etiology, due to volume depletion.  Resolved with IVF.  We will continue to monitor with serial BMPs daily.  Resolved    Critical condition with guarded prognosis  Cc time 37 min apart from procedures.

## 2020-09-27 NOTE — PLAN OF CARE
Problem: Gas Exchange - Impaired  Goal: Absence of hypoxia  9/27/2020 0524 by Rg Bowling RCP  Outcome: Ongoing     Problem: Breathing Pattern - Ineffective  Goal: Ability to achieve and maintain a regular respiratory rate  9/27/2020 0524 by Rg Bowling RCP  Outcome: Ongoing  Note: Patient currently on BiPAP 24/10 and 100%. SpO2 94% and RR 30.

## 2020-09-27 NOTE — PROGRESS NOTES
Called and updated Mau Stone, patients daughter. She stated that she would update the rest of the family. Updated patients spouse in room 9 as well.

## 2020-09-27 NOTE — PROGRESS NOTES
Pt unable to maintain RR and is very tachpneic. Pt intubated and placed on the ventilator. See Intubation flow sheet for vent settings. See MAR for meds given.

## 2020-09-28 NOTE — PLAN OF CARE
Problem: Impaired respiratory status  Goal: Will be able to breathe spontaneously, without ventilator support  Description: Will be able to breathe spontaneously, without ventilator support  Outcome: Ongoing  Note: Pt not able to wean at this time  Will continue to monitor and wean as tolerated

## 2020-09-28 NOTE — PROGRESS NOTES
CRITICAL CARE PROGRESS NOTE      Patient:  Darrius Aguilar    Unit/Bed:-05/005-A  YOB: 1945  MRN: [id number]   PCP: Lali Sanchez MD  Date of Admission: 9/24/2020  Chief Complaint:-COVID-19 infection    Assessment and Plan:    1. Acute hypoxic respiratory failure: Due to pneumonia. Patient intubated and sedated.  Patient has no known underlying lung diseases or processes.  proBNP within normal limits.  Hypoxic respiratory failure due to COVID-19 infection. Patient required intubation 9/27/2020. Mode: CMV Pressure Support: 22 PEEP: 12 FiO2: 70%, Meausre Rate: 20  2. Pneumonia: COVID-19 infection with superimposed Haemophilus influenza and Klebsiella pneumonia. Continue Decadron 6 mg for 10 day course, day 5/10 (initiated 9/25/2020). Steroids are the only therapy proven to decrease mortality risk in severe COVID-19 infections.  Remdesivir was initiated 9/24/20, will complete 5 day course.  Continue aspirin 81 mg daily decrease risk of microthrombi which has been noted and COVID-19.  Inflammatory markers and prothrombotic markers were sent. Continue Zosyn for H. influenzae Klebsiella pneumonia. lactic acid 2.3, Pro-calcitonin 0.24 will continue to monitor and trend. Respiratory cultures pending, will continue to monitor  3. History of hypertension: Well-controlled now. Continue amlodipine 10 mg daily with holding parameters; hold hydrochlorothiazide we will continue to monitor    4. Type 2 diabetes: A1c 9/24/2020 7.5 continue low-dose correction scale at mealtime and before bed.  Continue glimepiride 4 mg with breakfast.  Steroids are likely the cause of patient's increasing blood sugars.    Continue Lantus 10 units nightly.     5. History of hyperlipidemia: Continue Lipitor 10 mg nightly  6. Hypocalcemia: Replace per protocol  7. Vitamin D deficiency: Continue home vitamin D supplementation 1000 units daily. 8. Altered mental status: Metabolic/hypoxic encephalopathy, will continue to monitor.  Noted judicious use of narcotics/benzos/antihistamines/antiemetics/ Ach post -op.  Resolved   9. SANTI: Prerenal etiology, due to volume depletion.  Resolved with IVF.  We will continue to monitor with serial BMPs daily.  Resolved  10.  Elevated transaminase: In the setting of initiating danazol therapy we will continue to trend and monitor for now. [facility]     Spoke at length with patient about initiating danazol therapy to help minimize the effects of the bradykinin storm that is a direct result from COVID-19.  I expressed that there is no published literature stating that danazol therapy will improve outcome or decrease mortality however there have been case that is reporting benefit of danazol therapy.  Benefits and risks were discussed with patient. Carolynn Song demonstrated clear understanding of possible risks and wants to move forward with danazol treatment.     The possible use of convalescent plasma was also discussed with the patient.  Patient states that he would also like to undergo convalescent plasma therapy at the time that we feel is necessary.  Patient states his son has COVID 23 and also underwent this therapy.  Patient expressed that he understood that there was limited data and research out regarding COVID-19 treatment methods, however patient would like to move forward with any therapy that has showed any benefit.  Risks and benefits of receiving convalescent plasma were expressed, patient demonstrated clear understanding.        Past Medical History: Hypertension, hyperlipidemia, type 2 diabetes  Family History:  Father:  at age 67 Alzheimer's disease, prostate cancer.  Mother:   Social History: Former smoker quit in .  1 can of beer per week,  denies illicit drugs.  Patient      ROS    Unable to obtain secondary to patient being intubated sedated      Scheduled Meds:   piperacillin-tazobactam  3.375 g Intravenous Q8H    cisatracurium Besylate  10 mg Intravenous Once  etomidate  30 mg Intravenous Once    chlorhexidine  15 mL Mouth/Throat BID    famotidine (PEPCID) injection  20 mg Intravenous BID    amLODIPine  10 mg Per NG tube Daily    aspirin  81 mg Per NG tube Daily    atorvastatin  10 mg Per NG tube Nightly    danazol  400 mg Per NG tube BID    insulin lispro  0-6 Units Subcutaneous Q4H    Vitamin D  1,000 Units Per NG tube Daily    fentanNYL  100 mcg Intravenous Once    calcium replacement protocol   Other RX Placeholder    phosphorus replacement protocol   Other RX Placeholder    insulin glargine  10 Units Subcutaneous Nightly    sodium chloride flush  10 mL Intravenous 2 times per day    enoxaparin  40 mg Subcutaneous Q24H    [Held by provider] glimepiride  4 mg Oral Daily with breakfast    remdesivir IVPB  100 mg Intravenous Q24H    sodium chloride  30 mL Intravenous Daily     Continuous Infusions:   sodium chloride 100 mL/hr at 09/28/20 0513    midazolam Stopped (09/28/20 0247)    norepinephrine Stopped (09/27/20 2000)    propofol 40 mcg/kg/min (09/28/20 0615)    dextrose         PHYSICAL EXAMINATION:  T: 98.8. P: 63. RR: 21. B/P: 106/64. FiO2: 75%. O2 Sat: 95%. I/O:  1497/972  Body mass index is 33.86 kg/m². Mode: CMV Pressure Support: 22 PEEP: 12 FiO2: 70%, Meausre Rate: 20  GCS:   3  General:   Acutely ill male resting in bed sedated and on mechanical ventilation  HEENT:  normocephalic and atraumatic. No scleral icterus. PERR  Neck: supple. No Thyromegaly. Lungs: Diminished breath sounds bilaterally, clear to auscultation. No retractions  Cardiac: RRR. No JVD. Abdomen: soft. Nontender. Bowel sounds present  Extremities:  No clubbing, cyanosis, or edema x 4. Vasculature: capillary refill < 3 seconds. Palpable dorsalis pedis pulses. Skin:  warm and dry. Psych: Unable to assess secondary to patient being on mechanical ventilation and sedated  Lymph:  No supraclavicular adenopathy. Neurologic:  No focal deficit.  No

## 2020-09-28 NOTE — PROGRESS NOTES
Provides: vital 1.2 at 10 ml/hr = 240 ml, 288 kcals, 18 grams protein, 27 grams CHO, 1 gram fiber, 195 ml water, 405 mg potassium, and 202 mg phosphorus    Additional Calorie Sources:   Diprivan at 24.9 ml/hr provides 637 lipid kcals/24 hours    Anthropometric Measures:  · Height: 5' 8\" (172.7 cm)  · Current Body Weight: 222 lb (100.7 kg)(9/28/20, bedscale, no edema)   · Admission Body Weight: 220 lb (99.8 kg)(9/24/20, no weight source)    · Usual Body Weight: (no weight hx available in EMR, patient intubated)     · Ideal Body Weight: 154 lbs;    · BMI: 33.8  · Adjusted Body Weight:  ; No Adjustment   · BMI Categories: Obese Class 1 (BMI 30.0-34.9)(33.86)       Nutrition Diagnosis:   · Inadequate oral intake related to impaired respiratory function as evidenced by NPO or clear liquid status due to medical condition, intubation    Nutrition Interventions:   Food and/or Nutrient Delivery:  Continue NPO, Start Tube Feeding  Nutrition Education/Counseling:  No recommendation at this time   Coordination of Nutrition Care:  Continued Inpatient Monitoring, Interdisciplinary Rounds    Goals:  Patient will tolerate EN at 10-20ml/hr during acute phase       Nutrition Monitoring and Evaluation:   Behavioral-Environmental Outcomes:  (n/a)   Food/Nutrient Intake Outcomes:  Enteral Nutrition Intake/Tolerance  Physical Signs/Symptoms Outcomes:  Biochemical Data, GI Status, Fluid Status or Edema, Hemodynamic Status, Skin, Weight     Discharge Planning:     Too soon to determine     Electronically signed by Jonny Drake RD, LD on 9/28/20 at 11:48 AM EDT    Contact: .[phone]

## 2020-09-28 NOTE — FLOWSHEET NOTE
09/28/20 0428   Provider Notification   Reason for Communication Evaluate   Provider Name Marian Hampton    Provider Notification Advance Practice Clinician (CNS, NP, CNM, CRNA, PA)   Method of Communication Call   Response See orders   Notification Time [id number] - Call placed to Tanja Kimble NP regarding pt morning Lactic and positive pneumonia panel. See new orders. Updated her that pt still does not respond to pain despite titrating sedation down. Reviewed sedation infusions. Pt breaths over the vent and has a cough. Pt pupils equal 2mm and sluggish. No new orders received at this time.

## 2020-09-28 NOTE — PLAN OF CARE
Problem: Falls - Risk of:  Goal: Will remain free from falls  Description: Will remain free from falls  Outcome: Ongoing  Note: Pt has hx of falls. Bed in lowest position with bed alarm on. Pt sedated on vent. Problem: Falls - Risk of:  Goal: Absence of physical injury  Description: Absence of physical injury  Outcome: Ongoing  Note: No evidence of physical injury this far in shift. Problem: Skin Integrity:  Goal: Will show no infection signs and symptoms  Description: Will show no infection signs and symptoms  Outcome: Ongoing  Note: Pt on antibiotics for known infection. Problem: Skin Integrity:  Goal: Absence of new skin breakdown  Description: Absence of new skin breakdown  Outcome: Ongoing  Note: No evidence of skin breakdown this far in shift. Pt turned and repositioned q 2 and prn. Bilateral arms and heels elevated on pillows. Problem: Airway Clearance - Ineffective  Goal: Achieve or maintain patent airway  Outcome: Ongoing  Note: Pt mechanically ventilated. Problem: Gas Exchange - Impaired  Goal: Absence of hypoxia  Outcome: Ongoing  Note: O2 sat 95% on 75% on Fio2. Problem: Breathing Pattern - Ineffective  Goal: Ability to achieve and maintain a regular respiratory rate  Outcome: Ongoing  Note: Pt mechanically ventialted. Problem: Body Temperature -  Risk of, Imbalanced  Goal: Ability to maintain a body temperature within defined limits  Outcome: Ongoing  Note: Pt temp 35.7 - Evette hugger applied. Problem: Isolation Precautions - Risk of Spread of Infection  Goal: Prevent transmission of infection  Outcome: Ongoing  Note: Pt droplet + isolation for COVID -19. Problem: Nutrition Deficits  Goal: Optimize nutrtional status  Outcome: Ongoing  Note: Pt NPO at this time. Pt unable to participate in care planning due to being sedated on vent. Pt family actively participating in care planning and goal setting.

## 2020-09-28 NOTE — PROGRESS NOTES
Spencer Rayo 59  OCCUPATIONAL THERAPY MISSED TREATMENT NOTE  [facility] 4B  4B-05/005-A      Date: 2020  Patient Name: Fercho Torres        CSN: [id number]   : 1945  (67 y.o.)  Gender: male   Referring Practitioner: ARABELLA Dye CNP  Diagnosis: COVID 19         REASON FOR MISSED TREATMENT: Pt is now on vent. RN reports pt is not an early mobility candidate at this time. OT signing off. Please send new orders when appropriate  for early mobility or extubated.

## 2020-09-28 NOTE — PLAN OF CARE
Patient continues on ventilator tolerating well.   Will continue to wean and monitor patients respiratory status

## 2020-09-28 NOTE — PLAN OF CARE
Problem: Impaired respiratory status  Goal: Will be able to breathe spontaneously, without ventilator support  Description: Will be able to breathe spontaneously, without ventilator support  9/28/2020 1325 by Ajit Bruno RCP  Outcome: Not Met This Shift   Patient remains on the ventilator with lung protective strategies in place. Will continue to monitor and will wean when appropriate.

## 2020-09-29 NOTE — PLAN OF CARE
Problem: Falls - Risk of:  Goal: Will remain free from falls  Description: Will remain free from falls  Outcome: Ongoing  Note: Pt remains free from falls this far in shift. Pt bed in lowest position with bed alarm on. Pt sedated on vent. Problem: Falls - Risk of:  Goal: Absence of physical injury  Description: Absence of physical injury  Outcome: Ongoing  Note: No evidence of physical injury this far in shift. Problem: Skin Integrity:  Goal: Will show no infection signs and symptoms  Description: Will show no infection signs and symptoms  Outcome: Ongoing  Note: Pt on antibiotics for known infection. Problem: Skin Integrity:  Goal: Absence of new skin breakdown  Description: Absence of new skin breakdown  Outcome: Ongoing  Note: No evidence of new skin breakdown this far in shift. Pt tuned and repositioned q 2 hr and prn. Bilateral arms and heels elevated on pillows. Problem: Airway Clearance - Ineffective  Goal: Achieve or maintain patent airway  Outcome: Ongoing  Note: Pt mechanically ventilated. Problem: Body Temperature -  Risk of, Imbalanced  Goal: Ability to maintain a body temperature within defined limits  Outcome: Ongoing  Note: Pt normothermic       Problem: Isolation Precautions - Risk of Spread of Infection  Goal: Prevent transmission of infection  Outcome: Ongoing  Note: Pt in droplet plus isolation for COVID 19. Problem: Nutrition Deficits  Goal: Optimize nutrtional status  Outcome: Ongoing  Note: Pt tolerating continuous tube feed. Pt unable to participate in care planning due to being sedated on the vent. Pt family actively participates in care planning and goal setting.

## 2020-09-29 NOTE — CARE COORDINATION
9/29/20, 3:15 PM EDT    DISCHARGE ON GOING [address] [facility],[address] day: 5  Location: -05/005-A Reason for admit: COVID-19 [U07.1]   Procedure:   9/24 CXR: Slightly increased density in both lung fields; Borderline cardiomegaly; Thoracic spondylosis  9/24 Vapotherm initiated  9/27 Intubated  9/27 CVC Right subclavian  9/27 CXR: Diffuse infiltrates are scattered throughout the lungs bilaterally  9/29 Rotoprone therapy initiated    Treatment Plan of Care: Rotoprone therapy initiated today. ARDS. Remains on vent w/ETT on PCV, peep 15, FIO2 80%, sats 97%. Tmax 99.3. NSR. Unable to follow commands; LEVY x4 to painful stim. Dietitian consulted. PT/OT. Respiratory culture +Hflu and Klebsiella pneumoniae. +COVID 19 on 9/22. Telemetry, I&O, oral care, OG w/TF, flexiseal, pollock care. IVF, diprivan @ 35 mcg/kg/min, norvasc, asa, lipitor, IV decadron 6 mg daily, lovenox, pepcid, IV lasix 60 mg bid, lantus, SSI Q4H, IV zosyn, vit d, Electrolyte replacement protocols. IV remdesivir completed. Alb 2.3, hgb 11.3. Barriers to Discharge: on vent  PCP: Xochitl Murdock MD  Readmission Risk Score: 19%  Patient Goals/Plan/Treatment Preferences: From home w/wife. Plan pending clinical course. May need TCU vs LTACH.

## 2020-09-29 NOTE — FLOWSHEET NOTE
1520  Pt only had 20mls urine out last 8 hrs. Catheter flushed with difficulty, scant urine returned. ADMIT

## 2020-09-29 NOTE — PROGRESS NOTES
CRITICAL CARE PROGRESS NOTE      Patient:  Darrius Aguilar    Unit/Bed:4B-05/005-A  YOB: 1945  MRN: [id number]   PCP: Lali Sanchez MD  Date of Admission: 9/24/2020  Chief Complaint:-COVID-19 infection    Assessment and Plan:    1. Acute hypoxic respiratory failure: Due to pneumonia. Patient intubated and sedated.  Patient has no known underlying lung diseases or processes.  proBNP within normal limits.  Hypoxic respiratory failure due to COVID-19 infection. Patient required intubation 9/27/2020. Mode: CMV Pressure Support: 16 PEEP: 15 FiO2: 80%, Meausre Rate: 20.    2. ARDS: Secondary to complications of [id number].  Continue with mechanical ventilator support. Continue with lung protection strategies. Patient requiring greater than 70% oxygen requirements will initiate prone therapy. 3. Pneumonia: COVID-19 infection with superimposed Haemophilus influenza and Klebsiella pneumonia. Continue Decadron 6 mg for 10 day course, day 6/10 (initiated 9/25/2020). Steroids are the only therapy proven to decrease mortality risk in severe COVID-19 infections.  Remdesivir was initiated 9/24/20, will complete 5 day course.  Continue aspirin 81 mg daily decrease risk of microthrombi which has been noted and COVID-19.  Inflammatory markers and prothrombotic markers were sent. Continue Zosyn for H. influenzae Klebsiella pneumonia. Respiratory cultures rare gram positive bacilli. Rare gram positive cocci in pairs and chains.     4. History of hypertension: Well-controlled now. Continue amlodipine 10 mg daily with holding parameters; hold hydrochlorothiazide we will continue to monitor    5. Type 2 diabetes: A1c 9/24/2020 7.5  Steroids are likely the cause of patient's increasing blood sugars. Increase lantus to 15 units nightly.     6. History of hyperlipidemia: Continue Lipitor 10 mg nightly  7. Hypocalcemia: Replace per protocol  8.  Vitamin D deficiency: Continue home vitamin D supplementation 1000 units daily.  9. Altered mental status: Metabolic/hypoxic encephalopathy, will continue to monitor.  Noted judicious use of narcotics/benzos/antihistamines/antiemetics/ Ach post -op.  Resolved   10. SANTI: Prerenal etiology, due to volume depletion.  Resolved with IVF.  We will continue to monitor with serial BMPs daily.  Resolved  11. Elevated transaminase: In the setting of initiating danazol therapy we will continue to trend and monitor for now. [facility]     Spoke at length with patient about initiating danazol therapy to help minimize the effects of the bradykinin storm that is a direct result from COVID-19.  I expressed that there is no published literature stating that danazol therapy will improve outcome or decrease mortality however there have been case that is reporting benefit of danazol therapy.  Benefits and risks were discussed with patient. Enrique Benjamin demonstrated clear understanding of possible risks and wants to move forward with danazol treatment.     The possible use of convalescent plasma was also discussed with the patient.  Patient states that he would also like to undergo convalescent plasma therapy at the time that we feel is necessary.  Patient states his son has COVID 19 and also underwent this therapy.  Patient expressed that he understood that there was limited data and research out regarding COVID-19 treatment methods, however patient would like to move forward with any therapy that has showed any benefit.  Risks and benefits of receiving convalescent plasma were expressed, patient demonstrated clear understanding.     Prone Therapy  Every twelve hours alternate supine and prone (with maximal reverse-Trendelenburg).   While prone every hour:   30 minutes prone   15 minutes 45 degrees to the right   15 minutes 45 degrees to the left  While supine every hour:    30 minutes supine   15 minutes 25 degrees to the right   15 minutes 25 degrees to the left       Past Medical History: Hypertension, hyperlipidemia, type 2 diabetes  Family History:  Father:  at age 67 Alzheimer's disease, prostate cancer.  Mother:   Social History: Former smoker quit in .  1 can of beer per week,  denies illicit drugs.  Patient      ROS    Unable to obtain secondary to patient being intubated sedated    Scheduled Meds:   furosemide  60 mg Intravenous BID    potassium chloride  30 mEq Intravenous BID    piperacillin-tazobactam  3.375 g Intravenous Q8H    dexamethasone  6 mg Intravenous Daily    influenza virus vaccine  0.5 mL Intramuscular Once    cisatracurium Besylate  10 mg Intravenous Once    etomidate  30 mg Intravenous Once    chlorhexidine  15 mL Mouth/Throat BID    famotidine (PEPCID) injection  20 mg Intravenous BID    amLODIPine  10 mg Per NG tube Daily    aspirin  81 mg Per NG tube Daily    atorvastatin  10 mg Per NG tube Nightly    insulin lispro  0-6 Units Subcutaneous Q4H    Vitamin D  1,000 Units Per NG tube Daily    fentanNYL  100 mcg Intravenous Once    calcium replacement protocol   Other RX Placeholder    phosphorus replacement protocol   Other RX Placeholder    insulin glargine  10 Units Subcutaneous Nightly    sodium chloride flush  10 mL Intravenous 2 times per day    enoxaparin  40 mg Subcutaneous Q24H    [Held by provider] glimepiride  4 mg Oral Daily with breakfast    sodium chloride  30 mL Intravenous Daily     Continuous Infusions:   sodium chloride 100 mL/hr at 20 0147    midazolam Stopped (20)    norepinephrine Stopped (20)    propofol 35 mcg/kg/min (20 0634)    dextrose         PHYSICAL EXAMINATION:  T: 98.1. P: 52. RR: 19. B/P: 1951. FiO2: 70%. O2 Sat: 94%. I/O:  1127/75  Body mass index is 33.65 kg/m². Mode: CMV Pressure Support: 22 PEEP: 12 FiO2: 70%, Meausre Rate: 20  GCS:   7  General: Acutely ill male intubated and sedated on mechanical ventilation  HEENT:  normocephalic and atraumatic.   No scleral include my direct assessment of the patient and coordination of care.

## 2020-09-29 NOTE — PLAN OF CARE
Problem: Impaired respiratory status  Goal: Will be able to breathe spontaneously, without ventilator support  Description: Will be able to breathe spontaneously, without ventilator support  9/29/2020 0548 by Geoff Verduzco RCP  Outcome: Ongoing  Note: Vent setting optimized to achieve target tidal volume, respiratory rate and ideal oxygen saturations. Patient is currently 26/10 rate of 12 and 70%. Will continue to wean as patient tolerates. SBT will be performed when appropriate.

## 2020-09-29 NOTE — FLOWSHEET NOTE
1600- Received report from [facility]. Assessed patient and started with bathing the patient. Patient has mepilexes over bony prominences, heels, shins, top of feet, ankles, elbows, hands, elbows, clavicle, sternum and bony parts on skull. Skin prep to all ears of body, bridge of ears, nose and forehead, knuckles, toes. Nasal swabs applied to bilateral nares. [facility] [place] Updated Carolina Harper CNP about patient's condition. Notified her that the patient withdrawals to pain and coughs/ breaths over vent. Asked if I could utilize the versed ordered, she agreed. 1745- Patient transferred to rotoprone bed at this time. Rapid nurse sandeep and respiratory therapist at the bedside at this time. 1810- Patient successfully rotoproned, blood pressure stable and oxygenation requirements have maintained the same. Sacrum patch applied at this time.

## 2020-09-29 NOTE — PROGRESS NOTES
Comprehensive Nutrition Assessment    Type and Reason for Visit:  Reassess(TF management)    Nutrition Recommendations/Plan:   Patient appears to be tolerating EN of Vital 1.2 at 10 ml/hr, recommend increase rate to 20 ml/hr  Free water fllushes per MD    Nutrition Assessment:  Pt. nutritionally compromised AEB trophic EN started and patient appears to be tolerating so far. At risk for further nutrition compromise r/t admitted with +covid, obese, and underlying medical condition (hx: DM, HLD, HTN). Nutrition recommendations/interventions as per above. Malnutrition Assessment:  Malnutrition Status:  Insufficient data(+covid patient)    Context:  Acute Illness     Findings of the 6 clinical characteristics of malnutrition:  Energy Intake:  Unable to assess  Weight Loss:  Unable to assess(no weight hx in EMR)     Body Fat Loss:  Unable to assess(+covid)     Muscle Mass Loss:  Unable to assess(+covid)    Fluid Accumulation:  No significant fluid accumulation     Strength:  Not Performed    Estimated Daily Nutrient Needs:  Energy (kcal):  4136-5455 (11-14 kcals/kg for acute phase); Weight Used for Energy Requirements:  Current(101 kg, bedscale, no edema)     Protein (g):  140 (2.0 grams/kg ideal weight); Weight Used for Protein Requirements:  Ideal(70 kg)        Fluid (ml/day):  per MD; Weight Used for Fluid Requirements:  (n/a)      Nutrition Related Findings:  +covid; intubated 9/27. MAP: 78. BM 9/27. Vital 1.2 is currently at 10 ml/hr with 159 ml (66% Rx) in so far. POC: 210. Meds: dexamethasone, lasix, lantus, humalog, zosyn, vitamin D, diprivan.       Wounds:  Stage II(coccyx)       Current Nutrition Therapies:    Current Tube Feeding (TF) Orders:  · Feeding Route: Orogastric(placed 9/27/20)  · Formula: (Vital 1.2 started 9/28)  · Schedule: Continuous(start at 10 ml/hr, if tolerates after 24 hours increase to 20ml/hr)  · Water Flushes: per MD  · Current TF & Flush Orders Provides: currently at 10 ml/hr  · Goal TF & Flush Orders Provides: vital 1.2 at 10 ml/hr = 240 ml, 288 kcals, 18 grams protein, 27 grams CHO, 1 gram fiber, 195 ml water, 405 mg potassium, and 202 mg phosphorus    Additional Calorie Sources:   diprivan currently at 21.8 ml/hr which provides 576 lipid kcals    Anthropometric Measures:  · Height: 5' 8\" (172.7 cm)  · Current Body Weight: 221 lb (100.2 kg)([facility], 9/29/20, no edema)   · Admission Body Weight: 220 lb (99.8 kg)(9/24/20, no weight source)    · Usual Body Weight: (no weight hx available in EMR, patient intubated)     · Ideal Body Weight: 154 lbs;    · BMI: 33.6  · Adjusted Body Weight:  ; No Adjustment   · BMI Categories: Obese Class 1 (BMI 30.0-34.9)(33.65)       Nutrition Diagnosis:   · Inadequate oral intake related to impaired respiratory function as evidenced by NPO or clear liquid status due to medical condition, intubation    Nutrition Interventions:   Food and/or Nutrient Delivery:  Continue NPO, Continue Current Tube Feeding  Nutrition Education/Counseling:  No recommendation at this time   Coordination of Nutrition Care:  Continued Inpatient Monitoring, Interdisciplinary Rounds    Goals:  Patient will tolerate EN at 10-20ml/hr during acute phase       Nutrition Monitoring and Evaluation:   Behavioral-Environmental Outcomes:  (n/a)   Food/Nutrient Intake Outcomes:  Enteral Nutrition Intake/Tolerance  Physical Signs/Symptoms Outcomes:  Biochemical Data, GI Status, Fluid Status or Edema, Hemodynamic Status, Skin, Weight     Discharge Planning:     Too soon to determine     Electronically signed by Rosa Cheadle, RD, LD on 9/29/20 at 10:57 AM EDT    Contact: .[phone]

## 2020-09-29 NOTE — PROGRESS NOTES
Spencer Rayo 59  PHYSICAL THERAPY MISSED TREATMENT NOTE  [facility] 4B    Date: 2020  Patient Name: Aayush Bolivar        MRN: [id number]   : 1945  (67 y.o.)  Gender: male   Referring Practitioner: Esha Mccall MD  Diagnosis: COVID-19         REASON FOR MISSED TREATMENT:  Will discharge from inpatient therapy at this time due to change in medical status. Pt is currently on vent and is to be placed in prone position today. Will need  new orders for PT when pt is appropriate to participate in therapy.

## 2020-09-29 NOTE — PROGRESS NOTES
Present to pt room for skin assessment. Pt resting in bed, rolled to left side for assessment. Pt noted to have small smear bowel movement. Cleaned up and new chux pad placed. Pt has pollock catheter. Bilateral buttocks skin dry and intact. Pt had silicone bordered foam dressing that was removed. Applied EPC cream to bilateral buttocks. No open areas or lesions noted. Bilateral heels are blanching without redness. Pillows placed under right side and bilateral heels offloaded with pillow support. Pt on pavithra BENNY support surface. Bed in low, primary nurse in room. Bilateral buttocks skin dry and intact, removed sacral foam dressing, applied zinc barrier cream    Thank you for allowing us to participate in the care of your patient. TIME   Wound/ostomy individual minutes  Time In: 10:40  Time Out: 11:00  Minutes: 20  Time does not include documentation.

## 2020-09-29 NOTE — PLAN OF CARE
Problem: Nutrition  Goal: Optimal nutrition therapy  Outcome: Ongoing   Nutrition Problem #1: Inadequate oral intake  Intervention: Food and/or Nutrient Delivery: Continue NPO, Continue Current Tube Feeding  Nutritional Goals: Patient will tolerate EN at 10-20ml/hr during acute phase

## 2020-09-30 NOTE — PROGRESS NOTES
Comprehensive Nutrition Assessment    Type and Reason for Visit:  Reassess(TF management)    Nutrition Recommendations/Plan:   Continue trophic EN as GI status allows - ok to feed in prone position also. Free H20 per MD.   If tolerates EN at 10ml/hour next 24h plan to advance to 20ml/hour 10/1. Nutrition Assessment:    Pt. nutritionally compromised AEB trophic EN started but held d/t emesis. At risk for further nutrition compromise r/t admitted with +covid, rotoprone therapy initiated 9/29, obese, and underlying medical condition (hx: DM - A1c 7.5% 9/2020, HLD, HTN). Nutrition recommendations/interventions as per above. Malnutrition Assessment:  Malnutrition Status:  Insufficient data(+covid patient)    Context:  Acute Illness     Findings of the 6 clinical characteristics of malnutrition:  Energy Intake:  Unable to assess  Weight Loss:  Unable to assess(no weight hx in EMR)     Body Fat Loss:  Unable to assess(+covid)     Muscle Mass Loss:  Unable to assess(+covid)    Fluid Accumulation:  No significant fluid accumulation     Strength:  Not Performed    Estimated Daily Nutrient Needs:  Energy (kcal):  0632-4118 (11-14 kcals/kg for acute phase);  Weight Used for Energy Requirements:  Current(101 kg, bedscale, no edema)     Protein (g):  140 (2.0 grams/kg ideal weight); Weight Used for Protein Requirements:  Ideal(70 kg)        Fluid (ml/day):  per MD;     Nutrition Related Findings:  covid; intubated 9/27; on rotoprone bed - was supine this am with OGT to LIWS d/t emesis on nights when proned - TF was not infusing on nights per RN; meds include decadron, lasix, lantus, humalog, ATB, vitamin D, diprivan; +1-2 edema; glucose 168  BUN 17  creatinine 0.9  MAP 71; flexiseal 250ml output; abd. soft; received 39% of Rx TF volume past 24h; discussed resuming trophic EN with RN as status allows      Wounds:  Stage II(coccyx)       Current Nutrition Therapies:    Current Tube Feeding (TF) Orders:  · Feeding Route: Orogastric(placed 9/27/20)  · Formula: Semi-Elemental(Vital 1.2 started 9/28)  · Schedule: Continuous(10ml/hour if no N/V)  · Water Flushes: per MD  · Current TF & Flush Orders Provides: on hold d/t emesis - resume at 10ml/hour as status allows  · Goal TF & Flush Orders Provides: vital 1.2 at 10 ml/hr = 240 ml, 288 kcals (922 with diprivan lipids), 18 grams protein, 27 grams CHO, 1 gram fiber, 195 ml water, 405 mg potassium, and 202 mg phosphorus    Additional Calorie Sources:   diprivan at 24ml/hour provides 634 lipid kcals    Anthropometric Measures:  · Height: 5' 8\" (172.7 cm)  · Current Body Weight: 221 lb (100.2 kg)([facility], 9/29/20, no edema)   · Admission Body Weight: 220 lb (99.8 kg)(9/24/20, no weight source)    · Usual Body Weight: (no weight hx available in EMR, patient intubated)     · Ideal Body Weight: 154 lbs;   · BMI: 33.6  · BMI Categories: Obese Class 1 (BMI 30.0-34.9)(33.65)       Nutrition Diagnosis:   · Inadequate oral intake related to impaired respiratory function as evidenced by NPO or clear liquid status due to medical condition, intubation, nutrition support - enteral nutrition      Nutrition Interventions:   Food and/or Nutrient Delivery:  Continue NPO, Continue Current Tube Feeding(trophic EN as status allows)  Nutrition Education/Counseling:  No recommendation at this time   Coordination of Nutrition Care:  Continued Inpatient Monitoring, Interdisciplinary Rounds    Goals:  Patient will tolerate EN at 10-20ml/hr during acute phase       Nutrition Monitoring and Evaluation:   Behavioral-Environmental Outcomes:  (n/a)   Food/Nutrient Intake Outcomes:  Enteral Nutrition Intake/Tolerance  Physical Signs/Symptoms Outcomes:  Biochemical Data, GI Status, Diarrhea, Nausea or Vomiting, Fluid Status or Edema, Hemodynamic Status, Skin, Weight     Discharge Planning:     Too soon to determine     Electronically signed by Rachel Villalba RD, LD on 9/30/20 at 1:36 PM EDT    Contact: [phone])

## 2020-09-30 NOTE — CARE COORDINATION
9/30/20, 2:35 PM EDT    DISCHARGE ON GOING [address] [facility],[address] Floor day: 6  Location: -03/003-A Reason for admit: COVID-19 [U07.1]   Procedure:   9/24 CXR: Slightly increased density in both lung fields; Borderline cardiomegaly; Thoracic spondylosis  9/24 Vapotherm initiated  9/27 Intubated  9/27 CVC Right subclavian  9/27 CXR: Diffuse infiltrates are scattered throughout the lungs bilaterally  9/29 Rotoprone therapy initiated    Treatment Plan of Care: Continues on rotoprone bed w/pronation therapy. Remains on vent w/ETT on AC/PC, peep 12, FIO2 60%, sats 95%. Tmax 100.2. NSR. Unable to follow commands; LEVY x4 to painful stim. Dietitian consulted. PT/OT. Respiratory culture +Hflu and Klebsiella pneumoniae. +COVID 19 on 9/22. Free water flushes 200 ml QID. Telemetry, I&O, oral care, OG w/TF, flexiseal, pollock care. Versed @ 1 mg/hr, diprivan @ 20 mcg/kg/min, norvasc, asa, lipitor, IV decadron 6 mg daily, lovenox, pepcid, IV lasix 60 mg bid, lantus, SSI Q4H, IV zosyn, vit d, Electrolyte replacement protocols. Na+ 148, procal 0.12, alb 2.7, hgb 10.3. Barriers to Discharge: on vent & rotoprone bed  PCP: Sam Spencer MD  Readmission Risk Score: 19%  Patient Goals/Plan/Treatment Preferences:  From home w/wife. Plan pending clinical course. May need TCU vs LTACH. Will need PT/OT when appropriate.

## 2020-09-30 NOTE — PLAN OF CARE
Problem: Impaired respiratory status  Goal: Will be able to breathe spontaneously, without ventilator support  Description: Will be able to breathe spontaneously, without ventilator support  Outcome: Not Met This Shift   Patient remains on ventilator with lung protective strategies in place. Will continue to monitor patient for weaning readiness.

## 2020-09-30 NOTE — PLAN OF CARE
Problem: Nutrition  Goal: Optimal nutrition therapy  Outcome: Ongoing   Nutrition Problem #1: Inadequate oral intake  Intervention: Food and/or Nutrient Delivery: Continue NPO, Continue Current Tube Feeding(trophic EN as status allows)  Nutritional Goals: Patient will tolerate EN at 10-20ml/hr during acute phase

## 2020-09-30 NOTE — PROGRESS NOTES
CRITICAL CARE PROGRESS NOTE      Patient:  Aayush Bolivar    Unit/Bed:4B-03/003-A  YOB: 1945  MRN: [id number]   PCP: Victor M Skelton MD  Date of Admission: 9/24/2020  Chief Complaint:-COVID-19 infection    Assessment and Plan:    1. Acute hypoxic respiratory failure: Due to pneumonia. Patient intubated and sedated.  Patient has no known underlying lung diseases or processes.  proBNP within normal limits.  Hypoxic respiratory failure due to COVID-19 infection.  Patient required intubation 9/27/2020. Patient started increasing PEEP requirements increasing O2 requirements. Patient underwent recruiting strategy by elevating PEEP to 15. Prone therapy was initiated 9/29/20. We will continue to treat underlying Haemophilus influenza and Klebsiella pneumonia with Zosyn. Mode: CMV Pressure Support: 17 PEEP: 15 FiO2: 70%, [place]re Rate: 16.    2. 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  Continue with mechanical ventilator support. Continue with lung protection strategies. Continue prone therapy. 3. Pneumonia: COVID-19 infection with superimposed Haemophilus influenza and Klebsiella pneumonia. Continue Decadron 6 mg for 10 day course, day 7/10 (initiated 9/25/2020). Steroids are the only therapy proven to decrease mortality risk in severe COVID-19 infections.  Remdesivir was initiated 9/24/20, will complete 5 day course.  Continue aspirin 81 mg daily decrease risk of microthrombi which has been noted and COVID-19.  Inflammatory markers and prothrombotic markers were sent. Continue Zosyn for H. influenzae Klebsiella pneumonia.  Respiratory cultures rare gram positive bacilli. Rare gram positive cocci in pairs and chains.  Patient status post convalescent plasma administration. 4. History of hypertension: Well-controlled now. Continue amlodipine 10 mg daily with holding parameters; hold hydrochlorothiazide we will continue to monitor    5.  Type 2 diabetes: A1c 9/24/2020 7.5  Steroids are likely the cause of patient's increasing blood sugars. Continue lantus to 15 units nightly.     6. History of hyperlipidemia: Continue Lipitor 10 mg nightly  7. Hypocalcemia: Replace per protocol  8. Vitamin D deficiency: Continue home vitamin D supplementation 1000 units daily. 9. Altered mental status: Metabolic/hypoxic encephalopathy, will continue to monitor.  Noted judicious use of narcotics/benzos/antihistamines/antiemetics/ Ach post -op.  Resolved   10. SANTI: Prerenal etiology, due to volume depletion.  Resolved with IVF.  We will continue to monitor with serial BMPs daily.  Resolved  11. Elevated transaminase: In the setting of initiating danazol therapy we will continue to trend and monitor for now. [facility]     Spoke at length with patient about initiating danazol therapy to help minimize the effects of the bradykinin storm that is a direct result from COVID-19.  I expressed that there is no published literature stating that danazol therapy will improve outcome or decrease mortality however there have been case that is reporting benefit of danazol therapy.  Benefits and risks were discussed with patient. Jennifer Cisneros demonstrated clear understanding of possible risks and wants to move forward with danazol treatment.     The possible use of convalescent plasma was also discussed with the patient.  Patient states that he would also like to undergo convalescent plasma therapy at the time that we feel is necessary.  Patient states his son has COVID 19 and also underwent this therapy.  Patient expressed that he understood that there was limited data and research out regarding COVID-19 treatment methods, however patient would like to move forward with any therapy that has showed any benefit.  Risks and benefits of receiving convalescent plasma were expressed, patient demonstrated clear understanding.     Prone Therapy  Every twelve hours alternate supine and prone (with maximal reverse-Trendelenburg).   While prone every hour:  · 30 minutes prone  · 15 minutes 45 degrees to the right  · 15 minutes 45 degrees to the left  While supine every hour:   · 30 minutes supine  · 15 minutes 25 degrees to the right  · 15 minutes 25 degrees to the left        Past Medical History: Hypertension, hyperlipidemia, type 2 diabetes  Family History:  Father:  at age 67 Alzheimer's disease, prostate cancer.  Mother:   Social History: Former smoker quit in .  1 can of beer per week,  denies illicit drugs.  Patient      ROS    Unable to obtain secondary to patient being intubated sedated    Scheduled Meds:   furosemide  60 mg Intravenous BID    potassium chloride  20 mEq Intravenous BID    potassium chloride  10 mEq Intravenous BID    insulin glargine  15 Units Subcutaneous Nightly    piperacillin-tazobactam  3.375 g Intravenous Q8H    dexamethasone  6 mg Intravenous Daily    influenza virus vaccine  0.5 mL Intramuscular Once    cisatracurium Besylate  10 mg Intravenous Once    etomidate  30 mg Intravenous Once    chlorhexidine  15 mL Mouth/Throat BID    famotidine (PEPCID) injection  20 mg Intravenous BID    amLODIPine  10 mg Per NG tube Daily    aspirin  81 mg Per NG tube Daily    atorvastatin  10 mg Per NG tube Nightly    insulin lispro  0-6 Units Subcutaneous Q4H    Vitamin D  1,000 Units Per NG tube Daily    fentanNYL  100 mcg Intravenous Once    calcium replacement protocol   Other RX Placeholder    phosphorus replacement protocol   Other RX Placeholder    sodium chloride flush  10 mL Intravenous 2 times per day    enoxaparin  40 mg Subcutaneous Q24H    [Held by provider] glimepiride  4 mg Oral Daily with breakfast     Continuous Infusions:   sodium chloride 100 mL/hr at 20    midazolam 2 mg/hr (20 1800)    norepinephrine Stopped (20)    propofol 50 mcg/kg/min (20 0402)    dextrose         PHYSICAL EXAMINATION:  T: 99.3.  P: 62. RR: 11. B/P: 130/69. FiO2: 60. Case discussed with resident physician. Patient remains critically ill on mechanical ventilator. As he has severe ARDS, he is undergoing pronation therapy. He is undergoing salvage therapy with pronation. Patient on Zosyn for pneumonia. Status post convalescent plasma for COVID-19. Currently on Decadron. CC time 35 minutes. Time does not include procedures. Time does not include resident physician assessment. Time was discontiguous. Time does include my direct assessment of the patient and coordination of care. Electronically signed by Danielle Robles MD.      Electronically signed by Katie Delgado DO  CRITICAL CARE SPECIALIST

## 2020-09-30 NOTE — FLOWSHEET NOTE
9/29/20 2000 Patient nares swabbed with alcohol. 2015 Yelena Diaz Book updated on patient urine out put and to updated RASS goal for patient in rotoprone. Orders placed. [address] [facility]  called and updated on patient status. 2345 Bladder scan resulted over 999 in patient ladder. Patient pollock catheter removed d/t being able to flush and patient had decreased urine output. New catheter placed and 1750 of urine returned. 0600 Patient turned to supine position. Front side bath given. Tolerated well. [address] updated on patient status.

## 2020-09-30 NOTE — PLAN OF CARE
Problem: Falls - Risk of:  Goal: Will remain free from falls  Description: Will remain free from falls  Outcome: Ongoing  Note: Falling star program continued. Hourly rounding continued. Goal: Absence of physical injury  Description: Absence of physical injury  Outcome: Ongoing     Problem: Airway Clearance - Ineffective  Goal: Achieve or maintain patent airway  Outcome: Ongoing  Note: Ventilator support continued. Weaning support per protocol. No CPAP trial at this time. Problem: Gas Exchange - Impaired  Goal: Absence of hypoxia  Outcome: Ongoing  Note: Rotoprone bed started due to hypoxia,      Problem: Body Temperature -  Risk of, Imbalanced  Goal: Ability to maintain a body temperature within defined limits  Outcome: Ongoing  Note: Continuous temp monitor through temp probe catheter. Problem: Isolation Precautions - Risk of Spread of Infection  Goal: Prevent transmission of infection  Outcome: Ongoing  Note: Patient in droplet plus isolation d/t Covid -19     Problem: Nutrition Deficits  Goal: Optimize nutrtional status  Outcome: Ongoing  Note: Patient NPO while in Prone position. Problem: Loneliness or Risk for Loneliness  Goal: Demonstrate positive use of time alone when socialization is not possible  Outcome: Ongoing  Note: Patient wife able to see patient. Problem: Loneliness or Risk for Loneliness  Goal: Demonstrate positive use of time alone when socialization is not possible  Outcome: Ongoing  Note: Patient wife able to see patient. Problem: Nutrition Deficits  Goal: Optimize nutrtional status  Outcome: Ongoing  Note: Patient NPO while in Prone position. Problem: Skin Integrity:  Goal: Will show no infection signs and symptoms  Description: Will show no infection signs and symptoms  Note: Patient turn q2h. Pillow support. Patient on rotoprone bed. Skin prep and meplex patches to prevent skin breakdown. Care plan reviewed with patient and family.   Patient and family

## 2020-09-30 NOTE — FLOWSHEET NOTE
1815  Pt placed in prone position. FIO2 on at 100% with initial proning then back to 60% FIO2. Tolerated without drop in O2 saturation. BP stable with turning. Tube feed paused with initial proning.

## 2020-09-30 NOTE — FLOWSHEET NOTE
Bobo's nurse stated that he is on a vent and will not be able to speak to the  by phone. I offered to call his wife and encourage her. Bobo's wife, Natty Rosario answered the phone. She was glad to hear from the  and stated, it is hard to be away from her , but she appreciated the phone calls and prayers. 09/30/20 0677   Encounter Summary   Services provided to: Family   Referral/Consult From: [facility]   Support System Spouse   Continue Visiting Yes  (9/30 F)   Complexity of Encounter Moderate   Length of Encounter 15 minutes   Spiritual/P[employer]alism   Type Spiritual support   Care Plan:  Continue spiritual and emotional care for patient and family. Including prayers.

## 2020-10-01 NOTE — PLAN OF CARE
Problem: Falls - Risk of:  Goal: Absence of physical injury  Description: Absence of physical injury  Outcome: Met This Shift     Problem: Skin Integrity:  Goal: Absence of new skin breakdown  Description: Absence of new skin breakdown  Outcome: Met This Shift  Note: No new breakdown. Mepilex on bony areas as on rotoprone bed     Problem: Body Temperature -  Risk of, Imbalanced  Goal: Ability to maintain a body temperature within defined limits  Outcome: Met This Shift  Note: Temp 37.5 to 37.7 with bladder sensing temp. Problem: Falls - Risk of:  Goal: Will remain free from falls  Description: Will remain free from falls  Outcome: Ongoing  Note: On sedation and rotoprone bed. Precautions in place. Problem: Skin Integrity:  Goal: Will show no infection signs and symptoms  Description: Will show no infection signs and symptoms  Outcome: Ongoing  Note: Temp 99.9. WBC counts normal.      Problem: Airway Clearance - Ineffective  Goal: Achieve or maintain patent airway  Outcome: Ongoing  Note: Intubated and on vent. Fio2 while supine was weaned from 80% to 60%. And when proned pt did not desat. Problem: Gas Exchange - Impaired  Goal: Absence of hypoxia  Outcome: Ongoing  Note: As Above O2 sats maintained greater than 95% as weaning FIO2. Problem: Breathing Pattern - Ineffective  Goal: Ability to achieve and maintain a regular respiratory rate  Outcome: Ongoing  Note: Remains intubated. Problem: Isolation Precautions - Risk of Spread of Infection  Goal: Prevent transmission of infection  Outcome: Ongoing  Note: Droplet precautions/isolation maintained.       Problem: Nutrition Deficits  Goal: Optimize nutrtional status  Outcome: Ongoing  Note: Tube feed started of vital 1.2 at 10ml as being proned     Problem: Nutrition  Goal: Optimal nutrition therapy  9/30/2020 2010 by Alin Vigil RN  Outcome: Ongoing  9/30/2020 1333 by Enedina Greco RD, LD  Outcome: Ongoing     Problem: Loneliness or Risk for Loneliness  Goal: Demonstrate positive use of time alone when socialization is not possible  Note: Remains sedated and on vent     Problem: Fatigue  Goal: Verbalize increase energy and improved vitality  Note: Remains sedated and on vent    Care plan reviewed with wife and daughter in law Mau Stone. Family verbalize understanding of the plan of care and contribute to goal setting.

## 2020-10-01 NOTE — PROGRESS NOTES
Comprehensive Nutrition Assessment    Type and Reason for Visit:  Reassess(TF management)    Nutrition Recommendations/Plan:    Recommend goal of Vital 20ml/hour with 1 2.5ounce liquid protein bottle 4x daily to provide 992 kcals (1309 with diprivan lipids), 140 gms protein, 53 gms cho, 389ml free H20 (1189ml with MD flush)/24h to meet protein/kcal needs with lower cho load. Free H20 per MD (200ml 4x daily)  Monitoring labs, diprivan for TF adjustments as appropriate. Nutrition Assessment:  Pt. nutritionally compromised AEB trophic EN started but held d/t emesis 9/30 however tolerating better past 24h.   At risk for further nutrition compromise r/t admitted with +covid, rotoprone therapy initiated 9/29, obese, increased protein needs for healing process and underlying medical condition (hx: DM - A1c 7.5% 9/2020, HLD, HTN).  Nutrition recommendations/interventions as per above. Malnutrition Assessment:  Malnutrition Status:  Insufficient data(+covid patient)    Context:  Acute Illness     Findings of the 6 clinical characteristics of malnutrition:  Energy Intake:  Unable to assess  Weight Loss:  Unable to assess(no weight hx in EMR)     Body Fat Loss:  Unable to assess(+covid)     Muscle Mass Loss:  Unable to assess(+covid)    Fluid Accumulation:  No significant fluid accumulation     Strength:  Not Performed    Estimated Daily Nutrient Needs:  Energy (kcal):  5946-0012 (11-14 kcals/kg for acute phase);  Weight Used for Energy Requirements:  Current(101 kg, bedscale, no edema)     Protein (g):  140 (2.0 grams/kg ideal weight); Weight Used for Protein Requirements:  Ideal(70 kg)        Fluid (ml/day):  per MD; Weight Used for Fluid Requirements:  (n/a)      Nutrition Related Findings:  covid; intubated 9/27; on rotoprone bed - supine this am - plan to prone ~1800 per RN; TF infusing at 40ml/hour and tolerated well this am - no emesis noted; flexiseal output 250ml 9/30; abd. soft; +1-2 edema; received 53% of Rx TF volume past 24h; meds include diprivan, decadron, lasix, lantus, humalog, ATB, vitamin D, versed; glucose 219  BUN 20  creatinine 0.8  MAP 67; plan to speak with CNP re: TF goal adjustment when available      Wounds:  Stage II(coccyx)       Current Nutrition Therapies:    Current Tube Feeding (TF) Orders:  · Feeding Route: Orogastric(placed 9/27/20)  · Formula: Semi-Elemental(Vital 1.2 started 9/28)  · Schedule: Continuous(40ml/hour per MD order 9/30)  · Water Flushes: 200ml every 6h (per MD 9/30)  · Current TF & Flush Orders Provides: 2942 WRXHN(4184 with diprivan lipids), 72 gms protein, 106gms cho, 779ml free H20 (1579ml with MD flush)/24h  · Goal TF & Flush Orders Provides: Recommend goal of Vital 20ml/hour with 1 2.5ounce liquid protein bottle 4x daily to provide 992 kcals (1309 with diprivan lipids), 140 gms protein, 53 gms cho, 389ml free H20 (1189ml with MD flush)/24h    Additional Calorie Sources:   diprivan at 12ml/hour provides 317 lipid kcals    Anthropometric Measures:  · Height: 5' 8\" (172.7 cm)  · Current Body Weight: 221 lb (100.2 kg)([facility], 9/29/20, no edema)   · Admission Body Weight: 220 lb (99.8 kg)(9/24/20, no weight source)    · Usual Body Weight: (no weight hx available in EMR, patient intubated)     · Ideal Body Weight: 154 lbs;    · BMI: 33.6  · BMI Categories: Obese Class 1 (BMI 30.0-34.9)(33.65)       Nutrition Diagnosis:   · Inadequate oral intake related to impaired respiratory function as evidenced by NPO or clear liquid status due to medical condition, intubation, nutrition support - enteral nutrition      Nutrition Interventions:   Food and/or Nutrient Delivery:  Continue NPO, Continue Current Tube Feeding  Nutrition Education/Counseling:  No recommendation at this time   Coordination of Nutrition Care:  Continued Inpatient Monitoring, Interdisciplinary Rounds    Goals:  Patient will tolerate EN at 10-20ml/hr during acute phase       Nutrition Monitoring and Evaluation:   Behavioral-Environmental Outcomes:  (n/a)   Food/Nutrient Intake Outcomes:  Enteral Nutrition Intake/Tolerance  Physical Signs/Symptoms Outcomes:  Biochemical Data, GI Status, Diarrhea, Nausea or Vomiting, Fluid Status or Edema, Hemodynamic Status, Skin, Weight     Discharge Planning:     Too soon to determine     Electronically signed by Enedina Greco RD, LD on 10/1/20 at 11:51 AM EDT    Contact: [phone]

## 2020-10-01 NOTE — PROGRESS NOTES
36 - Pt's back side washed. Barrier cream applied to coccyx. 0600 - Pt flipped to supine. Xray completed. Front side of bath, jose luis care, and pollock care completed. Blanchable redness to pt's forehead and toes. 0630 - Pollock catheter irrigated. Updated Deirdre Dillard CNP of pollock catheter irrigation and bloody urine output. Same amount of sterile water put in returned in pollock catheter bag. No clots noted. No new orders.

## 2020-10-01 NOTE — CARE COORDINATION
10/1/20, 2:02 PM EDT    DISCHARGE ON GOING [address] [facility] day: 7  Location: -03/003-A Reason for admit: COVID-19 [U07.1]   Procedure:   9/24 CXR: Slightly increased density in both lung fields; Borderline cardiomegaly; Thoracic spondylosis  9/24 Vapotherm initiated  9/27 Intubated  9/27 CVC Right subclavian  9/27 CXR: Diffuse infiltrates are scattered throughout the lungs bilaterally  9/29 Rotoprone therapy initiated  9/30 CXR:   Mild progression in right basilar infiltrate with stable to mild    improvement in left infiltrates     Treatment Plan of Care: Continues on rotoprone bed w/pronation therapy. Remains on vent w/ETT on PCMV, peep 12, FIO2 50%, sats 93%. Tmax 100.8. NSR. Unable to follow commands; moves BLE to painful stim. Dietitian consulted. PT/OT. Respiratory culture +Hflu and Klebsiella pneumoniae. +COVID 19 on 9/22. Free water flushes 200 ml QID. Telemetry, I&O, oral care, OG w/TF, flexiseal, pollock care. Versed @ 1 mg/hr, diprivan @ 15 mcg/kg/min, norvasc, asa, lipitor, IV decadron 6 mg daily, lovenox, pepcid, IV lasix 60 mg bid, lantus, SSI Q4H, IV zosyn, vit d, Electrolyte replacement protocols. , hgb 12.2. Urine w/small leukocytes - sent for culture. Barriers to Discharge: on vent & rotoprone bed  PCP: rIis Light MD  Readmission Risk Score: 19%  Patient Goals/Plan/Treatment Preferences: From home w/wife. Plan pending clinical course. May need TCU vs LTACH. Will need PT/OT when appropriate.

## 2020-10-01 NOTE — PROGRESS NOTES
CRITICAL CARE PROGRESS NOTE      Patient:  Moni Ewing    Unit/Bed:4B-03/003-A  YOB: 1945  MRN: [id number]   PCP: Kali Kothari MD  Date of Admission: 9/24/2020  Chief Complaint:-COVID-19 infection    Assessment and Plan:    1. Acute hypoxic respiratory failure: Due to pneumonia. Patient intubated and sedated.  Patient has no known underlying lung diseases or processes.  proBNP within normal limits.  Hypoxic respiratory failure due to COVID-19 infection.  Patient required intubation 9/27/2020. Patient started increasing PEEP requirements increasing O2 requirements. Patient underwent recruiting strategy by elevating PEEP to 15. Prone therapy was initiated 9/29/20. We will continue to treat underlying Haemophilus influenza and Klebsiella pneumonia with Zosyn. Mode: CMV Pressure Support: 27 PEEP: 12 FiO2: 50%, Measure Rate: 17.    2. ARDS: Secondary to complications of [id number].  Continue with mechanical ventilator support. Continue with lung protection strategies. Continue prone therapy. Hope to end pronation therapy tomorrow. 3. Pneumonia: COVID-19 infection with superimposed Haemophilus influenza and Klebsiella pneumonia. Continue Decadron 6 mg for 10 day course, day 8/10 (initiated 9/24/2020). Steroids are the only therapy proven to decrease mortality risk in severe COVID-19 infections.  Remdesivir was initiated 9/24/20, completed 5 day course. Continue aspirin 81 mg daily decrease risk of microthrombi which has been noted and COVID-19.  Inflammatory markers and prothrombotic markers were sent. Continue Zosyn for H. influenzae Klebsiella pneumonia. Convalescent plasma 2 units initiated 10/01/2020.  4. History of hypertension: Well-controlled now. Continue amlodipine 10 mg daily with holding parameters; hold hydrochlorothiazide. We will continue to monitor    5. Type 2 diabetes: A1c 9/24/2020 7.5  Steroids are likely the cause of patient's increasing blood sugars.    Continue lantus 15 units nightly.  Accu-checks, low dose corrective algorithm, and hypoglycemic protocol in place. 6. History of hyperlipidemia: Continue Lipitor 10 mg nightly  7. Hypocalcemia: Replace per protocol  8. Vitamin D deficiency: Continue home vitamin D supplementation 1000 units daily. 9. Altered mental status: Metabolic/hypoxic encephalopathy, will continue to monitor.  Noted judicious use of narcotics/benzos/antihistamines/antiemetics/ Ach post -op.  Resolved   10. SANTI: Prerenal etiology, due to volume depletion.  Resolved with IVF.  We will continue to monitor with serial BMPs daily.  Resolved  11. Elevated transaminase: In the setting of initiating danazol therapy we will continue to trend and monitor for now. [facility]        Prone Therapy  Every twelve hours alternate supine and prone (with maximal reverse-Trendelenburg).   While prone every hour:  · 30 minutes prone  · 15 minutes 45 degrees to the right  · 15 minutes 45 degrees to the left  While supine every hour:   · 30 minutes supine  · 15 minutes 25 degrees to the right  · 15 minutes 25 degrees to the left        Past Medical History: Hypertension, hyperlipidemia, type 2 diabetes  Family History:  Father:  at age 67 Alzheimer's disease, prostate cancer.  Mother:   Social History: Former smoker quit in .  1 can of beer per week,  denies illicit drugs.  Patient      ROS    Unable to obtain secondary to patient being intubated sedated    Scheduled Meds:   furosemide  60 mg Intravenous BID    potassium chloride  20 mEq Intravenous BID    potassium chloride  10 mEq Intravenous BID    insulin glargine  15 Units Subcutaneous Nightly    piperacillin-tazobactam  3.375 g Intravenous Q8H    dexamethasone  6 mg Intravenous Daily    influenza virus vaccine  0.5 mL Intramuscular Once    cisatracurium Besylate  10 mg Intravenous Once    chlorhexidine  15 mL Mouth/Throat BID    famotidine (PEPCID) injection  20 mg Intravenous BID    amLODIPine  10 mg Per NG tube Daily    aspirin  81 mg Per NG tube Daily    atorvastatin  10 mg Per NG tube Nightly    insulin lispro  0-6 Units Subcutaneous Q4H    Vitamin D  1,000 Units Per NG tube Daily    fentanNYL  100 mcg Intravenous Once    calcium replacement protocol   Other RX Placeholder    phosphorus replacement protocol   Other RX Placeholder    sodium chloride flush  10 mL Intravenous 2 times per day    enoxaparin  40 mg Subcutaneous Q24H    [Held by provider] glimepiride  4 mg Oral Daily with breakfast     Continuous Infusions:   midazolam 1 mg/hr (10/01/20 0953)    norepinephrine Stopped (09/27/20 2000)    propofol 15 mcg/kg/min (10/01/20 1243)    dextrose         PHYSICAL EXAMINATION:  T: 100.8  P: 76. RR: 13. B/P: 84/53. FiO2: 50%. O2 Sat: 93.  I/O:  I/O last 3 completed shifts: In: 2586 [I.V.:1749; NG/GT:837]  Out: 1 [Urine:4810; Emesis/NG output:100]  I/O this shift: In: 916 [I.V.:404; NG/GT:579]  Out: 9861 [Urine:1375; Stool:200]        Body mass index is 33.65 kg/m². GCS:   6  Mode: CMV Pressure Support: 14 OCGK: 68  FiO2: 50%, [place] Rate: 17  General:   Acutely ill male sedated on mechanical ventilation and in pronation bed   HEENT:  normocephalic and atraumatic. No scleral icterus. Neck: supple. No Thyromegaly. Lungs: Diminished breath sounds bilaterally. No retractions  Cardiac: RRR. No JVD. Abdomen: soft. Nontender. Bowel sounds present   Extremities:  No clubbing, cyanosis x 4. Bilateral lower extremity edema. Vasculature: capillary refill < 3 seconds. Palpable dorsalis pedis pulses. Skin:  warm and moist.  Psych: Unable to assess secondary to patient being on mechanical relation and sedated  Lymph:  No supraclavicular adenopathy. Neurologic:  No focal deficit. No seizures. Data: (All radiographs, tracings, PFTs, and imaging are personally viewed and interpreted unless otherwise noted).     Sodium 145, potassium 3.7, chloride 105, CO2 32, BUN 20, creatinine 0.8, calcium 7.8   Procalcitonin 0.12   Albumin 2.7, alk phos 55, ALT 28, AST 24, bilirubin 0.3, total protein 5.2   WBC 9.9, hemoglobin 12.2, hematocrit 38.2, platelets 827   Telemetry shows normal sinus rhythm    Blood cultures no preliminary growth x2   Respiratory culture: No segmented neutrophils observed. Few epithelial cells observed. Rare gram positive bacilli. Rare gram positive cocci in pairs and chains. · Molecular PCR positive for Haemophilus influenza, Klebsiella pneumonia  · UA demonstrated large blood  · Blood cultures no growth x2 preliminary results   · Chest x-ray 9/24/2020 reports: Slightly increased density in both lung fields.  Borderline cardiomegaly. Thoracic spondylolysis. · Chest x-ray 10/01/2020 reports: Mild progression in right basilar infiltrate with stable to mild improvement in left infiltrate      Meets Continued ICU Level Care Criteria:    [x] Yes   [] No - Transfer Planned to listed location:  [] HOSPITALIST CONTACTED- DR     Case and plan discussed with Dr. Denzel Mccabe. Electronically signed by Willard Figueroa MD    Patient seen by me. Case discussed with resident physician. Patient is critically ill on mechanical ventilator. He requires pronation therapy to assist with oxygenation. Initiate convalescent plasma for COVID. On Decadron. Patient with bilateral pneumonia requiring Zosyn for Haemophilus influenza and Klebsiella. CC time 35 minutes. Time does not include resident physician assessment. Time does include my direct assessment and coordination of care. Electronically signed by Sarabjit Mccabe MD.

## 2020-10-01 NOTE — PLAN OF CARE
Problem: Falls - Risk of:  Goal: Will remain free from falls  Description: Will remain free from falls  10/1/2020 0151 by Collette South, RN  Outcome: Ongoing  Note: Fall precautions in place. Fall bracelet, nonskid socks, fall door magnet, bed alarm on       Problem: Falls - Risk of:  Goal: Absence of physical injury  Description: Absence of physical injury  10/1/2020 0151 by Collette South, RN  Outcome: Ongoing  Note: Absence of physical injury related to falls. Problem: Skin Integrity:  Goal: Will show no infection signs and symptoms  Description: Will show no infection signs and symptoms  10/1/2020 0151 by Collette South, RN  Outcome: Ongoing  Note: No S&S of infection      Problem: Skin Integrity:  Goal: Absence of new skin breakdown  Description: Absence of new skin breakdown  10/1/2020 0151 by Collette South, RN  Outcome: Ongoing  Note: Absence of new skin breakdown   In rotoprone bed. Mepilexes to all bony prominences      Problem: Airway Clearance - Ineffective  Goal: Achieve or maintain patent airway  10/1/2020 0151 by Collette South, RN  Outcome: Ongoing  Note: Remains intubated      Problem: Gas Exchange - Impaired  Goal: Absence of hypoxia  10/1/2020 0151 by Collette South, RN  Outcome: Ongoing  Note: FIO2 55% SPO2 95%      Problem: Breathing Pattern - Ineffective  Goal: Ability to achieve and maintain a regular respiratory rate  10/1/2020 0151 by Collette South, RN  Outcome: Ongoing  Note: Resp 16-18      Problem:  Body Temperature -  Risk of, Imbalanced  Goal: Ability to maintain a body temperature within defined limits  10/1/2020 0151 by Collette South, RN  Outcome: Ongoing  Note: 98.6 ax       Problem: Isolation Precautions - Risk of Spread of Infection  Goal: Prevent transmission of infection  10/1/2020 0151 by Collette South, RN  Outcome: Ongoing  Note: Remains in covid precautions      Problem: Nutrition Deficits  Goal: Optimize nutrtional status  10/1/2020 0151 by Josette Banks, RN  Outcome: Ongoing  Note: Vital 1.2 at 40ml/hr   With water 200ml q6   Residuals 10 ml     Will review and discuss care plan with family when available

## 2020-10-01 NOTE — PLAN OF CARE
Problem: Impaired respiratory status  Goal: Will be able to breathe spontaneously, without ventilator support  Description: Will be able to breathe spontaneously, without ventilator support  10/1/2020 0458 by Chun Fung RCP  Outcome: Ongoing  Note: Vent setting optimized to achieve target tidal volume, respiratory rate and ideal oxygen saturations. Patient is currently on 30/12 Rate of 12 and 55%. Will continue weaning as patient tolerates. Patient is currently proned and will be flipped to supine around 6am. Will continue to monitor. SBT will be performed when appropriate.

## 2020-10-02 NOTE — PLAN OF CARE
Problem: Nutrition  Goal: Optimal nutrition therapy  10/2/2020 1127 by Livia Watts RD, LD  Outcome: Ongoing   Nutrition Problem #1: Inadequate oral intake  Intervention: Food and/or Nutrient Delivery: Continue NPO, Continue Current Tube Feeding  Nutritional Goals: Patient will tolerate EN at 10-20ml/hr during acute phase

## 2020-10-02 NOTE — PROGRESS NOTES
0550 - Pt's back side washed. Barrier cream applied to coccyx.      9232 - Pt flipped to supine. OGT repositioned to 58cm. Xray completed. Front side of bath, jose luis care, and pollock care completed. Blanchable redness to pt's forehead and toes. Pt tolerated flip and supine rotation without desating.

## 2020-10-02 NOTE — PROGRESS NOTES
Comprehensive Nutrition Assessment    Type and Reason for Visit:  Reassess(TF management)    Nutrition Recommendations/Plan:   Continue EN of Vital 1.2 at 20ml/hr  Continue 1 bottle proteinex 2GO 4 times/day  Continue water flushes per MD (200 ml 4x/day)    Nutrition Assessment:  Pt improving from a nutritional standpoint AEB patient is tolerating EN at current goal.  Remains at risk for further nutritional compromise r/t admitted with +covid, rotoprone therapy initiated 9/29, obese, increased protein needs for healing process and underlying medical condition (hx: DM with A1C 7.5% on 9/2020, HLD, HTN). Nutrition recommendations/interventions as above. Malnutrition Assessment:  Malnutrition Status:  Insufficient data(+covid patient)    Context:  Acute Illness     Findings of the 6 clinical characteristics of malnutrition:  Energy Intake:  Unable to assess  Weight Loss:  Unable to assess(no weight hx in EMR)     Body Fat Loss:  Unable to assess(+covid)     Muscle Mass Loss:  Unable to assess(+covid)    Fluid Accumulation:  No significant fluid accumulation     Strength:  Not Performed    Estimated Daily Nutrient Needs:  Energy (kcal):  5795-0523 (11-14 kcals/kg for acute phase); Weight Used for Energy Requirements:  Current(101 kg, bedscale, no edema)     Protein (g):  140 (2.0 grams/kg ideal weight); Weight Used for Protein Requirements:  Ideal(70 kg)        Fluid (ml/day):  per MD; Weight Used for Fluid Requirements:  (n/a)      Nutrition Related Findings:  +covid; intubated 9/27; patient remains on pronation theray and is currently supine. Spoke to Nadia Lynch RN who reports that she just restarted EN and it is at goal of 20 ml/hr, TF was off and tube was repositioned. MAP: 71. Patient with rectal tube and 500 ml output noted. POC: 121 with lantus and humalog coverage. Meds: diprivan, lasix, ATB, vitamin D, versed.       Wounds:  Stage II(coccyx)       Current Nutrition Therapies:    Current Tube Feeding (TF) Orders:  · Feeding Route: Orogastric(placed 9/27/20)  · Formula: Semi-Elemental(Vital 1.2 started 9/28)  · Schedule: Continuous(Vital 1.2 goal of 20 ml/hr)  · Additives/Modulars: Protein(1 bottle Proteinex 2GO 4x per day)  · Water Flushes: 200ml every 6h (per MD 9/30)  · Current TF & Flush Orders Provides: Vital 1.2 at 20 ml/hr with 1 bottle o Proteinex 2GO 4 times/day with MD water flushes of 200 ml 4 times/day  · Goal TF & Flush Orders Provides: 1309 kcals (576 Vital, 416 protein modular, 317 diprivan), 140 grams protein (36 Vital, 104 protein modular), 53 grams cho, 1189 ml free H20 (389 Vital, 800 MD flush)/24h    Additional Calorie Sources:   diprivan at 12ml/hour provides 317 lipid kcals    Anthropometric Measures:  · Height: 5' 8\" (172.7 cm)  · Current Body Weight: 221 lb (100.2 kg)(9/29, bedscale, +2 LUE, +1 BLE, and non-pitting facial edema)   · Admission Body Weight: 220 lb (99.8 kg)(9/24/20, no weight source)    · Usual Body Weight: (no weight hx available in EMR, patient intubated)     · Ideal Body Weight: 154 lbs;    · BMI: 33.6  · Adjusted Body Weight:  ; No Adjustment   · BMI Categories: Obese Class 1 (BMI 30.0-34.9)(33.65)       Nutrition Diagnosis:   · Inadequate oral intake related to impaired respiratory function as evidenced by NPO or clear liquid status due to medical condition, intubation, nutrition support - enteral nutrition    Nutrition Interventions:   Food and/or Nutrient Delivery:  Continue NPO, Continue Current Tube Feeding  Nutrition Education/Counseling:  No recommendation at this time   Coordination of Nutrition Care:  Continued Inpatient Monitoring, Interdisciplinary Rounds    Goals:  Patient will tolerate EN at 10-20ml/hr during acute phase       Nutrition Monitoring and Evaluation:   Behavioral-Environmental Outcomes:  (n/a)   Food/Nutrient Intake Outcomes:  Enteral Nutrition Intake/Tolerance  Physical Signs/Symptoms Outcomes:  Biochemical Data, GI Status, Fluid Status or Edema, Hemodynamic Status, Skin, Weight     Discharge Planning:     Too soon to determine     Electronically signed by Bridger Goodrich RD, LD on 10/2/20 at 11:23 AM EDT    Contact: [phone]

## 2020-10-02 NOTE — CARE COORDINATION
10/2/20, 3:04 PM EDT    DISCHARGE ON GOING [address] [facility] day: 8  Location: -03/003-A Reason for admit: COVID-19 [U07.1]   Procedure:   9/24 CXR: Slightly increased density in both lung fields; Borderline cardiomegaly; Thoracic spondylosis  9/24 Vapotherm initiated  9/27 Intubated  9/27 CVC Right subclavian  9/27 CXR: Diffuse infiltrates are scattered throughout the lungs bilaterally  9/29 - 10/2 Rotoprone therapy   10/1 Convalescent Plasma  10/2 CXR:   Moderate progression in left with moderate improvement in right    infiltrates     Treatment Plan of Care: Received convalescent plasma yesterday. Pronation therapy stopped today. Versed drip stopped this morning. Remains on vent w/ETT on PCMV, peep 12, FIO2 50%, sats 93%. Tmax 100.4. NSR. Unable to follow commands; moves BLE to painful stim. Dietitian consulted. PT/OT. Respiratory culture +Hflu and Klebsiella pneumoniae. +COVID 19 on 9/22. Free water flushes 200 ml Q6H. Telemetry, I&O, oral care, OG w/TF, flexiseal, pollock care. Diprivan @ 20 mcg/kg/min, norvasc, asa, lipitor, IV decadron 6 mg daily, lovenox, pepcid, IV lasix 60 mg bid, lantus, SSI Q4H, IV levaquin, IV zosyn - day #9/10, vit d, Electrolyte replacement protocols. Procal 0.11, alb 2.7, wbc 11.1, hgb 12. Barriers to Discharge: on vent  PCP: Victor M Skelton MD  Readmission Risk Score: 19%  Patient Goals/Plan/Treatment Preferences: From home w/wife. Plan pending clinical course. May need TCU vs LTACH. Will need PT/OT when appropriate.     Spoke with Dr. Marylen Perish this morning; reported CVC is 10days old, recommended PICC.

## 2020-10-02 NOTE — PROGRESS NOTES
Physician Progress Note      PATIENT:               Sandra Somers  CSN #:                  [id number]  :                       1945  ADMIT DATE:       2020 10:05 AM  DISCH DATE:  RESPONDING  PROVIDER #:        Ata GARDUNO DO          QUERY TEXT:    Attending,    Pt admitted with COVID-19 and pneumonia. On admission, pt noted to have T   101.7, P 94, R 33, procal 0.42, CRP 16.82, , acute resp failure/ARDS,   lactic acidosis, metabolic encephalopathy, and SANTI. Sepsis was last   documented on 20. If possible, please respond below and document in the   progress notes and discharge summary if the sepsis was: The medical record reflects the following:  Risk Factors: COVID-19 PNA, Haemophilus influenza PNA, Klebsiella pneumoniae   PNA, DM 2, advanced age  Clinical Indicators: On admission: T 101.7, P 94, R 33, procal 0.42, CRP   16.82, ; acute resp failure/ARDS, lactic acidosis, metabolic   encephalopathy, SANTI  Treatment: Remdesivir, Danazol, IV Zosyn, convalescent plasma, IVF, Tylenol,   ASA, Decadron, mechanical ventilation, labs, imaging, pronation therapy,   isolation    Thank you! Makayla Casey, RN, BSN, RHIT, CCDS, [facility]  RN Clinical   [phone]  Options provided:  -- Sepsis confirmed after study  -- Sepsis treated and resolved  -- Sepsis ruled out after study  -- Other - I will add my own diagnosis  -- Disagree - Not applicable / Not valid  -- Disagree - Clinically unable to determine / Unknown  -- Refer to Clinical Documentation Reviewer    PROVIDER RESPONSE TEXT:    Sepsis confirmed after study.     Query created by: Brandi Rosado on 10/1/2020 1:53 PM      Electronically signed by:  Elaine Mora DO 10/2/2020 9:03 AM

## 2020-10-02 NOTE — PROGRESS NOTES
Patient was found to have bilateral pneumonia with Klebsiella pneumoniae and Haemophilus influenza which was treated with Zosyn. ROS: Sedated on mechanical ventilator. PMH:  Per HPI  SHX: Reformed smoker since 2000. FHX: No known TB exposure. Allergies: NKDA  Medications:     midazolam 1 mg/hr (10/01/20 1945)    norepinephrine Stopped (09/27/20 2000)    propofol 20 mcg/kg/min (10/02/20 0910)    dextrose        sodium chloride  20 mL Intravenous Once    levofloxacin  500 mg Intravenous Q24H    insulin lispro  0-18 Units Subcutaneous Q4H    furosemide  60 mg Intravenous BID    potassium chloride  20 mEq Intravenous BID    potassium chloride  10 mEq Intravenous BID    insulin glargine  15 Units Subcutaneous Nightly    piperacillin-tazobactam  3.375 g Intravenous Q8H    dexamethasone  6 mg Intravenous Daily    chlorhexidine  15 mL Mouth/Throat BID    famotidine (PEPCID) injection  20 mg Intravenous BID    amLODIPine  10 mg Per NG tube Daily    aspirin  81 mg Per NG tube Daily    atorvastatin  10 mg Per NG tube Nightly    Vitamin D  1,000 Units Per NG tube Daily    calcium replacement protocol   Other RX Placeholder    phosphorus replacement protocol   Other RX Placeholder    sodium chloride flush  10 mL Intravenous 2 times per day    enoxaparin  40 mg Subcutaneous Q24H       Vital Signs:   T: 99.9: P: 71 RR: 14 B/P: 101/56: FiO2: 55: O2 Sat:95: I/O: 2378/4025 GCS: 6  Body mass index is 33.65 kg/m². Graylon Nolan General:   Overweight acutely ill-appearing white male. HEENT:  normocephalic and atraumatic. No scleral icterus. PERR  Neck: supple. No Thyromegaly. Lungs: Bilateral crackles. No retractions  Cardiac: RRR. No JVD. Abdomen: soft. Nontender. Extremities:  No clubbing, cyanosis x 4.  1+ upper and lower extremity edema bilaterally. Vasculature: capillary refill < 3 seconds. Palpable dorsalis pedis pulses. Skin:  warm and moist.  Psych: Sedated on mechanical ventilator.   Lymph:  No supraclavicular adenopathy. Neurologic:   No seizures. Data: (All radiographs, tracings, PFTs, and imaging are personally viewed and interpreted unless otherwise noted).  Telemetry shows normal sinus rhythm.  X-ray shows migratory infiltrates bilaterally. They wax and wane.  Sputum PCR positive for Klebsiella pneumoniae, and Haemophilus influenza.  Sodium 144, potassium 3.7, chloride 102, bicarb 34, BUN 22, creatinine 0.7, glucose 116. White blood cell count 11.1, hemoglobin 12, platelets 018. CC time 35 minutes. Electronically signed by Sarabjit Mccabe M.D.

## 2020-10-02 NOTE — PLAN OF CARE
Problem: Falls - Risk of:  Goal: Will remain free from falls  Description: Will remain free from falls  Outcome: Ongoing  Note: No falls this shift. Rotoprone bed. Problem: Falls - Risk of:  Goal: Absence of physical injury  Description: Absence of physical injury  Outcome: Ongoing  Note: No physical injury     Problem: Skin Integrity:  Goal: Will show no infection signs and symptoms  Description: Will show no infection signs and symptoms  Outcome: Ongoing  Note: Mepilex to all bony prominences. Rotoprone bed. Problem: Skin Integrity:  Goal: Absence of new skin breakdown  Description: Absence of new skin breakdown  Outcome: Ongoing  Note: No new skin breakdown noted. Problem: Airway Clearance - Ineffective  Goal: Achieve or maintain patent airway  Outcome: Ongoing  Note: ETT in place. Problem: Gas Exchange - Impaired  Goal: Absence of hypoxia  Outcome: Ongoing  Note: No hypoxia at this time. Problem: Breathing Pattern - Ineffective  Goal: Ability to achieve and maintain a regular respiratory rate  Outcome: Ongoing  Note: RR 20 at this time     Problem: Body Temperature -  Risk of, Imbalanced  Goal: Ability to maintain a body temperature within defined limits  Outcome: Ongoing  Note: Afebrile at this time     Problem: Isolation Precautions - Risk of Spread of Infection  Goal: Prevent transmission of infection  Outcome: Ongoing  Note: COVID plus precautions. Problem: Nutrition Deficits  Goal: Optimize nutrtional status  Outcome: Ongoing  Note: TF at goal. Protein shots given. Problem: Impaired respiratory status  Goal: Will be able to breathe spontaneously, without ventilator support  Description: Will be able to breathe spontaneously, without ventilator support  10/2/2020 0602 by Siddharth Brasher RCP  Outcome: Ongoing  Note: Vent setting optimized to achieve target tidal volume, respiratory rate and ideal oxygen saturations. Patient ventilator settings are 26/12, RR 12, 55%.  SBT will be performed when appropriate. Problem: Nutrition  Goal: Optimal nutrition therapy  Outcome: Ongoing  Note: TF at goal   Patient unable to participate in care planning. No family at bedside. Will update family on phone today.

## 2020-10-02 NOTE — PROGRESS NOTES
1945 - OGT found at 20cm. Tube feed turn off. Vero Lawrence CNP notified. Order for xray for placement. 2050 - Pt turned supine with RT and another nurse. 2055 - OGT advanced to 60cm. 2120 - Pt turned to prone position in reverse trendelenburg. Pt tolerated turn well.

## 2020-10-02 NOTE — PLAN OF CARE
Problem: Impaired respiratory status  Goal: Will be able to breathe spontaneously, without ventilator support  Description: Will be able to breathe spontaneously, without ventilator support  Outcome: Ongoing  Note: Vent setting optimized to achieve target tidal volume, respiratory rate and ideal oxygen saturations. Patient ventilator settings are 26/12, RR 12, 55%. SBT will be performed when appropriate.

## 2020-10-02 NOTE — PLAN OF CARE
Problem: Falls - Risk of:  Goal: Will remain free from falls  Description: Will remain free from falls  Outcome: Ongoing  Note: Fall precautions in place. Fall bracelet, nonskid socks, fall door magnet, bed alarm on     Problem: Falls - Risk of:  Goal: Absence of physical injury  Description: Absence of physical injury  Outcome: Ongoing  Note: Absence of physical injury related to falls.      Problem: Skin Integrity:  Goal: Will show no infection signs and symptoms  Description: Will show no infection signs and symptoms  Outcome: Ongoing  Note: No S&S of infection      Problem: Skin Integrity:  Goal: Absence of new skin breakdown  Description: Absence of new skin breakdown  Outcome: Ongoing  Note: Absence of new skin breakdown   In rotoprone bed. Mepilexes to all bony prominences      Problem: Airway Clearance - Ineffective  Goal: Achieve or maintain patent airway  Outcome: Ongoing  Note: Remains intubated      Problem: Gas Exchange - Impaired  Goal: Absence of hypoxia  Outcome: Ongoing  Note: FIO2 60% SPO2 94%      Problem: Breathing Pattern - Ineffective  Goal: Ability to achieve and maintain a regular respiratory rate  Outcome: Ongoing  Note: Resp 16-18      Problem:  Body Temperature -  Risk of, Imbalanced  Goal: Ability to maintain a body temperature within defined limits  Outcome: Ongoing  Note: 100.5 rectal      Problem: Isolation Precautions - Risk of Spread of Infection  Goal: Prevent transmission of infection  Outcome: Ongoing  Note: Remains in covid precautions      Problem: Nutrition Deficits  Goal: Optimize nutrtional status  Outcome: Ongoing  Note: OGT to IL[facility] per Soni Gustafson CNP       Will review and discuss care plan with family when available

## 2020-10-02 NOTE — PLAN OF CARE
Problem: Falls - Risk of:  Goal: Absence of physical injury  Description: Absence of physical injury  Outcome: Met This Shift     Problem: Skin Integrity:  Goal: Absence of new skin breakdown  Description: Absence of new skin breakdown  Outcome: Met This Shift  Note: No new breakdown. Mepilex on bony areas as on rotoprone bed     Problem: Body Temperature -  Risk of, Imbalanced  Goal: Ability to maintain a body temperature within defined limits  Outcome: Met This Shift  Note: Temp 38 to 38.2 with bladder sensing temp. Problem: Falls - Risk of:  Goal: Will remain free from falls  Description: Will remain free from falls  Outcome: Ongoing  Note: On sedation and rotoprone bed. Precautions in place. Problem: Skin Integrity:  Goal: Will show no infection signs and symptoms  Description: Will show no infection signs and symptoms  Outcome: Ongoing  Note: Temp 100.9. WBC counts normal.      Problem: Airway Clearance - Ineffective  Goal: Achieve or maintain patent airway  Outcome: Ongoing  Note: Intubated and on vent. Fio2 while supine was weaned to 50%. And when proned pt did not desat. Problem: Gas Exchange - Impaired  Goal: Absence of hypoxia  Outcome: Ongoing  Note: As Above O2 sats maintained greater than 95% as weaning FIO2. Problem: Breathing Pattern - Ineffective  Goal: Ability to achieve and maintain a regular respiratory rate  Outcome: Ongoing  Note: Remains intubated. Problem: Isolation Precautions - Risk of Spread of Infection  Goal: Prevent transmission of infection  Outcome: Ongoing  Note: Droplet precautions/isolation maintained.       Problem: Nutrition Deficits  Goal: Optimize nutrtional status  Outcome: Ongoing  Note: Tube feed started of vital 1.2 with protein shots x4 per day     Problem: Nutrition  Goal: Optimal nutrition therapy  9/30/2020 2010 by Pallavi Sahu RN  Outcome: Ongoing  9/30/2020 1333 by Monique Guillaume RD, LD  Outcome: Ongoing     Problem: Loneliness or Risk

## 2020-10-03 NOTE — PLAN OF CARE
Problem: Body Temperature -  Risk of, Imbalanced  Goal: Ability to maintain a body temperature within defined limits  Outcome: Met This Shift     Problem: Falls - Risk of:  Goal: Will remain free from falls  Description: Will remain free from falls  Outcome: Ongoing  Note: No falls this shift. Hourly rounding in effect. Bed alarm on. Problem: Falls - Risk of:  Goal: Absence of physical injury  Description: Absence of physical injury  Outcome: Ongoing  Note: No injury this shift     Problem: Skin Integrity:  Goal: Will show no infection signs and symptoms  Description: Will show no infection signs and symptoms  Outcome: Ongoing  Note: No s/s of infection r/t skin     Problem: Skin Integrity:  Goal: Absence of new skin breakdown  Description: Absence of new skin breakdown  Outcome: Ongoing  Note: No new skin breakdown. Pt repositioned every two hours. Problem: Airway Clearance - Ineffective  Goal: Achieve or maintain patent airway  Outcome: Ongoing  Note: Pt vent     Problem: Gas Exchange - Impaired  Goal: Absence of hypoxia  Outcome: Ongoing  Note: Pt tolerating vent well     Problem: Breathing Pattern - Ineffective  Goal: Ability to achieve and maintain a regular respiratory rate  Outcome: Ongoing  Note: Regular respiratory rate on vent     Problem: Isolation Precautions - Risk of Spread of Infection  Goal: Prevent transmission of infection  Outcome: Ongoing  Note: Pt remains in COVID precautions     Problem: Nutrition Deficits  Goal: Optimize nutrtional status  Outcome: Ongoing  Note: Pt tolerating TF well    Care plan reviewed with patient and family. Family verbalizes understanding of the plan of care and contributes to goal setting.

## 2020-10-03 NOTE — PLAN OF CARE
Problem: Falls - Risk of:  Goal: Will remain free from falls  Description: Will remain free from falls  10/3/2020 0945 by Graciela Christianson RN  Outcome: Ongoing  Note: No falls this shift. Bed in lowest position and locked. Unable to use call light at this time. Bed alarm activated. Problem: Falls - Risk of:  Goal: Absence of physical injury  Description: Absence of physical injury  10/3/2020 0945 by Graciela Christianson RN  Outcome: Ongoing  Note: No new physical injury occurred. Problem: Skin Integrity:  Goal: Will show no infection signs and symptoms  Description: Will show no infection signs and symptoms  10/3/2020 0945 by Graciela Christianson RN  Outcome: Ongoing  Note: No new signs of skin breakdown noted. Turn q 2 hours and PRN. Problem: Skin Integrity:  Goal: Absence of new skin breakdown  Description: Absence of new skin breakdown  10/3/2020 0945 by Graciela Christianson RN  Outcome: Ongoing  Note: No new skin breakdown. Problem: Airway Clearance - Ineffective  Goal: Achieve or maintain patent airway  10/3/2020 0945 by Graciela Christianson RN  Outcome: Ongoing  Note: ETT in place. Spontaneous breathing trial in progress. Problem: Gas Exchange - Impaired  Goal: Absence of hypoxia  10/3/2020 0945 by Graciela Christianson RN  Outcome: Ongoing  Note: Monitoring. Problem: Breathing Pattern - Ineffective  Goal: Ability to achieve and maintain a regular respiratory rate  10/3/2020 0945 by Graciela Christianson RN  Outcome: Ongoing  Note: Tachypnea at times. Problem: Body Temperature -  Risk of, Imbalanced  Goal: Ability to maintain a body temperature within defined limits  10/3/2020 0945 by Graciela Christianson RN  Outcome: Ongoing  Note: Afebrile at this time. Problem: Isolation Precautions - Risk of Spread of Infection  Goal: Prevent transmission of infection  10/3/2020 0945 by Graciela Christianson RN  Outcome: Ongoing  Note: COVID plus precautions.       Problem: Nutrition Deficits  Goal: Optimize nutrtional status  10/3/2020 0945 by Deon Stallings, ROCKY  Outcome: Ongoing  Note: Tube feeding at goal. Tolerating well. Protein shots per orders. Care plan reviewed with patient. Patient unable to verbalize understanding of the plan of care and contribute to goal setting, on vent. Will update family with phone call today.

## 2020-10-03 NOTE — PLAN OF CARE
Problem: Impaired respiratory status  Goal: Will be able to breathe spontaneously, without ventilator support  Description: Will be able to breathe spontaneously, without ventilator support  Outcome: Ongoing   Will attempt weaning when pt is ready. Per RN pt not quite awake at this time, lowering sedation. Possible weaning attempt today.

## 2020-10-03 NOTE — PROGRESS NOTES
Intensive Care Unit  Intensivist Progress Note    Patient: Carolee Baker  : 1945  MRN#: [id number]  10/3/2020 11:41 AM  ADMISSION DAY:  2020 10:05 AM     Subjective:  Patient is intubated, on mechanical ventilation. He was proned until yesterday. He is doing Fine. He Is Currently on Propofol for Sedation. He was on Versed drip until 10/2. HPI: Patient is a 58-year-old morbidly obese white male reformed smoker. He has a history of diabetes, hyperlipidemia, and hypertension. Patient was admitted from the emergency room on 2020 with hypoxemic respiratory failure secondary to COVID. Patient had associated acute renal injury. He required 100% nonrebreather and was started on Decadron and Remdesivir. He continued to deteriorate and required intubation on 2020. Acute lung injury progressed to ARDS and started pronation therapy on 2020. Patient was found to have bilateral pneumonia with Klebsiella pneumoniae and Haemophilus influenza which was treated with Zosyn    Patient Vitals for the past 8 hrs:   BP Temp Temp src Pulse Resp SpO2 Weight   10/03/20 1103 (!) 146/68 -- -- 88 18 93 % --   10/03/20 1003 123/69 -- -- 88 15 93 % --   10/03/20 0930 -- -- -- 92 23 92 % --   10/03/20 0903 (!) 127/59 -- -- 88 24 92 % --   10/03/20 0853 -- -- -- 88 22 92 % --   10/03/20 0803 (!) 108/53 99 °F (37.2 °C) Bladder 67 13 95 % --   10/03/20 0700 120/76 -- -- 65 12 91 % --   10/03/20 0611 -- -- -- 64 (!) 0 93 % --   10/03/20 0600 (!) 150/67 -- -- 63 12 94 % --   10/03/20 0500 132/60 -- -- 59 18 95 % --   10/03/20 0450 -- -- -- 63 16 94 % --   10/03/20 0400 108/63 98.8 °F (37.1 °C) Bladder 61 12 94 % 220 lb 14.4 oz (100.2 kg)       EXAM:  General:    Sedated, intubated, acutely ill-appearing white male. HEENT:  normocephalic and atraumatic. No scleral icterus. PERR  Neck: supple. No Thyromegaly. Lungs: Bilateral crackles. No retractions  Cardiac: RRR. No JVD. Abdomen: soft. Nontender. Extremities:  No clubbing, cyanosis x 4.  1+ upper and lower extremity edema bilaterally. Vasculature: capillary refill < 3 seconds. Palpable dorsalis pedis pulses. Skin:  warm and moist.  Psych: Sedated on mechanical ventilator. Lymph:  No supraclavicular adenopathy. Neurologic:   No seizures      Intake/Output Summary (Last 24 hours) at 10/3/2020 1141  Last data filed at 10/3/2020 0521  Gross per 24 hour   Intake 2548.8 ml   Output 2300 ml   Net 248.8 ml     I/O last 3 completed shifts: In: 2548.8 [I.V.:1359.8; Blood:300; NG/GT:889]  Out: 2300 [Urine:2100; Stool:200]   Date 10/03/20 0000 - 10/03/20 2359   Shift 2944-8018 4063-1336 0858-8527 24 Hour Total   INTAKE   P.O.(mL/kg/hr) 0(0)   0   I. V.(mL/kg) 344. 1(3.4)   344. 1(3.4)   NG/GT(mL/kg) 540(5.4)   540(5.4)   Shift Total(mL/kg) 884. 1(8.8)   884. 1(8.8)   OUTPUT   Urine(mL/kg/hr) 350(0.4)   350   Stool(mL/kg) 200(2)   200(2)   Shift Total(mL/kg) 550(5.5)   550(5.5)   Weight (kg) 100.2 100.2 100.2 100.2     Wt Readings from Last 3 Encounters:   10/03/20 220 lb 14.4 oz (100.2 kg)      Body mass index is 33.59 kg/m².          Scheduled Meds:   sodium chloride  20 mL Intravenous Once    levofloxacin  500 mg Intravenous Q24H    insulin lispro  0-18 Units Subcutaneous Q4H    furosemide  60 mg Intravenous BID    potassium chloride  20 mEq Intravenous BID    potassium chloride  10 mEq Intravenous BID    insulin glargine  15 Units Subcutaneous Nightly    piperacillin-tazobactam  3.375 g Intravenous Q8H    chlorhexidine  15 mL Mouth/Throat BID    famotidine (PEPCID) injection  20 mg Intravenous BID    amLODIPine  10 mg Per NG tube Daily    aspirin  81 mg Per NG tube Daily    atorvastatin  10 mg Per NG tube Nightly    Vitamin D  1,000 Units Per NG tube Daily    calcium replacement protocol   Other RX Placeholder    phosphorus replacement protocol   Other RX Placeholder    sodium chloride flush  10 mL Intravenous 2 times per day    enoxaparin  40 mg Subcutaneous Q24H     Continuous Infusions:   midazolam Stopped (10/02/20 0910)    propofol 10 mcg/kg/min (10/03/20 0952)    dextrose       PRN Meds:potassium chloride, 20 mEq, PRN  fentanNYL, 25 mcg, Q1H PRN  potassium chloride, 20 mEq, PRN  magnesium sulfate, 2 g, PRN  sodium chloride flush, 10 mL, PRN  acetaminophen, 650 mg, Q6H PRN    Or  acetaminophen, 650 mg, Q6H PRN  polyethylene glycol, 17 g, Daily PRN  promethazine, 12.5 mg, Q6H PRN    Or  ondansetron, 4 mg, Q6H PRN  glucose, 15 g, PRN  dextrose, 12.5 g, PRN  glucagon (rDNA), 1 mg, PRN  dextrose, 100 mL/hr, PRN        PARENTERAL VASOACTIVE / INOTROPIC AGENTS:    SEDATION/ANALGESIA:      ICU PROPHYLAXIS/THERAPY:   Stress ulcer: [] PPI Agent  [] H2RA  [] Sucralfate [] Other:   VTE: [] Enoxaparin     [] Warfarin  [] NOAC     [] PCD Device:Bilat LE           [] Heparin: [] Subcut / [] IV    NUTRITION SUPPORT:    SUPPORT DEVICES:    Oxygen Delivery - O2 Flow Rate (L/min): 60 L/min  VENT SETTINGS (Comprehensive)  Vent Information  $Ventilation: $Subsequent Day  Skin Assessment: Clean, dry, & intact  Suction Catheter Diameter: 14  Equipment ID: [id number]  Equipment Changed: Airway securing device  Vent Type: C2G5 Munson  Vent Mode: (spont)  Vt Ordered: 0 mL  Pressure Ordered: 24(p control set at 16)  Rate Set: 12 bmp  Peak Flow: 40 L/min  Pressure Support: 14 cmH20  FiO2 : 45 %(Increased; SPO2 89% prior to increase)  SpO2: 93 %  SpO2/FiO2 ratio: 204.44  Sensitivity: 3  Flow by: 18  PEEP/CPAP: 8  I Time/ I Time %: 1 s  Humidification Source: Heated wire  Humidification Temp: 37  Humidification Temp Measured: 36.85  Circuit Condensation: Drained  Nitric Oxide/Epoprostenol In Use?: No  Mask Type: Full face mask  Mask Size: Small  Additional Respiratory  Assessments  Pulse: 88  Resp: 18  SpO2: 93 %  $End Tidal CO2: 34  pCO2 (TCOM, mmHg): 35 mmHg  Position: Semi-Webb's  Humidification Source: Heated wire  Humidification Temp: 37  Circuit Condensation: Drained  Oral Care: Suction toothette, Mouth suctioned, Mouth swabbed  Subglottic Suction Done?: Yes      DATA:    CBC:   Recent Labs     10/01/20  0515 10/02/20  0422 10/03/20  0438   WBC 9.9 11.1* 12.6*   RBC 3.95* 3.86* 3.77*   HGB 12.2* 12.0* 11.7*   HCT 38.2* 37.5* 36.8*   MCV 96.7* 97.2* 97.6*   MCH 30.9 31.1 31.0   MCHC 31.9* 32.0* 31.8*    334 348   MPV 9.3* 9.6 9.5      BMP/CMP:   Recent Labs     10/01/20  0515 10/02/20  0422 10/03/20  0438    144 143   K 3.7 3.7 3.8    102 98   CO2 32 34* 35*   ANIONGAP 8.0 8.0 10.0   BUN 20 22 26*   CREATININE 0.8 0.7 0.7   GLUCOSE 237* 160* 153*   CALCIUM 7.8* 8.2* 8.8   PROT  --  6.3 6.2   LABALBU  --  2.7* 2.9*   BILITOT  --  0.4 0.4   ALKPHOS  --  53 60   AST  --  38 77*   ALT  --  46 93*      Other Electrolytes:   No results for input(s): CAION, PHOS, MG in the last 72 hours. Serum Osmolality  No results for input(s): OSMOMEASER in the last 72 hours. Procalcitonin:  Recent Labs     10/02/20  0422 10/03/20  0438   PROCAL 0.11* 0.40*     Cardiac: No results for input(s): TROPONINT in the last 72 hours. Lipids: No results for input(s): CHOL, HDL in the last 72 hours. Invalid input(s): LDLCALCU  Coagulation:   Recent Labs     10/01/20  0515   INR 1.13     Lactic Acid: No results for input(s): LACTA in the last 72 hours. ABGs:   Lab Results   Component Value Date    PH 7.44 09/27/2020    PCO2 37 09/27/2020    PO2 78 09/27/2020    HCO3 25 09/27/2020    O2SAT 96 09/27/2020     Lab Results   Component Value Date    IFIO2 90 09/27/2020    SETPEEP 14.0 09/27/2020       Radiology/Imaging:    Patient Active Problem List   Diagnosis    COVID-19       Assessment and Plan:    1. Acute respiratory failure with hypoxia: Patient intubated 9/27/2020. Status post pronation therapy  9/29/2020 til  10/2/2020. SBT. Mental status precludes extubation. Continue with lung protection strategies. Keep off sedation, will use just Precedex if needed.   Hoping to extubate if his mental status improves. 2. COVID-19 pneumonia/infection: Patient on Decadron. Patient status post Remdesivir. Convalescent plasma was administered 10/1/2020.  3. Klebsiella and Haemophilus influenza bilateral multifocal pneumonia: Secondary to Klebsiella pneumoniae and Haemophilus influenza. On systemic steroids for severe pneumonia. Today will complete the course of 10 days of Zosyn. .  4. Sepsis: Secondary to pneumonia and COVID-19. Supportive care. 5. Acute metabolic encephalopathy: Secondary to hypoxemic respiratory failure and COVID infection. 6. 2 diabetes mellitus: On subcutaneous insulin and sliding scale insulin. 7. Hyperlipidemia: Lipitor. 8. Hypertension: On amlodipine.     Nneka Long MD

## 2020-10-03 NOTE — PLAN OF CARE
Problem: Restraint Use - Nonviolent/Non-Self-Destructive Behavior:  Goal: Absence of restraint-related injury  Description: Absence of restraint-related injury  Note: No restraint-related injury. Problem: Restraint Use - Nonviolent/Non-Self-Destructive Behavior:  Goal: Absence of restraint indications  Description: Absence of restraint indications  Outcome: Ongoing  Note: Attempting to pull ett. Restrained for safety. Educated without evidence of learning.

## 2020-10-03 NOTE — PLAN OF CARE
Patient continues on ventilator; tolerating well. Pt on basic setting; pt was on cpap for 4. 5HRs today; tolerating well.  Will continue to monitor patients respiratory status

## 2020-10-04 NOTE — PLAN OF CARE
Patient continues on bipap; agitated at this time; will continue to monitor patients respiratory status

## 2020-10-04 NOTE — PLAN OF CARE
Intensivist Progress Note      Patient:  Angie Ours    Unit/Bed:-03/003-A  YOB: 1945  MRN: [id number]   Acct: [de-identified]     PCP: Leandro Cavanaugh MD  Date of Admission: 9/24/2020    Paient awake & following commands on Precedex IV on mechanical ventilation. Meeting weaning criteria on SBT-  extubate to BiPAP.     Opal Albright, APRN - CNP

## 2020-10-04 NOTE — PROGRESS NOTES
Intensive Care Unit  Intensivist Progress Note    Patient: Carolee Baker  : 1945  MRN#: [id number]  10/4/2020 12:04 PM  ADMISSION DAY:  2020 10:05 AM     Subjective:  Patient mental status improved and he was extubated today. He is doing okay on BiPAP, no fever or chills. HPI: Patient is a 76-year-old morbidly obese white male reformed smoker. He has a history of diabetes, hyperlipidemia, and hypertension. Patient was admitted from the emergency room on 2020 with hypoxemic respiratory failure secondary to COVID. Patient had associated acute renal injury. He required 100% nonrebreather and was started on Decadron and Remdesivir. He continued to deteriorate and required intubation on 2020. Acute lung injury progressed to ARDS and started pronation therapy on 2020. Patient was found to have bilateral pneumonia with Klebsiella pneumoniae and Haemophilus influenza which was treated with Zosyn    Patient Vitals for the past 8 hrs:   BP Temp Temp src Pulse Resp SpO2   10/04/20 1000 116/63 -- -- 81 21 97 %   10/04/20 0924 -- -- -- -- -- 96 %   10/04/20 0909 -- -- -- -- 22 --   10/04/20 0900 (!) 105/59 -- -- 76 17 91 %   10/04/20 0800 (!) 107/54 99.7 °F (37.6 °C) Bladder 76 22 93 %   10/04/20 0700 (!) 100/59 -- -- 79 20 --   10/04/20 0505 -- -- -- -- -- 94 %   10/04/20 0502 116/62 -- -- 83 22 --   10/04/20 0444 -- -- -- -- -- 97 %   10/04/20 0443 -- -- -- -- -- 97 %       EXAM:  General:    Not in distress, acutely ill-appearing white male. HEENT:  normocephalic and atraumatic. No scleral icterus. PERR  Neck: supple. No Thyromegaly. Lungs: Bilateral crackles. No retractions  Cardiac: RRR. No JVD. Abdomen: soft. Nontender. Extremities:  No clubbing, cyanosis x 4.  1+ upper and lower extremity edema bilaterally. Vasculature: capillary refill < 3 seconds. Palpable dorsalis pedis pulses. Skin:  warm and moist.  Psych:  Awake, confused.   Lymph:  No supraclavicular adenopathy. Neurologic:   No seizures      Intake/Output Summary (Last 24 hours) at 10/4/2020 1204  Last data filed at 10/4/2020 1041  Gross per 24 hour   Intake 979 ml   Output 5275 ml   Net -4296 ml     I/O last 3 completed shifts: In: 979 [P.O.:10; I.V.:969]  Out: 1173 [Urine:3675; Stool:500]   Date 10/04/20 0000 - 10/04/20 2359   Shift 6734-0967 2788-0532 8920-9407 24 Hour Total   INTAKE   P.O.(mL/kg/hr) 10(0)   10   I. V.(mL/kg) 305(3)   305(3)   Shift Total(mL/kg) 315(3.1)   315(3.1)   OUTPUT   Urine(mL/kg/hr) 475(0.6) 1100  1575   Shift Total(mL/kg) 475(4.7) 1100(11)  1575(15.7)   Weight (kg) 100.2 100.2 100.2 100.2     Wt Readings from Last 3 Encounters:   10/03/20 220 lb 14.4 oz (100.2 kg)      Body mass index is 33.59 kg/m².          Scheduled Meds:   insulin lispro  0-12 Units Subcutaneous Q4H    sodium chloride  20 mL Intravenous Once    levofloxacin  500 mg Intravenous Q24H    furosemide  60 mg Intravenous BID    potassium chloride  20 mEq Intravenous BID    potassium chloride  10 mEq Intravenous BID    insulin glargine  15 Units Subcutaneous Nightly    piperacillin-tazobactam  3.375 g Intravenous Q8H    amLODIPine  10 mg Per NG tube Daily    aspirin  81 mg Per NG tube Daily    atorvastatin  10 mg Per NG tube Nightly    Vitamin D  1,000 Units Per NG tube Daily    calcium replacement protocol   Other RX Placeholder    phosphorus replacement protocol   Other RX Placeholder    sodium chloride flush  10 mL Intravenous 2 times per day    enoxaparin  40 mg Subcutaneous Q24H     Continuous Infusions:   dexmedetomidine 0.4 mcg/kg/hr (10/04/20 1055)    dextrose       PRN Meds:potassium chloride, 20 mEq, PRN  potassium chloride, 20 mEq, PRN  magnesium sulfate, 2 g, PRN  sodium chloride flush, 10 mL, PRN  acetaminophen, 650 mg, Q6H PRN    Or  acetaminophen, 650 mg, Q6H PRN  polyethylene glycol, 17 g, Daily PRN  promethazine, 12.5 mg, Q6H PRN    Or  ondansetron, 4 mg, Q6H PRN  glucose, 15 g, PRN  dextrose, 12.5 g, PRN  glucagon (rDNA), 1 mg, PRN  dextrose, 100 mL/hr, PRN        NUTRITION SUPPORT:    SUPPORT DEVICES:    Oxygen Delivery - O2 Flow Rate (L/min): 50 L/min  VENT SETTINGS (Comprehensive)  Vent Information  $Ventilation: $Subsequent Day  Skin Assessment: Clean, dry, & intact  Suction Catheter Diameter: 14  Equipment ID: [id number]  Equipment Changed: Mask  Vent Type: C2G5 Munson  Vent Mode: (Spontaneous)  Vt Ordered: 0 mL  Pressure Ordered: (PControl 12)  Rate Set: 12 bmp  Peak Flow: 31.9 L/min  Pressure Support: 10 cmH20  FiO2 : 75 %  SpO2: 97 %  SpO2/FiO2 ratio: 128  Sensitivity: 3  Flow by: 18  PEEP/CPAP: 6  I Time/ I Time %: 1 s  Humidification Source: Heated wire  Humidification Temp: 37  Humidification Temp Measured: 36.9  Circuit Condensation: (dry)  Nitric Oxide/Epoprostenol In Use?: No  Mask Type: Full face mask  Mask Size: Small  Additional Respiratory  Assessments  Pulse: 81  Resp: 21  SpO2: 97 %  $End Tidal CO2: (N/A)  pCO2 (TCOM, mmHg): 33 mmHg  Position: Semi-Webb's  Humidification Source: Heated wire  Humidification Temp: 37  Circuit Condensation: (dry)  Oral Care: Mouth swabbed, Mouth suctioned  Subglottic Suction Done?: Yes      DATA:    CBC:   Recent Labs     10/02/20  0422 10/03/20  0438 10/04/20  0430   WBC 11.1* 12.6* 14.7*   RBC 3.86* 3.77* 3.71*   HGB 12.0* 11.7* 11.6*   HCT 37.5* 36.8* 36.4*   MCV 97.2* 97.6* 98.1*   MCH 31.1 31.0 31.3   MCHC 32.0* 31.8* 31.9*    348 319   MPV 9.6 9.5 9.6      BMP/CMP:   Recent Labs     10/02/20  0422 10/03/20  0438 10/04/20  0430    143 143   K 3.7 3.8 2.9*    98 101   CO2 34* 35* 31   ANIONGAP 8.0 10.0 11.0   BUN 22 26* 29*   CREATININE 0.7 0.7 0.7   GLUCOSE 160* 153* 128*   CALCIUM 8.2* 8.8 7.8*   PROT 6.3 6.2 6.1   LABALBU 2.7* 2.9* 2.7*   BILITOT 0.4 0.4 0.5   ALKPHOS 53 60 57   AST 38 77* 64*   ALT 46 93* 106*      Other Electrolytes:   No results for input(s): CAION, PHOS, MG in the last 72 hours.   Serum Renita Ugalde MD

## 2020-10-04 NOTE — PROGRESS NOTES
SBT successful  SaO2: 98%   Resp. Rate: 15   Pt successfully extubated at this time per verbal face to face via DEB Bunch. Placed on bipap 14/8 60%. No complications  SaO2: 52%  Resp.  Rate: 22

## 2020-10-04 NOTE — PLAN OF CARE
Optimize nutrtional status  Outcome: Ongoing  Note: Patient receiving continuous TF, at goal, tolerating well, protein shots per ordered 4x daily. Problem: Restraint Use - Nonviolent/Non-Self-Destructive Behavior:  Goal: Absence of restraint indications  Description: Absence of restraint indications  10/4/2020 0104 by Rito Travis RN  Outcome: Ongoing  Note: Patient still needing restrained at this time due to pulling at lines and tubes. Educated this is for patient safety, no evidence of learning. Goal: Absence of restraint-related injury  Description: Absence of restraint-related injury  10/4/2020 0104 by Rito Travis RN  Outcome: Ongoing  Note: No restraint related injury, skin assessed every 2 hours. Care plan reviewed with patient and RN. Patient cannot verbalize understanding of the plan of care and contribute to goal setting.

## 2020-10-05 NOTE — PLAN OF CARE
Problem: Nutrition  Goal: Optimal nutrition therapy  Outcome: Ongoing   Nutrition Problem #1: Increased nutrient needs  Intervention: Food and/or Nutrient Delivery: Continue Current Diet, Vitamin Supplement, Start Oral Nutrition Supplement  Nutritional Goals: Patient will consume 75% or greater of meals and ONS during LOS

## 2020-10-05 NOTE — PROGRESS NOTES
Patient:  Nat Villegas                           Unit/Bed:4B-03/003-A  MRN: [id number]            PCP: Maxine Roberson MD  Date of Admission: 9/24/2020     Assessment and Plan(All pulmonary edema, renal failure, PE, and respiratory failure diagnoses are acute in nature unless otherwise specified):        1. Acute respiratory failure: Patient intubated 9/27/2020. Requiring moderate to high levels of PEEP and moderate levels of FiO2. Status post pronation therapy which started 9/29/2020 and completed 10/2/2020. Patient extubated to high flow oxygen on 0/1/8217.  2. ARDS: Application of YGADK-08. Status post pronation therapy. Hypercoagulable state. Aspirin and subcutaneous Lovenox to mitigate microthrombosis. Repeat chest x-ray in the morning. 3. COVID-19: Patient on Decadron. Patient status post Remdesivir. S/P convalescent plasma 10/1/2020.  4. Pneumonia: Secondary to Klebsiella pneumoniae and Haemophilus influenza. On systemic steroids for severe pneumonia. Day #12 Zosyn. 5. Sepsis: Secondary to pneumonia and COVID-19. Supportive care. 6. Metabolic encephalopathy: Secondary to hypoxemic respiratory failure. Clinically improved. Still requiring Precedex. 7. 2 diabetes mellitus: On subcutaneous insulin and sliding scale insulin. 8. Hyperlipidemia: Lipitor. 9. Hypertension: On amlodipine.     CC: Acute respiratory failure  HPI: Patient is a 54-year-old morbidly obese white male reformed smoker. He has a history of diabetes, hyperlipidemia, and hypertension. Patient was admitted from the emergency room on 9/24/2020 with hypoxemic respiratory failure secondary to COVID. Patient had associated acute renal injury. He required 100% nonrebreather and was started on Decadron and Remdesivir. He continued to deteriorate and required intubation on 9/27/2020. Acute lung injury progressed to ARDS and started pronation therapy on 9/29/2020.   Patient was found to have bilateral pneumonia with Klebsiella pneumoniae and Haemophilus influenza which was treated with Zosyn. Patient completed 12-day course of Zosyn on 10/5/20. Patient extubated 10/4/22 intermittent BiPAP. ROS: Patient without SOB. No mucous. No fever. No chest tightness. PMH:  Per HPI  SHX: Reformed smoker since 2000. FHX: No known TB exposure. Allergies: NKDA  Medications:     dexmedetomidine 0.5 mcg/kg/hr (10/05/20 4542)    dextrose        insulin lispro  0-12 Units Subcutaneous Q4H    sodium chloride  20 mL Intravenous Once    levofloxacin  500 mg Intravenous Q24H    furosemide  60 mg Intravenous BID    potassium chloride  20 mEq Intravenous BID    potassium chloride  10 mEq Intravenous BID    insulin glargine  15 Units Subcutaneous Nightly    piperacillin-tazobactam  3.375 g Intravenous Q8H    amLODIPine  10 mg Per NG tube Daily    aspirin  81 mg Per NG tube Daily    atorvastatin  10 mg Per NG tube Nightly    Vitamin D  1,000 Units Per NG tube Daily    calcium replacement protocol   Other RX Placeholder    phosphorus replacement protocol   Other RX Placeholder    sodium chloride flush  10 mL Intravenous 2 times per day    enoxaparin  40 mg Subcutaneous Q24H     Vital Signs:   T: 99.3: P: 64: RR: 18: B/P: 119/61: FiO2: 70: O2 Sat: 96: I/O: 1226/3650  GCS:  14: Body mass index is 33.59 kg/m². [facility] General:   Overweight acutely ill-appearing white male. HEENT:  normocephalic and atraumatic. No scleral icterus. PERR  Neck: supple. No Thyromegaly. Lungs: Bilateral crackles. No retractions  Cardiac: RRR. No JVD. Abdomen: soft. Nontender. Extremities:  No clubbing, cyanosis x 4. Trace upper and lower extremity edema bilaterally. Vasculature: capillary refill < 3 seconds. Palpable dorsalis pedis pulses. Skin:  warm and moist.  Psych:  Awake. Interactive. Oriented to person. Confused to place and circumstance. Lymph:  No supraclavicular adenopathy. Neurologic:   No seizures.   No focal deficit.     Data: (All radiographs, tracings, PFTs, and imaging are personally viewed and interpreted unless otherwise noted). · Telemetry shows normal sinus rhythm. · Sputum PCR positive for Klebsiella pneumoniae, and Haemophilus influenza. · Sodium 136, potassium 3.8, chloride 93, bicarb 35, BUN 24, creatinine 0.7, glucose 163. Procalcitonin 0.41. Albumin 3. White blood cell count 14.7, hemoglobin 12.9, platelets 322. · Chest x-ray requested for tomorrow morning.       Electronically signed by Isabel Pang.  Kerry Houston M.D.

## 2020-10-05 NOTE — CARE COORDINATION
10/5/20, 3:14 PM EDT    DISCHARGE ON GOING EVALUATION    [address] day: 11  Location: -03/003-A Reason for admit: COVID-19 [U07.1]   Procedure:   9/24 CXR: Slightly increased density in both lung fields; Borderline cardiomegaly; Thoracic spondylosis  9/24 Vapotherm initiated  9/27 Intubated  9/27 CVC Right subclavian  9/27 CXR: Diffuse infiltrates are scattered throughout the lungs bilaterally  9/29 - 10/2 Rotoprone therapy   10/1 Convalescent Plasma  10/3 CXR:   Significant improvement left lung infiltrates.  Mild progression right    lung infiltrates     10/4 Extubated  10/4 Vapotherm initiated    Treatment Plan of Care: Extubated yesterday. PICC ordered. On vapotherm, 60L, 57% FIO2, sats 92%. Bipap HS & PRN. Tmax 101. 3. NSR. Ox3, not time. Follows commands. On carb control diet. Dietitian consulted. PT/OT. Respiratory culture +Hflu and Klebsiella pneumoniae. +COVID 19 on 9/22. Telemetry, I&O, flexiseal, pollock care. Precedex @ 0.2 mcg/kg/hr, Norvasc, asa, lipitor, lovenox, IV lasix 60 mg bid, lantus, SSI ACHS, IV levaquin, IV zosyn, vit d, Electrolyte replacement protocols. Procal 0.41, alb 3, alt 124, ast 68, wbc 14.7, hgb 12.9. Barriers to Discharge: on vapotherm  PCP: Maribell Mcguire MD  Readmission Risk Score: 19%  Patient Goals/Plan/Treatment Preferences: From home w/wife. Plan pending clinical course. May need TCU vs LTACH.

## 2020-10-05 NOTE — PLAN OF CARE
Problem: Impaired respiratory status  Goal: Able to breathe comfortably  Description: Able to breathe comfortably  10/5/2020 0849 by Keeley Chatterjee RCP  Outcome: Ongoing  Pt on hfnc at this time  60 lpm @ 59% fio2 == 92%

## 2020-10-05 NOTE — PLAN OF CARE
Continue the use of bipap and HFNC to help improve respiratory status  Problem: Impaired respiratory status  Goal: Able to breathe comfortably  Description: Able to breathe comfortably  Outcome: Ongoing

## 2020-10-05 NOTE — PROGRESS NOTES
High Protein Supplement    Anthropometric Measures:  · Height: 5' 8\" (172.7 cm)  · Current Body Weight: 220 lb (99.8 kg)(10/3, bedscale, +2 BUE edema, +1 BLE edema)   · Admission Body Weight: 220 lb (99.8 kg)(9/24/20, no weight source)    · Usual Body Weight: (no weight hx available in EMR, patient intubated)     · Ideal Body Weight: 154 lbs;   · BMI: 33.5  · Adjusted Body Weight:  ; No Adjustment   · BMI Categories: Obese Class 1 (BMI 30.0-34.9)(33.59)       Nutrition Diagnosis:   · Increased nutrient needs related to catabolic illness as evidenced by wounds(+covid)    Nutrition Interventions:   Food and/or Nutrient Delivery:  Continue Current Diet, Vitamin Supplement, Start Oral Nutrition Supplement  Nutrition Education/Counseling:  No recommendation at this time   Coordination of Nutrition Care:  Continued Inpatient Monitoring, Interdisciplinary Rounds    Goals:  Patient will consume 75% or greater of meals and ONS during LOS       Nutrition Monitoring and Evaluation:   Behavioral-Environmental Outcomes:  (n/a)   Food/Nutrient Intake Outcomes:  Diet Advancement/Tolerance, Food and Nutrient Intake, Supplement Intake  Physical Signs/Symptoms Outcomes:  Biochemical Data, GI Status, Fluid Status or Edema, Meal Time Behavior, Skin, Weight     Discharge Planning:     Too soon to determine     Electronically signed by Rosa Cheadle, RD, LD on 10/5/20 at 12:17 PM EDT    Contact: [phone]

## 2020-10-05 NOTE — PLAN OF CARE
Problem: Falls - Risk of:  Goal: Will remain free from falls  Description: Will remain free from falls  Outcome: Ongoing  Note:  Bed alarmed armed. Bed wheels locked. Bedside table in reach. Hourly rounding being completed. Problem: Falls - Risk of:  Goal: Absence of physical injury  Description: Absence of physical injury  Outcome: Ongoing  Note: No evidence of physical injury during shift. Problem: Skin Integrity:  Goal: Will show no infection signs and symptoms  Description: Will show no infection signs and symptoms  Outcome: Ongoing  Note: Monitoring for signs and symptoms of infection. Problem: Skin Integrity:  Goal: Absence of new skin breakdown  Description: Absence of new skin breakdown  Outcome: Ongoing  Note: Patient turned q2. No new redness noted on pressure points such as elbows, back of head, heels, shoulder blades, or coccyx at this time. Will continue to monitor. Problem: Airway Clearance - Ineffective  Goal: Achieve or maintain patent airway  Outcome: Ongoing  Note: Patient on high flow O2. Problem: Gas Exchange - Impaired  Goal: Absence of hypoxia  Outcome: Ongoing     Problem: Breathing Pattern - Ineffective  Goal: Ability to achieve and maintain a regular respiratory rate  Outcome: Ongoing     Problem:  Body Temperature -  Risk of, Imbalanced  Goal: Ability to maintain a body temperature within defined limits  Outcome: Ongoing     Problem: Isolation Precautions - Risk of Spread of Infection  Goal: Prevent transmission of infection  Outcome: Ongoing     Problem: Nutrition Deficits  Goal: Optimize nutrtional status  Outcome: Ongoing

## 2020-10-05 NOTE — PROGRESS NOTES
PICC Procedure Note    Chito Splinter   Admitted- 9/24/2020 10:05 AM  Admission diagnosis- COVID-19 [U07.1]      Attending Physician- Jaye Silverio MD  Ordering Physician-same  Indication for Insertion: Poor Vascular Access    Catheter Insertion Date- 10/5/2020   Lot Number- [id number]  Gauge-6  Lumen-triple    Insertion Site- MATT Basilic  Vein Diameter- 8.31 mm  Catheter Length- 37 cm  Internal Length- 36 cm  Exposed Catheter Length- 1cm   Upper Arm Circumference- 39cm  Tip Confirmation System Bundle met- No: chest xray needed  If X-ray required, Tip Location- SVC  Radiologist- Dr Phylicia Fields    PICC insertion successful- Yes  Ultrasound- yes    Okay To Use PICC- Yes    Electronically signed by Paula Narvaez RN on 10/5/2020 at 4:10 PM

## 2020-10-06 NOTE — PLAN OF CARE
Problem: Falls - Risk of:  Goal: Will remain free from falls  Description: Will remain free from falls  Outcome: Met This Shift  Note: Pt at risk for falls r/t tubes and lines present. Pt remains free of falls t/o this shift. Bed alarm is on and functioning properly. Call light is within reach. Goal: Absence of physical injury  Description: Absence of physical injury  Outcome: Met This Shift     Problem: Skin Integrity:  Goal: Absence of new skin breakdown  Description: Absence of new skin breakdown  Outcome: Met This Shift  Note: Pt at risk for new skin breakdown r/t bedrest orders and weakness. Pt remains free of any new skin breakdown t/o this shift. Will continue to assess with each assessment. Pt turned q2h. Problem: Airway Clearance - Ineffective  Goal: Achieve or maintain patent airway  Outcome: Met This Shift  Note: Pt at risk for ineffective airway clearance r/t current medical condition. Pt airway remains patent t/o this shift. Will continue to assess with each assessment. Problem: Isolation Precautions - Risk of Spread of Infection  Goal: Prevent transmission of infection  Outcome: Met This Shift  Note: Pt remains in maximum precautions to prevent the spread of COVID 19. Problem: Skin Integrity:  Goal: Will show no infection signs and symptoms  Description: Will show no infection signs and symptoms  Outcome: Ongoing  Note: Pt shows no s/sx of CVC associated infection at this time. Will continue to monitor. Problem: Breathing Pattern - Ineffective  Goal: Ability to achieve and maintain a regular respiratory rate  Outcome: Ongoing  Note: Pt able to achieve and maintain regular RR t/o this shift. Pt RR is monitored continuously. Will continue to reassess.       Problem: Nutrition  Goal: Optimal nutrition therapy  10/5/2020 1220 by Karolyn Guevara RD, LD  Outcome: Ongoing     Problem: Infection - Central Venous Catheter-Associated Bloodstream Infection:  Goal: Will show no infection signs and symptoms  Description: Will show no infection signs and symptoms  Outcome: Ongoing  Note: Pt shows no s/sx of CVC associated infection at this time. Will continue to monitor. Problem: Body Temperature -  Risk of, Imbalanced  Goal: Ability to maintain a body temperature within defined limits  Outcome: Not Met This Shift  Note: Pt remains febrile t/o this shift. Will continue to monitor continuously. Medication given per order. Care plan reviewed with patient and family. Patient and family verbalize understanding of the plan of care and contribute to goal setting.

## 2020-10-06 NOTE — PROGRESS NOTES
Patient:  Bobo Lee  Unit/Bed:4B-03/003-A  QSO: [id number]            Macey Saenz MD  Date of Admission: 9/24/2020     Assessment and Plan(All pulmonary edema, renal failure, PE, and respiratory failure diagnoses are acute in nature unless otherwise specified):        1. Acute respiratory failure: Patient intubated 9/27/2020.  Requiring moderate to high levels of PEEP and moderate levels of FiO2.  Status post pronation therapy which started 9/29/2020 and completed 10/2/2020.    Patient extubated to high flow oxygen on 7/5/3344.  2. ARDS: Application of WFVGF-64.  Status post pronation therapy.  Hypercoagulable state.  Aspirin and subcutaneous Lovenox to mitigate microthrombosis. 3. COVID-19: Patient on Decadron.  Patient status post Remdesivir.  S/P convalescent plasma 10/1/2020.  4. Pneumonia: Secondary to Klebsiella pneumoniae and Haemophilus influenza.  On systemic steroids for severe pneumonia.  Day #13/14 Zosyn. 5. Sepsis: Secondary to pneumonia and COVID-19.  Supportive care. 6. Metabolic encephalopathy: Secondary to hypoxemic respiratory failure. Clinically improved. 7. 2 diabetes mellitus: On subcutaneous insulin and sliding scale insulin. 8. Hyperlipidemia: Lipitor. 9. Hypertension: On amlodipine.     CC: Acute respiratory failure  HPI: Patient is a 65-year-old morbidly obese white male reformed smoker. Gordy Child has a history of diabetes, hyperlipidemia, and hypertension.   Patient was admitted from the emergency room on 9/24/2020 with hypoxemic respiratory failure secondary to [address] [place] [address] had associated acute renal injury.  He required 100% nonrebreather and was started on Decadron and Remdesivir.  He continued to deteriorate and required intubation on 9/27/2020.  Acute lung injury progressed to ARDS and started pronation therapy on 9/29/2020. Rosalie Watkins was found to have bilateral pneumonia with Klebsiella pneumoniae and Haemophilus influenza which was treated with Zosyn. Patient completed 12-day course of Zosyn on 10/5/20. Patient extubated 10/4/22 intermittent BiPAP.  ROS: Patient denies pain, shortness of breath, or fever. [facility] PMH:  Per HPI  SHX: Reformed smoker since 2000. FHX: No known TB exposure. Allergies: NKDA  Medications:     dexmedetomidine Stopped (10/06/20 8735)    dextrose        insulin lispro  0-12 Units Subcutaneous 4x Daily AC & HS    lidocaine 1 % injection  5 mL Intradermal Once    sodium chloride flush  10 mL Intravenous 2 times per day    sodium chloride  20 mL Intravenous Once    levofloxacin  500 mg Intravenous Q24H    furosemide  60 mg Intravenous BID    potassium chloride  20 mEq Intravenous BID    potassium chloride  10 mEq Intravenous BID    insulin glargine  15 Units Subcutaneous Nightly    piperacillin-tazobactam  3.375 g Intravenous Q8H    amLODIPine  10 mg Per NG tube Daily    aspirin  81 mg Per NG tube Daily    atorvastatin  10 mg Per NG tube Nightly    Vitamin D  1,000 Units Per NG tube Daily    calcium replacement protocol   Other RX Placeholder    phosphorus replacement protocol   Other RX Placeholder    enoxaparin  40 mg Subcutaneous Q24H     Vital Signs:   T: 100.8: P: 72: RR: 21: B/P: 106/57: FiO2: 60: O2 Sat: 95: I/O: 2018/2450  GCS:  14: Body mass index is 32.05 kg/m². [facility] General:   Overweight acutely ill-appearing white male. HEENT:  normocephalic and atraumatic.  No scleral icterus. PERR  Neck: supple.  No Thyromegaly. Lungs: Bilateral crackles.  No retractions  Cardiac: RRR.  No JVD. Abdomen: soft.  Nontender. Extremities:  No clubbing, cyanosis x 4.  Trace upper and lower extremity edema bilaterally. Vasculature: capillary refill < 3 seconds. Palpable dorsalis pedis pulses. Skin:  warm and moist.  Psych:  Awake. Interactive. Oriented to person. Confused to place and circumstance. Lymph:  No supraclavicular adenopathy. Neurologic:   No seizures.   No focal deficit.     Data: (All radiographs, tracings, PFTs, and imaging are personally viewed and interpreted unless otherwise noted). · Telemetry shows normal sinus rhythm. · Sputum PCR positive for Klebsiella pneumoniae, and Haemophilus influenza. · Chest x-ray shows bilateral lower lobe infiltrates. · Sodium 134, potassium 3.7, chloride 91, bicarb 33, BUN 24, creatinine 0.8, glucose 113. Procalcitonin 0.47. .        Electronically signed by Silvestre Hernandez M.D.

## 2020-10-06 NOTE — PROGRESS NOTES
Present to pt room for skin assessment. Pt laying in bed with primary nurse at side. Assists pt to turn to right side. Pt noted to have blanching erythema to coccyx with not open areas. Zinc oxide applied for preventative measures. Staff to check skin integrity under foam each shift. Pt on moisture wicking chux pad and sheet. Blanching erythema noted to bilateral heels. Pillow placed under left side. Pt has pollock catheter and flexiseal in place. Pt on pavithra BENNY support surface. Bilateral heels offloaded with pillows. Staff to continue to turn every 2 hours, keep HOB below 30 degrees. Bed in low, call light in reach. Bilateral buttocks, blanching erythema to coccyx    Thank you for allowing us to participate in the care of your patient. TIME   Wound/ostomy individual minutes  Time In: 1340  Time Out: 1400  Minutes: 20  Time does not include documentation.

## 2020-10-06 NOTE — PROGRESS NOTES
[facility] 59  INPATIENT OCCUPATIONAL THERAPY  [facility] CVICU 4B  EVALUATION    Time:   Time In: 1250  Time Out: 1320  Timed Code Treatment Minutes: 15 Minutes  Minutes: 30          Date: 10/6/2020  Patient Name: Onur Escoto,   Gender: male      MRN: [id number]  : 1945  (67 y.o.)  Referring Practitioner: Dr. Sara Nuñez MD  Diagnosis: COVID 19  Additional Pertinent Hx: Pt presented to [facility] with fevers, chills, productive cough, sob, congestion, and body aches. Pt initially presented to the ED on  with the above symptoms without any sob, tested positive for COVID-19. At that time pt was afebrile and HD stable on RA, pt was sent home. He then returned with worsening symptoms. Pt denies any tobacco abuse. Denies any nausea, vomiting or diarrhea. Pt had further deterioration of his breathing and had to be intubated for 8 days. He had pronation therapy at that time. Pt extubated on 10/4/20. Restrictions/Precautions:  Restrictions/Precautions: Fall Risk, General Precautions, Isolation  Position Activity Restriction  Other position/activity restrictions: COVID-19 +    Subjective  Chart Reviewed: Yes, Orders, History and Physical, Other (comment)(PT evaluation)  Patient assessed for rehabilitation services?: Yes    Subjective: Pleasant and cooperative  Comments: RN approved session. Pt agreed to [facility] with therapy as he had his lunch. He was interested in trying to eat. He did not have any pain complaints.     Pain:  Pain Assessment  Patient Currently in Pain: Denies    Social/Functional History:  Lives With: Spouse  Type of Home: House  Home Layout: One level  Home Access: Stairs to enter with rails  Entrance Stairs - Number of Steps: 4 steps with 1 rail  Home Equipment: Cane, Rolling walker   Bathroom Shower/Tub: (Adapted walk in tub)  Bathroom Toilet: Standard  Bathroom Accessibility: Accessible    Receives Help From: Family  ADL Assistance: Needs assistance  Homemaking 0-60 degrees. Right Hand AROM: WFL    LUE Strength  L Hand General: 3+/5  LUE Strength Comment: 3/5 deltoid; 3+/5 pectoral; 3+/5 triceps and biceps  RUE Strength  R Hand General: 4-/5  RUE Strength Comment: 3/5 deltoid; 3+/5 pectoral; 4-/5 triceps and biceps    Sensation  Overall Sensation Status: [facility]  Fine Motor Skills  Fine Motor Comment: Pt helped with feeding using B hands. He showed better coordination with is R hand than with his L hand. Mild shakiness noted in B hands. He could lift a fork or tissue almost entirely to his face. Coordination and Movement description: Intention tremors, Right UE, Left UE       Activity Tolerance: Patient limited by fatigue  Pt was starting to sleep at the end of the session. He did report feeling extremely tired during session and needed to take frequent rest breaks. Assessment:  Assessment: Patient would benefit from continued skilled OT services to address above deficits. He presents with COVID-19. He was using a rolling walker prior to admission. Pt is retired from farming. He shared homemaking tasks with his spouse. He was independent with self care. Pt demonstrated bed mobility with assist of 2 persons. He did simple ADL tasks with MOD A for feeding and CGA or using a napkin to spit up his phlegm. He was very tired throughout session. Pt was using 50 L/min with a high flow meter set at 40%. Pt was not using any O2 at home. Performance deficits / Impairments: Decreased functional mobility , Decreased ADL status, Decreased ROM, Decreased strength, Decreased cognition, Decreased balance, Decreased endurance, Decreased high-level IADLs, Decreased coordination  Prognosis: Fair  REQUIRES OT FOLLOW UP: Yes  Decision Making: High Complexity    Treatment Initiated: Treatment and education initiated within context of evaluation.   Evaluation time included review of current medical information, gathering information related to past medical, social and functional history, completion of standardized testing, formal and informal observation of tasks, assessment of data and development of plan of care and goals. Treatment time included skilled education and facilitation of tasks to increase safety and independence with ADL's for improved functional independence and quality of life. Pt demonstrated BUE ROM exercises with assist needed for each: shoulder flexion, horizontal abduction/horizontal adduction, scapular protraction/retraction, elbow flexion/extension and forearm pronation/supination x 6 reps each. Pt also demonstrated neck ROM for lateral flexion and rotation. Pt shows fair control of these movements. He needed more assistance for his arm exercises and LUE more assist needed thann with RUE. Exercises completed to increase pt's strength or endurance for ease of doing self care and functional mobility. Pt tolerated fair- and needed rest breaks throughout. Discharge Recommendations:  Continue to assess pending progress, Subacute/Skilled Nursing Facility, Patient would benefit from continued therapy after discharge    Patient Education:  OT Education: OT Role, Plan of Care, ADL Adaptive Strategies, Orientation  Patient Education: Importance of increasing his strength and endurance through practicing self care activities    Equipment Recommendations:  Equipment Needed: Yes  Other: Will continue to monitor    Plan:  Times per week: 3-5x  Current Treatment Recommendations: Functional Mobility Training, Endurance Training, Self-Care / ADL, Cognitive Reorientation, Patient/Caregiver Education & Training, Strengthening, ROM, Balance Training  Plan Comment: Pt would benefit from continued skilled OT services when medically stable and discharged from Acute. OT recommended while on TCU. Specific instructions for Next Treatment: Sitting activity when able to tolerate; upper body exercises;  ADLs and positioning/adaptive strategies for use of B hands    Goals:  Patient goals : \"I want to get stronger and be able to do more on my own. \" pt states. Short term goals  Time Frame for Short term goals: By discharge  Short term goal 1: Pt will complete B hand functional activities with CGA and help with positioning to increase his independence with grooming or feeding tasks. Short term goal 2: Pt will complete upper body ROM exercises with rests breaks as needed to maintain his O2 saturations at or above 90% to increase his strength and endurance for ease of doing ADLs. Short term goal 3: Pt will demonstrate sitting activities when appropriate with OTR to prepare for doing upper body bathing or dressing. Short term goal 4: Pt will be oriented to date and his situation with cues as needed to increase his safety and facilitate progress torward goal achievement. See long-term goal time frame for expected duration of plan of care. If no long-term goals established, a short length of stay is anticipated. Following session, patient left in safe position with all fall risk precautions in place.

## 2020-10-06 NOTE — PROGRESS NOTES
[address] [address]  INPATIENT PHYSICAL THERAPY  reassessment  [facility] CVICU 4B - 4B-03/003-A    Time In: 1225  Time Out: 1250  Timed Code Treatment Minutes: 15 Minutes  Minutes: 25          Date: 10/6/2020  Patient Name: Adella Libman,  Gender:  male        MRN: [id number]  : 1945  (67 y.o.)     Referring Practitioner: Aixa Le MD  Diagnosis: COVID-19  Additional Pertinent Hx: Per ED note, pt \"is a 67 y.o. male who presents above-noted complaint (altered mental status, cough). Patient presents with generalized weakness not feeling good. He has been having symptoms this week with his wife and both are positive for COVID. He denies chest pain abdominal pain or other issues although had some generalized fatigue and some confusion. He himself is without complaint such as headache neck pain chest pain or abdominal pain. \" pt extubated 10/4/20     Prior Level of Function:  Lives With: Spouse  Type of Home: House  Home Layout: One level  Home Access: Stairs to enter with rails  Entrance Stairs - Number of Steps: 4 steps with 1 rail  Home Equipment: Cane, Rolling walker        Ambulation Assistance: Independent  Transfer Assistance: Independent  Additional Comments: per pt used walker PTA, spouse is currently on TCU for rehab    Restrictions/Precautions:  Restrictions/Precautions: Fall Risk, General Precautions, Isolation  Position Activity Restriction  Other position/activity restrictions: COVID-19 +    SUBJECTIVE: pt cooperative, pleasant, confusion at times    PAIN: c/o chest pain at times-not rated, RN aware    OBJECTIVE:  Bed Mobility:  Rolling to Left: Maximum Assistance, X 1   Rolling to Right: Maximum Assistance, X 1   Supine to Sit: Maximum Assistance, X 1  Sit to Supine: Minimal Assistance, Maximum Assistance, X 2   Scooting: Maximum Assistance, X 1  HOB up 30 degrees, cues for sequencing, incontinent of bowel and required clean up  Transfers:  Not Tested    Ambulation:  Not Tested      Balance:  Static Sitting Balance:  Maximum Assistance, X 1, fluctuated to brief times of Genet, pt heavily pushing to right with max tactile and vcs for midline, pt stating he could feel he was pushing to right but unable to self correct      Functional Outcome Measures: Completed  AM-PAC Inpatient Mobility without Stair Climbing Raw Score : 7  AM-PAC Inpatient without Stair Climbing T-Scale Score : 28.66    ASSESSMENT:  Assessment: Patient progressing toward established goals. and pt heavily pushing to right, no fatigue per pt but did c/o chest pain at times  Activity Tolerance:  Patient tolerance of  treatment: fair. Equipment Recommendations: Other: monitor for needs  Discharge Recommendations:  Continue to assess pending progress, Subacute/Skilled Nursing Facility, Patient would benefit from continued therapy after discharge    Plan: Times per week: 3-5x GM  Specific instructions for Next Treatment: PT to assess transfers and gait  Current Treatment Recommendations: Strengthening, Functional Mobility Training, Endurance Training, Transfer Training, Safety Education & Training, Home Exercise Program, Patient/Caregiver Education & Training    Patient Education  Patient Education: Bed Mobility, sitting balance-midline orientation    Goals:  Patient goals : go home  Short term goals  Time Frame for Short term goals: at discharge  Short term goal 1: Pt to be Genet for supine <> sit to get in/out of bed  Short term goal 2: pt to tolerate >10 min sitting balanace activity with </=Genet to demonstrate improved midline orientation and strength for progression to transfers  Short term goal 3: PT to assess transfers/gait  Long term goals  Time Frame for Long term goals : not set due to short ELOS    Following session, patient left in safe position with all fall risk precautions in place.

## 2020-10-07 PROBLEM — G81.94 LEFT HEMIPARESIS (HCC): Status: ACTIVE | Noted: 2020-01-01

## 2020-10-07 NOTE — SIGNIFICANT EVENT
Intensivist Progress Note      Patient:  Sheyla Stratton    Unit/Bed:4B-08/008-A  YOB: 1945  MRN: [id number]   Acct: [de-identified]     PCP: Onelia Adrian MD  Date of Admission: 9/24/2020    CODE stroke called at [address] due to left-sided arm weakness-NIH 8. Left voicemail for listed wife Piedad Artist answer. I spoke with HARJEET Beltrán [facility] physician. He told me he viewed CT head and there was no large territory hypodensity or bleed-old appearing right basal ganglia stroke. CTA-no LVO. Recommendation for TPA with associated monitoring, IVF, and maintain systolic blood pressure <612 for 24 hrs and then < 140. I updated bernabe Greene per phone regarding changes in neuro status and subsequent process for TPA including risks and benefits. She did consent.      Rick Conroy, ARABELLA - CNP

## 2020-10-07 NOTE — FLOWSHEET NOTE
With Eric Connelly, facilitated Zoom with patient's wife who is currently on 8E. At conclusion of call, retrieved unit, sanitized it and returned it to charging.

## 2020-10-07 NOTE — VIRTUAL HEALTH
[address] [facility],4Th Floor Stroke and Vascular Neurology Consult for  [facility] Stroke Alert through [address] @ 4:27am 10/7/2020  10/7/2020 4:43 AM  Pt Name: Tamanna Trujillo  MRN: [id number]  YOB: 1945  Date of evaluation: 10/7/2020  Primary Care Physician: Sherif Leone MD  Reason for Evaluation: Stroke Evaluation with Discussion with Ed or primary team with Telemedicine and stroke evaluation with Review of imaging and labs    75yo male with pmh of htn, dm copd, hld, admitted 9/24/2020 for acute respiratory failure from covid infection. Code stroke was called for acute L face, L hemibody weakness, decreased mentation and slurred speech. Pt was admitted 9/24/2020 for acute respiratory failure from covid infection. He was intubated, and was extubated 2d ago 10/5/2020. At baseline there are no known deficits. LKN was 3AM 10//7/2020. nurse found the pt less responsive with L focal deficits. bp 133/79, glu 148    Allergies  is allergic to bee venom. Medications  Prior to Admission medications    Medication Sig Start Date End Date Taking?  Authorizing Provider   amLODIPine (NORVASC) 10 MG tablet Take 10 mg by mouth daily  7/8/20  Yes Historical Provider, MD   atorvastatin (LIPITOR) 10 MG tablet Take 10 mg by mouth nightly  7/31/20  Yes Historical Provider, MD   glimepiride (AMARYL) 2 MG tablet Take 3 mg by mouth 2 times daily 6/30/20 12/27/20 Yes Historical Provider, MD   metFORMIN (GLUCOPHAGE) 1000 MG tablet Take 1,000 mg by mouth 2 times daily (with meals)  4/15/20 10/12/20 Yes Historical Provider, MD   hydroCHLOROthiazide (HYDRODIURIL) 12.5 MG tablet Take 12.5 mg by mouth daily 9/9/20 3/8/21 Yes Historical Provider, MD   Zinc Acetate (GALZIN) 25 MG capsule Take 1 capsule by mouth daily 9/23/20 10/23/20 Yes Historical Provider, MD   Cholecalciferol (VITAMIN D3) 125 MCG (5000 UT) CAPS Take by mouth   Yes Historical Provider, MD    Scheduled Meds:   insulin lispro  0-12 Units Subcutaneous 4x Daily AC & HS    lidocaine 1 % injection  5 mL Intradermal Once    sodium chloride flush  10 mL Intravenous 2 times per day    sodium chloride  20 mL Intravenous Once    levofloxacin  500 mg Intravenous Q24H    furosemide  60 mg Intravenous BID    potassium chloride  20 mEq Intravenous BID    potassium chloride  10 mEq Intravenous BID    insulin glargine  15 Units Subcutaneous Nightly    piperacillin-tazobactam  3.375 g Intravenous Q8H    amLODIPine  10 mg Per NG tube Daily    aspirin  81 mg Per NG tube Daily    atorvastatin  10 mg Per NG tube Nightly    Vitamin D  1,000 Units Per NG tube Daily    calcium replacement protocol   Other RX Placeholder    phosphorus replacement protocol   Other RX Placeholder    enoxaparin  40 mg Subcutaneous Q24H     Continuous Infusions:   dextrose       PRN Meds:.sodium chloride flush, potassium chloride, potassium chloride, magnesium sulfate, acetaminophen **OR** acetaminophen, polyethylene glycol, promethazine **OR** ondansetron, glucose, dextrose, glucagon (rDNA), dextrose  Past Medical History   has a past medical history of Diabetes mellitus ([facility] [address] Hyperlipidemia, and Hypertension.   Social History  Social History     Socioeconomic History    Marital status:      Spouse name: Not on file    Number of children: Not on file    Years of education: Not on file    Highest education level: Not on file   Occupational History    Not on file   Social Needs    Financial resource strain: Not on file    Food insecurity     Worry: Not on file     Inability: Not on file    Transportation needs     Medical: Not on file     Non-medical: Not on file   Tobacco Use    Smoking status: Former Smoker     Last attempt to quit: 2000     Years since quittin.7   Substance and Sexual Activity    Alcohol use: Never     Frequency: Never    Drug use: Never    Sexual activity: Not on file   Lifestyle    Physical activity     Days per week: Not on file     Minutes per session: Not on file    Stress: Not on file   Relationships    Social connections     Talks on phone: Not on file     Gets together: Not on file     Attends Zoroastrianism service: Not on file     Active member of club or organization: Not on file     Attends meetings of clubs or organizations: Not on file     Relationship status: Not on file    Intimate partner violence     Fear of current or ex partner: Not on file     Emotionally abused: Not on file     Physically abused: Not on file     Forced sexual activity: Not on file   Other Topics Concern    Not on file   Social History Narrative    Not on file     Family History  History reviewed. No pertinent family history. OBJECTIVE  Vitals:    10/07/20 0330   BP: 133/79   Pulse: 105   Resp: 24   Temp: 100.5 °F (38.1 °C)   SpO2: 94%        General:  Gen: obese habitus, NAD  HEENT: NCAT, mucosa moist  Cvs: RRR, S1 S2 normal  Resp: symmetric unlabored breathing. On NC  Abd: s/nd/nt  Ext: no edema  Skin: no lesions seen, warm and dry    Neuro:  Gen: drowsy but arousable with voice, oriented to self but no time or place   Lang/speech: no clear aphasia, perseveration. No dysarthria. Follows some simple commands. CN: PERRL, EOMI, VFF, V1-3 intact, mild L face droop, hearing intact, tongue midline  Motor: RUE at least 3/5 with drift, RLE at least 2/5. LUE 1/5, LLE at least 2/5  Sense: w/d to pain in all 4 ext, less on L hemibody. Coord: no gross ataxia  DTR: deferred  Gait: deferred    NIH Stroke Scale:   1a  Level of consciousness: 1 - not alert but arousable by minor stimulation to obey, answer or respond   1b. LOC questions:  1 - answers one question correctly   1c. LOC commands: 1 - performs one task correctly   2. Best Gaze: 0 - normal   3. Visual: 0 - no visual loss   4. Facial Palsy: 1 - minor paralysis (flattened nasolabial fold, asymmetric on smiling)   5a. Motor left arm: 3 - no effort against gravity, limb falls   5b.   Motor right arm: 1 - drift, limb holds 90 (or 45) degrees but drifts down before full 10 seconds: does not hit bed   6a. Motor left leg: 3 - no effort against gravity; leg falls to bed immediately   6b  Motor right leg:  3 - no effort against gravity; leg falls to bed immediately   7. Limb Ataxia: 0 - absent   8. Sensory: 1 - mild to moderate sensory loss; patient feels pinprick is less sharp or is dull on the affected side; there is a loss of superficial pain with pinprick but patient is aware of being touched    9. Best Language:  1 - mild to moderate aphasia; some obvious loss of fluency or facility of comprehension without significant limitation on ideas expressed or form of expression. Reduction of speech and/or comprehension, however, makes conversation about provided materials difficult or impossible. For example, in conversation about provided materials, examiner can identify picture or naming card content from patient's response. 10. Dysarthria: 0 - normal   11. Extinction and Inattention: 0 - no abnormality         Total:   16     Premorbid MRS: 0      Imaging:  Images were personally reviewed including:  CT brain without contrast: no large territory hypodensity or bleed. Old appearing R BG stroke. CTA imaging: no LVO. R cervical ICA stenosis with calcifications. Assessment  73yo male with pmh of htn, dm copd, hld, admitted 9/24/2020 for acute respiratory failure from covid infection. Code stroke was called for acute L face, L hemibody weakness, decreased mentation and slurred speech. CT head shows old R BG stroke, CTA shows R cervical ICA stenosis, otherwise no LVO. Suspect acute R BG stroke vs recrudescence. Metabolic encephalopathy also complicating picture. Stroke etio susepct small vessel. Large vessel embolic and cardioembolic COVID hypercoag also possible. tpa offered if no contraindication. Recommendations:  --tpa given.  Monitoring protocol as per team.  --no mt, no lvo  --bolus 1L NS, then 100cc/hr  --sbp < 220 x24h, then <140  --mri brain wo con. Remainder of workup as per neurology  --continue asa 80    Discussed with Nurse    At least 30 min of critical care time spent through telemedicine robot at patient bedside (via interactive/real-time software) as patient is in imminent and life threatening deterioration with further treatment and evaluation. This Virtual Visit was conducted with patient's (and/or legal guardian's) consent, to provide telestroke consultation and necessary medical care.   Time spent examining patient, reviewing the images personally, reviewing the chart, perform high complexity decision making and speaking with the nursing staff regarding recommendations    Zeyad Montejo MD PhD  Stroke, Neurocritical Care And/or [address] [place] [address] Stroke Network  [id number] [place]  Electronically signed 10/7/2020 at 4:43 AM

## 2020-10-07 NOTE — PROGRESS NOTES
[address] 59  OCCUPATIONAL THERAPY MISSED TREATMENT NOTE  [facility] CVICU 4B  4B-08/008-A      Date: 10/7/2020  Patient Name: Jacob Acuña        CSN: [id number]   : 1945  (67 y.o.)  Gender: male   Referring Practitioner: Dr. Michael Chand MD  Diagnosis: COVID 19         REASON FOR MISSED TREATMENT: Code stroke called this AM, will hold and reassess 10/8 as appropriate.

## 2020-10-07 NOTE — PROGRESS NOTES
Patient:  Bobo Woody  Unit/Bed:4B-03/003-A  QYQ: [id number]            Bruna Gomez MD  Date of Admission: 9/24/2020     Assessment and Plan(All pulmonary edema, renal failure, PE, and respiratory failure diagnoses are acute in nature unless otherwise specified):        1. Acute respiratory failure: Patient intubated 9/27/2020.  Requiring moderate to high levels of PEEP and moderate levels of FiO2.  Status post pronation therapy which started 9/29/2020 and completed 10/2/2020.    Patient extubated to high flow oxygen on 5/6/0517.  2. ARDS: Application of [id number].  Status post pronation therapy. Hypercoagulable state.  Aspirin and subcutaneous Lovenox to mitigate microthrombosis. 3. Acute CVA: Underwent TPA administration 10/7/2020. CTA head neck suggests right internal carotid artery narrowing at orifice. Obtain ultrasound of carotid arteries. Permissive hypertension for 24 hours targeting systolic less than 564. After 24 hours, target systolic blood pressure less than 140. Residual left-sided weakness, confusion, mild dysarthria, and potential swallowing difficulties. .    4. COVID-19: Patient s/p Decadron.  Patient s/p Remdesivir.  S/P convalescent plasma 10/1/2020. Still with ongoing fever. Continue to monitor. 5. Pneumonia: Secondary to Klebsiella pneumoniae and Haemophilus influenza.  On systemic steroids for severe pneumonia.  Day #14/14 Zosyn. Patient has persistent low-grade fever. Concern for aspiration events. Eventually will need modified barium swallow. On Levaquin in addition to Zosyn. Suspect fever is secondary to ongoing COVID. Monitor off antibiotics. 6. Sepsis: Secondary to pneumonia and COVID-19.  Supportive care. 7. Metabolic encephalopathy: Persists. Secondary to hypoxemia. Secondary to COVID-19. Now impaired by recent CVA.   8. 2 diabetes mellitus: On subcutaneous insulin and sliding scale insulin. 9. Hyperlipidemia: Lipitor. 10. Hypertension: On amlodipine. Amlodipine on hold for initial post CVA permissive hypertension. Initial goals are to keep systolic blood pressure less than 220 for 24 hours and subsequently less than 140 post 24 hours.     CC: Acute respiratory failure  HPI: Patient is a 65-year-old morbidly obese white male reformed smoker. [facility] has a history of diabetes, hyperlipidemia, and hypertension. Patient was admitted from the emergency room on 9/24/2020 with hypoxemic respiratory failure secondary to [address] had associated acute renal injury.  He required 100% nonrebreather and was started on Decadron and Remdesivir.  He continued to deteriorate and required intubation on 9/27/2020.  Acute lung injury progressed to ARDS and started pronation therapy on 9/29/2020. Enrique Benjamin was found to have bilateral pneumonia with Klebsiella pneumoniae and Haemophilus influenza which was treated with Zosyn.  Patient completed 12-day course of Zosyn on 10/5/20. Patient extubated to 10/4/22 intermittent BiPAP. Patient had neurologic decline in the early morning hours of 10/7/2020. Code stroke was initiated, and patient underwent neurologic assessment. The decision was made to administer TPA as the patient had left sided weakness and increased confusion. TPA was administered without difficulty. For further details, please review the assessment and plan. ROS: Patient denies pain. No shortness of breath. .  PMH:  Per HPI  SHX: Reformed smoker since 2000. FHX: No known TB exposure.   Allergies: NKDA  Medications:     sodium chloride 100 mL/hr at 10/07/20 0644    dextrose        sodium chloride flush  10 mL Intravenous 2 times per day    [START ON 10/8/2020] enoxaparin  40 mg Subcutaneous Daily    rosuvastatin  40 mg Oral Nightly    [START ON 10/8/2020] aspirin  81 mg Per NG tube Daily    insulin lispro  0-12 Units Subcutaneous 4x Daily AC & HS    lidocaine 1 % injection  5 mL 10/7/2020: No acute pathology. No hemorrhage. · CTA head neck was limited by motion artifact. Severe stenosis at the right internal carotid artery. No thrombus.       CC time 35 minutes. Electronically signed by Lovella Class Corrinne Angry M.D.

## 2020-10-07 NOTE — CONSULTS
[facility]    [address], [address]  [place]  10/7/2020 3:44 PM    Pt Name:    Elizabeht Case  MRN:     [id number]   [id number]  YOB: 1945  Admit Date:    9/24/2020 10:05 AM  Primary Care Physician:  Bebo Chadwick MD        Reason for Consult:  Hyponatremia    History:   The patient is a 67 y.o. male history of hypertension, diabetes, hyperlipidemia seen in nephrology consult for hyponatremia. He was admitted on September 24 with altered mental status And cough with a known COVID positive test.  He had hypoxic respiratory failure requiring nonrebreather and was started on Decadron and REM does of year. He deteriorated and required intubation and was being treated for arts. He also had pneumonia with Klebsiella and Haemophilus influenza. He was sequently extubated on 10 4 and is now on high flow nasal cannula. This morning he did have neurologic decline and code stroke was called and he had TPA administered due to left-sided weakness with some improvement in his symptoms. Nephrology was consulted today for hyponatremia. Patient's sodium level yesterday was 134 this morning was down to 129 with a repeat of 135. Patient was receiving Lasix 60 mg IV twice daily which was stopped today he was started on normal saline at 100 mL/h improvement in his sodium level. Urine studies show a low urine sodium less than 20 and urine osmolality of 663. Patient failed swallow evaluation and is n.p.o. Past Medical History:  Past Medical History:   Diagnosis Date    Diabetes mellitus ([facility] [address]     Hyperlipidemia     Hypertension        Past Surgical History:  History reviewed. No pertinent surgical history. Family History:  History reviewed. No pertinent family history.     Social History:  Social History     Socioeconomic History    Marital status:      Spouse name: Not on file    Number of children: Not on file    Years of Jacoby Randle) 25 MG capsule Take 1 capsule by mouth daily 9/23/20 10/23/20 Yes Historical Provider, MD   Cholecalciferol (VITAMIN D3) 125 MCG (5000 UT) CAPS Take by mouth   Yes Historical Provider, MD       Review of Systems:  Constitutional: no fever or chills  Head: No headaches  Eyes: no blurry vision, no discharge  Ears: no ear pain or hearing changes  Nose: no runny nose or epistaxis  Respiratory: +shortness of breath + cough  Cardiovascular: no chest pain, no edema  GI: no nausea, no vomiting or diarrhea  : denies any hematuria, no flank pain  Skin: no rash  Musculoskeletal: no joint pain, moves all ext  Neuro: +left sided weakness  Psychiatric: stable mood, no depression or insomnia    Current Meds:  Infusion:    sodium chloride 100 mL/hr at 10/07/20 0644    dextrose       Meds:    sodium chloride flush  10 mL Intravenous 2 times per day    [Held by provider] enoxaparin  40 mg Subcutaneous Daily    rosuvastatin  40 mg Oral Nightly    [Held by provider] aspirin  81 mg Per NG tube Daily    [START ON 10/8/2020] influenza virus vaccine  0.5 mL Intramuscular Once    insulin lispro  0-12 Units Subcutaneous 4x Daily AC & HS    lidocaine 1 % injection  5 mL Intradermal Once    sodium chloride  20 mL Intravenous Once    [Held by provider] furosemide  60 mg Intravenous BID    potassium chloride  20 mEq Intravenous BID    potassium chloride  10 mEq Intravenous BID    insulin glargine  15 Units Subcutaneous Nightly    [Held by provider] amLODIPine  10 mg Per NG tube Daily    Vitamin D  1,000 Units Per NG tube Daily    calcium replacement protocol   Other RX Placeholder    phosphorus replacement protocol   Other RX Placeholder     Meds prn: sodium chloride flush, promethazine **OR** ondansetron, potassium chloride, potassium chloride, magnesium sulfate, acetaminophen **OR** acetaminophen, polyethylene glycol, glucose, dextrose, glucagon (rDNA), dextrose     Allergies/Intolerances:   ALLERGIES: Bee venom    24HR INTAKE/OUTPUT:      Intake/Output Summary (Last 24 hours) at 10/7/2020 1544  Last data filed at 10/6/2020 2331  Gross per 24 hour   Intake 272.51 ml   Output 800 ml   Net -527.49 ml     I/O last 3 completed shifts: In: 272.5 [I.V.:272.5]  Out: 800 [Urine:800]  No intake/output data recorded. Admission weight: 210 lb (95.3 kg)  Wt Readings from Last 3 Encounters:   10/07/20 211 lb 13.8 oz (96.1 kg)     Body mass index is 32.21 kg/m². Physical Examination:  VITALS:  /62   Pulse 100   Temp 100.7 °F (38.2 °C) (Oral)   Resp 20   Ht 5' 8\" (1.727 m)   Wt 211 lb 13.8 oz (96.1 kg)   SpO2 96%   BMI 32.21 kg/m²   Weight:   Wt Readings from Last 3 Encounters:   10/07/20 211 lb 13.8 oz (96.1 kg)     Constitutional and General Appearance: awake, ill appearing  Eyes: no icteric sclera, no pallor conjunctiva, no discharge seen from either eye  Ears and Nose: normal external appearance of left and right ear and nose. No active drainage from nose. Oral: moist oral mucus membranes  Neck: No jugular venous distention, appears symmetric, good ROM  Lungs: faint rales at bases  Heart: regular rate, S1, S2  Extremities: trace LE edema B/L  GI: soft, non-tender,   Skin: no rash seen on exposed extremities  Musculo: moves all extremities  Neuro: left sided weakness  Psychiatric: Awake, alert, Oriented    Lab Data  CBC:   Recent Labs     10/05/20  0601 10/06/20  0420 10/07/20  0607   WBC 14.7* 13.4* 12.8*   HGB 12.9* 12.1* 11.6*   HCT 40.4* 38.3* 35.3*    304 259     BMP:  Recent Labs     10/05/20  0604 10/06/20  0420 10/07/20  0607 10/07/20  1002    134* 129* 135   K 3.8 3.7 3.7  3.7  --    CL 93* 91* 89*  --    CO2 35* 33 27  --    BUN 24* 24* 23*  --    CREATININE 0.7 0.8 0.8  --    GLUCOSE 85 113* 158*  --    CALCIUM 9.0 8.5 8.0*  --      PTH: @PTH@  TSH: No results for input(s): TSH in the last 72 hours. HgBa1c: No results for input(s): LABA1C in the last 72 hours.   Hepatic:   Recent Labs 10/05/20  0604 10/06/20  0420 10/07/20  0607   LABALBU 3.0* 2.8* 2.6*   AST 68* 108* 65*   * 172* 114*   BILITOT 0.7 0.8 0.8   ALKPHOS 72 107 90     ABGs: No results found for: PHART, PO2ART, PBT8NND  Troponin: No results for input(s): TROPONINI in the last 72 hours. BNP: No results for input(s): BNP in the last 72 hours. Old labs reviewed. Impression and Plan:  1. Hyponatremia : seems to be related to some intravascular volume depletion from lasix. Resolved sodium 135 from 129 this morning. Since sodium was 134 yesterday I am not concerned about rate of correction. Ok with diuretics as needed per primary team.  Monitor sodium levels. 2. S/p mild SANTI on admission  3. Acute hypoxic respiratory failure   4. Acute CVA s/p TPA  5. COVID-10+  6. DM  7. HTN    Thank you for allowing me to participate in the care of this patient. Please feel free to call me if you have any questions.      Electronically signed by Yulia Rai DO on 10/7/20 at 3:44 PM EDT    [facility]

## 2020-10-07 NOTE — PROGRESS NOTES
Responded to patient's code stroke. NIH completed by resource RN. NIH 8. Telestroke hotline paged. Primary RN, Srinivas Samuels. Number provided.

## 2020-10-07 NOTE — PROGRESS NOTES
[address] [address]  PHYSICAL THERAPY MISSED TREATMENT NOTE  ACUTE CARE  STRZ CVICU 4B              Missed Treatment  Per OT,pt was a code stroke this am.Hold today.

## 2020-10-07 NOTE — CONSULTS
NEUROLOGY CONSULT NOTE      Requesting Physician: Natali Polanco MD    Reason for Consult:  Evaluate for Stroke, post TPA    History of Present Illness:  Tamanna Trujillo is a 67 y.o. male admitted to [facility] on 9/24/2020. The patient was admitted to the hospital with respiratory failure with COVID-19 he was placed on nonrebreather, Decadron, he was placed on the ventilator due to worsening of his symptoms, he was extubated on October 4. He is on high flow nasal cannula. The patient was noted to develop sudden onset of left side weakness, last known well was around 3 AM on 10/7/2020. The patient was less responsive, with blood pressure of 133/79 glucose was 148. His symptoms were noted suddenly, ongoing severe he was evaluated by stroke neurologist, his NIH stroke scale was 16. He underwent CT head without contrast that showed no large area of infarct, or hemorrhage. No large vessel occlusion, he was determined to be a TPA candidate. He was given the TPA, and monitored in the intensive care unit. His symptoms of left-sided weakness have improved some after the administration of TPA. Stroke symptoms were ongoing for at least 1-1/2 hours, he was given TPA. The patient denies any headache, he does have shortness of breath, modifiers are symptoms occurred following hospitalization with COVID-19 infection, with respiratory failure, pneumonia. History is obtained from the medical record and discussion with primary service. Past Medical History:        Diagnosis Date    Diabetes mellitus ([facility] [address]     Hyperlipidemia     Hypertension        History reviewed. No pertinent surgical history. Allergies:     Allergies   Allergen Reactions    Bee Venom Swelling        Current Medications:   sodium chloride flush 0.9 % injection 10 mL, 2 times per day  sodium chloride flush 0.9 % injection 10 mL, PRN  promethazine (PHENERGAN) tablet 12.5 mg, Q6H PRN    Or  ondansetron (ZOFRAN) injection 4 mg, dry to touch  Superficial temporal artery pulses are normal.   Musculoskeletal: Has hand arthritis, no limitation of ROM in any of the four extremities. no joint tenderness, deformity or swelling. There is no leg edema. The Heart was regular in rate and rhythm. No heart murmur  Chest- Cdecreased air entry, scattered rhonchi, fair effort. Abdomen: soft, obese, intact bowel sounds. Labs:    CBC:   Recent Labs     10/05/20  0601 10/06/20  0420 10/07/20  0607   WBC 14.7* 13.4* 12.8*   HGB 12.9* 12.1* 11.6*    304 259   MCV 97.3* 96.2* 95.1*   MCH 31.1 30.4 31.3   MCHC 31.9* 31.6* 32.9     CMP:  Recent Labs     10/05/20  0604 10/06/20  0420 10/07/20  0607 10/07/20  1002    134* 129* 135   K 3.8 3.7 3.7  3.7  --    CL 93* 91* 89*  --    CO2 35* 33 27  --    BUN 24* 24* 23*  --    CREATININE 0.7 0.8 0.8  --    LABGLOM >90 >90 >90  --    GLUCOSE 85 113* 158*  --    CALCIUM 9.0 8.5 8.0*  --      Liver:   Recent Labs     10/07/20  0607   AST 65*   *   ALKPHOS 90   PROT 6.1   LABALBU 2.6*   BILITOT 0.8       Results for orders placed during the hospital encounter of 09/24/20   CTA HEAD W WO CONTRAST (CODE STROKE NIHSS 4 or Above)    Narrative EXAM: HEAD AND NECK CT ANGIOGRAM:    COMPARISON:   CT,SR  - CT HEAD WO CONTRAST  - 10/07/2020 04:37 AM EDT    TECHNIQUE:   Contiguous axial images from upper mediastinum through the   vertex of head during uneventful intravenous administration of patchy   ground glass opacities are present in both apices. Small right pleural   effusion suspected. Using CT angiogram technique. Coronal and sagittal   reformatted images were also reviewed. FINDINGS:    CTA NECK:    Patchy ground glass opacities in both apices. Atherosclerosis of aortic arch. Arch vessel origins are patent and   unremarkable. Atherosclerosis of carotid bulbs. No significant stenosis of the origin   of the left internal carotid artery.   Plaque formation is more significant   on the right, with potentially severe stenosis, greater than 70% diameter,   at the origin of the right ICA. Distal to this the vessel is patent and   similar caliber to the left side. There is tortuosity of the mid cervical   ICAs bilaterally. The distal cervical ICAs cannot be well assessed   secondary to motion artifact. The vertebral arteries are patent, symmetric. No soft tissue lesion of the neck is appreciated. CTA HEAD:    The petrous and cavernous portions of the ICAs cannot be well assessed. There is extensive motion artifact. Based on the appearance of the   supraclinoid portions of the internal carotid arteries in the mid to   distal cervical ICAs, the vessels appear to remain patent. The degree of   stenosis within the cavernous sinuses cannot be assessed. The anterior cerebral arteries are patent. The middle cerebral arteries are patent, no evidence of branch occlusion. Distal vertebral arteries and basilar artery cannot be well assessed due   to the motion artifact. The basilar appears to be intact, patent, and the   posterior cerebral arteries proximally are well-visualized and are patent. The distal P2 and P3 segments are not well assessed. No intracranial lesion is demonstrated. Impression 1. Study is severely limited by motion artifact. Portions of anterior   and posterior circulation are not well evaluated. 2.  Significant stenosis at the origin of the right internal carotid   artery is apparent. Formation with Doppler ultrasound recommended. 3.  No obvious intracranial vascular occlusion, although particularly the   cavernous ICAs and the posterior circulation are not well assessed. 4.  Bilateral upper lobe groundglass opacities concerning for pneumonia.     This document has been electronically signed by: Bob Juárez MD   on 10/07/2020 05:31 AM    All CT scans at this facility use dose modulation, iterative   reconstruction, and/or weight-based  dosing when appropriate to reduce radiation dose to as low as reasonably   achievable. I reviewed the CTA neck and agree with interpretation. Results for orders placed during the hospital encounter of 09/24/20   CTA [address] (CODE STROKE NIHSS 4 or Above)    Narrative EXAM: HEAD AND NECK CT ANGIOGRAM:    COMPARISON:   CT,SR  - CT HEAD WO CONTRAST  - 10/07/2020 04:37 AM EDT    TECHNIQUE:   Contiguous axial images from upper mediastinum through the   vertex of head during uneventful intravenous administration of patchy   ground glass opacities are present in both apices. Small right pleural   effusion suspected. Using CT angiogram technique. Coronal and sagittal   reformatted images were also reviewed. FINDINGS:    CTA NECK:    Patchy ground glass opacities in both apices. Atherosclerosis of aortic arch. Arch vessel origins are patent and   unremarkable. Atherosclerosis of carotid bulbs. No significant stenosis of the origin   of the left internal carotid artery. Plaque formation is more significant   on the right, with potentially severe stenosis, greater than 70% diameter,   at the origin of the right ICA. Distal to this the vessel is patent and   similar caliber to the left side. There is tortuosity of the mid cervical   ICAs bilaterally. The distal cervical ICAs cannot be well assessed   secondary to motion artifact. The vertebral arteries are patent, symmetric. No soft tissue lesion of the neck is appreciated. CTA HEAD:    The petrous and cavernous portions of the ICAs cannot be well assessed. There is extensive motion artifact. Based on the appearance of the   supraclinoid portions of the internal carotid arteries in the mid to   distal cervical ICAs, the vessels appear to remain patent. The degree of   stenosis within the cavernous sinuses cannot be assessed. The anterior cerebral arteries are patent.   The middle cerebral arteries are patent, no evidence of branch occlusion. Distal vertebral arteries and basilar artery cannot be well assessed due   to the motion artifact. The basilar appears to be intact, patent, and the   posterior cerebral arteries proximally are well-visualized and are patent. The distal P2 and P3 segments are not well assessed. No intracranial lesion is demonstrated. Impression 1. Study is severely limited by motion artifact. Portions of anterior   and posterior circulation are not well evaluated. 2.  Significant stenosis at the origin of the right internal carotid   artery is apparent. Formation with Doppler ultrasound recommended. 3.  No obvious intracranial vascular occlusion, although particularly the   cavernous ICAs and the posterior circulation are not well assessed. 4.  Bilateral upper lobe groundglass opacities concerning for pneumonia. This document has been electronically signed by: Paxton Aguayo MD   on 10/07/2020 05:31 AM    All CT scans at this facility use dose modulation, iterative   reconstruction, and/or weight-based  dosing when appropriate to reduce radiation dose to as low as reasonably   achievable. Carotid stenosis and measurements are in accordance with the NASCET   criteria. I reviewed the CT brain and agree with interpretation. Results for orders placed during the hospital encounter of 09/24/20   CT HEAD WO CONTRAST    Narrative * ADDENDUM #1 *  This report was discussed with Andrés Crain on Oct 07, 2020 05:11:00   EDT. This document has been electronically signed by: Benjy Kern on   10/07/2020 05:11 AM  * ORIGINAL REPORT *  EXAM:  CT HEAD:    COMPARISON:  None    TECHNIQUE:  Helical noncontrast axial images from base of skull to vertex,   with coronal and sagittal reformats. FINDINGS:    The ventricles and sulci are within normal limits. No mass, mass-effect,   hemorrhage or evidence of infarct is identified. The posterior fossa contents are unremarkable.     No abnormality of skull base or calvarium is seen. The orbital soft tissues are unremarkable. No significant abnormality of paranasal sinuses. Mastoid air cells are clear. Impression Unremarkable CT of the head. This document has been electronically signed by: Tono Gong MD   on 10/07/2020 05:06 AM    All CT scans at this facility use dose modulation, iterative   reconstruction, and/or weight-based  dosing when appropriate to reduce radiation dose to as low as reasonably   achievable. We reviewed the patient records and available information in the EHR       Impression:    Active Problems:    COVID-19    Left hemiparesis ([address]  Resolved Problems:    * No resolved hospital problems. *  This is a 67year old male who presents with hypoxemic respiratory failure, on 9/24 secondary to COVID-19. He also had Klebsiella pneumonia, and Haemophilus influenza treated with Zosyn. The patient was on intermittent BiPAP. Apparently the patient developed left hemiparesis, slurred speech, and confusion he was evaluated by stroke neurology team and found to have an NIH stroke scale of 16. CT brain shows no large territory infarct or bleed, old appearing right basal ganglia infarct, CTA imaging shows right cervical ICA stenosis with calcifications, no large vessel occlusion. He was determined to be TPA candidate. He improved some. Still has left hemiparesis. The patient should be maintained on close neuro status monitoring. MRI brain is pending for tonight. He is to continue antiplatelets after 24 hours of TPA per protocol. The patient stroke is probably related to COVID-19 hypercoagulable state. Case discussed with the primary service, recommendations are as follows. Recommendations:                                              1. Continue antiplatelets/anticoagulation after 24 hours of TPA  2. MRI brain without contrast with diffusion sequence for today  3. Close Neurochecks per ICU protocol. 4. Repeat CT head without contrast 24 hours after TPA administration. 5. Fasting lipid profile/ start Lipid lowering medication  6. Target LDL below 70.   7. Physical therapy, Occupational Therapy and Speech Therapy for Dysphagia/Languge  8. Modify Risk factors (example Hypertension, Diabetes, hyperlipidemia, Smoking Cessation)  9. CTA head & Neck noted  10. Cardiac echo with bubble study. 11. COVID-19 and respiratory failure mgmt per ICU team.   12. Permissive hypertension for up to one week desired -180. 13. DVT prophylaxis. 14. Discussed with  primary service. Time spent evaluating patient, counseling, consulting with providers, reviewing images, and records was 70 min.       Electronically signed by Higinio Chambers MD on 10/7/2020 at 6:12 PM

## 2020-10-07 NOTE — FLOWSHEET NOTE
5415: Primary nurse Alo Najera assessed patient noticed LOC changes and left sided arm weakness. 0404: Code Stroke Activated at this time. NIH 8    0407:117/62-83 , , SPO2 93% on HIFLOW    0410: Rapid Response Team at bedside    0530: Report from [facility].     0426: Dr. Charlee Mcginnis consulted at this time via perfect serve.

## 2020-10-07 NOTE — PLAN OF CARE
Intensivist Progress Note      Patient:  [facility]    Unit/Bed:4B-03/003-A  YOB: 1945  MRN: [id number]   Acct: [de-identified]     PCP: Iris Light MD  Date of Admission: 9/24/2020    Sign out in person to [facility] as patient transferring to  stepdown side. Precedex IV off since 0300. HiFlow oxygen needs decreasing.       Gisela Hollingsworth, ARABELLA - CNP

## 2020-10-07 NOTE — PROGRESS NOTES
[facility] THERAPY  [facility] CVICU 4B  Bedside Swallowing Evaluation      SLP Individual Minutes  Time In: 7271  Time Out: 4094  Minutes: 11  Timed Code Treatment Minutes: 0 Minutes       Date: 10/7/2020  Patient Name: Navarro Anguiano      CSN: [id number]   : 1945  (67 y.o.)  Gender: male   Referring Physician: Mitzy Adrian MD  Diagnosis: COVID-19  Secondary Diagnosis: Dysphagia, cognition    History of Present Illness/Injury: Per chart review; Patient is a 58-year-old morbidly obese white male reformed smoker. Gregg Mckeon has a history of diabetes, hyperlipidemia, and hypertension. Patient was admitted from the emergency room on 2020 with hypoxemic respiratory failure secondary to [address] had associated acute renal injury.  He required 100% nonrebreather and was started on Decadron and Remdesivir.  He continued to deteriorate and required intubation on 2020.  Acute lung injury progressed to ARDS and started pronation therapy on 2020. Sotero Dale was found to have bilateral pneumonia with Klebsiella pneumoniae and Haemophilus influenza which was treated with Zosyn.  Patient completed 12-day course of Zosyn on 10/5/20. Patient extubated to 10/4/22 intermittent BiPAP. Patient had neurologic decline in the early morning hours of 10/7/2020. Code stroke was initiated, and patient underwent neurologic assessment. The decision was made to administer TPA as the patient had left sided weakness and increased confusion. TPA was administered without difficulty. For further details, please review the assessment and plan. Past Medical History:   Diagnosis Date    Diabetes mellitus ([facility] [address]     Hyperlipidemia     Hypertension        SUBJECTIVE: Patient is alert, upright in bed, and agreeable for ST BSE. The pt is pleasantly confused, dysarthric, and unable to follow commands consistently. OBJECTIVE:    Pain:  No pain reported.     Current Diet: NPO     Respiratory Status:  Nasal Canula and HHFNC 66% FiO2, 50L/min *Intubated 9/27, Extubated 10/4    Behavioral Observation:  Alert, Confused and Dysarthric    Oral Mechanism Evaluation:      Facial / Labial WFL    Lingual WFL    Dentition WFL    Velum Not Tested Unable to visualize    Vocal Quality WFL    Sensation [facility]    Cough WFL Strong      Patient Evaluated Using: Thin via ice x2, nectar vis tap 1x, cup drink x1, honey, cup sip x1, puree via tsp x1. Oral Phase:  Impaired:  Impaired AP Movement, Lingual Pumping and Reduced Bolus Formation    Pharyngeal Phase: Impaired:  Delayed Swallow, Decreased Hyolaryngeal Elevation and Suspected Pharyngeal Residue    Signs and Symptoms of Laryngeal Penetration/Aspiration: Throat Clear, Immediate Cough and Wet Vocal Quality    Impresssions: Patient seen with RN permission on this date. Pt presented with moderate oral dysphagia / suspected moderate pharyngeal dysphagia, characterized by decreased bolus control, open mouth munching pattern of ice, nectar, honey, pudding. In addition, pt presented with decreased bolus formation and uncoordinated AP movement, HIGHLY suspect premature loss of liquids/solids over BOT given consistent 3-4 second delayed swallow onset. Also, pt has severely diminished laryngeal elevation, and persistent, consistent immediate throat clear, coughing, and wet vocal quality, following minimal PO trials. Recommended STRICT NPO with consideration for a re-evaluation 10/8 as schedule permits and as the pt is medically appropriate and/or available. RECOMMENDATIONS/ASSESSMENT:   Modified Barium Swallow:  MBS is not indicated at this time.   Will recommend as appropriate  Diet Recommendations:  NPO   Strategies:  Recommend NPO   Rehabilitation Potential: good    EDUCATION:  Learner: Patient  Education:  Reviewed results and recommendations of this evaluation, Reviewed diet and strategies, Reviewed ST goals and Plan of Care and Reviewed recommendations for follow-up  Evaluation of Education: Needs further instruction, No evidence of learning and Family not present    PLAN:  Speech Therapy evaluation to assess speech, language, cognition and/or voice and Skilled SLP intervention on acute care 3-5 x per week or until goals met and/or pt plateaus in function. Specific interventions for next session may include: PO trials, cog eval, adn monitor readiness or need for MBS . PATIENT GOAL:    Return to prior level of function. SHORT TERM GOALS:  Short-term Goals  Timeframe for Short-term Goals: 2 weeks  Goal 1: Patient will complete therapy directed PO trials per ST discretion with no overt s/s of aspiration/penetration to determine readiness for a diet upgrade. Goal 2: Will monitor for instrumental swallow evaluation  Goal 3: Will complete cognitive linguistic evaluation    LONG TERM GOALS:  No LTM Goals recommended, due to anticipated short ELOS. Brandyn Coyle Asp, MA., VIVI

## 2020-10-08 NOTE — FLOWSHEET NOTE
10/08/20 0291   Provider Notification   Reason for Communication Review case  (10 beat run of [place])   Provider Name Karli Baron   Provider Notification Advance Practice Clinician (CNS, NP, CNM, CRNA, PA)   Method of Communication Secure Message   Response See orders   Notification Time 0628     Notified Karli Baron of patient's 10 beat run of [place]. See new orders for magnesium lab draw.

## 2020-10-08 NOTE — PROGRESS NOTES
[facility]  INPATIENT SPEECH THERAPY  [facility] CVICU 4B  DAILY NOTE    TIME   SLP Individual Minutes  Time In: 8453  Time Out: 1320  Minutes: 16  Timed Code Treatment Minutes: 0 Minutes       Date: 10/8/2020  Patient Name: Ann Lucia      CSN: [id number]   : 1945  (67 y.o.)  Gender: male   Referring Physician: Donny Grossman MD  Diagnosis: COVID-19  Secondary Diagnosis: Dysphagia, cognition  Precautions: Droplet plus precautions, aspiration  Current Diet: NPO  Swallowing Strategies: Standard Universal Swallow Precautions  Date of Last MBS: Not Applicable    Pain:  No pain reported. Subjective:  Patient is alert, upright in bed, and pleasantly confused. Pt experiencing some hallucinations on this date. Pt continued to have conversations with his son [employer], who was NOT present in the pt's room. Short-Term Goals:  SHORT TERM GOAL #1:  Goal 1: Patient will complete therapy directed PO trials per ST discretion with no overt s/s of aspiration/penetration to determine readiness for a diet upgrade. INTERVENTIONS:Completed a swallow re-evaluation at the bedside on this date. PO Trails Included: Thin completed via ice chips x1-- pt presented with immediate gag response, delayed swallow onset, and overt coughing. Highly indicative of premature loss d/t decreased bolus control and concerning for aspiration. Nectar/Mild completed via tsp 1x with a  Similar response to thin liquids, as stated above. Honey/Moderate complete via tsp & cup drink x2 -- pt presented with improved bolus control via tsp, timely AP movement, no overt s/s of aspiration/penetration. Progressed to cup drink, which the pt impulsively took a large drink, with decreased bolus control, uncoordinated AP movement, and immediate overt s/s of aspiration/penetration moderate coughing episode. *Additional trials deferred on this date d/t increased aspiration risk.  *Recommended NPO with recommendations for short term means of nutrition via NG tube, only as MD is in agreement. SHORT TERM GOAL #2:  Goal 2: Will monitor for instrumental swallow evaluation  INTERVENTIONS: Not addressed on this date d/t obvious overt s/s of aspiration/penetration at bedside     Julian Hercules 0340 #3:  Goal 3: Will complete cognitive linguistic evaluation  INTERVENTIONS: Unable to complete d/t increased confusion, hallucinations, and slight agitation. Long-Term Goals:  No LTM Goals recommended, due to anticipated short ELOS. EDUCATION:  Learner: Patient  Education:  Reviewed results and recommendations of this evaluation, Reviewed diet and strategies, Reviewed signs, symptoms and risks of aspiration, Reviewed ST goals and Plan of Care and Reviewed recommendations for follow-up  Evaluation of Education: Needs further instruction, No evidence of learning and Family not present    ASSESSMENT/PLAN:  Activity Tolerance:  Patient tolerance of  treatment: fair. Assessment/Plan: Patient progressing toward established goals. Continues to require skilled care of licensed speech pathologist to progress toward achievement of established goals and plan of care. .     Plan for Next Session: limited PO trials to determine readiness fpr a diet upgrade. Brandyn Gonzales MA., OBIE

## 2020-10-08 NOTE — PROGRESS NOTES
This RN spoke with patient's daughter Angel Del Rosario about patient status and wellbeing. Daughter Angel Del Rosario was updated on patient's plan of care. Daughter had no other questions at this time. This RN encouraged daughter to call back with any other questions and concerns.

## 2020-10-08 NOTE — CARE COORDINATION
10/8/20, 3:18 PM EDT    DISCHARGE ON GOING [address] [facility] [facility] day: 14  Location: -08/008-A Reason for admit: COVID-19 [U07.1]   Procedure: 9/24 CXR: Slightly increased density in both lung fields; Borderline cardiomegaly; Thoracic spondylosis  9/24 Vapotherm initiated  9/27 Intubated  9/27 CVC Right subclavian - 10/5 removed  9/27 CXR: Diffuse infiltrates are scattered throughout the lungs bilaterally  9/29 - 10/2 Rotoprone therapy   10/1 Convalescent Plasma  10/4 Extubated  10/4 Vapotherm initiated  37/7 PICC right basilic  89/7 CT Head: No acute findings  10/7 CTA Head/Neck:  Study is severely limited by motion artifact.  Portions of anterior and posterior circulation are not well evaluated; Significant stenosis at the origin of the right internal carotid artery is apparent; No obvious intracranial vascular occlusion, although particularly the cavernous ICAs and the posterior circulation are not well assessed; Bilateral upper lobe groundglass opacities concerning for pneumonia  10/7 Code stroke - TPA given  10/7 Bilateral carotid dopplers: Mural plaque at the carotid bulbs bilaterally, right greater than left with greater than 70% stenosis on the right and less than 50% stenosis on the left by SRU consensus criteria  MRI brain: Mural plaque at the carotid bulbs bilaterally, right greater than left with greater than 70% stenosis on the right and less than 50% stenosis on the left by SRU consensus criteria. 10/08 CT head:No evidence of intracranial hemorrhage. Mild severity chronic small vessel ischemic changes. Layering fluid in the paranasal sinuses with some fluid in the mastoid air cells.        Treatment Plan of Care: Neruo checks, K+ 3.3 with IV replacement, telemetry Sinus Tachy 106, had TPA 10/7, code stroke, left sided weakness, confusion, Zosyn stopped, diabetic management with ISS, on Vapotherm FiO2 53 % with 50 L/min, sat 93%, Tmax 100.7, Tylenol, Lasix ASA held, IVF, nephrology, intensivist, neurology follows, NIH 11, SLP, PT/OT. Barriers to Discharge: post stroke  PCP: Bebo Chadwick MD  Readmission Risk Score: 22%  Patient Goals/Plan/Treatment Preferences: from home with spouse; will require a precert for TCU when med ready.

## 2020-10-08 NOTE — FLOWSHEET NOTE
10/08/20 4798   Provider Notification   Reason for Communication Review case  (potassium of 3.3)   Provider Name João Elias    Provider Notification Advance Practice Clinician (CNS, NP, CNM, CRNA, PA)   Method of Communication Secure Message   Response No new orders   Notification Time [id number]     Notified João Elias of patient's potassium of 3.3. No new orders.

## 2020-10-08 NOTE — PROGRESS NOTES
[facility]  OCCUPATIONAL THERAPY MISSED TREATMENT NOTE  [facility] CVICU 4B  4B-08/008-A      Date: 10/8/2020  Patient Name: Cecil Ramírez        CSN: [id number]   : 1945  (67 y.o.)  Gender: male   Referring Practitioner: Dr. Cindy Cabrera MD  Diagnosis: COVID 19         REASON FOR MISSED TREATMENT: Pt leaving for head CT this AM, will check back as time allows

## 2020-10-08 NOTE — PROGRESS NOTES
promethazine **OR** ondansetron, potassium chloride, potassium chloride, magnesium sulfate, acetaminophen **OR** acetaminophen, polyethylene glycol, glucose, dextrose, glucagon (rDNA), dextrose    Input/Output:       I/O last 3 completed shifts: In: 5831 [I.V.:3564]  Out: 6045 [Urine:1375]. Patient Vitals for the past 96 hrs (Last 3 readings):   Weight   10/08/20 0400 220 lb (99.8 kg)   10/07/20 0046 211 lb 13.8 oz (96.1 kg)   10/06/20 0402 210 lb 12.2 oz (95.6 kg)       Vital Signs:   Temperature:  Temp: 98.9 °F (37.2 °C)  TMax:   Temp (24hrs), Av.6 °F (37.6 °C), Min:98.6 °F (37 °C), Max:100.7 °F (38.2 °C)    Respirations:  Resp: 22  Pulse:   Pulse: 98  BP:    BP: (!) 144/78  BP Range: Systolic (02VTQ), QZH:889 , Min:120 , IOH:249       Diastolic (17WOX), UMQ:27, Min:61, Max:78      Physical Examination:     General:  Awake, sitting at side of bed with RN, very unsteady  HEENT: NC/AT/ MMM  Chest:               Rhonchi noted  Cardiac:  S1 S2   Abdomen: Soft, non-tender,  Neuro:  No facial droop, left sided weakness  SKIN:  No rashes, good skin turgor. Extremities:  No edema, no cyanosis    Labs:       Recent Labs     10/06/20  0420 10/07/20  0607 10/08/20  0416   WBC 13.4* 12.8* 11.8*   RBC 3.98* 3.71* 3.12*   HGB 12.1* 11.6* 9.7*   HCT 38.3* 35.3* 30.3*   MCV 96.2* 95.1* 97.1*   MCH 30.4 31.3 31.1   MCHC 31.6* 32.9 32.0*    259 242   MPV 9.7 9.7 9.6      BMP:   Recent Labs     10/06/20  0420 10/07/20  0607 10/07/20  1002 10/08/20  0003 10/08/20  0416   * 129* 135 132* 138   K 3.7 3.7  3.7  --   --  3.3*   CL 91* 89*  --   --  103   CO2 33 27  --   --  22*   BUN 24* 23*  --   --  12   CREATININE 0.8 0.8  --   --  0.5   GLUCOSE 113* 158*  --   --  119*   CALCIUM 8.5 8.0*  --   --  6.7*      Phosphorus:   No results for input(s): PHOS in the last 72 hours.   Magnesium:    Recent Labs     10/08/20  0416   MG 1.8     Albumin:    Recent Labs     10/06/20  0420 10/07/20  0607 10/08/20  0416 LABALBU 2.8* 2.6* 2.2*     BNP:    No results found for: BNP  RICHARD:    No results found for: RICHARD  SPEP:  Lab Results   Component Value Date    PROT 5.1 10/08/2020     UPEP:   No results found for: LABPE  C3:   No results found for: C3  C4:   No results found for: C4  MPO ANCA:   No results found for: MPO  PR3 ANCA:   No results found for: PR3  Anti-GBM:   No results found for: GBMABIGG  Hep BsAg:       No results found for: HEPBSAG  Hep C AB:        No results found for: HEPCAB    Urinalysis/Chemistries:      Lab Results   Component Value Date    NITRU NEGATIVE 10/01/2020    COLORU ERICA 10/01/2020    PHUR 5.5 10/01/2020    LABCAST 0-4 HYALINE 10/01/2020    LABCAST NONE SEEN 10/01/2020    WBCUA 10-15 10/01/2020    RBCUA 50-75 10/01/2020    YEAST NONE SEEN 10/01/2020    BACTERIA NONE SEEN 10/01/2020    SPECGRAV 1.028 10/01/2020    LEUKOCYTESUR SMALL 10/01/2020    UROBILINOGEN 1.0 10/01/2020    BILIRUBINUR NEGATIVE 10/01/2020    BLOODU LARGE 10/01/2020    KETUA NEGATIVE 10/01/2020    AMORPHOUS URATES 09/27/2020     Urine Sodium:   No results found for: NATALIIA  Urine Potassium:  No results found for: KUR  Urine Chloride:  No results found for: CLUR  Urine Osmolarity:   Lab Results   Component Value Date    OSMOU 663 10/07/2020     Urine Protein:   No components found for: TOTALPROTEIN, URINE   Urine Creatinine:   No results found for: LABCREA  Urine Eosinophils:  No components found for: UEOS        Impression and Plan:  1. Hyponatremia due to diuretic: resolved  2. Hypokalemia: replacement in process  3. Acute hypoxic resp failure  4. COVID-19+   5. Acute CVA s/p TPA  6. DM  7. HTN  8. Carotid artery stenosis    Hyponatremia is resolved. Nephrology will sign off, please call with questions. Please don't hesitate to call with any questions.   Electronically signed by Asael Wallace DO on 10/8/2020 at 12:53 PM

## 2020-10-08 NOTE — PROGRESS NOTES
Patient:  Bobo Rasmussen  Unit/Bed:4B-03/003-A  IPJ: [id number]            Dami Louis MD  Date of Admission: 9/24/2020     Assessment and Plan(All pulmonary edema, renal failure, PE, and respiratory failure diagnoses are acute in nature unless otherwise specified):        1. Acute respiratory failure: Patient intubated 9/27/2020.  Requiring moderate to high levels of PEEP and moderate levels of FiO2.  Status post pronation therapy which started 9/29/2020 and completed 10/2/2020.    Patient extubated to high flow oxygen on 6/4/8454.  2. ARDS: Application of NINYB-85.  Status post pronation therapy. Hypercoagulable state.  Aspirin and subcutaneous Lovenox to mitigate microthrombosis. Repeat CT scan head 10/8/2020 showed no evidence of hemorrhage or petechial hemorrhage. 3. Acute CVA: Underwent TPA administration 10/7/2020. CTA head neck suggests right internal carotid artery narrowing at orifice. Obtain ultrasound of carotid arteries. Permissive hypertension for 24 hours targeting systolic less than 506. After 24 hours, target systolic blood pressure less than 140. Residual left-sided weakness, confusion, mild dysarthria, and potential swallowing difficulties. Permissive hypertension should and 10/14/2020.    4. COVID-19: Patient s/p Decadron.  Patient s/p Remdesivir.  S/P convalescent plasma 10/1/2020. Still with ongoing fever. Continue to monitor. 5. Pneumonia: Secondary to Klebsiella pneumoniae and Haemophilus influenza.  On systemic steroids for severe pneumonia.  Patient completed 14 days of Zosyn on 10/7/2020. Concern for aspiration events. Eventually will need modified barium swallow. Suspect fever is secondary to ongoing COVID. Monitor off antibiotics. 6. Sepsis: Secondary to pneumonia and COVID-19.  Supportive care. 7. Metabolic encephalopathy: Persists. Secondary to hypoxemia.   Secondary to COVID-19.   8. 2 diabetes mellitus: On subcutaneous insulin and sliding scale insulin. 9. Hyperlipidemia: Lipitor. 10. Hypertension: On amlodipine. Amlodipine on hold for initial post CVA permissive hypertension. Initial goals are to keep systolic blood pressure less than 220 for 24 hours and subsequently less than 140 post 24 hours. Now with 1 week permissive hypotension with systolic blood pressure between 150 and 180 prefer to.     CC: Acute respiratory failure  HPI: Patient is a 54-year-old morbidly obese white male reformed smoker. Darnell Forrester has a history of diabetes, hyperlipidemia, and hypertension. Patient was admitted from the emergency room on 9/24/2020 with hypoxemic respiratory failure secondary to [address] had associated acute renal injury.  He required 100% nonrebreather and was started on Decadron and Remdesivir.  He continued to deteriorate and required intubation on 9/27/2020.  Acute lung injury progressed to ARDS and started pronation therapy on 9/29/2020. Jeff Elizondo was found to have bilateral pneumonia with Klebsiella pneumoniae and Haemophilus influenza which was treated with Zosyn.  Patient completed 12-day course of Zosyn on 10/5/20. Patient extubated to 10/4/22 intermittent BiPAP. Patient had neurologic decline in the early morning hours of 10/7/2020. Code stroke was initiated, and patient underwent neurologic assessment. The decision was made to administer TPA as the patient had left sided weakness and increased confusion. TPA was administered without difficulty. For further details, please review the assessment and plan. ROS: No cough. No shortness of breath. No fever. PMH:  Per HPI  SHX: Reformed smoker since 2000. FHX: No known TB exposure.   Allergies: NKDA  Medications:     sodium chloride 100 mL/hr at 10/08/20 1649    dextrose        sodium chloride flush  10 mL Intravenous 2 times per day    enoxaparin  40 mg Subcutaneous Daily    rosuvastatin  40 mg Oral Nightly    aspirin  81 mg Per NG tube Daily    influenza virus vaccine 0.5 mL Intramuscular Once    insulin lispro  0-12 Units Subcutaneous 4x Daily AC & HS    lidocaine 1 % injection  5 mL Intradermal Once    sodium chloride  20 mL Intravenous Once    [Held by provider] furosemide  60 mg Intravenous BID    potassium chloride  20 mEq Intravenous BID    potassium chloride  10 mEq Intravenous BID    insulin glargine  15 Units Subcutaneous Nightly    [Held by provider] amLODIPine  10 mg Per NG tube Daily    Vitamin D  1,000 Units Per NG tube Daily    calcium replacement protocol   Other RX Placeholder    phosphorus replacement protocol   Other RX Placeholder     Vital Signs:   T: 98.5: P: 107: RR: 20: B/P: 136/65: FiO2: 50: O2 Sat: 93: I/O: 3564/1375  GCS:  14: Body mass index is 33.45 kg/m². Luther [facility]:   Overweight acutely ill-appearing white male. HEENT:  normocephalic and atraumatic.  No scleral icterus. PERR  Neck: supple.  No Thyromegaly. Lungs: Bilateral crackles.  No retractions  Cardiac: RRR.  No JVD. Abdomen: soft.  Nontender. Extremities:  No clubbing, cyanosis x 4.  Trace upper and lower extremity edema bilaterally. Vasculature: capillary refill < 3 seconds. Palpable dorsalis pedis pulses. Skin:  warm and moist.  Psych:  Awake.  Interactive.  Oriented to person.  Confused to place, time, and circumstance. Lymph:  No supraclavicular adenopathy. Neurologic:   No seizures.  Slight dysarthria. Left sided weakness. Patient unable to maintain left arm and left leg against gravity.       Data: (All radiographs, tracings, PFTs, and imaging are personally viewed and interpreted unless otherwise noted). · Telemetry shows normal sinus rhythm. · Sputum PCR positive for Klebsiella pneumoniae, and Haemophilus influenza. · CT scan head report 10/7/2020: No acute pathology. No hemorrhage. · CTA 10/7/20 report head neck was limited by motion artifact. Severe stenosis at the right internal carotid artery. No thrombus. · CT scan head 10/8/2020: Report.   No active hemorrhage. · Sodium 138, potassium 3.3, chloride 103, bicarb 22, BUN 12, creatinine 0.5, glucose 150. White blood cell count 11.8, hemoglobin 9.7, platelets 633.        Electronically signed by Hay Casey.  Alyson Kehr M.D.

## 2020-10-08 NOTE — PLAN OF CARE
Problem: Nutrition  Goal: Optimal nutrition therapy  Outcome: Ongoing   Nutrition Problem #1: Inadequate oral intake  Intervention: Food and/or Nutrient Delivery: Continue NPO, Start Tube Feeding, Vitamin Supplement  Nutritional Goals: EN to provide % nutrient needs until appropriate for po intake

## 2020-10-08 NOTE — PROGRESS NOTES
[address] [address]  INPATIENT PHYSICAL THERAPY  DAILY NOTE  [facility] 4B - 4B-08/008-A    Time In: 1120  Time Out: 1145  Timed Code Treatment Minutes: 25 Minutes  Minutes: 25          Date: 10/8/2020  Patient Name: Aayush Bolivar,  Gender:  male        MRN: [id number]  : 1945  (67 y.o.)     Referring Practitioner: Esha Mccall MD  Diagnosis: COVID-19  Additional Pertinent Hx: Per ED note, pt \"is a 67 y.o. male who presents above-noted complaint (altered mental status, cough). Patient presents with generalized weakness not feeling good. He has been having symptoms this week with his wife and both are positive for COVID. He denies chest pain abdominal pain or other issues although had some generalized fatigue and some confusion. He himself is without complaint such as headache neck pain chest pain or abdominal pain. \" pt extubated 10/4/20     Prior Level of Function:  Lives With: Spouse  Type of Home: House  Home Layout: One level  Home Access: Stairs to enter with rails  Entrance Stairs - Number of Steps: 4 steps with 1 rail  Home Equipment: Cane, Rolling walker   Bathroom Shower/Tub: (Adapted walk in tub)  Bathroom Toilet: Standard  Bathroom Accessibility: Accessible    Receives Help From: Family  ADL Assistance: Needs assistance  Homemaking Assistance: Needs assistance  Homemaking Responsibilities: No  Ambulation Assistance: Independent  Transfer Assistance: Independent  Active :  Yes  Additional Comments: Per pt,  he used walker PTA, spouse is currently on TCU for rehab    Restrictions/Precautions:  Restrictions/Precautions: Fall Risk, General Precautions, Isolation  Position Activity Restriction  Other position/activity restrictions: COVID-19 +    SUBJECTIVE: pt tolerated fair, pt cont confusion throughout session    PAIN: pt stated has pain but can't clarify where or rate    OBJECTIVE:  Bed Mobility:  Rolling to Right/left: Maximum Assistance, X 1   Supine to Sit: Maximum Assistance, X 1  Sit to Supine: Maximum Assistance, X 1   Scooting: Maximum Assistance, X 1   cues for pt to assist, inc difficulty reaching with LUE, HOB Up 20 degrees  Transfers:  Not Tested    Ambulation:  Not tested    Balance:  Static Sitting Balance:  Maximum Assistance, X 1, pt tendency to push to right, briefly at times would be CGA but still min lean to right however not maintaining, BUE support, tolerated around 10 min        Functional Outcome Measures: Completed  AM-PAC Inpatient Mobility without Stair Climbing Raw Score : 7  AM-PAC Inpatient without Stair Climbing T-Scale Score : 28.66    ASSESSMENT:  Assessment: Patient progressing toward established goals. and pt with confusion and fluctuating assist primarily maxA  Activity Tolerance:  Patient tolerance of  treatment: fair. Equipment Recommendations: Other: monitor for needs  Discharge Recommendations: pt not safe to return home,  Continue to assess pending progress, [address], Patient would benefit from continued therapy after discharge    Plan: Times per week: 3-5x GM  Specific instructions for Next Treatment: PT to assess transfers and gait  Current Treatment Recommendations: Strengthening, Functional Mobility Training, Endurance Training, Transfer Training, Safety Education & Training, Home Exercise Program, Patient/Caregiver Education & Training    Patient Education  Patient Education: bed mobility, sitting balance and midline orientation    Goals:  Patient goals : go home  Short term goals  Time Frame for Short term goals: at discharge  Short term goal 1: Pt to be Genet for supine <> sit to get in/out of bed  Short term goal 2: pt to tolerate >10 min sitting balanace activity with </=Genet to demonstrate improved midline orientation and strength for progression to transfers  Short term goal 3: PT to assess transfers/gait  Long term goals  Time Frame for Long term goals : not set due to short ELOS    Following session, patient left in safe position with all fall risk precautions in place.

## 2020-10-08 NOTE — PROGRESS NOTES
Comprehensive Nutrition Assessment    Type and Reason for Visit:  Reassess, Consult(TF ordering and management)    Nutrition Recommendations/Plan:   Once NGT placement verified begin Vital 1.2 20ml/hour increase 10ml every 4h as tolerated to goal of 70ml/hour. Flush 1 2.5ounce liquid protein bottle daily. Free H20 per MD.     Nutrition Assessment:    Pt nutritionally compromised AEB NPO per SLP evaluation with need for EN to meet nutrition needs. 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 Nutrition recommendations/interventions as above. Malnutrition Assessment:  Malnutrition Status:  Insufficient data(+covid patient)    Context:  Acute Illness     Findings of the 6 clinical characteristics of malnutrition:  Energy Intake:  (EN or NPO since admit)  Weight Loss:  Unable to assess(no weight hx in EMR)     Body Fat Loss:  Unable to assess(+covid)     Muscle Mass Loss:  Unable to assess(+covid)    Fluid Accumulation:  No significant fluid accumulation     Strength:  Not Performed    Estimated Daily Nutrient Needs:  Energy (kcal):  8901-0858 (30-32 kcals/kg for late active phase);  Weight Used for Energy Requirements:  Ideal(70 kg - ideal weight)     Protein (g):  140 (2.0 grams/kg ideal weight); Weight Used for Protein Requirements:  (70 kg - ideal weight)        Fluid (ml/day):  per MD; Weight Used for Fluid Requirements:  (n/a)      Nutrition Related Findings:  covid; extubated 10/4; was on po diet post extubation then CVA with TPA Tx on 10/7; NPO per SLP; spoke with Dr. Tom Silverio today and starting TF; flexiseal in place but no output recorded since 10/6; glucose 119  BUN 12  creatinine 0.5  ; meds include vitamin d, lantus, humalog; per SLP Recommend NPO with recommendations for short term means of nutrition via NG tube, only as MD is in agreement. Wounds:  Stage II(coccyx)       Current Nutrition Therapies:    Current Tube Feeding (TF) Orders:  · Feeding Route: Nasogastric(plan placement 10/8)  · Formula: Semi-Elemental(Vital 1.2)  · Schedule: Continuous(starting at 20ml/hour increase 10ml every 4h as tolerated to goal of 70ml/hour)  · Additives/Modulars: Protein(1 2.5ounce liquid protein bottle daily)  · Water Flushes: per MD  · Goal TF & Flush Orders Provides: 2120 kcals, 152 gms protein, 186gms cho, 9 gms fiber, 1363ml free H20/24h      Anthropometric Measures:  · Height: 5' 8\" (172.7 cm)  · Current Body Weight: 220 lb (99.8 kg)(10/8 with no edema)   · Admission Body Weight: 220 lb (99.8 kg)(9/24/20, no weight source)    · Usual Body Weight: (no weight hx available in EMR, patient intubated)     · Ideal Body Weight: 154 lbs;    · BMI: 33.5  · BMI Categories: Obese Class 1 (BMI 30.0-34.9)(33.59)       Nutrition Diagnosis:   · Inadequate oral intake related to swallowing difficulty as evidenced by swallow study results, nutrition support - enteral nutrition      Nutrition Interventions:   Food and/or Nutrient Delivery:  Continue NPO, Start Tube Feeding, Vitamin Supplement  Nutrition Education/Counseling:  No recommendation at this time   Coordination of Nutrition Care:  Continued Inpatient Monitoring, Interdisciplinary Rounds    Goals:  EN to provide % nutrient needs until appropriate for po intake       Nutrition Monitoring and Evaluation:   Behavioral-Environmental Outcomes:  (n/a)   Food/Nutrient Intake Outcomes:  Enteral Nutrition Intake/Tolerance  Physical Signs/Symptoms Outcomes:  Biochemical Data, Chewing or Swallowing, Diarrhea, GI Status, Fluid Status or Edema, Hemodynamic Status, Skin, Weight     Discharge Planning:     Too soon to determine     Electronically signed by Gia Miller, RD, LD on 10/8/20 at 2:29 PM EDT    Contact: [phone]

## 2020-10-08 NOTE — PLAN OF CARE
Problem: Falls - Risk of:  Goal: Will remain free from falls  Description: Will remain free from falls  Outcome: Ongoing  Note: Patient remains free from falls this shift. Fall precautions in place with bed/chair exit alarmed. Fall sign posted and fall armband in place. Nonskid footwear used with transferring. Educated patient to use call light when in need of staff assistance with transferring, ambulating, and other activities of daily living. Patient appropriately uses call light this shift. Problem: Falls - Risk of:  Goal: Absence of physical injury  Description: Absence of physical injury  Outcome: Ongoing     Problem: Skin Integrity:  Goal: Will show no infection signs and symptoms  Description: Will show no infection signs and symptoms  10/8/2020 0519 by Nick Still RN  Outcome: Ongoing  Note: Patient shows no new signs of infection this shift. PICC site clean, dry, intact. Problem: Airway Clearance - Ineffective  Goal: Achieve or maintain patent airway  10/8/2020 0519 by Nick Still RN  Note: Patient able to maintain patent airway this shift. Patient on Heated High-Flow, FiO2 50% at 50L/min       Problem: Gas Exchange - Impaired  Goal: Absence of hypoxia  Outcome: Ongoing  Note: Patient oxygen saturation greater than 90% at this time. Problem: Breathing Pattern - Ineffective  Goal: Ability to achieve and maintain a regular respiratory rate  Outcome: Ongoing  Note: Patient able to maintain a regular RR this shift. Problem: Body Temperature -  Risk of, Imbalanced  Goal: Ability to maintain a body temperature within defined limits  Outcome: Ongoing  Note: Patient febrile this shift with a temperature of 100.7 rectally. Acetaminophen suppository given. Will continue to monitor. Problem: Isolation Precautions - Risk of Spread of Infection  Goal: Prevent transmission of infection  Outcome: Ongoing  Note: Patient in droplet plus precautions due to COVID-19 infection.       Problem: Nutrition Deficits  Goal: Optimize nutrtional status  Outcome: Ongoing  Note: Patient NPO at this time. Problem: Infection - Central Venous Catheter-Associated Bloodstream Infection:  Goal: Will show no infection signs and symptoms  Description: Will show no infection signs and symptoms  10/8/2020 0519 by Juan Noriega RN  Outcome: Ongoing  Note: Patient shows no new signs of infection this shift. PICC site clean, dry, intact. Problem: Skin Integrity:  Goal: Absence of new skin breakdown  Description: Absence of new skin breakdown  Note: Patient shows no new skin breakdown this shift. Patient is turned every 2 hours and as needed. Skin warm, dry, intact unless otherwise charted in Flowsheets. Care plan reviewed with patient. Patient verbalizes understanding of the plan of care and contributes to goal setting.

## 2020-10-09 NOTE — PROGRESS NOTES
recorded since 10/6; abd. soft; when RD on unit this am pt. pulled his NGT out - RN planned to replace and resume TF; NPO per SLP      Wounds:  Stage II(coccyx)       Current Nutrition Therapies:    Current Tube Feeding (TF) Orders:  · Feeding Route: Nasogastric(plan reinsertion 10/9)  · Formula: Semi-Elemental(Vital 1.2 resuming 10/8)  · Schedule: Continuous(plan to resume at 50ml/hour increase 10ml every 4h as tolerated to goal of 70ml/hour)  · Additives/Modulars: Protein(1 2.5ounce liquid protein bottle daily starting 10/8)  · Water Flushes: per MD  · Goal TF & Flush Orders Provides: 2120 kcals, 152 gms protein, 186gms cho, 9 gms fiber, 1363ml free H20/24h      Anthropometric Measures:  · Height: 5' 8\" (172.7 cm)  · Current Body Weight: 223 lb (101.2 kg)(10/9 with trace edema)   · Admission Body Weight: 220 lb (99.8 kg)(9/24/20, no weight source)    · Usual Body Weight: (no weight hx available in EMR, patient intubated)     · Ideal Body Weight: 154 lbs;    · BMI: 33.9  · BMI Categories: Obese Class 1 (BMI 30.0-34.9)(34)       Nutrition Diagnosis:   · Inadequate oral intake related to swallowing difficulty as evidenced by swallow study results, nutrition support - enteral nutrition      Nutrition Interventions:   Food and/or Nutrient Delivery:  Continue NPO, Vitamin Supplement(resume EN once NGT placement verified)  Nutrition Education/Counseling:  No recommendation at this time   Coordination of Nutrition Care:  Continued Inpatient Monitoring, Interdisciplinary Rounds    Goals:  EN to provide % nutrient needs until appropriate for po intake       Nutrition Monitoring and Evaluation:   Behavioral-Environmental Outcomes:  (n/a)   Food/Nutrient Intake Outcomes:  Enteral Nutrition Intake/Tolerance  Physical Signs/Symptoms Outcomes:  Biochemical Data, Chewing or Swallowing, Diarrhea, GI Status, Fluid Status or Edema, Hemodynamic Status, Skin, Weight     Discharge Planning:     Too soon to determine Electronically signed by James Jimenez RD, LD on 10/9/20 at 2:31 PM EDT    Contact: [phone]

## 2020-10-09 NOTE — CARE COORDINATION
10/9/20, 9:42 AM EDT    DISCHARGE ON GOING EVALUATION    [address] day: 15  Location: -08/008-A Reason for admit: COVID-19 [U07.1]   Procedure:   9/24 CXR: Slightly increased density in both lung fields; Borderline cardiomegaly; Thoracic spondylosis  9/24 Vapotherm initiated  9/27 Intubated  9/27 CVC Right subclavian - 10/5 removed  9/27 CXR: Diffuse infiltrates are scattered throughout the lungs bilaterally  9/29 - 10/2 Rotoprone therapy   10/1 Convalescent Plasma  10/4 Extubated  10/4 Vapotherm initiated  36/4 PICC right basilic  92/9 CT Head: No acute findings  10/7 CTA Head/Neck:  Study is severely limited by motion artifact.  Portions of anterior and posterior circulation are not well evaluated; Significant stenosis at the origin of the right internal carotid artery is apparent; No obvious intracranial vascular occlusion, although particularly the cavernous ICAs and the posterior circulation are not well assessed; Bilateral upper lobe groundglass opacities concerning for pneumonia  10/7 Code stroke - TPA given  10/7 Bilateral carotid dopplers: Mural plaque at the carotid bulbs bilaterally, right greater than left with greater than 70% stenosis on the right and less than 50% stenosis on the left by SRU consensus criteria  MRI brain: Mural plaque at the carotid bulbs bilaterally, right greater than left with greater than 70% stenosis on the right and less than 50% stenosis on the left by SRU consensus criteria.      10/08 CT head:No evidence of intracranial hemorrhage. Mild severity chronic small vessel ischemic changes. Layering fluid in the paranasal sinuses with some fluid in the mastoid air cells.    Treatment Plan of Care: Neruo checks,   neurology saw post tPA, added Plavix, ASA, Crestor, Lovenox,  K+ replacement, telemetry Sinus Tachy  had TPA 10/7, code stroke, left sided weakness, confusion, Zosyn stopped, diabetic management with ISS, on Vapotherm FiO2 60 % with 50 L/min, sat

## 2020-10-09 NOTE — PROGRESS NOTES
provider] furosemide  60 mg Intravenous BID    potassium chloride  20 mEq Intravenous BID    potassium chloride  10 mEq Intravenous BID    insulin glargine  15 Units Subcutaneous Nightly    [Held by provider] amLODIPine  10 mg Per NG tube Daily    Vitamin D  1,000 Units Per NG tube Daily    calcium replacement protocol   Other RX Placeholder    phosphorus replacement protocol   Other RX Placeholder       Data:   CBC:   Recent Labs     10/06/20  0420 10/07/20  0607 10/08/20  0416   WBC 13.4* 12.8* 11.8*   HGB 12.1* 11.6* 9.7*    259 242     BMP:    Recent Labs     10/06/20  0420 10/07/20  0607  10/08/20  0416 10/08/20  1521 10/08/20  2020   * 129*   < > 138 138 137   K 3.7 3.7  3.7  --  3.3*  --   --    CL 91* 89*  --  103  --   --    CO2 33 27  --  22*  --   --    BUN 24* 23*  --  12  --   --    CREATININE 0.8 0.8  --  0.5  --   --    GLUCOSE 113* 158*  --  119*  --   --     < > = values in this interval not displayed. No results found for this or any previous visit. No results found for this or any previous visit. Results for orders placed during the hospital encounter of 09/24/20   CTA HEAD W WO CONTRAST (CODE STROKE NIHSS 4 or Above)    Narrative EXAM: HEAD AND NECK CT ANGIOGRAM:    COMPARISON:   CT,SR  - CT HEAD WO CONTRAST  - 10/07/2020 04:37 AM EDT    TECHNIQUE:   Contiguous axial images from upper mediastinum through the   vertex of head during uneventful intravenous administration of patchy   ground glass opacities are present in both apices. Small right pleural   effusion suspected. Using CT angiogram technique. Coronal and sagittal   reformatted images were also reviewed. FINDINGS:    CTA NECK:    Patchy ground glass opacities in both apices. Atherosclerosis of aortic arch. Arch vessel origins are patent and   unremarkable. Atherosclerosis of carotid bulbs. No significant stenosis of the origin   of the left internal carotid artery.   Plaque formation is more significant   on the right, with potentially severe stenosis, greater than 70% diameter,   at the origin of the right ICA. Distal to this the vessel is patent and   similar caliber to the left side. There is tortuosity of the mid cervical   ICAs bilaterally. The distal cervical ICAs cannot be well assessed   secondary to motion artifact. The vertebral arteries are patent, symmetric. No soft tissue lesion of the neck is appreciated. CTA HEAD:    The petrous and cavernous portions of the ICAs cannot be well assessed. There is extensive motion artifact. Based on the appearance of the   supraclinoid portions of the internal carotid arteries in the mid to   distal cervical ICAs, the vessels appear to remain patent. The degree of   stenosis within the cavernous sinuses cannot be assessed. The anterior cerebral arteries are patent. The middle cerebral arteries are patent, no evidence of branch occlusion. Distal vertebral arteries and basilar artery cannot be well assessed due   to the motion artifact. The basilar appears to be intact, patent, and the   posterior cerebral arteries proximally are well-visualized and are patent. The distal P2 and P3 segments are not well assessed. No intracranial lesion is demonstrated. Impression 1. Study is severely limited by motion artifact. Portions of anterior   and posterior circulation are not well evaluated. 2.  Significant stenosis at the origin of the right internal carotid   artery is apparent. Formation with Doppler ultrasound recommended. 3.  No obvious intracranial vascular occlusion, although particularly the   cavernous ICAs and the posterior circulation are not well assessed. 4.  Bilateral upper lobe groundglass opacities concerning for pneumonia.     This document has been electronically signed by: Allie Alcala MD   on 10/07/2020 05:31 AM    All CT scans at this facility use dose modulation, iterative   reconstruction, and/or basilar artery cannot be well assessed due   to the motion artifact. The basilar appears to be intact, patent, and the   posterior cerebral arteries proximally are well-visualized and are patent. The distal P2 and P3 segments are not well assessed. No intracranial lesion is demonstrated. Impression 1. Study is severely limited by motion artifact. Portions of anterior   and posterior circulation are not well evaluated. 2.  Significant stenosis at the origin of the right internal carotid   artery is apparent. Formation with Doppler ultrasound recommended. 3.  No obvious intracranial vascular occlusion, although particularly the   cavernous ICAs and the posterior circulation are not well assessed. 4.  Bilateral upper lobe groundglass opacities concerning for pneumonia. This document has been electronically signed by: Edmund Srinivasan MD   on 10/07/2020 05:31 AM    All CT scans at this facility use dose modulation, iterative   reconstruction, and/or weight-based  dosing when appropriate to reduce radiation dose to as low as reasonably   achievable. Carotid stenosis and measurements are in accordance with the NASCET   criteria. Results for orders placed during the hospital encounter of 09/24/20   MRI BRAIN WO CONTRAST    Narrative MR Brain without gadolinium. Comparison:  CT,SR  - CTA HEAD W WO CONTRAST  - 10/07/2020 04:37 AM EDT    Findings:  Motion artifact throughout the exam.    Mild atrophy-like change. Mild nonspecific white matter T2 hyperintensity. No mass or midline shift. No hydrocephalus. Vascular flow voids are intact. The orbits are normal.  Partial opacification of the mastoids. Small amount of fluid in the sinuses, especially right maxillary sinus. No focal bone lesion. Impression No acute stroke. Sinus and mastoid disease. This document has been electronically signed by:  Erika Colon MD on 10/07/2020 11:46 PM       No results found for this or any previous visit. No results found for this or any previous visit. Results for orders placed during the hospital encounter of 09/24/20   CT head without contrast    Narrative PROCEDURE: CT HEAD WO CONTRAST    CLINICAL INFORMATION: sp tpa. Confusion. Stroke. Given TPA. COMPARISON: Head CT 10/7/2020. Brain MRI 10/7/2020. TECHNIQUE: Noncontrast 5 mm axial images were obtained through the brain. Sagittal and coronal reconstructions were obtained. All CT scans at this facility use dose modulation, iterative reconstruction, and/or weight-based dosing when appropriate to reduce radiation dose to as low as reasonably achievable. FINDINGS:        There is no hemorrhage. There are no intra-or extra-axial collections. There is no hydrocephalus, midline shift or mass effect. There is mild global volume loss. There are vascular calcifications. There is a very mild amount of abnormal signal in the   white matter the brain suggesting chronic small vessel ischemic changes. There is some layering fluid in the right maxillary sinus. There is also some layering fluid in the sphenoid sinus. There is some partial opacification of the mastoid air cells. There is no suspicious calvarial abnormality. Impression    1. No evidence of intracranial hemorrhage. 2. Mild severity chronic small vessel ischemic changes. 3. Layering fluid in the paranasal sinuses with some fluid in the mastoid air cells. **This report has been created using voice recognition software. It may contain minor errors which are inherent in voice recognition technology. **    Final report electronically signed by Dr. Lonnie Hoffman on 10/8/2020 11:50 AM         Assessment:  Active Problems:    COVID-19    Left hemiparesis ([address]  Resolved Problems:    * No resolved hospital problems. *    68 year old man with COVID 19 encephalopathy, presented with stroke like symptoms, s/p tPA. Plan:    1.  MRI is neg for acute stroke, but CTA showed a severe R ICA stenosis  2. tPA likely aborted his stroke given his COVID19 that put him in a hypercoagulable state and that is made worse by his R ICA severe stenosis  3. As MRI brain is negative for stroke, and given his COVID19, his R ICA stenosis can be treated later as outpatient, patient can come to see me in the clinic to determine when is the right time to treat if he wants it  4. Meanwhile, recommend aspirin 81mg daily and plavix 75mg daily, will load 300mg x 1 dose  5. crestor 40mg daily  6. lovenox for DVT prophylaxis\  7.  Will f/u    Christian Ambrosio MD, 10/8/2020 11:05 PM       Hollis Renteria MD  Attending Neurologist/Neurointensivist

## 2020-10-09 NOTE — PLAN OF CARE
Problem: Falls - Risk of:  Goal: Will remain free from falls  Description: Will remain free from falls  Outcome: Met This Shift  Note: Continue fall precautions, patient confused. Continue bed alarm. Problem: Falls - Risk of:  Goal: Absence of physical injury  Description: Absence of physical injury  Outcome: Met This Shift     Problem: Skin Integrity:  Goal: Will show no infection signs and symptoms  Description: Will show no infection signs and symptoms  Outcome: Ongoing  Note: Continue to assess skin, turn every 2 hours. Redness to buttocks. Assess skin to PICC Line. Problem: Skin Integrity:  Goal: Absence of new skin breakdown  Description: Absence of new skin breakdown  Outcome: Ongoing  Note: Continue skin assessment. Turn patient every 2 hours. Problem: Airway Clearance - Ineffective  Goal: Achieve or maintain patent airway  Outcome: Ongoing  Note: Patient continues on high flow oxygen at 50L and 58%. Problem: Gas Exchange - Impaired  Goal: Absence of hypoxia  Outcome: Ongoing  Note: Patient continues on high flow oxygen at 50L and 58%. Continue to try and wean. Pulse ox >90% on the high flow. Problem: Breathing Pattern - Ineffective  Goal: Ability to achieve and maintain a regular respiratory rate  Outcome: Ongoing     Problem: Body Temperature -  Risk of, Imbalanced  Goal: Ability to maintain a body temperature within defined limits  Outcome: Ongoing  Note: Temp today 100.2, later improved to 99. 6. continue to monitor for fever. Tylenol prn. Hospitalist notified today of temp. Problem: Isolation Precautions - Risk of Spread of Infection  Goal: Prevent transmission of infection  Outcome: Ongoing  Note: Continue COVID isolation precautions. Problem: Nutrition Deficits  Goal: Optimize nutrtional status  Outcome: Ongoing  Note: Tube feed ordered per NG tube. Held today, due to patient pulled out NG. NG replaced-awaiting confirmation of placement per chest x-ray.      Problem: Nutrition  Goal: Optimal nutrition therapy  10/9/2020 1952 by Luz Maria Chan RN  Outcome: Ongoing     Problem: Infection - Central Venous Catheter-Associated Bloodstream Infection:  Goal: Will show no infection signs and symptoms  Description: Will show no infection signs and symptoms  Outcome: Ongoing  Note: Continue to assess skin, turn every 2 hours. Redness to buttocks. Assess skin to PICC Line. Care plan reviewed with patient and family (family per phone today). Patient and family verbalize understanding of the plan of care and contribute to goal setting.

## 2020-10-09 NOTE — PLAN OF CARE
Problem: Falls - Risk of:  Goal: Will remain free from falls  Description: Will remain free from falls  Outcome: Ongoing  Note: Patient free from falls this shift with call light within reach, slipper socks on, and bed alarm in place. Problem: Falls - Risk of:  Goal: Absence of physical injury  Description: Absence of physical injury  Outcome: Ongoing  Note: Patient free of physical injury and falls this shift with call light within reach, slipper socks on, and bed alarm in place. Problem: Skin Integrity:  Goal: Will show no infection signs and symptoms  Description: Will show no infection signs and symptoms  Outcome: Ongoing     Problem: Skin Integrity:  Goal: Absence of new skin breakdown  Description: Absence of new skin breakdown  Outcome: Ongoing  Note: Patient has ongoing skin issues, frequent turning completed. Problem: Gas Exchange - Impaired  Goal: Absence of hypoxia  Outcome: Ongoing  Note: Patient free of hypoxia. Problem: Isolation Precautions - Risk of Spread of Infection  Goal: Prevent transmission of infection  Outcome: Ongoing     Problem: Nutrition  Goal: Optimal nutrition therapy  10/9/2020 0020 by Nayana Page RN  Outcome: Ongoing  Note: Patient started on tube feeding as ordered from dietary. Care plan reviewed with patient. Patient verbalized understanding of the plan of care and contribute to goal setting.

## 2020-10-09 NOTE — PROGRESS NOTES
Progress Note      Patient:  Chito Splinter      Unit/Bed:4B-08/008-A    YOB: 1945    MRN: [id number]     Acct: [de-identified]     Admit date: 9/24/2020    Patient Seen, Chart, Consults notes, Labs, Radiology studies reviewed. Subjective: Day 15 of stay with COVID/ acute resp failure was seen by Dr. Randy Breaux till yesterday and most recent (in last 24 hours) has had low grade fever, pt unfortunately is alert but confused, so can't get a detailed history. Reviewed previous notes , completed 14 days of Zosyn , and being monitored off antbx. Now with atrial fib with RVR, so IV lopressor was initiated, as pt pulled out the NGT since my eval in am . SPOKE   [facility] . All other ROS negative except noted in HPI    Past, Family, Social History unchanged from admission. Diet:  DIET TUBE FEED CONTINUOUS/CYCLIC NPO; Semi-elemental (Vital 1.2);  Nasogastric; Continuous; 20; 70    Medications:  Scheduled Meds:   [START ON 10/10/2020] clopidogrel  75 mg Oral Daily    metoprolol  5 mg Intravenous Q8H    sodium chloride flush  10 mL Intravenous 2 times per day    enoxaparin  40 mg Subcutaneous Daily    rosuvastatin  40 mg Oral Nightly    aspirin  81 mg Per NG tube Daily    influenza virus vaccine  0.5 mL Intramuscular Once    insulin lispro  0-12 Units Subcutaneous 4x Daily AC & HS    lidocaine 1 % injection  5 mL Intradermal Once    [Held by provider] furosemide  60 mg Intravenous BID    potassium chloride  20 mEq Intravenous BID    potassium chloride  10 mEq Intravenous BID    insulin glargine  15 Units Subcutaneous Nightly    [Held by provider] amLODIPine  10 mg Per NG tube Daily    Vitamin D  1,000 Units Per NG tube Daily    calcium replacement protocol   Other RX Placeholder    phosphorus replacement protocol   Other RX Placeholder     Continuous Infusions:   dextrose       PRN Meds:sodium chloride flush, promethazine **OR** ondansetron, potassium chloride, potassium chloride, magnesium sulfate, acetaminophen **OR** acetaminophen, polyethylene glycol, glucose, dextrose, glucagon (rDNA), dextrose    Objective:    CBC:   Recent Labs     10/07/20  0607 10/08/20  0416 10/09/20  0600   WBC 12.8* 11.8* 8.2   HGB 11.6* 9.7* 10.0*    242 242     BMP:    Recent Labs     10/07/20  0607  10/08/20  0416  10/08/20  2020 10/09/20  0150 10/09/20  0600   *   < > 138   < > 137 139 139   K 3.7  3.7  --  3.3*  --   --   --  4.5   CL 89*  --  103  --   --   --  104   CO2 27  --  22*  --   --   --  26   BUN 23*  --  12  --   --   --  9   CREATININE 0.8  --  0.5  --   --   --  0.5   GLUCOSE 158*  --  119*  --   --   --  190*    < > = values in this interval not displayed. Calcium:  Recent Labs     10/09/20  0600   CALCIUM 8.0*     Ionized Calcium:No results for input(s): IONCA in the last 72 hours. Magnesium:  Recent Labs     10/08/20  0416   MG 1.8     Phosphorus:No results for input(s): PHOS in the last 72 hours. BNP:No results for input(s): BNP in the last 72 hours. Glucose:  Recent Labs     10/08/20  2110 10/09/20  0552 10/09/20  1200   POCGLU 141* 177* 220*     HgbA1C: No results for input(s): LABA1C in the last 72 hours. INR:   Recent Labs     10/07/20  0607   INR 1.60*     Hepatic:   Recent Labs     10/09/20  0600   ALKPHOS 79   ALT 95*   AST 90*   PROT 5.6*   BILITOT 0.4   LABALBU 2.3*     Amylase and Lipase:No results for input(s): LACTA, AMYLASE in the last 72 hours. Lactic Acid: No results for input(s): LACTA in the last 72 hours. Troponin:   Recent Labs     10/07/20  0607   TROPONINT < 0.010     BNP: No results for input(s): BNP in the last 72 hours. Lipids: No results for input(s): CHOL, TRIG, HDL, LDLCALC in the last 72 hours.     Invalid input(s): LDL  ABGs:   Lab Results   Component Value Date    PH 7.50 10/07/2020    PCO2 39 10/07/2020    PO2 70 10/07/2020    HCO3 31 10/07/2020    O2SAT 95 10/07/2020           Physical Exam:  Vitals: BP (!) 155/67 Pulse 98   Temp 99.6 °F (37.6 °C) (Oral)   Resp 24   Ht 5' 8\" (1.727 m)   Wt 223 lb 9.6 oz (101.4 kg)   SpO2 97%   BMI 34.00 kg/m²   24 hour intake/output:    Intake/Output Summary (Last 24 hours) at 10/9/2020 1640  Last data filed at 10/9/2020 1500  Gross per 24 hour   Intake 3967.55 ml   Output 1800 ml   Net 2167.55 ml     Last 3 weights: Wt Readings from Last 3 Encounters:   10/09/20 223 lb 9.6 oz (101.4 kg)       General appearance - alert, well appearing, and in no distress,  acyanotic, in no respiratory distress and well hydrated  HEENT: Head: Normal, normocephalic, atraumatic. Neck / Thyroid: Supple, no masses, nodes, nodules or enlargement. Chest -  no wheezes, rales or rhonchi, symmetric air entry  Distant Breath Sounds: yes  Cardiovascular - irreg HS, no JVD  Abdomen - soft, nontender, nondistended, no masses or organomegaly  Bowel sounds normal;  Obese: No; Protuberant: No   Neurological - alert & oriented x 3 with fluent speech, no focal motor/sensory deficits, reflexes normal and symmetric  Integumentary - Skin color, texture, turgor normal. No rashes or lesions. Musculoskeletal - normal range of motion, no joint swelling, deformity or tenderness, Edema  Psych: alert but confused, so can't eval    DVT prophylaxis: [] Lovenox                                 [] SCDs                                 [] SQ Heparin                                 [] Encourage ambulation, low risk for DVT, no chemical or mechanical prophylaxis necessary              [x] Already on Anticoagulation        Assessment:    Patient Active Problem List   Diagnosis    COVID-19    Left hemiparesis ([facility] [address]         Underwent TPA on this admission, with residual left sided weakness      . Atrial fib  - added lopressor to the regimen, and cardiology consulted. and continue with the tele monitor and continue with plavix and ECASA for now.        .  Resolved acute Resp failure likely due to No 1, and ARDS  was on the vent , now on high mickey Oxygen     . Pneumonia - likely due to pseudomonas and completed IV zosyn for 14 days   And possible low grad fever due to COVID, monitor closely off antibiotics. DM-2 on Sliding scale monitor closely, had an Feeding tube in am , and then pulled out by pt, so spoke with the RN, plan on re inserting it back and monitor   Sugars closely. On long acting acting, Lantus 15 Units QHS, and SSI     . Sepsis - resolving likely due to COVID-19 and PNA, Treated well during this   Admission. HTN - BP is acceptable 120-150 SBP , on amlodipine and today added lopressor  Titrate meds as needed. Plan:    Hospitalist service will closely follow up , refer to above recommendations.      Electronically signed by Gavino Rascon MD on 10/9/2020 at 4:40 PM    Rounding Hospitalist

## 2020-10-09 NOTE — PLAN OF CARE
Problem: Nutrition  Goal: Optimal nutrition therapy  Outcome: Ongoing   Nutrition Problem #1: Inadequate oral intake  Intervention: Food and/or Nutrient Delivery: Continue NPO, Vitamin Supplement(resume EN once NGT placement verified)  Nutritional Goals: EN to provide % nutrient needs until appropriate for po intake

## 2020-10-09 NOTE — PROGRESS NOTES
[facility]  INPATIENT SPEECH THERAPY  [facility] CVICU 4B  DAILY NOTE    TIME   SLP Individual Minutes  Time In: 4429  Time Out: 4717  Minutes: 26  Timed Code Treatment Minutes: 0 Minutes       Date: 10/9/2020  Patient Name: Zuhair Humphrey      CSN: [id number]   : 1945  (67 y.o.)  Gender: male   Referring Physician: Dannielle Henderson MD  Diagnosis: COVID-19  Secondary Diagnosis: Dysphagia, cognition  Precautions: Droplet plus precautions, aspiration  Current Diet: NPO  Swallowing Strategies: Standard Universal Swallow Precautions  Date of Last MBS: Not Applicable    Pain:  No pain reported. Subjective:  Patient is alert, upright in bed, and pleasantly confused-- Improved since previous date. Pt agreeable for ST treatment, while easily fatigues and requesting rest breaks. Short-Term Goals:  SHORT TERM GOAL #1:  Goal 1: Patient will complete therapy directed PO trials per ST discretion with no overt s/s of aspiration/penetration to determine readiness for a diet upgrade. INTERVENTIONS:Completed a swallow re-evaluation at the bedside on this date. PO Trails Included:  Honey/Moderate complete via tsp 5/6 tsp trials with no overt s/s of aspirataion/penetration. Immediate coughing response documented 1x, suspected d/t decreased bolus control. PT presented with improved timing of oral transit when cued to \"swallow hard & quick. \". Puree- complete tsp bites x2  *Patient was requesting frequent rest breaks with with minimal trials. Although he was demonstrating improved safe consumption with decreased overt s/s of aspiration/penetration; decreased endurance with remain a significant barrier. Per ST discussion with the pt's wife, recommended 2-3 additional treatment sessions to determine progress and ability to initiate PO intake for meals prior to recommendations for PEG placement. PT's wife verbalized understanding and agreement, but requested continued updates.      *Recommended NPO with recommendations for short term means of nutrition via NG tube, only as MD is in agreement. *Increased concerns for endurance as a barrier for success and prognosis. SHORT TERM GOAL #2:  Goal 2: Will monitor for instrumental swallow evaluation  INTERVENTIONS: Not addressed on this date d/t obvious overt s/s of aspiration/penetration at bedside     Julian Hercules [address] #3:  Goal 3: Will complete cognitive linguistic evaluation  INTERVENTIONS: Unable to complete d/t increased confusion, hallucinations, and slight agitation. Long-Term Goals:  No LTM Goals recommended, due to anticipated short ELOS. EDUCATION:  Learner: Patient  Education:  Reviewed results and recommendations of this evaluation, Reviewed diet and strategies, Reviewed signs, symptoms and risks of aspiration, Reviewed ST goals and Plan of Care and Reviewed recommendations for follow-up  Evaluation of Education: Needs further instruction, No evidence of learning and Family not present    ASSESSMENT/PLAN:  Activity Tolerance:  Patient tolerance of  treatment: fair. Assessment/Plan: Patient progressing toward established goals. Continues to require skilled care of licensed speech pathologist to progress toward achievement of established goals and plan of care. .     Plan for Next Session: limited PO trials to determine readiness fpr a diet upgrade. Brandyn Major MA., TLT-VJM

## 2020-10-10 NOTE — PLAN OF CARE
Problem: Impaired respiratory status  Goal: Able to breathe comfortably  Description: Able to breathe comfortably  Outcome: Ongoing     Patient continues on high flow at this time, tolerating well.

## 2020-10-10 NOTE — PLAN OF CARE
Problem: Nutrition  Goal: Optimal nutrition therapy  Outcome: Ongoing  Nutrition Problem #1: Inadequate oral intake  Intervention: Food and/or Nutrient Delivery: Continue NPO, Start Parenteral Nutrititon  Nutritional Goals: PN to provide 75% or more of estimated nutrient needs until able to transition back to EN or start oral diet during LOS.

## 2020-10-10 NOTE — PROGRESS NOTES
Pharmacy Consult       TPN    Ordering Provider: Osmin Almanzar    Indication for TPN: Failed swallow evaluation, keeps pulling NG    Electrolyte Replacement:   Calcium gluconate 2 grams  Sodium phos 16 mmol    Assessment/ Plan:  Discussed with dietician.  Plan to start Clinimix 5/15 central formulation @ 45 mL/hr to deliver 767 kcal (~10 kcal/kg with dosing weight 78 kg)  Will plan to transition to 3-in-1 TPN on Monday 10/12/20      Te Davis PharmD, BCPS  10/10/2020  2:40 PM

## 2020-10-10 NOTE — CONSULTS
orders placed during the hospital encounter of 09/24/20   ECHO Complete 2D W Doppler W Color    Narrative Transthoracic Echocardiography Report (TTE)     Demographics      Patient Name   Kayy May  Gender              Male                  P      MR #           [id number]       Race                                                     Ethnicity      Account #      [de-identified]       Room Number         0008      Accession      [id number]      Date of Study       10/07/2020   Number      Date of Birth  1945      Referring Physician Alexia Saunders MD      Age            67 year(s)      Sonographer         Taylor Reyes RDCS                                     Interpreting        Echo reader of the week                                  Physician           Alexia Gómez MD     Procedure    Type of Study      TTE procedure:ECHOCARDIOGRAM COMPLETE 2D W DOPPLER W COLOR. Procedure Date  Date: 10/07/2020 Start: 09:55 AM    Study Location: Bedside  Technical Quality: Limited visualization due to body habitus. Indications:Possible stroke. Additional Medical History: Former smoker, diabetes, hypertension,  hyperlipidemia, COPD    Patient Status: Routine    Height: 68.11 inches Weight: 211.65 pounds BSA: 2.1 m^2 BMI: 32.08 kg/m^2    BP: 128/79 mmHg     Conclusions      Summary   Ejection fraction is visually estimated at 55%. Overall left ventricular function is normal.   The aortic valve leaflets were not well visualized. Aortic valve appears tricuspid. Aortic valve leaflets are somewhat thickened. Aortic valve leaflets are Mildly calcified.       Signature      ----------------------------------------------------------------   Electronically signed by Alexia Gómez MD (Interpreting   physician) on 10/07/2020 at 03:39 PM ----------------------------------------------------------------      Findings      Mitral Valve   The mitral valve was not well visualized . Trace mitral regurgitation is present. Aortic Valve   The aortic valve leaflets were not well visualized. Aortic valve appears tricuspid. Aortic valve leaflets are somewhat thickened. Aortic valve leaflets are Mildly calcified. Tricuspid Valve   Tricuspid valve was not well visualized. Mild tricuspid regurgitation. Pulmonic Valve   The pulmonic valve was not well visualized . Trivial pulmonic regurgitation visualized. Left Atrium   Left atrial size was normal.      Left Ventricle   Ejection fraction is visually estimated at 55%. Overall left ventricular function is normal.      Right Atrium   Right atrial size was normal.      Right Ventricle   The right ventricular size was normal with normal systolic function and   wall thickness. Pericardial Effusion   The pericardium was normal in appearance with no evidence of a pericardial   effusion. Pleural Effusion   No evidence of pleural effusion. Aorta / Great Vessels   -Aortic root dimension within normal limits.   -The Pulmonary artery is within normal limits. -IVC size is within normal limits with normal respiratory phasic changes.      M-Mode/2D Measurements & Calculations      LV Diastolic    LV Systolic Dimension: 2.6  AV Cusp Separation: 1.6 cmLA   Dimension: 3.8  cm                          Dimension: 2.8 cmAO Root   cm              LV Volume Diastolic: 62 ml  Dimension: 3.3 cm   LV FS:31.6 %    LV Volume Systolic: 28.0 ml   LV PW           LV EDV/LV EDV Index: 62   Diastolic: 1.2  TF/84 B^0AB ESV/LV ESV   cm              Index: 24.6 ml/12 m^2       RV Diastolic Dimension: 3.7 cm   Septum          EF Calculated: 68.7 %   Diastolic: 1.2                              LA/Aorta: 0.85   cm                                          Ascending Aorta: 3.2 cm     Doppler Measurements & Take 1 capsule by mouth daily  Cholecalciferol (VITAMIN D3) 125 MCG (5000 UT) CAPS, Take by mouth    Allergies:    Bee venom    Social History:    reports that he quit smoking about 20 years ago. He does not have any smokeless tobacco history on file. He reports that he does not drink alcohol or use drugs. Family History:   family history is not on file. REVIEW OF SYSTEMS:  Per d/w patient's RN, patient remains confused  No chest pain reported       PHYSICAL EXAM:   Vitals:  Patient Vitals for the past 24 hrs:   BP Temp Temp src Pulse Resp SpO2 Weight   10/10/20 1812 -- -- -- -- 20 90 % --   10/10/20 1515 (!) 126/59 98.4 °F (36.9 °C) Oral 97 20 91 % --   10/10/20 1415 -- -- -- -- -- -- 239 lb 12.8 oz (108.8 kg)   10/10/20 1319 -- -- -- -- 22 91 % --   10/10/20 1315 (!) 138/7 100.7 °F (38.2 °C) Axillary 82 22 95 % --   10/10/20 0923 -- -- -- -- 20 93 % --   10/10/20 0745 136/63 99.8 °F (37.7 °C) Oral 88 24 93 % --   10/10/20 0507 -- -- -- -- -- 93 % --   10/10/20 0409 127/70 99.3 °F (37.4 °C) Oral 83 18 94 % --   10/10/20 0325 -- -- -- -- 20 94 % --   10/10/20 0005 (!) 127/58 99.7 °F (37.6 °C) Oral 83 18 94 % --   10/09/20 2231 -- -- -- -- 20 93 % --   10/09/20 2058 (!) 149/72 99.3 °F (37.4 °C) Oral 94 18 92 % --       Last 3 weights: Wt Readings from Last 3 Encounters:   10/10/20 239 lb 12.8 oz (108.8 kg)     24 hour intake/output:    Intake/Output Summary (Last 24 hours) at 10/10/2020 1910  Last data filed at 10/10/2020 0409  Gross per 24 hour   Intake 1273.17 ml   Output 1850 ml   Net -576.83 ml     BMI:Body mass index is 36.46 kg/m². General Appearance: confused   Extremities: trace Edema reported      RADIOLOGY   Xr Chest Portable    Result Date: 10/9/2020  PROCEDURE: XR CHEST PORTABLE CLINICAL INFORMATION: NG tube placement. COMPARISON: October 9, 2020. TECHNIQUE: Limited single view. FINDINGS: OG catheter below the diaphragm following the stomach curvature. Degenerative changes lumbar spine. MCHC 31.6 10/10/2020    RDW 13.2 09/24/2020     10/10/2020    MPV 9.3 10/10/2020     BMP:    Lab Results   Component Value Date     10/10/2020    K 4.3 10/10/2020    K 3.7 10/07/2020     10/10/2020    CO2 24 10/10/2020    BUN 7 10/10/2020    LABALBU 2.3 10/10/2020    CREATININE 0.4 10/10/2020    CALCIUM 7.8 10/10/2020    LABGLOM >90 10/10/2020    GLUCOSE 130 10/10/2020     Hepatic Function Panel:    Lab Results   Component Value Date    ALKPHOS 84 10/10/2020     10/10/2020     10/10/2020    PROT 5.6 10/10/2020    BILITOT 0.4 10/10/2020    BILIDIR <0.2 09/30/2020    LABALBU 2.3 10/10/2020     Magnesium:    Lab Results   Component Value Date    MG 1.9 10/10/2020     Warfarin PT/INR:  No components found for: PTPATWAR, PTINRWAR  HgBA1c:    Lab Results   Component Value Date    LABA1C 7.5 09/24/2020     FLP:  No results found for: TRIG, HDL, LDLCALC, LDLDIRECT, LABVLDL  TSH:  No results found for: TSH  BNP: No results found for: BNP      ASSESSMENT:    Navarro Anguiano is a 67year old male patient with past medical history that includes:   Past Medical History:   Diagnosis Date    Diabetes mellitus ([facility] [address]     Hyperlipidemia     Hypertension    The patient was admitted to the hospital on 9/24/2020 with worsening SOB, cough and acute hypoxic respiratory failure due to an acute COVID-19 pneumonia. He apparently was also treated for bacterial pneumonia. The patient required intubation and prolonged ICU stay. He required intubation, eventually was extubated on 10/04/2020. The patient developed acute confusion and left sided weakness on 10/07/2020, he was evaluated by Neurology and was given tPA. Echo on 10/7/2020 revealed an EF of 55%. Cardiology was consulted after patient developed atrial fibrillation with RVR. He was given IV lopressor. He has no know prior cardiac history. The patient has been followed by pulmonary for PNA, respiratory failure, ARDS.  He is on IV lasix for suspected CHF

## 2020-10-10 NOTE — CONSULTS
[facility]    Patient - Angie Xie   MRN -  [id number]   [facility]   - 1945      Date of Admission -  2020 10:05 AM  Date of evaluation -  10/10/2020  Room - 4B--A   Hospital Day - [address] Serg Briggs MD Primary Care Physician - Leandro Cavanaugh MD     Problem List      Active Hospital Problems    Diagnosis Date Noted    Left hemiparesis [facility]) [G81.94] 10/07/2020    COVID-19 [U07.1] 2020       Reason for consult   For management of Hypoxic respiratory insufficiency. He is on Hi Flow at 50LPM  HPI   Patient is a 62-year-old morbidly obese white male reformed smoker. Angelo Dillard has a history of diabetes, hyperlipidemia, and hypertension. The patient was initially admitted from the emergency room on 2020 with hypoxemic respiratory failure secondary to [address] had associated acute renal injury.  He required 100% nonrebreather and was started on Decadron and Remdesivir. He was initially admitted under Dr. Carin Longo continued to deteriorate and required intubation on 2020.  Acute lung injury progressed to ARDS and started pronation therapy on 2020. John Ramírez was found to have bilateral pneumonia with Klebsiella pneumoniae and Haemophilus influenza which was treated with Zosyn.  Patient completed 12-day course of Zosyn on 10/5/20. Patient was subsequently extubated to 10/4/22 to intermittent BiPAP. Patient had neurologic decline in the early morning hours of 10/7/2020.  Code stroke was initiated, and patient underwent neurologic assessment. He was administered with tPA as the patient had left sided weakness and increased confusion.  tPA was administered without difficulty. At the time of my initial evaluation, he remain on Hi Flow. Not in any acute distress. He was noted to be confused at times during my visit today. He was also noted to be weak on his left side of body.  He was never diagnosed with obstructive sleep apnea. PMHx   Past Medical History      Diagnosis Date    Diabetes mellitus ([facility] [address]     Hyperlipidemia     Hypertension       Past Surgical History    History reviewed. No pertinent surgical history.   Meds    Current Medications    enoxaparin  1 mg/kg Subcutaneous Q12H    calcium replacement protocol   Other RX Placeholder    sodium phosphate IVPB  16 mmol Intravenous Once    calcium gluconate IVPB  2 g Intravenous Once    clopidogrel  75 mg Oral Daily    metoprolol  5 mg Intravenous Q8H    sodium chloride flush  10 mL Intravenous 2 times per day    rosuvastatin  40 mg Oral Nightly    aspirin  81 mg Per NG tube Daily    insulin lispro  0-12 Units Subcutaneous 4x Daily AC & HS    lidocaine 1 % injection  5 mL Intradermal Once    [Held by provider] furosemide  60 mg Intravenous BID    potassium chloride  20 mEq Intravenous BID    potassium chloride  10 mEq Intravenous BID    insulin glargine  15 Units Subcutaneous Nightly    [Held by provider] amLODIPine  10 mg Per NG tube Daily    Vitamin D  1,000 Units Per NG tube Daily    calcium replacement protocol   Other RX Placeholder    phosphorus replacement protocol   Other RX Placeholder     sodium chloride flush, promethazine **OR** ondansetron, potassium chloride, potassium chloride, magnesium sulfate, acetaminophen **OR** acetaminophen, polyethylene glycol, glucose, dextrose, glucagon (rDNA), dextrose  IV Drips/Infusions   PN-Adult Premix 5/15 - Central      dextrose       Home Medications  Medications Prior to Admission: amLODIPine (NORVASC) 10 MG tablet, Take 10 mg by mouth daily   atorvastatin (LIPITOR) 10 MG tablet, Take 10 mg by mouth nightly   glimepiride (AMARYL) 2 MG tablet, Take 3 mg by mouth 2 times daily  metFORMIN (GLUCOPHAGE) 1000 MG tablet, Take 1,000 mg by mouth 2 times daily (with meals)   hydroCHLOROthiazide (HYDRODIURIL) 12.5 MG tablet, Take 12.5 mg by mouth daily  Zinc Acetate (GALZIN) 25 MG capsule, Take 1 capsule by mouth daily  Cholecalciferol (VITAMIN D3) 125 MCG (5000 UT) CAPS, Take by mouth  Diet    DIET TUBE FEED CONTINUOUS/CYCLIC NPO; Semi-elemental (Vital 1.2); Nasogastric; Continuous; 20; 70  PN-Adult Premix 5/15 - Central  Allergies    Bee venom  Social History     Social History     Socioeconomic History    Marital status:      Spouse name: Not on file    Number of children: Not on file    Years of education: Not on file    Highest education level: Not on file   Occupational History    Not on file   Social Needs    Financial resource strain: Not on file    Food insecurity     Worry: Not on file     Inability: Not on file    Transportation needs     Medical: Not on file     Non-medical: Not on file   Tobacco Use    Smoking status: Former Smoker     Last attempt to quit: 2000     Years since quittin.7   Substance and Sexual Activity    Alcohol use: Never     Frequency: Never    Drug use: Never    Sexual activity: Not on file   Lifestyle    Physical activity     Days per week: Not on file     Minutes per session: Not on file    Stress: Not on file   Relationships    Social connections     Talks on phone: Not on file     Gets together: Not on file     Attends [employer] service: Not on file     Active member of club or organization: Not on file     Attends meetings of clubs or organizations: Not on file     Relationship status: Not on file    Intimate partner violence     Fear of current or ex partner: Not on file     Emotionally abused: Not on file     Physically abused: Not on file     Forced sexual activity: Not on file   Other Topics Concern    Not on file   Social History Narrative    Not on file     Family History    History reviewed. No pertinent family history. Vitals     height is 5' 8\" (1.727 m) and weight is 239 lb 12.8 oz (108.8 kg). His axillary temperature is 100.7 °F (38.2 °C).  His blood pressure is 138/7 (abnormal) and his pulse is 82. His respiration is 22 and oxygen saturation is 91%. Body mass index is 36.46 kg/m². SUPPLEMENTAL O2: O2 Flow Rate (L/min): 50 L/min     I/O        Intake/Output Summary (Last 24 hours) at 10/10/2020 1515  Last data filed at 10/10/2020 0409  Gross per 24 hour   Intake 1273.17 ml   Output 1850 ml   Net -576.83 ml     I/O last 3 completed shifts: In: 1273.2 [I.V.:1273.2]  Out: 2974 [Urine:1150; UTRKZ:949]   Patient Vitals for the past 96 hrs (Last 3 readings):   Weight   10/10/20 1415 239 lb 12.8 oz (108.8 kg)   10/09/20 0600 223 lb 9.6 oz (101.4 kg)   10/08/20 0400 220 lb (99.8 kg)       Exam   General Appearance: Patient appears well built and well nourished in no acute distress on Hi Flow. HEENT: Normal, Head is normocephalic, atraumatic. Oropharynx is clear and moist.  No oral thrush. PERRL  Neck - Supple, No JVD present. No tracheal deviation present. Lungs - Bilateral air entry present. Bilateral expiratory wheezes. No rales or rhonchi. Cardiovascular - Heart sounds are normal.  Regular rhythm normal rate without murmur, gallop or rub. Abdomen - Soft, obese, nontender, nondistended, no masses or organomegaly  Neurologic - Awake, alert. At times confused There are no focal motor or sensory deficits  Extremities - No cyanosis, clubbing or edema. Musculoskeletal: Normal range of motion. Patient exhibits no tenderness. Left side weakness. Lymphadenopathy:  No cervical adenopathy. Psychiatric: Patient at times confused. Skin - No bruising or bleeding.     Labs  - Old records and notes have been reviewed in [facility]   ABG  Lab Results   Component Value Date    PH 7.48 10/10/2020    PO2 72 10/10/2020    PCO2 30 10/10/2020    HCO3 22 10/10/2020    O2SAT 96 10/10/2020     Lab Results   Component Value Date    IFIO2 50 10/10/2020    SETPEEP 14.0 09/27/2020     CBC  Recent Labs     10/08/20  0416 10/09/20  0600 10/10/20  0630   WBC 11.8* 8.2 8.2   RBC 3.12* 3.21* 3.19*   HGB 9.7* 10.0* 9.9*   HCT 30.3* 31.6* 31.3*   MCV 97.1* 98.4* 98.1*   MCH 31.1 31.2 31.0   MCHC 32.0* 31.6* 31.6*    242 222   MPV 9.6 9.4 9.3*      BMP  Recent Labs     10/08/20  0416  10/09/20  0150 10/09/20  0600 10/10/20  0630 10/10/20  1320      < > 139 139 138  --    K 3.3*  --   --  4.5 4.3  --      --   --  104 104  --    CO2 22*  --   --  26 24  --    BUN 12  --   --  9 7  --    CREATININE 0.5  --   --  0.5 0.4  --    GLUCOSE 119*  --   --  190* 130*  --    MG 1.8  --   --   --   --  1.9   PHOS  --   --   --   --   --  2.0*   CALCIUM 6.7*  --   --  8.0* 7.8*  --     < > = values in this interval not displayed. LFT  Recent Labs     10/08/20  0416 10/09/20  0600 10/10/20  0630   AST 42* 90* 102*   ALT 65 95* 126*   BILITOT 0.4 0.4 0.4   ALKPHOS 74 79 84     TROP  Lab Results   Component Value Date    TROPONINT < 0.010 10/07/2020     BNP  No results for input(s): BNP in the last 72 hours. Lactic Acid  No results for input(s): LACTA in the last 72 hours. INR  No results for input(s): INR, PROTIME in the last 72 hours. PTT  No results for input(s): APTT in the last 72 hours. Glucose  Recent Labs     10/09/20  2250 10/10/20  0757 10/10/20  1322   POCGLU 147* 141* 130*     UA No results for input(s): SPECGRAV, PHUR, COLORU, CLARITYU, MUCUS, PROTEINU, BLOODU, RBCUA, WBCUA, BACTERIA, NITRU, GLUCOSEU, BILIRUBINUR, UROBILINOGEN, KETUA, LABCAST, LABCASTTY, AMORPHOS in the last 72 hours. Invalid input(s): CRYSTALS. Cultures    Blood cultures X 2 sets were : Negative   Respiratory cultures: Sputum cultures: Normal Shabana. Pneumonia Panel by Molecular:  Detected for Haemophilus  Klebsiella Pneumoniae. Echocardiogram   10/7/2020   Narrative & Impression      Transthoracic Echocardiography Report (TTE)  Right Ventricle   The right ventricular size was normal with normal systolic function and   wall thickness  Conclusions      Summary   Ejection fraction is visually estimated at 55%.    Overall left ventricular function is normal.   The aortic valve leaflets were not well visualized. Aortic valve appears tricuspid. Aortic valve leaflets are somewhat thickened. Aortic valve leaflets are Mildly calcified. Signature      ----------------------------------------------------------------   Electronically signed by Arabella Juarez MD (Interpreting   physician) on 10/07/2020 at 03:39 PM   ----------------------------------------------------------------    Radiology    Oct 9, 2020   PROCEDURE: XR CHEST PORTABLE   OG catheter below the diaphragm following the stomach curvature. ASSESSMENT:   -Acute respiratory failure due to ARDS. -ARDS due to COVID-19- improving. .   -Acute CVA. He underwent tPA administration 10/7/2020 with Left side weakness. -COVID-19. He was treated with Decadron and Remdesivir.  S/P convalescent plasma 10/1/2020.   -Hx of Tobacco smoking- High suspicion for COPD. -Metabolic encephalopathy due to COVID-19. Neurology. -Type II diabetes mellitus.  -Hypertension.  -Increased neck size with hx of CVA- high suspicion for Sleep apnea due to obstructive Vs Central.    Plan:   -Wean Fio2 to keep Spo2>90%. -Will start patient on Bevespi Aerosphere 9/4.8mcg per actuation, 2 puffs via inhalation BID. -Start patient on Albuterol HFA 90mcg/Spray MDI, 2puffs  Q6Hprn. He  was informed about adverse effects of Albuterol HFA. He verbalizes understanding.  -Will send BNP level now.  -Avoid all sedative meds if he is drowsy. -Aspiration precautions.  -Will start patient on CPAP with pressure of 12cm H20 during sleep for ? Sleep apnea. -Vest therapy Qshift as tolerated. -Continue Incentive spirometry Q4h as tolerated. Case discussed with patient  Questions and concerns addressed.     Electronically signed by   Mary Mackenzie MD on 10/10/2020 at 3:15 PM

## 2020-10-10 NOTE — PROGRESS NOTES
Hospitalist Progress Note    Patient:  Pearl Malone      Unit/Bed:4B-08/008-A    YOB: 1945    MRN: [id number]       Acct: [de-identified]     PCP: Smiley Chambers MD    Date of Admission: 9/24/2020    Chief Complaint: cough, altered mental status     Hospital Course:     Per Dr. Amy Barnes note:    Ok Deng is a 71-year-old morbidly obese white male reformed smoker. Cheli Villarreal has a history of diabetes, hyperlipidemia, and hypertension. Patient was admitted from the emergency room on 9/24/2020 with hypoxemic respiratory failure secondary to [address] had associated acute renal injury.  He required 100% nonrebreather and was started on Decadron and Remdesivir.  He continued to deteriorate and required intubation on 9/27/2020.  Acute lung injury progressed to ARDS and started pronation therapy on 9/29/2020. Steffanie Homans was found to have bilateral pneumonia with Klebsiella pneumoniae and Haemophilus influenza which was treated with Zosyn.  Patient completed 12-day course of Zosyn on 10/5/20. Patient extubated to 10/4/22 intermittent BiPAP. Patient had neurologic decline in the early morning hours of 10/7/2020.  Code stroke was initiated, and patient underwent neurologic assessment.  The decision was made to administer TPA as the patient had left sided weakness and increased confusion.  TPA was administered without difficulty. \"    Subjective:     Patient seen and examined. Pt states that he's still having sob. Denies chest pain. He states that his left arm weakness is getting better.  Pt did not answer other questions       Medications:  Reviewed    Infusion Medications    dextrose       Scheduled Medications    enoxaparin  1 mg/kg Subcutaneous Q12H    calcium replacement protocol   Other RX Placeholder    clopidogrel  75 mg Oral Daily    metoprolol  5 mg Intravenous Q8H    sodium chloride flush  10 mL Intravenous 2 times per day    rosuvastatin  40 mg Oral Nightly    aspirin  81 mg Per NG tube he is, normal speech, Motor: 5/5 on both RUE and RLE, 4/5 on both LUE and LLE  Exam of extremities: peripheral pulses normal, no pedal edema, no clubbing or cyanosis        Labs:   Recent Labs     10/08/20  0416 10/09/20  0600 10/10/20  0630   WBC 11.8* 8.2 8.2   HGB 9.7* 10.0* 9.9*   HCT 30.3* 31.6* 31.3*    242 222     Recent Labs     10/08/20  0416  10/09/20  0150 10/09/20  0600 10/10/20  0630      < > 139 139 138   K 3.3*  --   --  4.5 4.3     --   --  104 104   CO2 22*  --   --  26 24   BUN 12  --   --  9 7   CREATININE 0.5  --   --  0.5 0.4   CALCIUM 6.7*  --   --  8.0* 7.8*    < > = values in this interval not displayed. Recent Labs     10/08/20  0416 10/09/20  0600 10/10/20  0630   AST 42* 90* 102*   ALT 65 95* 126*   BILITOT 0.4 0.4 0.4   ALKPHOS 74 79 84     No results for input(s): INR in the last 72 hours. No results for input(s): [place] in the last 72 hours. Urinalysis:      Lab Results   Component Value Date    NITRU NEGATIVE 10/01/2020    WBCUA 10-15 10/01/2020    BACTERIA NONE SEEN 10/01/2020    RBCUA 50-75 10/01/2020    BLOODU LARGE 10/01/2020    SPECGRAV 1.028 10/01/2020       Radiology:  XR CHEST PORTABLE   Final Result   OG catheter below the diaphragm following the stomach curvature. **This report has been created using voice recognition software. It may contain minor errors which are inherent in voice recognition technology. **      Final report electronically signed by Dr. Nicki Arana on 10/9/2020 7:12 PM      XR CHEST PORTABLE   Final Result   Mild progression bilateral infiltrates vs edema. This document has been electronically signed by: Rajinder Quinn MD on    10/09/2020 04:54 AM         XR CHEST PORTABLE   Final Result   Worsening bilateral airspace opacities. Correlation with clinical presentation is advised. Support lines and tubes in satisfactory position. Progress imaging recommended.          **This report has been created using voice recognition software. It may contain minor errors which are inherent in voice recognition technology. **      Final report electronically signed by Dr. Radha Sy on 10/8/2020 8:46 PM      CT head without contrast   Final Result       1. No evidence of intracranial hemorrhage. 2. Mild severity chronic small vessel ischemic changes. 3. Layering fluid in the paranasal sinuses with some fluid in the mastoid air cells. **This report has been created using voice recognition software. It may contain minor errors which are inherent in voice recognition technology. **      Final report electronically signed by Dr. Deepali Liu on 10/8/2020 11:50 AM      MRI BRAIN WO CONTRAST   Final Result   No acute stroke. Sinus and mastoid disease. This document has been electronically signed by: Aminah Scherer MD on 10/07/2020 11:46 PM         VL DUP CAROTID BILATERAL   Final Result    Mural plaque at the carotid bulbs bilaterally, right greater than left with greater than 70% stenosis on the right and less than 50% stenosis on the left by SRU consensus criteria. **This report has been created using voice recognition software. It may contain minor errors which are inherent in voice recognition technology. **      Final report electronically signed by Dr. Anais Lawton MD on 10/7/2020 1:21 PM      CT HEAD WO CONTRAST   Final Result   Unremarkable CT of the head. This document has been electronically signed by: Jose Ramirez MD    on 10/07/2020 05:06 AM      All CT scans at this facility use dose modulation, iterative    reconstruction, and/or weight-based   dosing when appropriate to reduce radiation dose to as low as reasonably    achievable. CTA HEAD W WO CONTRAST (CODE STROKE NIHSS 4 or Above)   Final Result   1. Study is severely limited by motion artifact. Portions of anterior    and posterior circulation are not well evaluated.    2.  Significant stenosis at the origin of the right internal carotid    artery is apparent. Formation with Doppler ultrasound recommended. 3.  No obvious intracranial vascular occlusion, although particularly the    cavernous ICAs and the posterior circulation are not well assessed. 4.  Bilateral upper lobe groundglass opacities concerning for pneumonia. This document has been electronically signed by: Becca Bronson MD    on 10/07/2020 05:31 AM      All CT scans at this facility use dose modulation, iterative    reconstruction, and/or weight-based   dosing when appropriate to reduce radiation dose to as low as reasonably    achievable. CTA NECK W WO CONTRAST (CODE STROKE NIHSS 4 or Above)   Final Result   1. Study is severely limited by motion artifact. Portions of anterior    and posterior circulation are not well evaluated. 2.  Significant stenosis at the origin of the right internal carotid    artery is apparent. Formation with Doppler ultrasound recommended. 3.  No obvious intracranial vascular occlusion, although particularly the    cavernous ICAs and the posterior circulation are not well assessed. 4.  Bilateral upper lobe groundglass opacities concerning for pneumonia. This document has been electronically signed by: Becca Bronson MD    on 10/07/2020 05:31 AM      All CT scans at this facility use dose modulation, iterative    reconstruction, and/or weight-based   dosing when appropriate to reduce radiation dose to as low as reasonably    achievable. Carotid stenosis and measurements are in accordance with the NASCET    criteria. XR CHEST PORTABLE   Final Result   No significant change of bibasilar pneumonia. This document has been electronically signed by: Alban Rodriguez MD on 10/06/2020 07:23 AM         XR CHEST PORTABLE   Final Result   1. Interval placement of right-sided PICC line with tip in superior vena cava.    2. Interval removal of endotracheal tube with decreased lung volumes and persistent bibasilar opacities. **This report has been created using voice recognition software. It may contain minor errors which are inherent in voice recognition technology. **      Final report electronically signed by Dr. Pari Dillard MD on 10/5/2020 4:02 PM      XR CHEST PORTABLE   Final Result   Significant improvement left lung infiltrates. Mild progression right    lung infiltrates. This document has been electronically signed by: Garcia Bruno MD on    10/03/2020 06:27 AM         XR CHEST PORTABLE   Final Result   Moderate progression in left with moderate improvement in right    infiltrates. This document has been electronically signed by: Garcia Bruno MD on    10/02/2020 06:24 AM         XR CHEST PORTABLE   Final Result   1. Patchy airspace filling within the bilateral lungs, compatible with    pneumonia. No significant change since the prior study. 2. Orogastric tube within the proximal body of the stomach just distal to    the gastroesophageal junction. The tube may be kinked or partially coiled. This document has been electronically signed by: Ugo Pham MD on    10/01/2020 10:01 PM         XR CHEST PORTABLE   Final Result   Impression:   Mild progression in right basilar infiltrate with stable to mild    improvement in left infiltrates. This document has been electronically signed by: Garcia Bruno MD on    10/01/2020 06:41 AM         XR CHEST PORTABLE   Final Result      1. Interval placement of an endotracheal tube, nasogastric tube and right IJ access central venous catheter. The medical devices appear appropriately positioned. 2. Diffuse infiltrates are scattered throughout the lungs bilaterally. **This report has been created using voice recognition software. It may contain minor errors which are inherent in voice recognition technology. **      Final report electronically signed by Dr Gonzalez Rojas on 9/27/2020 1:15 PM      XR CHEST PORTABLE   Final Result      1. Interval placement of an endotracheal tube, nasogastric tube and right IJ access central venous catheter. The medical devices appear appropriately positioned. 2. Diffuse infiltrates are scattered throughout the lungs bilaterally. **This report has been created using voice recognition software. It may contain minor errors which are inherent in voice recognition technology. **      Final report electronically signed by Dr Gonzalez Rojas on 9/27/2020 1:15 PM      XR CHEST PORTABLE   Final Result   There are infiltrates throughout the lungs bilaterally. Worse appearance of the chest when compared to the previous study. **This report has been created using voice recognition software. It may contain minor errors which are inherent in voice recognition technology. **      Final report electronically signed by Dr Gonzalez Rojas on 9/27/2020 11:10 AM      XR CHEST PORTABLE   Final Result   1. Slightly increased density in both lung fields. 2. Borderline cardiomegaly. 3. Thoracic spondylosis. **This report has been created using voice recognition software. It may contain minor errors which are inherent in voice recognition technology. **      Final report electronically signed by DR Sesar Sun on 9/24/2020 10:50 AM          Assessment/Plan:      Acute respiratory failure due to ARDS secondary to COVID-19     -Patient intubated 9/27/2020.  Requiring moderate to high levels of PEEP and moderate levels of FiO2.  Patient extubated to high flow oxygen on 10/4/2020.  -pulmonology on-board. Appreciate pulmonology input     ARDS secondary to COVID-19    -Status post pronation therapy   -pulmonology on-board.  Appreciate pulmonology input    New-onset Afib with RVR, now NSR    -occurred on 10/9/2020  -CHADsVASc score 6  -on IV lopressor   -switch lovenox to therapeutic lovenox   -cardiology Continuous; 20; 70    DVT prophylaxis: [x] Lovenox                                 [] SCDs                                 [] SQ Heparin                                 [] Encourage ambulation           [] Already on Anticoagulation     Disposition:    [] Home       [] TCU       [] Rehab       [] Psych       [] SNF       [] [place]       [x] Other-Plan as above       Code Status: Full Code      Electronically signed by Yuki Maier MD on 10/10/2020 at 10:23 AM

## 2020-10-10 NOTE — PROGRESS NOTES
Comprehensive Nutrition Assessment    Type and Reason for Visit:  Reassess, Consult(TPN start, eval/treat)    Nutrition Recommendations/Plan:   -Spoke with pharmacist, pending labs, plan Clinimix 5/15 at 45 ml/hr to start today, on 10/12/20 will transition to 3:1 custom TPN if still needed.  -If NGT replaced would recommend resume Vital 1.2 at 30 ml/hr, increase 10ml every 4h as tolerated to goal of 70ml/hour. Flush 1 2.5ounce liquid protein bottle daily. Free H20 per MD.   -Further diet recommendations per SLP. -Consider probiotic. Nutrition Assessment:    Pt. declining from a nutritional standpoint AEB remains NPO due to dysphagia/poor endurance with no nutrition support due to pt pulling NGT out 2 times in the last 2 4 hours. Remains at risk for further nutritional compromise r/t continued NPO status due to dysphagia/poor endurance concerns, confusion and behavorial issues, increased nutrient needs to support wound healing, advanced age, and underlying medical condition ( covid 23, s/p code stroke with TPA 10/7 which per neuro likely aborted a CVA - MRI (-) for CVA but CTA shows severe ICA stenosis, rotoprone therapy initiated 9/29, extubated 10/4; obese, hx: DM with A1C 7.5% on 9/2020, HLD, HTN), and need for continued nutrition support. Nutrition recommendations/interventions as per above. Malnutrition Assessment:  Malnutrition Status:  Insufficient data(+covid patient)    Context:  Acute Illness     Findings of the 6 clinical characteristics of malnutrition:  Energy Intake:  (EN or NPO since admit)  Weight Loss:  Unable to assess(no weight hx in EMR)     Body Fat Loss:  Unable to assess(+covid)     Muscle Mass Loss:  Unable to assess(+covid)    Fluid Accumulation:  No significant fluid accumulation     Strength:  Not Performed    Estimated Daily Nutrient Needs:  Energy (kcal):  2127-9966 (30-32 kcals/kg for late active phase);  Weight Used for Energy Requirements:  Ideal(70 kg - ideal weight)     Protein (g):  140 (2.0 grams/kg ideal weight); Weight Used for Protein Requirements:  (70 kg - ideal weight)        Fluid (ml/day):  per MD; Weight Used for Fluid Requirements:  (n/a)      Nutrition Related Findings:  Covid-19. Extubated 10/4/20, on high flow oxygen. Was on diet post-extubation then code stroke, s/p TPA which per neuro likely aborted CVA, MRI (-) for CVA but CTA shows severe ICA stenosis. SLP following, recommended NPO 10/9/20 due to poor endurance and NGT for feedings. NGT was placed, he pulled out yesterday, reinserted, and pulled out again last night per RN. Pt is alert but confused. Plan temporary PN via PICC line in hopes pt will be able to start diet soon. Sodium 138, Potassium 4.3, Creatinine 0.4. Phosphorus pending, spoke with pharmacist.  Note albumin 2.3, however not a reliable indicator of nutritional status, more reflective of inflammatory process. MAP 89. Humalog, lantus, lasix, vitamin D, norvasc. Rectal tube, 2 BM + 700 mls recorded in the last 24 hours. Wounds:  Stage II(coccyx)       Current Nutrition Therapies:    Current Tube Feeding (TF) Recommendations if re-attempted:  · Feeding Route: no feeding tube, pulled NGT out 2 times in the last 24 hours.   · Formula: Semi-Elemental(Vital 1.2)  · Schedule: Continuous(starting at 30ml/hour increase 10ml every 4h as tolerated to goal of 70ml/hour)  · Additives/Modulars: Protein(1 2.5ounce liquid protein bottle daily)  · Water Flushes: per MD  · Goal TF & Flush Orders Provides: 2120 kcals, 152 gms protein, 186gms cho, 9 gms fiber, 1363ml free H20/24h    Current Parenteral Nutrition Orders:  · Type and Formula: Premix Central(Clinimix 5/15)   · Lipids: None  · Duration: Continuous  · Rate/Volume: plan 45ml/hr pending phosphorus level  · Current PN Order Provides: ~ 767 kcals (~ 10 kcals/kg dosing wt), 54 grams protein, 162 grams CHO per 24 hours      Anthropometric Measures:  · Height: 5' 8\" (172.7 cm)  · Current Body Weight: 223 lb 9.6 oz (101.4 kg)(10/9/20 trace UE, LE, facial edema)   · Admission Body Weight: 220 lb (99.8 kg)(9/24/20, no weight source)    · Usual Body Weight: (no weight hx available in EMR, patient intubated)     · Ideal Body Weight: 154 lbs;   · Adjusted Body Weight for PN: 171# (78 kg)  · BMI: 34  · BMI Categories: Obese Class 1 (BMI 30.0-34.9)(34)       Nutrition Diagnosis:   · Inadequate oral intake related to swallowing difficulty as evidenced by NPO or clear liquid status due to medical condition, swallow study results, nutrition support - parenteral nutrition      Nutrition Interventions:   Food and/or Nutrient Delivery:  Continue NPO, Start Parenteral Nutrititon. Consider retrial of NGT placement with tubefeedings if extended NPO status needed. Nutrition Education/Counseling:  No recommendation at this time   Coordination of Nutrition Care:  Continued Inpatient Monitoring    Goals:  PN to provide 75% or more of estimated nutrient needs until able to transition back to EN or start oral diet during LOS. Nutrition Monitoring and Evaluation:   Behavioral-Environmental Outcomes:  (n/a)   Food/Nutrient Intake Outcomes:  Diet Advancement/Tolerance, Parenteral Nutrition Intake/Tolerance, IVF Intake  Physical Signs/Symptoms Outcomes:  Biochemical Data, Chewing or Swallowing, Diarrhea, GI Status, Fluid Status or Edema, Hemodynamic Status, Skin, Weight     Discharge Planning:     Too soon to determine     Electronically signed by Gely Servin RD, LD on 10/10/20 at 11:09 AM EDT    Contact: [phone]

## 2020-10-11 NOTE — PLAN OF CARE
Problem: Impaired respiratory status  Goal: Able to breathe comfortably  Description: Able to breathe comfortably  Outcome: Ongoing  Note: Patient has been placed on CPAP throughout the night, he's still currently on it at this time. CPAP 12 and 65%. Will continue to wean FiO2 as patient tolerates. Can go to HFNC when ready.

## 2020-10-11 NOTE — PROGRESS NOTES
Hospitalist Progress Note    Patient:  Chito Splinter      Unit/Bed:4B-08/008-A    YOB: 1945    MRN: [id number]       Acct: [de-identified]     PCP: Erich Hurt MD    Date of Admission: 9/24/2020    Chief Complaint: cough, altered mental status     Hospital Course:     Per Dr. Sukhwinder Gore note:    Sriram Hamilton is a 69-year-old morbidly obese white male reformed smoker. [facility] has a history of diabetes, hyperlipidemia, and hypertension. Patient was admitted from the emergency room on 9/24/2020 with hypoxemic respiratory failure secondary to [address] [facility] [address] had associated acute renal injury.  He required 100% nonrebreather and was started on Decadron and Remdesivir.  He continued to deteriorate and required intubation on 9/27/2020.  Acute lung injury progressed to ARDS and started pronation therapy on 9/29/2020. Nellie Landau was found to have bilateral pneumonia with Klebsiella pneumoniae and Haemophilus influenza which was treated with Zosyn.  Patient completed 12-day course of Zosyn on 10/5/20. Patient extubated to 10/4/22 intermittent BiPAP. Patient had neurologic decline in the early morning hours of 10/7/2020.  Code stroke was initiated, and patient underwent neurologic assessment.  The decision was made to administer TPA as the patient had left sided weakness and increased confusion.  TPA was administered without difficulty. \"    10/11: overnight, still spiking fevers and still on HFNC    Subjective:     Patient seen and examined. Pt reports that he's doing fine, still coughing. Denies chest pain and sob. He states that his left arm weakness is getting better. Denies palpitations, abd pain, N/V.        Medications:  Reviewed    Infusion Medications    PN-Adult Premix 5/15 - Central      PN-Adult Premix 5/15 - Central 45 mL/hr at 10/10/20 1705    dextrose       Scheduled Medications    sodium phosphate IVPB  16 mmol Intravenous Once    magnesium sulfate  2,000 mg Intravenous Once    furosemide  40 mg Intravenous Once    enoxaparin  1 mg/kg Subcutaneous Q12H    calcium replacement protocol   Other RX Placeholder    pantoprazole  40 mg Intravenous Daily    And    sodium chloride (PF)  10 mL Intravenous Daily    glycopyrrolate-formoterol  2 puff Inhalation BID    clopidogrel  75 mg Oral Daily    metoprolol  5 mg Intravenous Q8H    sodium chloride flush  10 mL Intravenous 2 times per day    rosuvastatin  40 mg Oral Nightly    aspirin  81 mg Per NG tube Daily    insulin lispro  0-12 Units Subcutaneous 4x Daily AC & HS    lidocaine 1 % injection  5 mL Intradermal Once    [Held by provider] furosemide  60 mg Intravenous BID    potassium chloride  20 mEq Intravenous BID    potassium chloride  10 mEq Intravenous BID    insulin glargine  15 Units Subcutaneous Nightly    [Held by provider] amLODIPine  10 mg Per NG tube Daily    Vitamin D  1,000 Units Per NG tube Daily    calcium replacement protocol   Other RX Placeholder    phosphorus replacement protocol   Other RX Placeholder     PRN Meds: albuterol sulfate HFA, sodium chloride flush, promethazine **OR** ondansetron, potassium chloride, potassium chloride, magnesium sulfate, acetaminophen **OR** acetaminophen, polyethylene glycol, glucose, dextrose, glucagon (rDNA), dextrose      Intake/Output Summary (Last 24 hours) at 10/11/2020 0834  Last data filed at 10/10/2020 2000  Gross per 24 hour   Intake --   Output 1650 ml   Net -1650 ml       Diet:  DIET TUBE FEED CONTINUOUS/CYCLIC NPO; Semi-elemental (Vital 1.2); Nasogastric; Continuous; 20; 70  PN-Adult Premix 5/15 - Central  PN-Adult Premix 5/15 - Central    Exam:  BP (!) 152/68   Pulse 102   Temp 103 °F (39.4 °C) (Rectal)   Resp 24   Ht 5' 8\" (1.727 m)   Wt 239 lb 12.8 oz (108.8 kg)   SpO2 90%   BMI 36.46 kg/m²     General appearance: alert, confused, not in acute distress  HEENT: Pupils equal, round, and reactive to light. Neck: Supple, with full range of motion.  No jugular venous distention. Trachea midline. Respiratory: On high flow, clear to auscultation on anterior lung fields, hard to assess posterior lung fields (pt does not want to turn)  Cardiovascular: normal rate, regular rhythm with normal S1/S2 without murmurs, rubs or gallops. Abdomen: Soft, non-tender, non-distended. Rectal: rectal tube in placed with greenish watery stool  : pollock cath in placed with yellow urine  Musculoskeletal: passive and active ROM x 4 extremities. Neurological exam reveals alert, oriented to self only, does not know his bday and where he is, normal speech, Motor: 5/5 on both RUE and RLE, 4/5 on both LUE and LLE  Exam of extremities: peripheral pulses normal, no pedal edema, no clubbing or cyanosis        Labs:   Recent Labs     10/09/20  0600 10/10/20  0630 10/11/20  0525   WBC 8.2 8.2 9.9   HGB 10.0* 9.9* 9.9*   HCT 31.6* 31.3* 30.6*    222 235     Recent Labs     10/09/20  0600 10/10/20  0630 10/10/20  1320 10/11/20  0525    138  --  129*   K 4.5 4.3  --  4.0    104  --  95*   CO2 26 24  --  27   BUN 9 7  --  7   CREATININE 0.5 0.4  --  0.5   CALCIUM 8.0* 7.8*  --  7.7*   PHOS  --   --  2.0* 2.0*     Recent Labs     10/09/20  0600 10/10/20  0630 10/11/20  0525   AST 90* 102* 58*   ALT 95* 126* 102*   BILITOT 0.4 0.4 0.5   ALKPHOS 79 84 94     No results for input(s): INR in the last 72 hours. No results for input(s): Verlena Beka in the last 72 hours. Urinalysis:      Lab Results   Component Value Date    NITRU NEGATIVE 10/01/2020    WBCUA 10-15 10/01/2020    BACTERIA NONE SEEN 10/01/2020    RBCUA 50-75 10/01/2020    BLOODU LARGE 10/01/2020    SPECGRAV 1.028 10/01/2020       Radiology:  XR CHEST PORTABLE   Final Result   OG catheter below the diaphragm following the stomach curvature. **This report has been created using voice recognition software. It may contain minor errors which are inherent in voice recognition technology. **      Final report electronically signed by Dr. Ana Toth on 10/9/2020 7:12 PM      XR CHEST PORTABLE   Final Result   Mild progression bilateral infiltrates vs edema. This document has been electronically signed by: Marcus Hernandes MD on    10/09/2020 04:54 AM         XR CHEST PORTABLE   Final Result   Worsening bilateral airspace opacities. Correlation with clinical presentation is advised. Support lines and tubes in satisfactory position. Progress imaging recommended. **This report has been created using voice recognition software. It may contain minor errors which are inherent in voice recognition technology. **      Final report electronically signed by Dr. Ana Toth on 10/8/2020 8:46 PM      CT head without contrast   Final Result       1. No evidence of intracranial hemorrhage. 2. Mild severity chronic small vessel ischemic changes. 3. Layering fluid in the paranasal sinuses with some fluid in the mastoid air cells. **This report has been created using voice recognition software. It may contain minor errors which are inherent in voice recognition technology. **      Final report electronically signed by Dr. Cirilo Hurt on 10/8/2020 11:50 AM      MRI BRAIN WO CONTRAST   Final Result   No acute stroke. Sinus and mastoid disease. This document has been electronically signed by: Katlin Guillaume MD on 10/07/2020 11:46 PM         VL DUP CAROTID BILATERAL   Final Result    Mural plaque at the carotid bulbs bilaterally, right greater than left with greater than 70% stenosis on the right and less than 50% stenosis on the left by SRU consensus criteria. **This report has been created using voice recognition software. It may contain minor errors which are inherent in voice recognition technology. **      Final report electronically signed by Dr. Karo Holcomb MD on 10/7/2020 1:21 PM      CT HEAD WO CONTRAST   Final Result   Unremarkable CT of the head.       This document has been electronically signed by: Tono Gong MD    on 10/07/2020 05:06 AM      All CT scans at this facility use dose modulation, iterative    reconstruction, and/or weight-based   dosing when appropriate to reduce radiation dose to as low as reasonably    achievable. CTA HEAD W WO CONTRAST (CODE STROKE NIHSS 4 or Above)   Final Result   1. Study is severely limited by motion artifact. Portions of anterior    and posterior circulation are not well evaluated. 2.  Significant stenosis at the origin of the right internal carotid    artery is apparent. Formation with Doppler ultrasound recommended. 3.  No obvious intracranial vascular occlusion, although particularly the    cavernous ICAs and the posterior circulation are not well assessed. 4.  Bilateral upper lobe groundglass opacities concerning for pneumonia. This document has been electronically signed by: Tono Gong MD    on 10/07/2020 05:31 AM      All CT scans at this facility use dose modulation, iterative    reconstruction, and/or weight-based   dosing when appropriate to reduce radiation dose to as low as reasonably    achievable. CTA NECK W WO CONTRAST (CODE STROKE NIHSS 4 or Above)   Final Result   1. Study is severely limited by motion artifact. Portions of anterior    and posterior circulation are not well evaluated. 2.  Significant stenosis at the origin of the right internal carotid    artery is apparent. Formation with Doppler ultrasound recommended. 3.  No obvious intracranial vascular occlusion, although particularly the    cavernous ICAs and the posterior circulation are not well assessed. 4.  Bilateral upper lobe groundglass opacities concerning for pneumonia.       This document has been electronically signed by: Tono Gong MD    on 10/07/2020 05:31 AM      All CT scans at this facility use dose modulation, iterative    reconstruction, and/or weight-based   dosing when appropriate to reduce radiation dose to as low as reasonably    achievable. Carotid stenosis and measurements are in accordance with the NASCET    criteria. XR CHEST PORTABLE   Final Result   No significant change of bibasilar pneumonia. This document has been electronically signed by: Tonia Munson MD on 10/06/2020 07:23 AM         XR CHEST PORTABLE   Final Result   1. Interval placement of right-sided PICC line with tip in superior vena cava. 2. Interval removal of endotracheal tube with decreased lung volumes and persistent bibasilar opacities. **This report has been created using voice recognition software. It may contain minor errors which are inherent in voice recognition technology. **      Final report electronically signed by Dr. Ariana Lopez MD on 10/5/2020 4:02 PM      XR CHEST PORTABLE   Final Result   Significant improvement left lung infiltrates. Mild progression right    lung infiltrates. This document has been electronically signed by: Maria Ines العراقي MD on    10/03/2020 06:27 AM         XR CHEST PORTABLE   Final Result   Moderate progression in left with moderate improvement in right    infiltrates. This document has been electronically signed by: Maria Ines العراقي MD on    10/02/2020 06:24 AM         XR CHEST PORTABLE   Final Result   1. Patchy airspace filling within the bilateral lungs, compatible with    pneumonia. No significant change since the prior study. 2. Orogastric tube within the proximal body of the stomach just distal to    the gastroesophageal junction. The tube may be kinked or partially coiled. This document has been electronically signed by: Fidelina Perez MD on    10/01/2020 10:01 PM         XR CHEST PORTABLE   Final Result   Impression:   Mild progression in right basilar infiltrate with stable to mild    improvement in left infiltrates. This document has been electronically signed by:  Tiffany Beckford William Hansen MD on    10/01/2020 06:41 AM         XR CHEST PORTABLE   Final Result      1. Interval placement of an endotracheal tube, nasogastric tube and right IJ access central venous catheter. The medical devices appear appropriately positioned. 2. Diffuse infiltrates are scattered throughout the lungs bilaterally. **This report has been created using voice recognition software. It may contain minor errors which are inherent in voice recognition technology. **      Final report electronically signed by Dr Rhianna Barron on 9/27/2020 1:15 PM      XR CHEST PORTABLE   Final Result      1. Interval placement of an endotracheal tube, nasogastric tube and right IJ access central venous catheter. The medical devices appear appropriately positioned. 2. Diffuse infiltrates are scattered throughout the lungs bilaterally. **This report has been created using voice recognition software. It may contain minor errors which are inherent in voice recognition technology. **      Final report electronically signed by Dr Rhianna Barron on 9/27/2020 1:15 PM      XR CHEST PORTABLE   Final Result   There are infiltrates throughout the lungs bilaterally. Worse appearance of the chest when compared to the previous study. **This report has been created using voice recognition software. It may contain minor errors which are inherent in voice recognition technology. **      Final report electronically signed by Dr Rhianna Barron on 9/27/2020 11:10 AM      XR CHEST PORTABLE   Final Result   1. Slightly increased density in both lung fields. 2. Borderline cardiomegaly. 3. Thoracic spondylosis. **This report has been created using voice recognition software. It may contain minor errors which are inherent in voice recognition technology. **      Final report electronically signed by DR Hang Maynard on 9/24/2020 10:50 AM          Assessment/Plan:      Acute respiratory failure due to ARDS secondary to COVID-19     -Patient intubated 9/27/2020.  Requiring moderate to high levels of PEEP and moderate levels of FiO2.  Patient extubated to high flow oxygen on 10/4/2020, still on high flow and now on 60 lpm 71%FiO2   -pro-BNP elevated. CXR on 10/9/2020 showed mild progression B/L infiltrates vs edema   -Lasix 40 mg IV x 1 dose ordered  -pulmonology on-board. Pulmonology ordered lasix 20 mg IV BID x 3 days. Appreciate pulmonology input  -repeat CXR in am      ARDS secondary to COVID-19    -Status post pronation therapy   -pulmonology on-board. Appreciate pulmonology input    New-onset Afib with RVR, now Sinus Tachycardia     -occurred on 10/9/2020, converted to NSR on 10/10, then now having sinus tachycardia rate 110s on tele today. Cardiology increased lopressor IV dose   -CHADsVASc score 6  -switched lovenox to therapeutic lovenox on 10/10    -cardiology on-board. Appreciate cardiology input     NSVT    -had 5 beats of NSVT on 10/10 at 2300  -cont tele   -keep magnesium >/=2.0 and potassium >/=4.0    COVID-19 pneumonia     -treated with Decadron, Remdesivir and had convalescent plasma 10/1/2020.  Still with ongoing fever.  Continue to monitor. Pneumonia secondary to Klebsiella pneumoniae and Haemophilus influenza    -received systemic steroids for severe pneumonia.    -Patient completed 14 days of Zosyn on 10/7/2020.   -has dysphagia, failed swallow eval, concern for aspiration.  Eventually will need modified barium swallow.  has ongoing fever likely due to COVID.  Monitor off antibiotics. Sepsis due to pneumonia and COVID-19, resolving: treated. Still spiking fevers, likely related to COVID-19. Repeat blood cultures x 2 today.  Pt already completed 14-day course of zosyn      Stroke-like symptoms    -Code stroke was called on 10/7/2020 around 3am due to acute L face, L hemibody weakness, decreased mentation and slurred speech.   -s/p tPA  -brain MRI (-) for acute stroke   CTA H/N: severe (70%) right ICA stenosis  -neurology on-board. Neurology recommend ASA and plavix. Pt was loaded with 300 mg plavix then now on 75 mg PO daily. Appreciate neurology input  -cont statin     Acute metabolic encephalopathy vs Delirium due to COVID-19     -per RN, pt is pulling lines and pulled NGT multiple times   -cont to monitor   -will consider psych consult if persist or worsen  -tele sitter      Right ICA stenosis     -cont asa and plavix  -f/u with neurology on discharge     Dysphagia     -ST on-board, who recommend NPO and NGT feeding, however, pt pulled his NGT multiple times   -keep NPO  -will start TPN temporarily, pharmacy and dietician consult     Pseudo-Hypocalcemia due to hypoalbuminemia     -low normal vit D and normal PTH   -corrected calcium normal   -replace per protocol   -ical in am     Hypoalbuminemia, r/o malnutrition    -dietician on-board    Hypophosphatemia     -replace per protocol      Isotonic Hyponatremia     -initially resolved, now sodium down to 129 again   -serum osmolality normal 282 on 10/7/2020  -nephrology evaluated the pt on 10/7, then s/o once hyponatremia resolved. re-consult nephrology     DM type 2 with hyperglycemia     -Hyperglycemia likely due to TPN   -cont SSI and lantus   -accu-check    Elevated LFTs, improving    -LFTs in am     Hyperlipidemia    -cont statin    Essential HTN    -amlodipine on-hold for permissive HTN due to recent stroke-like symptoms   -VS per protocol       Anticipated Discharge in : pending       Diet: DIET TUBE FEED CONTINUOUS/CYCLIC NPO; Semi-elemental (Vital 1.2);  Nasogastric; Continuous; 20; 70    DVT prophylaxis: [x] Lovenox                                 [] SCDs                                 [] SQ Heparin                                 [] Encourage ambulation           [] Already on Anticoagulation     Disposition:    [] Home       [] TCU       [] Rehab       [] Psych       [] SNF       [] [place]        [x] Other-Plan as above       Code Status: Full Code      Electronically signed by Jodi Becerra MD on 10/11/2020 at 8:34 AM

## 2020-10-11 NOTE — PLAN OF CARE
Problem: Impaired respiratory status  Goal: Able to breathe comfortably  Description: Able to breathe comfortably  10/11/2020 0810 by Maryam Singh RCP  Outcome: Ongoing  Note: HFNC to improve o2 requirements.  Txs to maintain open airways

## 2020-10-11 NOTE — PROGRESS NOTES
TPN Follow Up Note    Assessment: Failed swallow evaluation, keeps pulling NG. Phosphorus remains slightly low. Ical improved with replacement.     Electrolyte Replacement:   Sodium phos 16 mmol    TPN changes for (today) at 1800:   None, continue Clinimix 5/15 central formulation @ 45 mL/hr to deliver 767 kcal  Plan to transition to 3-in-1 TPN tomorrow 10/12/20    Re-check BMP, Mg, PO4, iCa 10/12/20    Tom Nichole, PharmD, BCPS  10/11/2020  8:20 AM

## 2020-10-11 NOTE — PROGRESS NOTES
[facility]    Patient - Steven England   MRN -  [id number]   [facility] # - [de-identified]   - 1945      Date of Admission -  2020 10:05 AM  Date of evaluation -  10/11/2020  Room - 4B--A   Hospital Day - [address] [facility] Andera Braun MD Primary Care Physician - Claudell Donath, MD     Problem List      Active Hospital Problems    Diagnosis Date Noted    Acute respiratory failure due to COVID-19 ([address] [U07.1, J96.00]     Acute respiratory distress syndrome (ARDS) due to COVID-19 virus ([address] [U07.1, J80]     New onset atrial fibrillation (HCC) [I48.91]     Pneumonia due to COVID-19 virus [U07.1, J12.89]     CAP (community acquired pneumonia) due to Klebsiella pneumoniae ([address] [J15.0]     Sepsis due to COVID-19 ([address] [U07.1, A41.89]     Stroke-like symptoms [R29.90]     Acute metabolic encephalopathy [D27.14]     Delirium [R41.0]     Stenosis of right carotid artery [I65.21]     Dysphagia [R13.10]     Hypocalcemia [E83.51]     Hyponatremia [E87.1]     Type 2 diabetes mellitus with hyperglycemia ([address] [E11.65]     Hyperlipidemia [E78.5]     Essential hypertension [I10]     Left hemiparesis ([address] [G81.94] 10/07/2020    COVID-19 [U07.1] 2020       Reason for consult   For management of hypoxic respiratory failure. Patient still on high flow. HPI   Patient is a 67-year-old morbidly obese white male reformed smoker. Anna Parekh has a history of diabetes, hyperlipidemia, and hypertension. The patient was initially admitted from the emergency room on 2020 with hypoxemic respiratory failure secondary to [address] had associated acute renal injury.  He required 100% nonrebreather and was started on Decadron and Remdesivir. He was initially admitted under Dr. Mesa Core continued to deteriorate and required intubation on 2020.  Acute lung injury progressed to ARDS and started pronation therapy on 2020. Inability: Not on file    Transportation needs     Medical: Not on file     Non-medical: Not on file   Tobacco Use    Smoking status: Former Smoker     Last attempt to quit: 2000     Years since quittin.7   Substance and Sexual Activity    Alcohol use: Never     Frequency: Never    Drug use: Never    Sexual activity: Not on file   Lifestyle    Physical activity     Days per week: Not on file     Minutes per session: Not on file    Stress: Not on file   Relationships    Social connections     Talks on phone: Not on file     Gets together: Not on file     Attends [employer] service: Not on file     Active member of club or organization: Not on file     Attends meetings of clubs or organizations: Not on file     Relationship status: Not on file    Intimate partner violence     Fear of current or ex partner: Not on file     Emotionally abused: Not on file     Physically abused: Not on file     Forced sexual activity: Not on file   Other Topics Concern    Not on file   Social History Narrative    Not on file     Family History    History reviewed. No pertinent family history. Vitals     height is 5' 8\" (1.727 m) and weight is 239 lb 12.8 oz (108.8 kg). His oral temperature is 99.2 °F (37.3 °C). His blood pressure is 110/90 (abnormal) and his pulse is 96. His respiration is 21 and oxygen saturation is 91%. Body mass index is 36.46 kg/m². SUPPLEMENTAL O2: O2 Flow Rate (L/min): 50 L/min     I/O        Intake/Output Summary (Last 24 hours) at 10/11/2020 1328  Last data filed at 10/11/2020 1244  Gross per 24 hour   Intake 1925.28 ml   Output 3350 ml   Net -1424.72 ml     I/O last 3 completed shifts:  In: -   Out: 4386 [Urine:1650]   Patient Vitals for the past 96 hrs (Last 3 readings):   Weight   10/10/20 1415 239 lb 12.8 oz (108.8 kg)   10/09/20 0600 223 lb 9.6 oz (101.4 kg)   10/08/20 0400 220 lb (99.8 kg)       Exam   Constitutional: Patient appears in no distress- on Hi Flow. Ector MILLER:    Head: Normocephalic and atraumatic. Right Ear: External ear normal.   Left Ear: External ear normal.   Mouth/Throat: Oropharynx is clear and moist.  No oral thrush. Eyes: Pupils are equal, round, and reactive to light. Neck: Neck supple. Cardiovascular: Normal rate, regular rhythm, normal heart sounds. No murmur heard. Pulmonary/Chest: Effort normal and good breath sounds. Diminished at bases. No stridor. No respiratory distress. Occasional expiraotry wheezes. No rales. Abdominal: Soft. Obese. Patient exhibits no distension. No tenderness. Musculoskeletal: Normal range of motion. Left side weakness. Extremities: Patient exhibits no edema and no tenderness. Lymphadenopathy:  No cervical adenopathy. Neurological: Patient is alert and oriented to person, place, and time. Skin: Skin is warm and dry. Psychiatric: Patient  has a normal mood and affect.     Labs  - Old records and notes have been reviewed in [facility]   ABG  Lab Results   Component Value Date    PH 7.49 10/11/2020    PO2 55 10/11/2020    PCO2 35 10/11/2020    HCO3 27 10/11/2020    O2SAT 91 10/11/2020     Lab Results   Component Value Date    IFIO2 65 10/11/2020    SETPEEP 14.0 09/27/2020     CBC  Recent Labs     10/09/20  0600 10/10/20  0630 10/11/20  0525   WBC 8.2 8.2 9.9   RBC 3.21* 3.19* 3.17*   HGB 10.0* 9.9* 9.9*   HCT 31.6* 31.3* 30.6*   MCV 98.4* 98.1* 96.5*   MCH 31.2 31.0 31.2   MCHC 31.6* 31.6* 32.4    222 235   MPV 9.4 9.3* 9.2*      BMP  Recent Labs     10/09/20  0600 10/10/20  0630 10/10/20  1320 10/11/20  0525    138  --  129*   K 4.5 4.3  --  4.0    104  --  95*   CO2 26 24  --  27   BUN 9 7  --  7   CREATININE 0.5 0.4  --  0.5   GLUCOSE 190* 130*  --  193*   MG  --   --  1.9 1.9   PHOS  --   --  2.0* 2.0*   CALCIUM 8.0* 7.8*  --  7.7*     LFT  Recent Labs     10/09/20  0600 10/10/20  0630 10/11/20  0525   AST 90* 102* 58*   ALT 95* 126* 102*   BILITOT 0.4 0.4 0.5   ALKPHOS 79 84 94     TROP  Lab Results Component Value Date    TROPONINT < 0.010 10/07/2020     BNP  No results for input(s): BNP in the last 72 hours. Lactic Acid  No results for input(s): LACTA in the last 72 hours. INR  No results for input(s): INR, PROTIME in the last 72 hours. PTT  No results for input(s): APTT in the last 72 hours. Glucose  Recent Labs     10/10/20  1709 10/10/20  2247 10/11/20  1028   POCGLU 145* 214* 219*     UA No results for input(s): SPECGRAV, PHUR, COLORU, CLARITYU, MUCUS, PROTEINU, BLOODU, RBCUA, WBCUA, BACTERIA, NITRU, GLUCOSEU, BILIRUBINUR, UROBILINOGEN, KETUA, LABCAST, LABCASTTY, AMORPHOS in the last 72 hours. Invalid input(s): CRYSTALS. Cultures    Blood cultures X 2 sets were : Negative   Respiratory cultures: Sputum cultures: Normal Shabana. Pneumonia Panel by Molecular:  Detected for Haemophilus  Klebsiella Pneumoniae. Echocardiogram   10/7/2020   Narrative & Impression      Transthoracic Echocardiography Report (TTE)  Right Ventricle   The right ventricular size was normal with normal systolic function and   wall thickness  Conclusions      Summary   Ejection fraction is visually estimated at 55%. Overall left ventricular function is normal.   The aortic valve leaflets were not well visualized. Aortic valve appears tricuspid. Aortic valve leaflets are somewhat thickened. Aortic valve leaflets are Mildly calcified. Signature      ----------------------------------------------------------------   Electronically signed by Nabor Tabares MD (Interpreting   physician) on 10/07/2020 at 03:39 PM   ----------------------------------------------------------------    Radiology    Oct 9, 2020   PROCEDURE: XR CHEST PORTABLE   OG catheter below the diaphragm following the stomach curvature. ASSESSMENT:   -Acute respiratory failure due to ARDS- Worsening of hypoxia compared to yesterday. -ARDS due to COVID-19.  -Hx of Acute CVA.  He underwent tPA administration 10/7/2020 with Left side weakness. -COVID-19. He was treated with Decadron and Remdesivir.  S/P convalescent plasma 10/1/2020.   -Hx of Tobacco smoking- High suspicion for COPD. -Metabolic encephalopathy due to COVID-19. Neurology. -Type II diabetes mellitus.  -Hypertension.  -Increased neck size with hx of CVA- high suspicion for Sleep apnea due to obstructive Vs Central.    Plan:   -Wean Fio2 to keep Spo2>90%. -Continue patient on Bevespi Aerosphere 9/4.8mcg per actuation, 2 puffs via inhalation BID. -Continue patient on Albuterol HFA 90mcg/Spray MDI, 2puffs  Q6Hprn.   -Still found to have elevated BNP level I.e 2595. Will start on Lasix 20mg bid for 3days with close monitoring of BMP daily. He was given a dose of Lasix 40mg IV today.  -Avoid all sedative meds if he is drowsy. -Aspiration precautions.  -Continue patient on CPAP with pressure of 12cm H20 during sleep for ? Sleep apnea. -Will do CXR PA and lateral views in Am to follow his pneumonia Vs pulmonary edema  -Vest therapy Qshift as tolerated. -Continue Incentive spirometry Q4h as tolerated. Case discussed with patient  Discussed with taking care of patient regarding patient condition and my management plan at bed side. Questions and concerns addressed.     Electronically signed by   Blayne Rubalcava MD on 10/11/2020 at 1:28 PM

## 2020-10-12 NOTE — PROGRESS NOTES
currently on Clinimix 5/15 at 45 ml/hr. Note patient had BM x 2 on 10/9. Patient with rectal tube and no recent output noted. Labs: sodium: 131, POC: 258, Phosphorus: 2.1. Meds: lasix, lantus, humalog, zosyn. Wounds:  Stage II(coccyx)       Current Nutrition Therapies:    Current Parenteral Nutrition Orders:  · Type and Formula: Premix Central(Clinimix 5/15)   · Lipids: None  · Duration: Continuous  · Rate/Volume: plan 45ml/hr pending phosphorus level  · Current PN Order Provides: ~ 767 kcals (~ 10 kcals/kg dosing wt), 54 grams protein, 162 grams CHO per 24 hours  · Goal PN Orders Provides: switch to custom 3:1 TPN via central line: dose weight of 78 kg, 15 kcals/kg, 1.5 grams protein/kg, and 30% lipids = 1170 kcals, 117 grams protein, 103 grams CHO    Anthropometric Measures:  · Height: 5' 8\" (172.7 cm)  · Current Body Weight: 236 lb (107 kg)(10/12, bedscale, trace BUE and BLE edema)   · Admission Body Weight: 220 lb (99.8 kg)(9/24/20, no weight source)    · Usual Body Weight: (no weight hx available in EMR, patient intubated)     · Ideal Body Weight: 154 lbs;   · BMI: 35.9  · Adjusted Body Weight:  ; (78 kg for TPN doing)   · BMI Categories: Obese Class 2 (BMI 35.0 -39.9)(36.01)       Nutrition Diagnosis:   · Inadequate oral intake related to swallowing difficulty as evidenced by NPO or clear liquid status due to medical condition, swallow study results, nutrition support - parenteral nutrition    Nutrition Interventions:   Food and/or Nutrient Delivery:  Continue NPO, Modify Parenteral Nutrition(consider PEG for EN)  Nutrition Education/Counseling:  No recommendation at this time   Coordination of Nutrition Care:  Continued Inpatient Monitoring, Interdisciplinary Rounds    Goals:  PN to provide 75% or more of estimated nutrient needs until able to transition back to EN or start oral diet during LOS.        Nutrition Monitoring and Evaluation:   Behavioral-Environmental Outcomes:  (n/a)   Food/Nutrient Intake Outcomes:  Parenteral Nutrition Intake/Tolerance  Physical Signs/Symptoms Outcomes:  Biochemical Data, Chewing or Swallowing, GI Status, Fluid Status or Edema, Hemodynamic Status, Weight, Skin     Discharge Planning:     Too soon to determine     Electronically signed by Umberto Elam RD, ADRIAN on 10/12/20 at 11:06 AM EDT    Contact: [phone]

## 2020-10-12 NOTE — PROGRESS NOTES
[facility]    Patient - Navarro Anguiano   MRN -  [id number]   [facility] # - [de-identified]   - 1945      Date of Admission -  2020 10:05 AM  Date of evaluation -  10/12/2020  Room - 4B--A   Hospital Day - [address] Yon Marks MD Primary Care Physician - Adalberto Torres MD     Problem List      Active Hospital Problems    Diagnosis Date Noted    Acute respiratory failure due to COVID-19 ([address] [U07.1, J96.00]     Acute respiratory distress syndrome (ARDS) due to COVID-19 virus ([address] [U07.1, J80]     New onset atrial fibrillation (HCC) [I48.91]     Pneumonia due to COVID-19 virus [U07.1, J12.89]     CAP (community acquired pneumonia) due to Klebsiella pneumoniae ([address] [J15.0]     Sepsis due to COVID-19 ([address] [U07.1, A41.89]     Stroke-like symptoms [R29.90]     Acute metabolic encephalopathy [N49.84]     Delirium [R41.0]     Stenosis of right carotid artery [I65.21]     Dysphagia [R13.10]     Hypocalcemia [E83.51]     Hyponatremia [E87.1]     Type 2 diabetes mellitus with hyperglycemia ([address] [E11.65]     Hyperlipidemia [E78.5]     Essential hypertension [I10]     Left hemiparesis ([address] [G81.94] 10/07/2020    COVID-19 [U07.1] 2020     Reason for consult   For management of hypoxic respiratory failure. HPI   Patient is a 58-year-old morbidly obese white male reformed smoker. Gregg Linda has a history of diabetes, hyperlipidemia, and hypertension. The patient was initially admitted from the emergency room on 2020 with hypoxemic respiratory failure secondary to [address] had associated acute renal injury.  He required 100% nonrebreather and was started on Decadron and Remdesivir.  He was initially admitted under Dr. Jaffe Pour continued to deteriorate and required intubation on 2020.  Acute lung injury progressed to ARDS and started pronation therapy on 2020. Sotero Dale was found to have bilateral pneumonia with Klebsiella pneumoniae and Haemophilus influenza which was treated with Zosyn.  Patient completed 12-day course of Zosyn on 10/5/20. Patient was subsequently extubated to 10/4/22 to intermittent BiPAP. Patient had neurologic decline in the early morning hours of 10/7/2020.  Code stroke was initiated, and patient underwent neurologic assessment. He was administered with tPA as the patient had left sided weakness and increased confusion.  tPA was administered without difficulty. At the time of my initial evaluation, he remain on Hi Flow. Not in any acute distress. He was noted to be confused at times during my visit today. He was also noted to be weak on his left side of body. He was never diagnosed with obstructive sleep apnea. Subjective/Events Past 24 hours/ROS   He still remains on high flow. Patient did not used CPAP with a pressure of 12 cm of water last night. He is awake and alert. He had a fever with a T-max of 101.1 °F.  Rest of the review of systems were reviewed. PMHx   Past Medical History      Diagnosis Date    Diabetes mellitus [facility]     Hyperlipidemia     Hypertension       Past Surgical History    History reviewed. No pertinent surgical history.   Meds    Current Medications    insulin lispro  0-18 Units Subcutaneous TID WC    insulin lispro  0-9 Units Subcutaneous Nightly    piperacillin-tazobactam  3.375 g Intravenous Q8H    metoprolol  5 mg Intravenous Q6H    furosemide  20 mg Intravenous BID    enoxaparin  1 mg/kg Subcutaneous Q12H    calcium replacement protocol   Other RX Placeholder    pantoprazole  40 mg Intravenous Daily    And    sodium chloride (PF)  10 mL Intravenous Daily    glycopyrrolate-formoterol  2 puff Inhalation BID    clopidogrel  75 mg Oral Daily    sodium chloride flush  10 mL Intravenous 2 times per day    rosuvastatin  40 mg Oral Nightly    aspirin  81 mg Per NG tube Daily    lidocaine 1 % injection  5 mL Intradermal Once  potassium chloride  20 mEq Intravenous BID    potassium chloride  10 mEq Intravenous BID    insulin glargine  15 Units Subcutaneous Nightly    [Held by provider] amLODIPine  10 mg Per NG tube Daily    Vitamin D  1,000 Units Per NG tube Daily    calcium replacement protocol   Other RX Placeholder    phosphorus replacement protocol   Other RX Placeholder     albuterol sulfate HFA, sodium chloride flush, promethazine **OR** ondansetron, potassium chloride, potassium chloride, magnesium sulfate, acetaminophen **OR** acetaminophen, polyethylene glycol, glucose, dextrose, glucagon (rDNA), dextrose  IV Drips/Infusions   PN-Adult  3 IN 1 Central Line (Custom)      PN-Adult Premix 5/15 - Central 45 mL/hr at 10/11/20 1712    dextrose       Home Medications  Medications Prior to Admission: amLODIPine (NORVASC) 10 MG tablet, Take 10 mg by mouth daily   atorvastatin (LIPITOR) 10 MG tablet, Take 10 mg by mouth nightly   glimepiride (AMARYL) 2 MG tablet, Take 3 mg by mouth 2 times daily  metFORMIN (GLUCOPHAGE) 1000 MG tablet, Take 1,000 mg by mouth 2 times daily (with meals)   hydroCHLOROthiazide (HYDRODIURIL) 12.5 MG tablet, Take 12.5 mg by mouth daily  Zinc Acetate (GALZIN) 25 MG capsule, Take 1 capsule by mouth daily  Cholecalciferol (VITAMIN D3) 125 MCG (5000 UT) CAPS, Take by mouth  Diet    DIET TUBE FEED CONTINUOUS/CYCLIC NPO; Semi-elemental (Vital 1.2);  Nasogastric; Continuous; 20; 70  PN-Adult Premix 5/15 - Central  PN-Adult  3 IN 1 Central Line (Custom)  Allergies    Bee venom  Social History     Social History     Socioeconomic History    Marital status:      Spouse name: Not on file    Number of children: Not on file    Years of education: Not on file    Highest education level: Not on file   Occupational History    Not on file   Social Needs    Financial resource strain: Not on file    Food insecurity     Worry: Not on file     Inability: Not on file    Transportation needs     Medical: Not on file     Non-medical: Not on file   Tobacco Use    Smoking status: Former Smoker     Last attempt to quit: 2000     Years since quittin.7   Substance and Sexual Activity    Alcohol use: Never     Frequency: Never    Drug use: Never    Sexual activity: Not on file   Lifestyle    Physical activity     Days per week: Not on file     Minutes per session: Not on file    Stress: Not on file   Relationships    Social connections     Talks on phone: Not on file     Gets together: Not on file     Attends Hinduism service: Not on file     Active member of club or organization: Not on file     Attends meetings of clubs or organizations: Not on file     Relationship status: Not on file    Intimate partner violence     Fear of current or ex partner: Not on file     Emotionally abused: Not on file     Physically abused: Not on file     Forced sexual activity: Not on file   Other Topics Concern    Not on file   Social History Narrative    Not on file     Family History    History reviewed. No pertinent family history. Vitals     height is 5' 8\" (1.727 m) and weight is 236 lb 12.8 oz (107.4 kg). His oral temperature is 99.3 °F (37.4 °C). His blood pressure is 131/60 and his pulse is 112. His respiration is 26 and oxygen saturation is 93%. Body mass index is 36.01 kg/m². SUPPLEMENTAL O2: O2 Flow Rate (L/min): 60 L/min     I/O        Intake/Output Summary (Last 24 hours) at 10/12/2020 1246  Last data filed at 10/12/2020 0543  Gross per 24 hour   Intake 1202 ml   Output 900 ml   Net 302 ml     I/O last 3 completed shifts: In: 3127.3 [I.V.:3127.3]  Out: 2600 [Urine:2600]   Patient Vitals for the past 96 hrs (Last 3 readings):   Weight   10/12/20 0320 236 lb 12.8 oz (107.4 kg)   10/10/20 1415 239 lb 12.8 oz (108.8 kg)   10/09/20 0600 223 lb 9.6 oz (101.4 kg)       Exam   Constitutional: Patient appears in no distress on Hi Flow   Mouth/Throat: Oropharynx is clear and moist.  No oral thrush.   Neck: Neck supple. Cardiovascular: S1 and S2 heard. No murmurs. Pulmonary/Chest: Bilateral air entry present. Good breath sounds on both sides, diminished at bases. Occasional expiratory wheezes. No rales. Abdominal: Soft. No tenderness. Extremities: Patient exhibits no edema. Neurological: Patient is awake and alert. Labs  - Old records and notes have been reviewed in [facility]   ABG  Lab Results   Component Value Date    PH 7.49 10/11/2020    PO2 55 10/11/2020    PCO2 35 10/11/2020    HCO3 27 10/11/2020    O2SAT 91 10/11/2020     Lab Results   Component Value Date    IFIO2 65 10/11/2020    SETPEEP 14.0 09/27/2020     CBC  Recent Labs     10/10/20  0630 10/11/20  0525 10/12/20  0455   WBC 8.2 9.9 11.5*   RBC 3.19* 3.17* 3.13*   HGB 9.9* 9.9* 9.8*   HCT 31.3* 30.6* 29.7*   MCV 98.1* 96.5* 94.9*   MCH 31.0 31.2 31.3   MCHC 31.6* 32.4 33.0    235 243   MPV 9.3* 9.2* 9.1*      BMP  Recent Labs     10/10/20  0630 10/10/20  1320 10/11/20  0525  10/12/20  0015 10/12/20  0455 10/12/20  0815     --  129*   < > 130* 130* 131*   K 4.3  --  4.0  --   --  3.7  --      --  95*  --   --  95*  --    CO2 24  --  27  --   --  27  --    BUN 7  --  7  --   --  11  --    CREATININE 0.4  --  0.5  --   --  0.6  --    GLUCOSE 130*  --  193*  --   --  254*  --    MG  --  1.9 1.9  --   --  2.1  --    PHOS  --  2.0* 2.0*  --   --  2.1*  --    CALCIUM 7.8*  --  7.7*  --   --  7.6*  --     < > = values in this interval not displayed. LFT  Recent Labs     10/10/20  0630 10/11/20  0525 10/12/20  0455   * 58* 40   * 102* 74*   BILITOT 0.4 0.5 0.6   ALKPHOS 84 94 78     TROP  Lab Results   Component Value Date    TROPONINT < 0.010 10/07/2020     BNP  No results for input(s): BNP in the last 72 hours. Lactic Acid  No results for input(s): LACTA in the last 72 hours. INR  No results for input(s): INR, PROTIME in the last 72 hours. PTT  No results for input(s): APTT in the last 72 hours.   Glucose  Recent Labs     10/12/20  0207 10/12/20  0828 10/12/20  1218   POCGLU 221* 258* 226*     UA   Recent Labs     10/12/20  0318   SPECGRAV 1.018   PHUR 5.0   COLORU DK YELLOW   MUCUS THREADS   PROTEINU 30*   BLOODU MODERATE*   RBCUA 15-20   WBCUA 0-2   BACTERIA MODERATE   NITRU NEGATIVE   BILIRUBINUR NEGATIVE   UROBILINOGEN 1.0   KETUA NEGATIVE   LABCAST 0-4 C. GRAN   . Cultures    Blood cultures X 2 sets were : Negative   Respiratory cultures: Sputum cultures: Normal Shabana. Pneumonia Panel by Molecular:  Detected for Haemophilus  Klebsiella Pneumoniae. Echocardiogram   10/7/2020   Narrative & Impression      Transthoracic Echocardiography Report (TTE)  Right Ventricle   The right ventricular size was normal with normal systolic function and   wall thickness  Conclusions      Summary   Ejection fraction is visually estimated at 55%. Overall left ventricular function is normal.   The aortic valve leaflets were not well visualized. Aortic valve appears tricuspid. Aortic valve leaflets are somewhat thickened. Aortic valve leaflets are Mildly calcified. Signature      ----------------------------------------------------------------   Electronically signed by Arabella Juarez MD (Interpreting   physician) on 10/07/2020 at 03:39 PM   ----------------------------------------------------------------    Radiology    Chest Xray:  10/12/2020   Portable semierect AP chest.    Progressive ill-defined consolidations in the left mid to lower lung zone favoring pneumonia with possible superimposed atelectasis. Relatively similar pneumonia and/or atelectasis in the right lung. Oct 9, 2020   PROCEDURE: XR CHEST PORTABLE   OG catheter below the diaphragm following the stomach curvature.      ASSESSMENT:   -Acute respiratory failure due to ARDS- Worsening of hypoxia compared to yesterday due to new left lower lobe pneumonia  -Elevated WBC counts with New left lower lobe pneumonia- Most likely etiology for his worsening of hypoxia.  -He is -3,224.7ml negative per I/O chart. Will stop Lasix. -ARDS due to COVID-19.  -Hx of Acute CVA. He underwent tPA administration 10/7/2020 with Left side weakness. -COVID-19. He was treated with Decadron and Remdesivir.  S/P convalescent plasma 10/1/2020.   -Hx of Tobacco smoking- High suspicion for COPD. -Metabolic encephalopathy due to COVID-19. Neurology. -Type II diabetes mellitus.  -Hypertension.  -Increased neck size with hx of CVA- high suspicion for Sleep apnea due to obstructive Vs Central.    Plan:   -Wean Fio2 to keep Spo2>90%. -Continue patient on Bevespi Aerosphere 9/4.8mcg per actuation, 2 puffs via inhalation BID. -Continue patient on Albuterol HFA 90mcg/Spray MDI, 2puffs  Q6Hprn.   -Stop Lasix  -Avoid all sedative meds if he is drowsy. -Aspiration precautions.  -Continue patient on CPAP with pressure of 12cm H20 during sleep for ? Sleep apnea. -Follow pending cultures.  -He was started on Zosyn by primary service.  -Awaiting ID consult in put regarding antibiotic therapy. -Vest therapy Qshift as tolerated. -Continue Incentive spirometry Q4h as tolerated. Case discussed with patient  Discussed with RN taking care of patient regarding patient condition and my management plan at bed side.  -Discussed with MD Grisel regarding patient condition and management plan. Questions and concerns addressed.     Electronically signed by   Governor MD Avery on 10/12/2020 at 12:46 PM

## 2020-10-12 NOTE — PROGRESS NOTES
[facility]  SPEECH THERAPY MISSED TREATMENT NOTE  [facility] CVICU 4B      Date: 10/12/2020  Patient Name: Lauren Floers        MRN: [id number]    : 1945  (67 y.o.)    REASON FOR MISSED TREATMENT: Per ST discussion with charge RN in 4B on this date, the patient is now on BIPAP with a live sitter. The pt is demonstrating increased confusion and declined respiratory status. Given minimal to no progress with dysphagia to this point, compromised medical status,  and increased confusion; ST HIGHLY anticipates the pt will require long-term means of nutrition/hydration via PEG placement. [place]  Sandra Ambrosia MA., [id number]

## 2020-10-12 NOTE — PROGRESS NOTES
Hospitalist Progress Note    Patient:  Ann Lucia      Unit/Bed:4B-08/008-A    YOB: 1945    MRN: [id number]       Acct: [de-identified]     PCP: Iris Light MD    Date of Admission: 9/24/2020    Chief Complaint: cough, altered mental status     Hospital Course:     Per Dr. Grecia Sorto note:    Jg Frederick is a 71-year-old morbidly obese white male reformed smoker. [facility] has a history of diabetes, hyperlipidemia, and hypertension. Patient was admitted from the emergency room on 9/24/2020 with hypoxemic respiratory failure secondary to [address] had associated acute renal injury.  He required 100% nonrebreather and was started on Decadron and Remdesivir.  He continued to deteriorate and required intubation on 9/27/2020.  Acute lung injury progressed to ARDS and started pronation therapy on 9/29/2020. Carlotta Alfaro was found to have bilateral pneumonia with Klebsiella pneumoniae and Haemophilus influenza which was treated with Zosyn.  Patient completed 12-day course of Zosyn on 10/5/20. Patient extubated to 10/4/22 intermittent BiPAP. Patient had neurologic decline in the early morning hours of 10/7/2020.  Code stroke was initiated, and patient underwent neurologic assessment.  The decision was made to administer TPA as the patient had left sided weakness and increased confusion.  TPA was administered without difficulty. \"    10/11: overnight, still spiking fevers and still on HFNC    10/12: still running fever, overnight, Tmax 101.1F     Subjective:     Patient seen and examined. Pt reports that he's still coughing. Denies chest pain and sob. He states that his left arm weakness is getting better. Denies palpitations, abd pain, N/V.        Medications:  Reviewed    Infusion Medications    PN-Adult Premix 5/15 - Central 45 mL/hr at 10/11/20 1712    dextrose       Scheduled Medications    insulin lispro  0-18 Units Subcutaneous TID WC    insulin lispro  0-9 Units Subcutaneous Nightly    piperacillin-tazobactam  3.375 g Intravenous Q8H    metoprolol  5 mg Intravenous Q6H    furosemide  20 mg Intravenous BID    enoxaparin  1 mg/kg Subcutaneous Q12H    calcium replacement protocol   Other RX Placeholder    pantoprazole  40 mg Intravenous Daily    And    sodium chloride (PF)  10 mL Intravenous Daily    glycopyrrolate-formoterol  2 puff Inhalation BID    clopidogrel  75 mg Oral Daily    sodium chloride flush  10 mL Intravenous 2 times per day    rosuvastatin  40 mg Oral Nightly    aspirin  81 mg Per NG tube Daily    lidocaine 1 % injection  5 mL Intradermal Once    potassium chloride  20 mEq Intravenous BID    potassium chloride  10 mEq Intravenous BID    insulin glargine  15 Units Subcutaneous Nightly    [Held by provider] amLODIPine  10 mg Per NG tube Daily    Vitamin D  1,000 Units Per NG tube Daily    calcium replacement protocol   Other RX Placeholder    phosphorus replacement protocol   Other RX Placeholder     PRN Meds: albuterol sulfate HFA, sodium chloride flush, promethazine **OR** ondansetron, potassium chloride, potassium chloride, magnesium sulfate, acetaminophen **OR** acetaminophen, polyethylene glycol, glucose, dextrose, glucagon (rDNA), dextrose      Intake/Output Summary (Last 24 hours) at 10/12/2020 0821  Last data filed at 10/12/2020 0543  Gross per 24 hour   Intake 3127.28 ml   Output 2600 ml   Net 527.28 ml       Diet:  DIET TUBE FEED CONTINUOUS/CYCLIC NPO; Semi-elemental (Vital 1.2); Nasogastric; Continuous; 20; 70  PN-Adult Premix 5/15 - Central    Exam:  BP (!) 148/74 Comment: manual  Pulse 104   Temp 100.5 °F (38.1 °C) (Oral)   Resp 22   Ht 5' 8\" (1.727 m)   Wt 236 lb 12.8 oz (107.4 kg)   SpO2 91%   BMI 36.01 kg/m²     General appearance: alert, confused, not in acute distress  HEENT: Pupils equal, round, and reactive to light. Neck: Supple, with full range of motion. No jugular venous distention. Trachea midline. Respiratory:   On high flow, clear to auscultation on anterior lung fields, hard to assess posterior lung fields (pt does not want to turn)  Cardiovascular: normal rate, regular rhythm with normal S1/S2 without murmurs, rubs or gallops. Abdomen: Soft, non-tender, non-distended. Rectal: rectal tube in placed with greenish watery stool  : pollock cath in placed with yellow urine  Musculoskeletal: passive and active ROM x 4 extremities. Neurological exam reveals alert, oriented to self and knows his bday and knows that he's in the hospital though he does not know which hospital, normal speech, Motor: 5/5 on both RUE and RLE, 4/5 on both LUE and LLE  Exam of extremities: peripheral pulses normal, no pedal edema, no clubbing or cyanosis        Labs:   Recent Labs     10/10/20  0630 10/11/20  0525 10/12/20  0455   WBC 8.2 9.9 11.5*   HGB 9.9* 9.9* 9.8*   HCT 31.3* 30.6* 29.7*    235 243     Recent Labs     10/10/20  0630 10/10/20  1320 10/11/20  0525 10/11/20  1645 10/12/20  0015 10/12/20  0455     --  129* 126* 130* 130*   K 4.3  --  4.0  --   --  3.7     --  95*  --   --  95*   CO2 24  --  27  --   --  27   BUN 7  --  7  --   --  11   CREATININE 0.4  --  0.5  --   --  0.6   CALCIUM 7.8*  --  7.7*  --   --  7.6*   PHOS  --  2.0* 2.0*  --   --  2.1*     Recent Labs     10/10/20  0630 10/11/20  0525 10/12/20  0455   * 58* 40   * 102* 74*   BILITOT 0.4 0.5 0.6   ALKPHOS 84 94 78     No results for input(s): INR in the last 72 hours. No results for input(s): Leandro Seed in the last 72 hours. Urinalysis:      Lab Results   Component Value Date    NITRU NEGATIVE 10/01/2020    WBCUA 10-15 10/01/2020    BACTERIA NONE SEEN 10/01/2020    RBCUA 50-75 10/01/2020    BLOODU LARGE 10/01/2020    SPECGRAV 1.028 10/01/2020       Radiology:  XR CHEST PORTABLE   Final Result   OG catheter below the diaphragm following the stomach curvature. **This report has been created using voice recognition software.  It may contain minor errors which are inherent in voice recognition technology. **      Final report electronically signed by Dr. Ivy Gonzalez on 10/9/2020 7:12 PM      XR CHEST PORTABLE   Final Result   Mild progression bilateral infiltrates vs edema. This document has been electronically signed by: Erick Sifuentes MD on    10/09/2020 04:54 AM         XR CHEST PORTABLE   Final Result   Worsening bilateral airspace opacities. Correlation with clinical presentation is advised. Support lines and tubes in satisfactory position. Progress imaging recommended. **This report has been created using voice recognition software. It may contain minor errors which are inherent in voice recognition technology. **      Final report electronically signed by Dr. Ivy Gonzalez on 10/8/2020 8:46 PM      CT head without contrast   Final Result       1. No evidence of intracranial hemorrhage. 2. Mild severity chronic small vessel ischemic changes. 3. Layering fluid in the paranasal sinuses with some fluid in the mastoid air cells. **This report has been created using voice recognition software. It may contain minor errors which are inherent in voice recognition technology. **      Final report electronically signed by Dr. Auqiles Medina on 10/8/2020 11:50 AM      MRI BRAIN WO CONTRAST   Final Result   No acute stroke. Sinus and mastoid disease. This document has been electronically signed by: Meghan Rivero MD on 10/07/2020 11:46 PM         VL DUP CAROTID BILATERAL   Final Result    Mural plaque at the carotid bulbs bilaterally, right greater than left with greater than 70% stenosis on the right and less than 50% stenosis on the left by SRU consensus criteria. **This report has been created using voice recognition software. It may contain minor errors which are inherent in voice recognition technology. **      Final report electronically signed by Dr. Mikayla Meyer MD on 10/7/2020 1:21 PM      CT HEAD WO CONTRAST   Final Result   Unremarkable CT of the head. This document has been electronically signed by: Janette Pyle MD    on 10/07/2020 05:06 AM      All CT scans at this facility use dose modulation, iterative    reconstruction, and/or weight-based   dosing when appropriate to reduce radiation dose to as low as reasonably    achievable. CTA HEAD W WO CONTRAST (CODE STROKE NIHSS 4 or Above)   Final Result   1. Study is severely limited by motion artifact. Portions of anterior    and posterior circulation are not well evaluated. 2.  Significant stenosis at the origin of the right internal carotid    artery is apparent. Formation with Doppler ultrasound recommended. 3.  No obvious intracranial vascular occlusion, although particularly the    cavernous ICAs and the posterior circulation are not well assessed. 4.  Bilateral upper lobe groundglass opacities concerning for pneumonia. This document has been electronically signed by: Janette Pyle MD    on 10/07/2020 05:31 AM      All CT scans at this facility use dose modulation, iterative    reconstruction, and/or weight-based   dosing when appropriate to reduce radiation dose to as low as reasonably    achievable. CTA NECK W WO CONTRAST (CODE STROKE NIHSS 4 or Above)   Final Result   1. Study is severely limited by motion artifact. Portions of anterior    and posterior circulation are not well evaluated. 2.  Significant stenosis at the origin of the right internal carotid    artery is apparent. Formation with Doppler ultrasound recommended. 3.  No obvious intracranial vascular occlusion, although particularly the    cavernous ICAs and the posterior circulation are not well assessed. 4.  Bilateral upper lobe groundglass opacities concerning for pneumonia.       This document has been electronically signed by: Janette Pyle MD    on 10/07/2020 05:31 AM      All CT scans at this facility use dose modulation, iterative    reconstruction, and/or weight-based   dosing when appropriate to reduce radiation dose to as low as reasonably    achievable. Carotid stenosis and measurements are in accordance with the NASCET    criteria. XR CHEST PORTABLE   Final Result   No significant change of bibasilar pneumonia. This document has been electronically signed by: Yonis Yepez MD on 10/06/2020 07:23 AM         XR CHEST PORTABLE   Final Result   1. Interval placement of right-sided PICC line with tip in superior vena cava. 2. Interval removal of endotracheal tube with decreased lung volumes and persistent bibasilar opacities. **This report has been created using voice recognition software. It may contain minor errors which are inherent in voice recognition technology. **      Final report electronically signed by Dr. Oliva Vargas MD on 10/5/2020 4:02 PM      XR CHEST PORTABLE   Final Result   Significant improvement left lung infiltrates. Mild progression right    lung infiltrates. This document has been electronically signed by: Nik Stone MD on    10/03/2020 06:27 AM         XR CHEST PORTABLE   Final Result   Moderate progression in left with moderate improvement in right    infiltrates. This document has been electronically signed by: Nik Stone MD on    10/02/2020 06:24 AM         XR CHEST PORTABLE   Final Result   1. Patchy airspace filling within the bilateral lungs, compatible with    pneumonia. No significant change since the prior study. 2. Orogastric tube within the proximal body of the stomach just distal to    the gastroesophageal junction. The tube may be kinked or partially coiled.       This document has been electronically signed by: Thereas Orellana MD on    10/01/2020 10:01 PM         XR CHEST PORTABLE   Final Result   Impression:   Mild progression in right basilar infiltrate with stable to mild    improvement in left infiltrates. This document has been electronically signed by: Efren Herbert MD on    10/01/2020 06:41 AM         XR CHEST PORTABLE   Final Result      1. Interval placement of an endotracheal tube, nasogastric tube and right IJ access central venous catheter. The medical devices appear appropriately positioned. 2. Diffuse infiltrates are scattered throughout the lungs bilaterally. **This report has been created using voice recognition software. It may contain minor errors which are inherent in voice recognition technology. **      Final report electronically signed by Dr Radha Beckwith on 9/27/2020 1:15 PM      XR CHEST PORTABLE   Final Result      1. Interval placement of an endotracheal tube, nasogastric tube and right IJ access central venous catheter. The medical devices appear appropriately positioned. 2. Diffuse infiltrates are scattered throughout the lungs bilaterally. **This report has been created using voice recognition software. It may contain minor errors which are inherent in voice recognition technology. **      Final report electronically signed by Dr Radha Beckwith on 9/27/2020 1:15 PM      XR CHEST PORTABLE   Final Result   There are infiltrates throughout the lungs bilaterally. Worse appearance of the chest when compared to the previous study. **This report has been created using voice recognition software. It may contain minor errors which are inherent in voice recognition technology. **      Final report electronically signed by Dr Radha Beckwith on 9/27/2020 11:10 AM      XR CHEST PORTABLE   Final Result   1. Slightly increased density in both lung fields. 2. Borderline cardiomegaly. 3. Thoracic spondylosis. **This report has been created using voice recognition software. It may contain minor errors which are inherent in voice recognition technology. **      Final report electronically signed by DR Jaja Almeida on 9/24/2020 10:50 AM      XR CHEST PORTABLE    (Results Pending)       Assessment/Plan:      Acute respiratory failure due to ARDS secondary to COVID-19     -Patient intubated 9/27/2020.  Requiring moderate to high levels of PEEP and moderate levels of FiO2.  Patient extubated to high flow oxygen on 10/4/2020, still on high flow and now on 60 lpm 71%FiO2   -pro-BNP elevated. CXR on 10/9/2020 showed mild progression B/L infiltrates vs edema   -Lasix 40 mg IV x 1 dose ordered on 10/11  -pulmonology on-board. Pulmonology ordered lasix 20 mg IV BID x 3 days. Appreciate pulmonology input  -repeat CXR in am      ARDS secondary to COVID-19    -Status post pronation therapy   -pulmonology on-board. Appreciate pulmonology input    New-onset Afib with RVR, now NSR    -occurred on 10/9/2020, converted to NSR on 10/10, had sinus tachycardia rate 110s on tele on 10/11, now NSR. Cardiology increased lopressor IV dose on 10/11. -CHADsVASc score 6  -switched lovenox to therapeutic lovenox on 10/10    -cardiology on-board. Appreciate cardiology input     NSVT    -had 5 beats of NSVT on 10/10 at 2300  -cont tele   -keep magnesium >/=2.0 and potassium >/=4.0    COVID-19 pneumonia     -treated with Decadron, Remdesivir and had convalescent plasma 10/1/2020.  Still with ongoing fever.  procalcitonin elevated. Has leukocytosis, WBC 11.5 from 9.9. Will start zosyn again. Check blood cxs x 2. Continue to monitor. Febrile Illness, suspect due to COVID-19    -plan as above     Pneumonia secondary to Klebsiella pneumoniae and Haemophilus influenza    -received systemic steroids for severe pneumonia.    -Patient completed 14 days of Zosyn on 10/7/2020.   -has dysphagia, failed swallow eval, concern for aspiration.  Eventually will need modified barium swallow.  has ongoing fever likely due to COVID.  Monitor off antibiotics. Sepsis due to pneumonia and COVID-19, resolving: treated. Still spiking fevers, likely related to COVID-19. Repeat blood cultures x 2 on . Pt already completed 14-day course of zosyn. Stroke-like symptoms    -Code stroke was called on 10/7/2020 around 3am due to acute L face, L hemibody weakness, decreased mentation and slurred speech.   -s/p tPA  -brain MRI (-) for acute stroke   CTA H/N: severe (70%) right ICA stenosis  -neurology on-board. Neurology recommend ASA and plavix. Pt was loaded with 300 mg plavix then now on 75 mg PO daily. Appreciate neurology input  -cont statin     Acute metabolic encephalopathy vs Delirium due to COVID-19     -per RN, pt is pulling lines and pulled NGT multiple times   -cont to monitor   -still agitated, psych consult   -tele sitter      Right ICA stenosis     -cont asa and plavix  -f/u with neurology on discharge     Dysphagia     -ST on-board, who recommend NPO and NGT feeding, however, pt pulled his NGT multiple times   -keep NPO  -started TPN temporarily on 10/10, pharmacy and dietician on-board     Pseudo-Hypocalcemia due to hypoalbuminemia     -low normal vit D and normal PTH   -corrected calcium normal   -replace per protocol   -ical in am     Hypoalbuminemia, r/o malnutrition    -dietician on-board    Hypophosphatemia     -replace per protocol      Isotonic Hyponatremia     -initially resolved, now sodium down to 129 again on 10/11, now stable at 130s  -serum osmolality normal 282 on 10/7/2020  -nephrology evaluated the pt on 10/7, then s/o once hyponatremia resolved. re-consult nephrology     DM type 2 with hyperglycemia     -Hyperglycemia likely due to TPN   -cont SSI and lantus   -accu-check    Elevated LFTs, improving    -LFTs in am     Hyperlipidemia    -cont statin    Essential HTN    -amlodipine on-hold for permissive HTN due to recent stroke-like symptoms   -VS per protocol       Anticipated Discharge in : pending       Diet: DIET TUBE FEED CONTINUOUS/CYCLIC NPO; Semi-elemental (Vital 1.2);  Nasogastric; Continuous; 20; 70    DVT prophylaxis: [x] Lovenox [] SCDs                                 [] SQ Heparin                                 [] Encourage ambulation           [] Already on Anticoagulation     Disposition:    [] Home       [] TCU       [] Rehab       [] Psych       [] SNF       [] [place]        [x] Other-Plan as above       Code Status: Full Code      Electronically signed by Juan Rodgers MD on 10/12/2020 at 8:21 AM

## 2020-10-12 NOTE — PROGRESS NOTES
This RN called Ludivina Mckinley, patient's child, and left a voicemail. This RN then called Shaunna Kc, patient's granddaughter and updated her of patient's need to be on a bipap. She states to call Reggie Roxbury as she is a nurse and has been the one to call and get updates. This RN then called [facility]. This RN explained how the patient was on high flow oxygen and then was changed to bipap. This RN then asked if things would progress if the patient would need to be intubated, is that something that he, the patient would want. Prieto Temple states at this time, yes to intubation. She states that the family understands that he might need to be intubated and that this early stage they would all want this. This RN states understanding of this. Prieto Temple also states that if that should happen to please give her a call as she would like to notified if he should be intubated. Ludivina Mckinley, patient's son, calls. This RN updates him as well of patient's need for a bipap at this time. He also states that he would want his father to be intubated if he would need it. 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 Kacie Arenas states he would like to call tomorrow just to talk to his dad on the phone and tell him about how harvesting season is doing. This RN states as long as the patient is awake, that should be ok.   Kacie Arenas states understanding of t

## 2020-10-12 NOTE — PROGRESS NOTES
Renal Progress Note  [facility]    Patient :  Fercho Torres; 67 y.o. MRN# [id number]  Location:  4B-08/008-A  Attending: Denis Hernandez MD  Admit Date:  9/24/2020   Hospital Day: 25      Subjective:     Nephrology is following the patient for hyponatremia. Patient seen and examined. On high flow nasal canula. Receiving TPN. He is npo. Has some confusion. On IV lasix, has good urine output.       Outpatient Medications:     Medications Prior to Admission: amLODIPine (NORVASC) 10 MG tablet, Take 10 mg by mouth daily   atorvastatin (LIPITOR) 10 MG tablet, Take 10 mg by mouth nightly   glimepiride (AMARYL) 2 MG tablet, Take 3 mg by mouth 2 times daily  metFORMIN (GLUCOPHAGE) 1000 MG tablet, Take 1,000 mg by mouth 2 times daily (with meals)   hydroCHLOROthiazide (HYDRODIURIL) 12.5 MG tablet, Take 12.5 mg by mouth daily  Zinc Acetate (GALZIN) 25 MG capsule, Take 1 capsule by mouth daily  Cholecalciferol (VITAMIN D3) 125 MCG (5000 UT) CAPS, Take by mouth    Current Medications:     Scheduled Meds:    insulin lispro  0-18 Units Subcutaneous TID WC    insulin lispro  0-9 Units Subcutaneous Nightly    piperacillin-tazobactam  3.375 g Intravenous Q8H    metoprolol  5 mg Intravenous Q6H    furosemide  20 mg Intravenous BID    enoxaparin  1 mg/kg Subcutaneous Q12H    calcium replacement protocol   Other RX Placeholder    pantoprazole  40 mg Intravenous Daily    And    sodium chloride (PF)  10 mL Intravenous Daily    glycopyrrolate-formoterol  2 puff Inhalation BID    clopidogrel  75 mg Oral Daily    sodium chloride flush  10 mL Intravenous 2 times per day    rosuvastatin  40 mg Oral Nightly    aspirin  81 mg Per NG tube Daily    lidocaine 1 % injection  5 mL Intradermal Once    potassium chloride  20 mEq Intravenous BID    potassium chloride  10 mEq Intravenous BID    insulin glargine  15 Units Subcutaneous Nightly    [Held by provider] amLODIPine  10 mg Per NG tube Daily    Vitamin D  1,000 Units Per NG tube Daily    calcium replacement protocol   Other RX Placeholder    phosphorus replacement protocol   Other RX Placeholder     Continuous Infusions:    PN-Adult Premix 5/15 - Central 45 mL/hr at 10/11/20 1712    dextrose       PRN Meds:  albuterol sulfate HFA, sodium chloride flush, promethazine **OR** ondansetron, potassium chloride, potassium chloride, magnesium sulfate, acetaminophen **OR** acetaminophen, polyethylene glycol, glucose, dextrose, glucagon (rDNA), dextrose    Input/Output:       I/O last 3 completed shifts: In: 3127.3 [I.V.:3127.3]  Out: 2600 [Urine:2600]. Patient Vitals for the past 96 hrs (Last 3 readings):   Weight   10/12/20 0320 236 lb 12.8 oz (107.4 kg)   10/10/20 1415 239 lb 12.8 oz (108.8 kg)   10/09/20 0600 223 lb 9.6 oz (101.4 kg)       Vital Signs:   Temperature:  Temp: 98.9 °F (37.2 °C)  TMax:   Temp (24hrs), Av.6 °F (37.6 °C), Min:98.9 °F (37.2 °C), Max:101.1 °F (38.4 °C)    Respirations:  Resp: 22  Pulse:   Pulse: 104  BP:    BP: (!) 148/74(manual)  BP Range: Systolic (37FKA), UEA:498 , Min:110 , CXE:001       Diastolic (09GBO), ACL:50, Min:57, Max:90      Physical Examination:     General:  Awake, on HFNC, chronically ill appearing  HEENT: NC/AT/ MMM  Chest:               High flow NC, no intercostal retractions, tachypneic  Cardiac:  S1 S2   Abdomen: Soft, non-tender,  SKIN:  No rashes, good skin turgor.   Extremities:  No edema, no cyanosis    Labs:       Recent Labs     10/10/20  0630 10/11/20  0525 10/12/20  0455   WBC 8.2 9.9 11.5*   RBC 3.19* 3.17* 3.13*   HGB 9.9* 9.9* 9.8*   HCT 31.3* 30.6* 29.7*   MCV 98.1* 96.5* 94.9*   MCH 31.0 31.2 31.3   MCHC 31.6* 32.4 33.0    235 243   MPV 9.3* 9.2* 9.1*      BMP:   Recent Labs     10/10/20  0630 10/11/20  0525 10/11/20  1645 10/12/20  0015 10/12/20  0455    129* 126* 130* 130*   K 4.3 4.0  --   --  3.7    95*  --   --  95*   CO2 24 27  --   --  27   BUN 7 7  --   --  11 CREATININE 0.4 0.5  --   --  0.6   GLUCOSE 130* 193*  --   --  254*   CALCIUM 7.8* 7.7*  --   --  7.6*      Phosphorus:     Recent Labs     10/10/20  1320 10/11/20  0525 10/12/20  0455   PHOS 2.0* 2.0* 2.1*     Magnesium:    Recent Labs     10/10/20  1320 10/11/20  0525 10/12/20  0455   MG 1.9 1.9 2.1     Albumin:    Recent Labs     10/10/20  0630 10/11/20  0525 10/12/20  0455   LABALBU 2.3* 2.3* 2.3*     BNP:    No results found for: BNP  RICHARD:    No results found for: RICHARD  SPEP:  Lab Results   Component Value Date    PROT 5.6 10/12/2020     UPEP:   No results found for: LABPE  C3:   No results found for: C3  C4:   No results found for: C4  MPO ANCA:   No results found for: MPO  PR3 ANCA:   No results found for: PR3  Anti-GBM:   No results found for: GBMABIGG  Hep BsAg:       No results found for: HEPBSAG  Hep C AB:        No results found for: HEPCAB    Urinalysis/Chemistries:      Lab Results   Component Value Date    NITRU NEGATIVE 10/01/2020    COLORU ERICA 10/01/2020    PHUR 5.5 10/01/2020    LABCAST 0-4 HYALINE 10/01/2020    LABCAST NONE SEEN 10/01/2020    WBCUA 10-15 10/01/2020    RBCUA 50-75 10/01/2020    YEAST NONE SEEN 10/01/2020    BACTERIA NONE SEEN 10/01/2020    SPECGRAV 1.028 10/01/2020    LEUKOCYTESUR SMALL 10/01/2020    UROBILINOGEN 1.0 10/01/2020    BILIRUBINUR NEGATIVE 10/01/2020    BLOODU LARGE 10/01/2020    KETUA NEGATIVE 10/01/2020    AMORPHOUS URATES 09/27/2020     Urine Sodium:   No results found for: NATALIIA  Urine Potassium:  No results found for: KUR  Urine Chloride:  No results found for: CLUR  Urine Osmolarity:   Lab Results   Component Value Date    OSMOU 540 10/12/2020     Urine Protein:   No components found for: TOTALPROTEIN, URINE   Urine Creatinine:   No results found for: LABCREA  Urine Eosinophils:  No components found for: UEOS        Impression and Plan:  1. Hyponatremia :worsened after diuretics restarted. Does look to have some ADH mechanism as well. Sodium stable around 130. Please make any IVPBs in normal saline instead of D5W. Will adjust TPN sodium concentration and volume. Discussed with pharmacy. 2. Hypokalemia from diuretics  3. Acute hypoxic resp failure  4. COVID-19+   5. Acute CVA s/p TPA  6. DM  7. HTN  8. Carotid artery stenosis        Please don't hesitate to call with any questions.   Electronically signed by Brett Aaron DO on 10/12/2020 at 8:38 AM

## 2020-10-12 NOTE — PROGRESS NOTES
Pharmacy Consult       TPN  Macronutrients   DW: 78kg   AA: 1.5 g/kg   Total Kcal: 15 Kcal/kg   Lipids: 30 % of Total Kcal   Infusion Rate: 58.3 mL/hr    Electrolytes (per bag)   NaCl:         150 meq   NaPhos:       21 mmol     K Acetate:     70 meq   KCl:               30 meq     Calcium Gluc:   4.65 meq   Magnesium:      8.12 meq      MV:      10 mL    Electrolyte Replacement: sodium phos 15 mmol    Assessment/ Plan:  Convert to 3 in 1 TPN today with the above ingredients.      Brock Sands PharmD, BCPS, BCCCP  10/12/2020 12:48 PM (3) no apparent problem

## 2020-10-12 NOTE — CARE COORDINATION
10/12/20, 4:47 PM EDT    DISCHARGE ON GOING [address] [facility] day: 18  Location: -08/008-A Reason for admit: COVID-19 [U07.1]   Procedure:   9/24 CXR: Slightly increased density in both lung fields; Borderline cardiomegaly; Thoracic spondylosis  9/24 Vapotherm initiated  9/27 Intubated  9/27 CVC Right subclavian - 10/5 removed  9/27 CXR: Diffuse infiltrates are scattered throughout the lungs bilaterally  9/29 - 10/2 Rotoprone therapy   10/1 Convalescent Plasma  10/4 Extubated  10/4 Vapotherm initiated  70/4 PICC right basilic  00/4 CT Head: No acute findings  10/7 CTA Head/Neck:  Study is severely limited by motion artifact.  Portions of anterior and posterior circulation are not well evaluated; Significant stenosis at the origin of the right internal carotid artery is apparent; No obvious intracranial vascular occlusion, although particularly the cavernous ICAs and the posterior circulation are not well assessed; Bilateral upper lobe groundglass opacities concerning for pneumonia  10/7 Code stroke - TPA given  10/7 Bilateral carotid dopplers: Mural plaque at the carotid bulbs bilaterally, right greater than left with greater than 70% stenosis on the right and less than 50% stenosis on the left by SRU consensus criteria  MRI brain: Mural plaque at the carotid bulbs bilaterally, right greater than left with greater than 70% stenosis on the right and less than 50% stenosis on the left by SRU consensus criteria.      10/08 CT head:No evidence of intracranial hemorrhage.    Mild severity chronic small vessel ischemic changes.  Layering fluid in the paranasal sinuses with some fluid in the mastoid air cells. 10/12  CXR- Progressive ill-defined consolidations in the left mid to lower lung    favoring pneumonia.   Treatment Plan of Care: Telemetry, Plavix, ASA, Crestor, Lovenox,  K+ replacement, telemetry Sinus Tachy  had TPA 10/7, code stroke, left sided weakness, confusion, diabetic management with ISS, on Vapotherm FiO2 50% with 60 L/min, sat 97%, Tmax 101.1, Tylenol, IVF, nephrology, intensivist, SLP, PT/OT. Barriers to Discharge: not med ready  PCP: Erich Hurt MD  Readmission Risk Score: 26%   Patient Goals/Plan/Treatment Preferences: from home with spouse; plan for TCU.  Will require precert.

## 2020-10-12 NOTE — PLAN OF CARE
Problem: Falls - Risk of:  Goal: Will remain free from falls  Description: Will remain free from falls  Outcome: Ongoing  Note: Call light in reach, bed in lowest position, and bed alarm activated. Education given on use of call light before ambulation and when in need of assistance. Patient expressed understanding. Hourly visual checks performed and charted. Toileting offered to patient. No falls this shift, at any time. Arm band and falling star in place. Will continue to monitor. Problem: Skin Integrity:  Goal: Will show no infection signs and symptoms  Description: Will show no infection signs and symptoms  Outcome: Ongoing  Note: Monitoring patients CVC. IV therapy following patient's central line. Problem: Airway Clearance - Ineffective  Goal: Achieve or maintain patent airway  Outcome: Ongoing  Note: Patient was on high flow then changed to bipap today. Problem: Body Temperature -  Risk of, Imbalanced  Goal: Ability to maintain a body temperature within defined limits  Outcome: Ongoing     Problem: Isolation Precautions - Risk of Spread of Infection  Goal: Prevent transmission of infection  Outcome: Ongoing     Problem: Nutrition Deficits  Goal: Optimize nutrtional status  Outcome: Ongoing  Note: Patient is currently receiving TPN via central line. Problem: Nutrition  Goal: Optimal nutrition therapy  10/12/2020 1110 by Sachin Keyes RD, LD  Outcome: Ongoing     Problem: Impaired respiratory status  Goal: Able to breathe comfortably  Description: Able to breathe comfortably  10/12/2020 1425 by Emeli Edmonds RCP  Outcome: Ongoing  Note: CPAP 12 to improve breath sounds and decrease work of breathing. Problem: Infection - Central Venous Catheter-Associated Bloodstream Infection:  Goal: Will show no infection signs and symptoms  Description: Will show no infection signs and symptoms  Outcome: Ongoing  Note: Monitoring patients CVC.   IV therapy following patient's central line.     Problem: Pain:  Goal: Pain level will decrease  Description: Pain level will decrease  Outcome: Ongoing  Goal: Control of acute pain  Description: Control of acute pain  Outcome: Ongoing  Goal: Control of chronic pain  Description: Control of chronic pain  Outcome: Ongoing   Care plan reviewed with patient and patient's family. Patient and patient's family verbalize understanding of the plan of care and contribute to goal setting.

## 2020-10-12 NOTE — PLAN OF CARE
Problem: Falls - Risk of:  Goal: Will remain free from falls  Description: Will remain free from falls  Outcome: Ongoing  Note: Patient remains free from falls this shift. Fall precautions in place with bed/chair exit alarmed. Fall sign posted and fall armband in place. Nonskid footwear used with transferring. Educated patient to use call light when in need of staff assistance with transferring, ambulating, and other activities of daily living. Patient appropriately uses call light this shift. Problem: Falls - Risk of:  Goal: Absence of physical injury  Description: Absence of physical injury  Outcome: Ongoing     Problem: Skin Integrity:  Goal: Will show no infection signs and symptoms  Description: Will show no infection signs and symptoms  Outcome: Ongoing  Note: Patient shows no new signs of infection this shift. Patient afebrile at this time. Problem: Skin Integrity:  Goal: Absence of new skin breakdown  Description: Absence of new skin breakdown  Outcome: Ongoing  Note: Patient shows no new signs of skin breakdown this shift. Patient has a Stage 2 Pressure ulcer on the coccyx, will continue to monitor. Barrier cream applied. Problem: Gas Exchange - Impaired  Goal: Absence of hypoxia  Outcome: Ongoing  Note: Patient on Heated High-flow oxygen at FiO2 71% at 60 L/min with saturations > 90%. Problem: Airway Clearance - Ineffective  Goal: Achieve or maintain patent airway  Outcome: Ongoing  Note: Patient maintains a patient airway this shift. Patient on Heated High-flow oxygen at FiO2 71% at 60 L/min. Problem: Breathing Pattern - Ineffective  Goal: Ability to achieve and maintain a regular respiratory rate  Outcome: Ongoing  Note: Patient exhibits regular respiratory rate with saturations > 90% this shift. Patient experiences dyspnea with exertion. Problem:  Body Temperature -  Risk of, Imbalanced  Goal: Ability to maintain a body temperature within defined limits  Outcome: Ongoing  Note: Patient afebrile this shift. Problem: Isolation Precautions - Risk of Spread of Infection  Goal: Prevent transmission of infection  Outcome: Ongoing  Note: Patient on Droplet Plus precautions d/t COVID-19 Infection. Problem: Nutrition Deficits  Goal: Optimize nutrtional status  Outcome: Ongoing  Note: Patient receiving PN-Adult Premix 5/15 TPN continuously this shift. Patient tolerating well. Problem: Infection - Central Venous Catheter-Associated Bloodstream Infection:  Goal: Will show no infection signs and symptoms  Description: Will show no infection signs and symptoms  Outcome: Ongoing  Note: Patient shows no new signs of infection this shift. Patient afebrile at this time. Problem: Pain:  Goal: Pain level will decrease  Description: Pain level will decrease  Outcome: Ongoing  Note: Patient denies pain this shift. Problem: Pain:  Goal: Control of acute pain  Description: Control of acute pain  Outcome: Ongoing     Problem: Pain:  Goal: Control of chronic pain  Description: Control of chronic pain  Outcome: Ongoing   Care plan reviewed with patient and family. Patient and family verbalize understanding of the plan of care and contribute to goal setting.

## 2020-10-12 NOTE — PROGRESS NOTES
This RN spoke with patient's daughter Loyda Edmonds about patient status and wellbeing. Daughter Loyda Edmonds was updated on patient's plan of care. Daughter had no other questions at this time. This RN encouraged daughter to call back with any other questions and concerns.

## 2020-10-13 NOTE — SIGNIFICANT EVENT
2047- 30MG ATOMADATE.  98% ,   2048- 10MG NIMBEX  2049 - ETT IN, SIZE 7 1/2, 24 @ LIP, COLOR CHANGE  2049- BILATERAL BREATH SOUNDS  2055- OG  @ 60

## 2020-10-13 NOTE — SIGNIFICANT EVENT
Brigido Gutierrez is a 14-year-old  male currently a stepdown patient on 4B-Covid unit. I did reach out to family and updated on current condition of tachypnea and hypoxia while on Bipap. Family agreeable to intubation and did review Code Status of Full.

## 2020-10-13 NOTE — PROGRESS NOTES
[address] [facility] THERAPY MISSED TREATMENT NOTE  [facility] CCU 3A      Date: 10/13/2020  Patient Name: Pearl Malone        MRN: [id number]    : 1945  (67 y.o.)    REASON FOR MISSED TREATMENT:   Patient is currently not medically appropriate for skilled ST evaluation/treatment d/t current intubation/ventilation. ST plans alejandro continue monitoring the pt's chart and check-in with RN-- ST with plans to re-attempt on a later date/time as schedule permits, pt is medically appropriate, and/or available. Brandyn Manley MA., BVW-CZN

## 2020-10-13 NOTE — PLAN OF CARE
Problem: Nutrition  Goal: Optimal nutrition therapy  Outcome: Ongoing   Nutrition Problem #1: Inadequate oral intake  Intervention: Food and/or Nutrient Delivery: Continue NPO, Start Tube Feeding, Discontinue Parenteral Nutrition  Nutritional Goals: Patient will tolerate trickle EN at 10ml/hr-20ml/hr until able to increase to goal

## 2020-10-13 NOTE — PROGRESS NOTES
Comprehensive Nutrition Assessment    Type and Reason for Visit:  Reassess(TPN managment)    Nutrition Recommendations/Plan:   Start tube feeding of Vital 1.2 at 10ml/hr, if tolerates after 24 hours increase to 20 ml/hr  Free water flushes per MD  Consider PEG for long-term nutrition support  OK per Dr. Kannan Torres to wean TPN    Nutrition Assessment:  Pt. with no improvement from a nutritional standpoint AEB remains on TPN, now intubated. Remains at risk for further nutritional compromise r/t admitted with +covid, CVA with TPA 10/7 which likely aborted a CVA per MRI (MRI negative for CVA) but CTA show severe ICA stenosis, obese, advanced age, obese, increased nutrient needs for healing, and underlying medical condition (hx: DM with A1C 7.5% on 9/2020, HLD, HTN). Nutrition recommendations/interventions as per above. Malnutrition Assessment:  Malnutrition Status:  Insufficient data(+covid patient)    Context:  Acute Illness     Findings of the 6 clinical characteristics of malnutrition:  Energy Intake:  (EN or NPO since admit)  Weight Loss:  Unable to assess(no weight hx in EMR)     Body Fat Loss:  Unable to assess(+covid)     Muscle Mass Loss:  Unable to assess(+covid)    Fluid Accumulation:  No significant fluid accumulation     Strength:  Not Performed    Estimated Daily Nutrient Needs:  Energy (kcal):  7849-6231 (30-32 kcals/kg for late active phase); Weight Used for Energy Requirements:  Ideal(70 kg - ideal weight)     Protein (g):  140 (2.0 grams/kg ideal weight); Weight Used for Protein Requirements:  (70 kg - ideal weight)        Fluid (ml/day):  per MD; Weight Used for Fluid Requirements:  (n/a)      Nutrition Related Findings:  +covid; intubated 10/12; MAP: 75. Spoke to Dr. Kannan Torres and says that it is OK to start enteral nutrition and wean TPN; also we will work on way toward PEG placement. Patient with hx of recent failed swallow eval and hx of pulling out NGT.  Pharmacy called to inform them of plan to wean TPN. Note Bronch was completed 10/13. Patient does have a flexiseal with 25 ml output noted. POC: 201. Meds: precedex, fentanyl, levophed, lantus, humalog, zosyn, vitamin D, bevesp. Order placed to start trickle TF. Wounds:  Stage II(coccyx)       Current Nutrition Therapies:    Current Tube Feeding (TF) Orders:  · Feeding Route: Orogastric  · Formula: (Vital 1.2 to start 10/13)  · Schedule: Continuous(vital 1.2 to start at 10 ml/hr, if tolerates after 24 hours increase to 20 ml/hr)  · Additives/Modulars: (.)  · Water Flushes: per MD  · Current TF & Flush Orders Provides: await start  · TF & Flush Orders Provides: Vital 1.2 at 10 ml/hr = 240 ml, 288 kcals, 18 grams protein, 27 grams CHO, 1 gram fiber, 195 ml water, 405 mg potassium, 202 mg phosphorus.     Anthropometric Measures:  · Height: 5' 8\" (172.7 cm)  · Current Body Weight: 197 lb (89.4 kg)(10/13, bedscale, trace BUE and BLE edema)   · Admission Body Weight: 220 lb (99.8 kg)(9/24/20, no weight source)    · Usual Body Weight: (no weight hx available in EMR, patient intubated)     · Ideal Body Weight: 154 lbs;   · BMI: 30  · Adjusted Body Weight:  ; (78 kg for TPN doing)    · BMI Categories: Overweight (BMI 25.0-29.9)(29.98)       Nutrition Diagnosis:   · Inadequate oral intake related to swallowing difficulty as evidenced by NPO or clear liquid status due to medical condition, intubation, nutrition support - enteral nutrition    Nutrition Interventions:   Food and/or Nutrient Delivery:  Continue NPO, Start Tube Feeding, Discontinue Parenteral Nutrition  Nutrition Education/Counseling:  No recommendation at this time   Coordination of Nutrition Care:  Continued Inpatient Monitoring, Interdisciplinary Rounds    Goals:  Patient will tolerate trickle EN at 10ml/hr-20ml/hr until able to increase to goal       Nutrition Monitoring and Evaluation:   Behavioral-Environmental Outcomes:  (n/a)   Food/Nutrient Intake Outcomes:  Enteral Nutrition Intake/Tolerance  Physical Signs/Symptoms Outcomes:  Biochemical Data, GI Status, Fluid Status or Edema, Hemodynamic Status, Skin, Weight     Discharge Planning:     Too soon to determine     Electronically signed by Hiren Dickey RD, LD on 10/13/20 at 11:51 AM EDT    Contact: [phone]

## 2020-10-13 NOTE — CARE COORDINATION
10/13/20, 11:54 AM EDT    DISCHARGE ON GOING EVALUATION    [address] day: 19  Location: -04/004-A Reason for admit: COVID-19 [U07.1]   Procedure: 9/24 Vapotherm initiated  9/27 Intubated  9/27 CVC Right subclavian - 10/5 removed  9/29 - 10/2 Rotoprone therapy   10/1 Convalescent Plasma  10/4 Extubated  10/4 Vapotherm initiated  13/4 PICC right basilic  86/2 Code stroke - TPA given   10/12 Re-intubated. 10/13 CT chest - Nonspecific diffuse bilateral consolidation and pleural fluid. 10/13 Bronchoscopy - Quite mucous to the lower lobes bilaterally. Treatment Plan of Care: Transferred from  to . Intensivist following. Pt became hypoxic and tachycardic last evening while on BiPAP and required re-intubation. Tmax 101.5F. Vent settings: PEEP 10, PCV+ mode, rate 12, FiO2 80%. Tube feeding. Baby ASA, Peridex, Norvasc (held), Plavix, Precedex gtt, Lovenox, Pepcid iv bid, Fentanyl gtt, Bevespi, Lopressor iv q6hr, Levophed gtt, Protonix iv daily, Zosyn iv q8hr, Vit D. Diabetes management, electrolyte replacement. Weaning off TPN. Plan Palliative care consult. Barriers to Discharge: Intubated, vent. PCP: Alphonse Hill MD  Readmission Risk Score: 27%  Patient Goals/Plan/Treatment Preferences: Pt is from home with wife. Current plan includes TCU, continue to monitor. Pt will be a precert for any placement.

## 2020-10-13 NOTE — PROCEDURES
BRONCHOSCOPY    Indication:ALI/PNA  Risks and benefits to the procedure were discussed. Alternatives and their risks were discussed as well. Sedation: Fentanyl drip      EBL:  None. Findings:   Quite mucous to the lower lobes bilaterally. Samples:   Right lower lobe BAL with 60 cc lavage and 25 cc returned. Complications:  None    Procedure:  After informed consent was obtained, patient received sedation as detailed above. Utilizing the endotracheal tube, the bronchoscope was advanced to the level of the trachea and subsequently the nick. The nikc was noted to be crisp. Scope was taken to the left and right lung fields. Samples taken as noted above. Bronchoscope was withdrawn. Electronically signed by Lovella Class Corrinne Angry M.D.

## 2020-10-13 NOTE — PROGRESS NOTES
Renal Progress Note  [facility]    Patient :  Zuhair Humphrey; 67 y.o. MRN# [id number]  Location:  3A-04/004-A  Attending:  Chetan Maya MD  Admit Date:  9/24/2020   Hospital Day: 23      Subjective:     Nephrology is following the patient for hyponatremia. Patient seen this morning. Intubated overnight. Was requiring levophed. FiO2 up to 80%. Patient had bronch this morning.   Receiving TPN        Outpatient Medications:     Medications Prior to Admission: amLODIPine (NORVASC) 10 MG tablet, Take 10 mg by mouth daily   atorvastatin (LIPITOR) 10 MG tablet, Take 10 mg by mouth nightly   glimepiride (AMARYL) 2 MG tablet, Take 3 mg by mouth 2 times daily  metFORMIN (GLUCOPHAGE) 1000 MG tablet, Take 1,000 mg by mouth 2 times daily (with meals)   hydroCHLOROthiazide (HYDRODIURIL) 12.5 MG tablet, Take 12.5 mg by mouth daily  Zinc Acetate (GALZIN) 25 MG capsule, Take 1 capsule by mouth daily  Cholecalciferol (VITAMIN D3) 125 MCG (5000 UT) CAPS, Take by mouth    Current Medications:     Scheduled Meds:    potassium bicarb-citric acid  30 mEq Oral BID    piperacillin-tazobactam  3.375 g Intravenous Q8H    clopidogrel  75 mg Per NG tube Daily    insulin lispro  0-18 Units Subcutaneous Q4H    rosuvastatin  40 mg Per NG tube Nightly    sodium chloride flush  10 mL Intravenous 2 times per day    chlorhexidine  15 mL Mouth/Throat BID    famotidine (PEPCID) injection  20 mg Intravenous BID    metoprolol  5 mg Intravenous Q6H    enoxaparin  1 mg/kg Subcutaneous Q12H    calcium replacement protocol   Other RX Placeholder    pantoprazole  40 mg Intravenous Daily    And    sodium chloride (PF)  10 mL Intravenous Daily    glycopyrrolate-formoterol  2 puff Inhalation BID    aspirin  81 mg Per NG tube Daily    lidocaine 1 % injection  5 mL Intradermal Once    insulin glargine  15 Units Subcutaneous Nightly    [Held by provider] amLODIPine  10 mg Per NG tube Daily    Vitamin D  1,000 Units Per NG tube Daily    phosphorus replacement protocol   Other RX Placeholder     Continuous Infusions:    PN-Adult  3 IN 1 Central Line (Custom) 58.3 mL/hr at 10/13/20 0057    dexmedetomidine 1 mcg/kg/hr (10/13/20 0914)    fentaNYL (SUBLIMAZE) 500 mcg in sodium chloride 0.9 % 100 mL 100 mcg/hr (10/13/20 0758)    norepinephrine 4 mcg/min (10/13/20 09)    vasopressin (Septic Shock) infusion Stopped (10/12/20 224)    dextrose       PRN Meds:  sodium chloride flush, acetaminophen **OR** acetaminophen, promethazine **OR** ondansetron, fentanNYL, midazolam, albuterol sulfate HFA, promethazine **OR** [DISCONTINUED] ondansetron, potassium chloride, potassium chloride, magnesium sulfate, polyethylene glycol, glucose, dextrose, glucagon (rDNA), dextrose    Input/Output:       I/O last 3 completed shifts: In: 4693.3 [I.V.:4693.3]  Out: 5714 [Urine:2350; Stool:25]. Patient Vitals for the past 96 hrs (Last 3 readings):   Weight   10/13/20 0432 197 lb 3.2 oz (89.4 kg)   10/12/20 0320 236 lb 12.8 oz (107.4 kg)   10/10/20 1415 239 lb 12.8 oz (108.8 kg)       Vital Signs:   Temperature:  Temp: 96.8 °F (36 °C)  TMax:   Temp (24hrs), Av °F (37.8 °C), Min:96.8 °F (36 °C), Max:103.8 °F (39.9 °C)    Respirations:  Resp: 17  Pulse:   Pulse: 60  BP:    BP: (!) 155/67  BP Range: Systolic (72ZGS), YEZ:087 , Min:57 , ICQ:859       Diastolic (36KWU), JWT:24, Min:37, Max:90      Physical Examination:     General:  Intubated, sedated  HEENT: NC/AT/ MMM +endotracheal tube  Chest:               diminished  Cardiac:  S1 S2   Abdomen: Soft, non-tender,  SKIN:  No rashes, good skin turgor.   Extremities:  No edema, no cyanosis    Labs:       Recent Labs     10/12/20  0455 10/12/20  2239 10/13/20  0430   WBC 11.5* 15.2* 14.1*   RBC 3.13* 3.10* 2.93*   HGB 9.8* 9.7* 9.0*   HCT 29.7* 29.8* 28.3*   MCV 94.9* 96.1* 96.6*   MCH 31.3 31.3 30.7   MCHC 33.0 32.6 31.8*    286 283   MPV 9.1* 9.4 9.4      BMP:   Recent Labs Creatinine:   No results found for: LABCREA  Urine Eosinophils:  No components found for: UEOS        Impression and Plan:  1. Hyponatremia : resolved, appears to be related to diuretics and he does have some ADH mechanism as well. Avoid hypotonic fluids. TPN sodium was adjusted as well. For now hyponatremia is resolved, will follow labs peripherally and can see patient again if needed. 2. Hypokalemia from diuretics better  3. Acute hypoxic resp failure s/p intubation  4. COVID-19+   5. Acute CVA s/p TPA  6. DM  7. HTN  8. Carotid artery stenosis        Please don't hesitate to call with any questions.   Electronically signed by Asael Wallace DO on 10/13/2020 at 12:14 PM

## 2020-10-13 NOTE — PROGRESS NOTES
Pt moved from 4b to  and intubated by aleida rush cnp.  glidescope used with successful first attempt, 7.5  24 at top lip.     Placed pt on ventilator-will titrate as pt tolerates

## 2020-10-13 NOTE — PROGRESS NOTES
CRITICAL CARE PROGRESS NOTE      Patient:  Joseph Arauz    Unit/Bed:3A-04/004-A  YOB: 1945  MRN: [id number]   PCP: Colin Chowdhury MD  Date of Admission: 9/24/2020  Chief Complaint:- acute respiratory failure    Assessment and Plan:    1. Acute respiratory failure, hypoxia: Patient was hypoxic and tachypneic while on BiPAP. Patient failed BiPAP. Patient was orally intubated and connected to PCV mode of ventilation. Continue with lung protection strategies targeting a peak pressure 35 and less and plateau 30 and less. 2. ARDS: with history of COVID-19 and pronation therapy. ASA and Lovenox to mitigate microthrombosis. Patient requiring high amounts of FiO2 and Peep, will collaborate with Dr. Katie Cash regarding Bronchoscopy and need for pronation therapy. 3. Distributive versus hypovolemic shock:  Elevated Lactic Acid which patient was responsive to 0.9NS fluid bolusing. Lactic Clearance 3.2-1.4. Will continue Zosyn at this time. Noted history of Diarrhea. CT of ABD/PELVIS is pending. 4. DMT2, uncontrolled:  Patient was given Decadron 10 milligrams IVP X1. Will continue to monitor with ACCU and SSI. 5. CVA:  History of recent tPA use, will continue to monitor. INITIAL H AND P AND ICU COURSE:  Asuncion Munson is a 67year old  male, transferred to [facility] ICU 10/12/2020 for intubation. Patient is a 58-year-old morbidly obese white male reformed smoker. Krzysztof Aldana has a history of diabetes, hyperlipidemia, and hypertension.     Lyle Jasso was admitted from the emergency room on 9/24/2020 with hypoxemic respiratory failure secondary to [address] had associated acute renal injury.  He required 100% nonrebreather and was started on Decadron and Remdesivir.  He continued to deteriorate and required intubation on 9/27/2020.  Acute lung injury progressed to ARDS and started pronation therapy on 9/29/2020. Edward Briones was found to have bilateral pneumonia with Klebsiella pneumoniae and Subcutaneous TID WC    insulin lispro  0-9 Units Subcutaneous Nightly    piperacillin-tazobactam  3.375 g Intravenous Q8H    etomidate  30 mg Intravenous Once    cisatracurium Besylate  10 mg Intravenous Once    metoprolol  5 mg Intravenous Q6H    enoxaparin  1 mg/kg Subcutaneous Q12H    calcium replacement protocol   Other RX Placeholder    pantoprazole  40 mg Intravenous Daily    And    sodium chloride (PF)  10 mL Intravenous Daily    glycopyrrolate-formoterol  2 puff Inhalation BID    clopidogrel  75 mg Oral Daily    sodium chloride flush  10 mL Intravenous 2 times per day    rosuvastatin  40 mg Oral Nightly    aspirin  81 mg Per NG tube Daily    lidocaine 1 % injection  5 mL Intradermal Once    potassium chloride  20 mEq Intravenous BID    potassium chloride  10 mEq Intravenous BID    insulin glargine  15 Units Subcutaneous Nightly    [Held by provider] amLODIPine  10 mg Per NG tube Daily    Vitamin D  1,000 Units Per NG tube Daily    calcium replacement protocol   Other RX Placeholder    phosphorus replacement protocol   Other RX Placeholder     Continuous Infusions:   PN-Adult  3 IN 1 Central Line (Custom) 58.3 mL/hr at 10/12/20 1732    dextrose         PHYSICAL EXAMINATION:  T:  103.8 P 150. RR:  40. B/P:  138/75. FiO2:  100. O2 Sat:  95.  I/O:  pending  Body mass index is 36.01 kg/m². GCS:   7  General:   Pale, acute ill in appearance  HEENT:  normocephalic and atraumatic. No scleral icterus. PERR  Neck: supple. No Thyromegaly. Lungs: clear to auscultation, positive for crackles in bases. No retractions  Cardiac: RRR-tachycardia. No JVD. Abdomen: soft. Nontender, bowel sounds present, positive for diarrhea  Extremities:  No clubbing, cyanosis, or edema x 4. Vasculature: capillary refill < 3 seconds. Palpable dorsalis pedis pulses. Skin:  warm and dry. Psych:  Affect appropriate  Lymph:  No supraclavicular adenopathy. Neurologic:  No focal deficit. No seizures.     Data: (All radiographs, tracings, PFTs, and imaging are personally viewed and interpreted unless otherwise noted).  10/12/2020 sodium 134 potassium 4.1 chlorides 9 9 CO2 23 BUN 14 creatinine 0.6 lactic acid 3.2 glucose 189 calcium 7.3 phosphorus 2.7 procalcitonin 0.55 CRP is 24.8 LDH is 379 albumin 3.2 alk phos: Phosphate 94 ALT 78 AST 61 total bili 0.6   10/12/2020 white count 15.2 hemoglobin 9.7 hematocrit 29.8 platelet count is 060   Ferritin 1259   CT chest pending, CT abdomen pelvis pending.  Echo 10/7/2020 LVEF 55%.  Cardiac telemetry A. fib with RVR        Seen with multidisciplinary ICU team yes. Meets Continued ICU Level Care Criteria:    [x] Yes   [] No - Transfer Planned to listed location:  [] HOSPITALIST CONTACTED-      Case and plan discussed with Dr. Alaina Dunham.         Electronically signed by ARABELLA Van - CNP  CRITICAL CARE SPECIALIST  Total time 40 minutes this does not include procedure time

## 2020-10-13 NOTE — PLAN OF CARE
Problem: MECHANICAL VENTILATION  Goal: Will be able to breathe spontaneously, without ventilator support  Description: Will be able to breathe spontaneously, without ventilator support  10/13/2020 0420 by Elvia Santillan RCP  Outcome: Ongoing  Note: Vent setting optimized to achieve target tidal volume, respiratory rate and ideal oxygen saturations. SBT will be performed when appropriate.             Problem: Impaired respiratory status  Goal: Able to breathe comfortably  Description: Able to breathe comfortably  10/13/2020 0420 by Elvia Santillan RCP  Outcome: Ongoing

## 2020-10-13 NOTE — PROCEDURES
ICU PROCEDURE - ENDOTRACHEAL INTUBATION    Jacob Acuña     MRN#: [id number]  10/12/20      Accliset [email or web address]     : 1945    INDICATION: acute respiratory failure, hypoxia failed Bipap    TIME OUT: taken    Permission obtained, risks/benefits reviewed:    ANESTHESIA:   []Ketamine  []Ativan  [] Morphine  []Propofol  [x]Other medications:Etomidate and Nimbex    ESTIMATED BLOOD LOSS:  None. COMPLICATIONS:  []N/A  [] Other:    LARYNGOSCOPIC AIRWAY GRADE (CORMACK-LEHANE):[]1  [x]2a  []2b []3  []4        INTUBATION EQUIPMENT USED:  [x] Direct laryngoscope only    OUTCOME: Successful placement of #  7.5  Taperguard Evac endotracheal via   [x]Oral route    INSERTION DEPTH:  24    cm from   [x]lip           CONFIRMATION OF TUBE POSITION:   [x]Capnography - Strong & repeatable exhaled CO2 detection   [x]Multiple point auscultation   [x]SpO2 response   [x]STAT X-ray   [x]Bronchoscopic assessment    UNUSUAL FINDINGS:    PROCEDURE:     Using direct video-laryngoscopy, the vocal cords were visualized and the endotracheal tube was placed through the cords under direct vision. Good breath sounds were auscultated bilaterally without sounds over abdomen. Appropriate strong & repeatable exhaled CO2 detection was confirmed.        Electronically signed by ARABELLA Sarabia CNP on 10/12/2020 at 9:06 PM

## 2020-10-13 NOTE — PROGRESS NOTES
CRITICAL CARE PROGRESS NOTE      Patient:  Angie Xie    Unit/Bed:3A-04/004-A  YOB: 1945  MRN: [id number]   PCP: Leandro Cavanaugh MD  Date of Admission: 9/24/2020  Chief Complaint:- acute respiratory failure    Assessment and Plan:   1. Acute respiratory failure, hypoxia: Patient became hypoxic and tachypneic while on BiPAP. Intubated the night of 10/12/20. Continue PCV ventilation with lung protection strategies targeting a peak pressure 35 and less. Patient currently 25/10 at 70%. Satting well in the mid 90s. Continue to wean requirement as tolerated. 2. ARDS: with history of COVID-19 and pronation therapy. ASA and Lovenox to mitigate microthrombosis. Patient requiring high amounts of FiO2 and Peep, bronchoscopy today and determine need for pronation. Follow respiratory panel. Covid 19 retest. 10/13/2020 CT chest revealed nonspecific diffuse bilateral consolidation and pleural fluid. 3. Distributive versus hypovolemic shock: Patient was febrile overnight with T-max of 39.4. Responsive to acetaminophen, no longer febrile. Elevated Lactic Acid which patient was responsive to 0.9NS fluid bolusing. Lactic Clearance 3.2-1.4. Will continue Zosyn at this time. Noted history of Diarrhea. CT of ABD/PELVIS revealed no acute abnormality. Patient has remained normotensive with white blood cell count of 14.1.  4. Anemia: Borderline macrocytic with MCV of 96.6. Given TPA during this hospital stay for R ICA stenosis. Stool occult, B12 and folate pending. Patient's hemoglobin 9.0 which has slowly decreased since admission. Ferritin 1259.  5. DMT2, uncontrolled:  Patient was given Decadron 10 milligrams IVP X1. Will continue to monitor with ACCU and SSI. Most recent blood glucose 201.  6. CVA:  History of recent tPA use, will continue to monitor. Patient taking clopidogrel and Lovenox. Patient's neurologic exam limited by sedation.   Pt currently responsive to painful stimuli and voice.  7. Hypertension, essential: Stable, on metoprolol. 8. Hyperlipidemia: Home rosuvastatin is continued. INITIAL H AND P AND ICU COURSE:  Julian Newell is a 67year old  male, transferred to [facility] ICU 10/12/2020 for intubation. Patient is a 67-year-old morbidly obese white male reformed smoker. Anna Parekh has a history of diabetes, hyperlipidemia, and hypertension. Diya Brody was admitted from the emergency room on 9/24/2020 with hypoxemic respiratory failure secondary to [address] had associated acute renal injury.  He required 100% nonrebreather and was started on Decadron and Remdesivir.  He continued to deteriorate and required intubation on 9/27/2020.  Acute lung injury progressed to ARDS and started pronation therapy on 9/29/2020. Enrique Benjamin was found to have bilateral pneumonia with Klebsiella pneumoniae and Haemophilus influenza which was treated with Zosyn.  Patient completed 12-day course of Zosyn on 10/5/20. Patient extubated to 10/4/22 intermittent BiPAP. Patient had neurologic decline in the early morning hours of 10/7/2020.  Code stroke was initiated, and patient underwent neurologic assessment.  The decision was made to administer TPA as the patient had left sided weakness and increased confusion.  TPA was administered without difficulty. MRI was negative for acute stroke but CTA showed a severe R ICA stenosis. Neurology felt tPA likely aborted his CVA given his COVID 23 that put him in a hypercoagulable state. 10/10/2020 Patient was transferred to the Hospitalist Service. Consults to Pulmonary and Cardiology services. Patient was confused at times, and was tolerating alternating between HFNC and Bipap. New onset atrial fibrillation which patient did convert with IVP Lopressor. Patient was placed on CPAP with concerns of HUSSEIN. 10/12/2020 Noted increase in work of breathing patient had failed Bipap therapy. He was transferred to ICU for further management and care.   Patient received Case and plan discussed with Dr. Katie Cash. Electronically signed by Cody Monae DO  CRITICAL CARE SPECIALIST  Patient seen by me. Case discussed with resident physician. Patient underwent bronchoscopy 10/13/2020 secondary to ongoing issues with respiratory failure and bilateral infiltrates. Patient did have mucinous return of BAL. Awaiting results of of PCR and culture. Patient intubated 10/12/2020. Awake and cooperative and appears comfortable on mechanical ventilator. CC time 30 minutes. Time was discontiguous. Time does not include procedures. Time does not include resident physician assessment. Time does include my direct assessment and coordination of care. Electronically signed by Saul Cash MD.

## 2020-10-13 NOTE — PLAN OF CARE
Problem: Impaired respiratory status  Goal: Able to breathe comfortably  Description: Able to breathe comfortably  10/12/2020 2249 by Marcelino Stern RCP  Outcome: Ongoing       Problem: MECHANICAL VENTILATION  Goal: Will be able to breathe spontaneously, without ventilator support  Description: Will be able to breathe spontaneously, without ventilator support  Outcome: Ongoing

## 2020-10-14 NOTE — PLAN OF CARE
Problem: MECHANICAL VENTILATION  Goal: Will be able to breathe spontaneously, without ventilator support  Description: Will be able to breathe spontaneously, without ventilator support  10/14/2020 0953 by Stone Acosta RCP  Outcome: Not Met This Shift   Patient remains on the ventilator with lung protective strategies in place. No SBT done due to high PIP, PEEP 12 and 70% FiO2 is required to achieve target tidal volume and SpO2. Will continue to monitor and adjust settings as patient tolerates.

## 2020-10-14 NOTE — FLOWSHEET NOTE
45 Mr Cortez Lundborg rests in bed without evidence of distress. He remains intubated, sedated with FFentanyl and Precedex. He is able to awaken with verbal stimuli and follow commands. Will attempt to wean vent settings today as he tolerates.

## 2020-10-14 NOTE — PLAN OF CARE
Problem: Restraint Use - Nonviolent/Non-Self-Destructive Behavior:  Intervention: Assess potential safety hazards  Note: Patient intermittently attempts to reach and pull at lines and tubes. Vital signs, circulation, and range of motion checked every two hours. Assessment/justification of the use of restaints is done every four hours. Will continue to monitor hourly. Care plan reviewed with patient family. Patient family verbalize understanding of the plan of care and contribute to goal setting.

## 2020-10-14 NOTE — PLAN OF CARE
Problem: Falls - Risk of:  Goal: Will remain free from falls  Description: Will remain free from falls  Outcome: Ongoing  Note: Pt free of falls for the shift. Bed alarm in place, bed at lowest position, hourly rounding performed. Goal: Absence of physical injury  Description: Absence of physical injury  Outcome: Ongoing     Problem: Skin Integrity:  Goal: Will show no infection signs and symptoms  Description: Will show no infection signs and symptoms  Outcome: Ongoing  Note: Aseptic technique used when accessing ports. Goal: Absence of new skin breakdown  Description: Absence of new skin breakdown  Outcome: Ongoing  Note: No signs of new skin breakdown with each assessment. Skin remains warm, dry, intact. Mucous membranes pink & moist. Patient is Q2 hour and prn turn. Problem: Airway Clearance - Ineffective  Goal: Achieve or maintain patent airway  Outcome: Ongoing  Note: Pt suctioned as needed. Ventilator utilized for airway. Problem: Gas Exchange - Impaired  Goal: Absence of hypoxia  Outcome: Ongoing  Note: Pt remains on ventilator support. Problem: Breathing Pattern - Ineffective  Goal: Ability to achieve and maintain a regular respiratory rate  Outcome: Ongoing  Note: Pt remains on ventilator support. Problem: Body Temperature -  Risk of, Imbalanced  Goal: Ability to maintain a body temperature within defined limits  Outcome: Ongoing  Note: Pt temps within normal limits. Problem: Isolation Precautions - Risk of Spread of Infection  Goal: Prevent transmission of infection  Outcome: Ongoing  Note: Isolation precautions in place. Problem: Nutrition Deficits  Goal: Optimize nutrtional status  Outcome: Ongoing     Problem: Nutrition  Goal: Optimal nutrition therapy  Outcome: Ongoing  Note: Pt receiving tube feed at 10ml/hr.      Problem: Infection - Central Venous Catheter-Associated Bloodstream Infection:  Goal: Will show no infection signs and symptoms  Description: Will show no infection signs and symptoms  Outcome: Ongoing  Note: Aseptic technique used when accessing ports. Problem: Pain:  Goal: Pain level will decrease  Description: Pain level will decrease  Outcome: Ongoing  Note: Cpot utilized for pain management. Goal: Control of acute pain  Description: Control of acute pain  Outcome: Ongoing  Note: Cpot utilized for pain management. Goal: Control of chronic pain  Description: Control of chronic pain  Outcome: Ongoing  Note: Cpot utilized for pain management. Care plan reviewed with patient family. Patient family verbalize understanding of the plan of care and contribute to goal setting.

## 2020-10-14 NOTE — PROGRESS NOTES
Comprehensive Nutrition Assessment    Type and Reason for Visit:  Reassess    Nutrition Recommendations/Plan:   Advance TF rate to 20 ml/hr as tolerated of Vital 1.2. Free water flushes per MD  Consider probiotic. Nutrition Assessment:    Pt improving from a nutritional standpoint AEB tubefeedings started 10/13/20, currently running at 10 ml/hr, and TPN weaned off 10/13/20. Remains at risk for further nutritional compromise r/t continued NPO status, intubation, nutrition support not up to goal, previous dysphagia concerns-SLP following, increased nutrient needs to support wound healing, advanced age, and underlying medical condition (covid positive, acute respiratory failure, ARDS, CVA with TPA 10/7 which likely aborted a CVA per MRI (MRI negative for CVA) but CTA show severe ICA stenosis, hx: DM, HLD, HTN), and need for continued nutrition support. Nutrition recommendations/interventions as per above. Malnutrition Assessment:  Malnutrition Status:  Insufficient data(+covid patient)    Context:  Acute Illness     Findings of the 6 clinical characteristics of malnutrition:  Energy Intake:  (EN or NPO since admit)  Weight Loss:  Unable to assess(no weight hx in EMR)     Body Fat Loss:  Unable to assess(+covid)     Muscle Mass Loss:  Unable to assess(+covid)    Fluid Accumulation:  No significant fluid accumulation     Strength:  Not Performed    Estimated Daily Nutrient Needs:  Energy (kcal):  5927-7988 (30-32 kcals/kg for late active phase); Weight Used for Energy Requirements:  Ideal(70 kg - ideal weight)     Protein (g):  140 (2.0 grams/kg ideal weight); Weight Used for Protein Requirements:  (70 kg - ideal weight)        Fluid (ml/day):  per MD; Weight Used for Fluid Requirements:  (n/a)      Nutrition Related Findings:  Covid positive. Intubated 10/12/20, no propofol. MAP 78. TPN weaned off 10/13/20.   TF's at 10 ml/hr, per RN, pt didn't have very good bowel sounds and ~ 150 mls residuals this morning. Flexiseal remains in place, 25 mls output, 1 BM in the last 24 hours. RN plans to reassess pt and if doing okay will advance  TF rate to 20 ml/hr. SLP was following prior to intubation. Sodium 141, Potassium 4.7, BUN 14, Creatinine 0.4. Vasopressin, versed, fentanyl, levophed, humalog, precedex, zosyn, lopressor, lantus, vitamin D, glycolax prn.  93 ml TF intake recorded in the last 24 hours (39% of prescribed intake)    Wounds:  Stage II(coccyx)       Current Nutrition Therapies:    Current Tube Feeding (TF) Orders:  · Feeding Route: Orogastric  · Formula: (Vital 1.2 to start 10/13)  · Schedule: Continuous(vital 1.2 to start at 10 ml/hr, if tolerates after 24 hours increase to 20 ml/hr)  · Water Flushes: per MD  · Current TF & Flush Orders Provides: Vital 1.2 at 10 ml/hr = 240 ml, 288 kcals, 18 grams protein, 27 grams CHO, 1 gram fiber, 195 ml water, 405 mg potassium, 202 mg phosphorus. · Goal TF & Flush Order Provides: Vital 1.2 at 20 ml/hr~ 480 mls 576 kcals, 36 grams protein, 53 grams CHO, 2 grams fiber, 389 ml water, 810 mg potassium, and 405 mg phosphorus per 24 hours. Anthropometric Measures:  · Height: 5' 8\" (172.7 cm)  · Current Body Weight: 197 lb 3.2 oz (89.4 kg)(10/13/20 trace UE and LE edema)   · Admission Body Weight: 220 lb (99.8 kg)(9/24/20, no weight source)    · Usual Body Weight: (no weight hx available in EMR, patient intubated)     · Ideal Body Weight: 154 lbs;   · BMI: 30  · BMI Categories: Overweight (BMI 25.0-29. 9)       Nutrition Diagnosis:   · Inadequate oral intake related to impaired respiratory function as evidenced by NPO or clear liquid status due to medical condition, intubation, nutrition support - enteral nutrition      Nutrition Interventions:   Food and/or Nutrient Delivery:  Continue NPO, Continue Current Tube Feeding  Nutrition Education/Counseling:  No recommendation at this time   Coordination of Nutrition Care:  Continued Inpatient

## 2020-10-14 NOTE — CARE COORDINATION
10/14/20, 2:25 PM EDT    DISCHARGE ON GOING [address] [facility] day: 20  Location: 3A-04/004-A Reason for admit: COVID-19 [U07.1]   Procedure: 9/24 Vapotherm initiated  9/27 Intubated  9/27 CVC Right subclavian - 10/5 removed  9/29 - 10/2 Rotoprone therapy   10/1 Convalescent Plasma  10/4 Extubated  10/4 Vapotherm initiated  10/7 PICC right basilic  60/8 Code stroke - TPA given   10/12 Re-intubated. 10/13 CT chest - Nonspecific diffuse bilateral consolidation and pleural fluid. 10/13 Bronchoscopy - Quite mucous to the lower lobes bilaterally. Treatment Plan of Care: Intensivist following. Tmax 97.0 F. Remains intubated and on ventilator. PCV mode, FiO2 down to 70%. PEEP down to 10. Sats 95%. Continues on Fentanyl and Precedex gtts. Pt is able to follow commands. Tube feeding. Baby ASA, Peridex, Norvasc (held), Plavix, Precedex gtt, Lovenox, Pepcid iv bid, Fentanyl gtt, Bevespi, Lopressor iv q6hr, Levophed gtt, Protonix iv daily, Zosyn iv q8hr, Vit D. Diabetes management, electrolyte replacement. Palliative care consult. Barriers to Discharge: Intubated/vent. PCP: Alphonse Hill MD  Readmission Risk Score: 27%  Patient Goals/Plan/Treatment Preferences: Pt is from home with wife. Current plan includes TCU, continue to monitor. Pt will be a precert for any placement.

## 2020-10-14 NOTE — PROGRESS NOTES
[facility] 59  PHYSICAL THERAPY MISSED TREATMENT NOTE  [facility] CCU 3A    Date: 10/14/2020  Patient Name: Cecilio Travis        MRN: [id number]   : 1945  (67 y.o.)  Gender: male   Referring Practitioner: Jolanta Cesepdes MD  Diagnosis: COVID-19         REASON FOR MISSED TREATMENT:  Intubated and ventilated this date.       Will check back later

## 2020-10-14 NOTE — PROGRESS NOTES
CRITICAL CARE PROGRESS NOTE      Patient:  Jimmie Riedel    Unit/Bed:3A-04/004-A  YOB: 1945  MRN: [id number]   PCP: Tj Tuttle MD  Date of Admission: 9/24/2020  Chief Complaint:- acute respiratory failure    Assessment and Plan:   1. Acute respiratory failure, hypoxia: Patient became hypoxic and tachypneic while on BiPAP. Intubated the night of 10/12/20. Continue PCV ventilation with lung protection strategies targeting a peak pressure 35 and less. Patient currently 23/12 at 80%. Satting well in the mid 90s. Continue to wean requirement as tolerated. 2. ARDS: with history of COVID-19 and pronation therapy. ASA and Lovenox to mitigate microthrombosis. Patient requiring high amounts of FiO2 and Peep. Bronchoscopy 10/13/20 with mucinous return. Negative respiratory panel. Covid 19 retest is negative. 10/13/2020 CT chest revealed nonspecific diffuse bilateral consolidation and pleural fluid. Continue supportive ventilation. 3. Distributive versus hypovolemic shock: Patient was afebrile overnight. Elevated Lactic Acid which patient was responsive to 0.9NS fluid bolusing. Lactic Clearance 3.2-1.4. Will continue Zosyn (10/12/20) at this time. Noted history of Diarrhea. CT of ABD/PELVIS revealed no acute abnormality. Patient has remained normotensive with white blood cell count downtrending to 10.2. Procal pending, 0.55 on 10/12/20.  4. Anemia: Borderline macrocytic with MCV of 96.6. Unlikely hemo dilution as patient is net negative intake output. Given TPA during this hospital stay for R ICA stenosis. Stool occult pending. B12 and folate normal.  Patient's hemoglobin 8.5 which has slowly decreased since admission on 9/26/20. Ferritin 1259. Follow H/H closely, consider transfuse for Hgb less than 8, since patient requiring increased ventilatory support. 5. DMT2, uncontrolled:  Patient was given Decadron 10 milligrams IVP X1. Will continue to monitor with ACCU and SSI.   Most recent blood glucose 101.  6. CVA:  History of recent tPA use, will continue to monitor. Patient taking clopidogrel and Lovenox. Patient's neurologic exam limited by sedation. Pt with increased sedation today. Minimally responsive to voice. 7. Hypertension, essential: Stable, on metoprolol. 8. Hyperlipidemia: Home rosuvastatin is continued. INITIAL H AND P AND ICU COURSE:  Jaja Gale is a 67year old  male, transferred to [facility] ICU 10/12/2020 for intubation. Patient is a 43-year-old morbidly obese white male reformed smoker. Angelo Dillard has a history of diabetes, hyperlipidemia, and hypertension. Faheem Vernon was admitted from the emergency room on 9/24/2020 with hypoxemic respiratory failure secondary to [address] had associated acute renal injury.  He required 100% nonrebreather and was started on Decadron and Remdesivir.  He continued to deteriorate and required intubation on 9/27/2020.  Acute lung injury progressed to ARDS and started pronation therapy on 9/29/2020. John Ramírez was found to have bilateral pneumonia with Klebsiella pneumoniae and Haemophilus influenza which was treated with Zosyn.  Patient completed 12-day course of Zosyn on 10/5/20. Patient extubated to 10/4/22 intermittent BiPAP. Patient had neurologic decline in the early morning hours of 10/7/2020.  Code stroke was initiated, and patient underwent neurologic assessment.  The decision was made to administer TPA as the patient had left sided weakness and increased confusion.  TPA was administered without difficulty. MRI was negative for acute stroke but CTA showed a severe R ICA stenosis. Neurology felt tPA likely aborted his CVA given his COVID 23 that put him in a hypercoagulable state. 10/10/2020 Patient was transferred to the Hospitalist Service. Consults to Pulmonary and Cardiology services. Patient was confused at times, and was tolerating alternating between HFNC and Bipap.  New onset atrial fibrillation which patient did convert with IVP Lopressor. Patient was placed on CPAP with concerns of HUSSEIN. 10/12/2020 Noted increase in work of breathing patient had failed Bipap therapy. He was transferred to ICU for further management and care. Patient received bronchoscopy on 10/13/2020. Past Medical History:  See HPI. Family History: Mother and Father unknown. Social History: Former smoker, denies any ETOH or illicit drug use. .    ROS   Unable to complete secondary to patient's sedation and ventilatory status. PHYSICAL EXAMINATION:  Temperature 96.8, heart rate 54, respirations 12, blood pressure 119/54, oxygen saturation 92%. I/o +660  Body mass index is 29.98 kg/m². General:   Pale, acute ill in appearance  HEENT:  normocephalic and atraumatic. No scleral icterus. PERRL  Neck: supple. No Thyromegaly. Lungs: positive for crackles in bases. No retractions  Cardiac: RRR-sinus without tachycardia. S1-S2, no extraneous heart sounds. no JVD. Abdomen: soft. Nontender, bowel sounds present  Extremities:  No clubbing, cyanosis, or edema x 4. Vasculature: capillary refill < 3 seconds. Palpable dorsalis pedis pulses. Skin:  warm and dry. Lymph:  No supraclavicular adenopathy. Neurologic:  No appreciable focal deficit. No seizures. Data: (All radiographs, tracings, PFTs, and imaging are personally viewed and interpreted unless otherwise noted).  Sodium 141, potassium 4.7, chloride 107, bicarb 27, BUN 14, creatinine 0.4, glucose 101   White count 10.2, hemoglobin 8.5, hematocrit 26.4, platelets 807.  10/13/20 BAL panel negative   10/13/20 Albumin 2.0, alk phos 83, ALT 64, AST 43, bilirubin 0.5, total protein 5.5   10/13/20 CT chest, abdomen, pelvis revealed only bilateral diffuse consolidation of the lungs.  Echo 10/7/2020 LVEF 55%.  Cardiac telemetry NSR   10/12/2020 pneumonia panel negative   10/12/2020 urine no gross preliminarily   10/6/2020 blood cultures no growth final result.         Seen with

## 2020-10-14 NOTE — PLAN OF CARE
1914 by Kaylie Kumar RN  Outcome: Ongoing  Note: Tube feedings     Problem: Infection - Central Venous Catheter-Associated Bloodstream Infection:  Goal: Will show no infection signs and symptoms  Description: Will show no infection signs and symptoms  10/14/2020 1914 by Kaylie Kumar RN  Outcome: Ongoing  Note: Frequent position changes as tolerated. Care plan reviewed with patient and family. Patient and family verbalize understanding of the plan of care and contribute to goal setting.

## 2020-10-14 NOTE — PLAN OF CARE
Problem: Nutrition  Goal: Optimal nutrition therapy  10/14/2020 1336 by Shakira Wong RD, LD  Outcome: Ongoing   Nutrition Problem #1: Inadequate oral intake  Intervention: Food and/or Nutrient Delivery: Continue NPO, Continue Current Tube Feeding  Nutritional Goals: Patient will tolerate trickle EN at 10ml/hr-20ml/hr until able to increase to goal

## 2020-10-15 NOTE — PROGRESS NOTES
Comprehensive Nutrition Assessment    Type and Reason for Visit:  Reassess    Nutrition Recommendations/Plan:   Will add 2.5 oz. Proteinex 2 go BID to current TF regimen. Continue Vital 1.2 at 20 ml/hr. Free water flush per MD.  Consider Probiotic. Nutrition Assessment:    Pt improving from a nutritional standpoint AEB tolerating TF at 20 ml/hr (initial goal rate during acute phase). Remains at risk for further nutritional compromise r/t COVID +, mechanical ventilation, increased nutrient needs to support wound healing, CVA with TPA 10/7 which likely aborted a CVA per MRI (MRI negative for CVA) but CTA show severe ICA stenosis and underlying medical condition (hx DM). Nutrition recommendations/interventions as per above. Malnutrition Assessment:  Malnutrition Status:  Insufficient data(+covid patient)    Context:  Acute Illness     Findings of the 6 clinical characteristics of malnutrition:  Energy Intake:  (EN or NPO since admit)  Weight Loss:  Unable to assess(no weight hx in EMR)     Body Fat Loss:  Unable to assess(+covid)     Muscle Mass Loss:  Unable to assess(+covid)    Fluid Accumulation:  No significant fluid accumulation     Strength:  Not Performed    Estimated Daily Nutrient Needs:  Energy (kcal):  0042-5839 (30-32 kcals/kg for late active phase); Weight Used for Energy Requirements:  Ideal(70 kg - ideal weight)     Protein (g):  140 (2.0 grams/kg ideal weight); Weight Used for Protein Requirements:  (70 kg - ideal weight)        Fluid (ml/day):  per MD; Weight Used for Fluid Requirements:  (n/a)      Nutrition Related Findings:  COVID+, Intubated 10/12/20; MAP 73, 1 BM noted past 24 hours; RN states pt.tolerating TF at 20 ml/hr per report;  Rx includes Vasopressin,Levophed, Fentnyl, ATB, Precedex, Insulin, Vitamin D; 10/15: Sodium 133, Potassium 4.8, Glucose 111, BUN 11, Cr 0.6; received 257 ml TF in past 24 hours (54% of prescribed but rate increased 10/14)      Wounds:  Stage II(coccyx)       Current Nutrition Therapies:    Current Tube Feeding (TF) Orders:  · Feeding Route: Orogastric  · Formula: Semi-Elemental(Vital 1.2)  · Schedule: Continuous(20 ml/hr)  · Additives/Modulars: (Proteinex 2 go 2.5 oz. BID (208 kcals, 52 gm protein))  · Water Flushes: per MD  · Current TF & Flush Orders Provides: Vital 1.2 at 20 ml/hr~ 480 mls 576 kcals, 36 grams protein, 53 grams CHO, 2 grams fiber, 389 ml water, 810 mg potassium, and 405 mg phosphorus per 24 hours. · Goal TF & Flush Orders Provides: Vital 1.2 at 20 ml/hr with Proteinex BID = 784 kcals, 88 gm protein, 53 gm CHO, 2 gm fiber, 405 mg Potassium, 202 mg Phosphorus and 389 ml free water/day      Anthropometric Measures:  · Height: 5' 8\" (172.7 cm)  · Current Body Weight: 197 lb 3.2 oz (89.4 kg)(10/13/20 trace UE and LE edema)   · Admission Body Weight: 220 lb (99.8 kg)(9/24/20, no weight source)    · Usual Body Weight: (no weight hx available in EMR, patient intubated)     · Ideal Body Weight: 154 lbs;   · BMI: 30  · BMI Categories: Overweight (BMI 25.0-29. 9)       Nutrition Diagnosis:   · Inadequate oral intake related to impaired respiratory function as evidenced by NPO or clear liquid status due to medical condition, intubation, nutrition support - enteral nutrition      Nutrition Interventions:   Food and/or Nutrient Delivery:  Continue NPO, Continue Current Tube Feeding  Nutrition Education/Counseling:  No recommendation at this time   Coordination of Nutrition Care:  Continued Inpatient Monitoring    Goals:  Patient will tolerate trickle EN at 10ml/hr-20ml/hr until able to increase to goal       Nutrition Monitoring and Evaluation:   Behavioral-Environmental Outcomes:  (n/a)   Food/Nutrient Intake Outcomes:  Enteral Nutrition Intake/Tolerance  Physical Signs/Symptoms Outcomes:  Biochemical Data, GI Status, Fluid Status or Edema, Hemodynamic Status, Skin, Weight     Discharge Planning:     Too soon to determine     Electronically signed by Tato Mora RD, ADRIAN on 10/15/20 at 10:23 AM EDT    Contact: [phone]

## 2020-10-15 NOTE — CARE COORDINATION
10/15/20, 10:25 AM EDT    DISCHARGE ON GOING EVALUATION    [address] day: 21  Location: 3A-04/004-A Reason for admit: COVID-19 [U07.1]   Procedure: 9/24 Vapotherm initiated  9/27 Intubated  9/27 CVC Right subclavian - 10/5 removed  9/29 - 10/2 Rotoprone therapy   10/1 Convalescent Plasma  10/4 Extubated  10/4 Vapotherm initiated  23/6 PICC right basilic  75/5 Code stroke - TPA given   10/12 Re-intubated. 10/13 CT chest - Nonspecific diffuse bilateral consolidation and pleural fluid.   10/13 Bronchoscopy -mucous to the lower lobes bilaterally. Treatment Plan of Care: Intensivist following. Tmax 101.5F. Remains intubated and on ventilator. PCV mode, FiO2 up to 90%. PEEP up to 14. Sats 95%. Tube feeding. Baby ASA, Peridex, Norvasc (held), Plavix, Precedex gtt, Lovenox, Pepcid iv bid, Fentanyl gtt, Bevespi, Lopressor iv q6hr, Levophed gtt, Protonix iv daily, Zosyn iv q8hr, Crestor, Vit D. Diabetes management, electrolyte replacement. Palliative care.   Barriers to Discharge: Intubated/vent. PCP: Lali Sanchez MD  Readmission Risk Score: 28%  Patient Goals/Plan/Treatment Preferences: Pt is from home with wife. Current plan includes TCU, continue to monitor.  Pt will be a precert for any placement.

## 2020-10-15 NOTE — PROGRESS NOTES
ED Attending  CC: Amy       Department of Emergency Medicine   ED  Provider Note  Admit Date/RoomTime: 3/29/2019  7:18 PM  ED Room: 10/10  MRN: [id number]  Chief Complaint:   Hyperglycemia (Meter max is 600 and it is reading high. Type 1 diabetic. )       History of Present Illness   Source of history provided by:  patient. History/Exam Limitations: none. Janelle Ambriz is a 38 y.o. female who has a past medical history of:   Past Medical History:   Diagnosis Date    Diabetes mellitus ([address]     Gastritis     Migraines     Thyroid disease     presents to the ED by private vehicle for hyperglycemia. Patient states this started around 2:00 today. She changes the delivery set for her insulin pump around that time. She said her normal rate for her blood sugar. Later on she began to feel muscle aches and cramps as well as epigastric discomfort and nausea and she became concerned and checked her blood sugar, it was high. She bolused herself with insulin and checked it again a couple hours later finding it to be greater than 600 which is the threshold for her meter. She rechecked it 2 more times and at the same result. She thought possibly her delivery set was kinked, change her delivery set. She admits to muscle aching, nausea and epigastric discomfort similar to when she is not DKA in the past. She denies fevers or chills. Denies headache, neck pain or stiffness. No cough or shortness of breath, no upper respiratory complaints and no chest pain. Secondary epigastric discomfort she denies any vomiting, admits to nausea. No bowel pattern changes or bloody stools. No urinary or vaginal complaints. ROS   Pertinent positives and negatives are stated within HPI, all other systems reviewed and are negative. Past Surgical History:   Procedure Laterality Date    CHOLECYSTECTOMY      DUODENOTOMY      SPLENECTOMY     Social History:  reports that she has quit smoking.  She uses smokeless tobacco. She reports Spencer Rayo 59  PHYSICAL THERAPY MISSED TREATMENT NOTE  [facility]    Date: 10/15/2020  Patient Name: Nanda Lei        MRN: [id number]   : 1945  (67 y.o.)  Gender: male   Referring Practitioner: Ariadna Lee MD  Diagnosis: COVID-19         REASON FOR MISSED TREATMENT:  Per discussion with nursing, pt is on vent and sedated. Will defer PT evaluation until medically appropriate.      Josr Abrahamk, PT, DPT 35.0 pg    MCHC 34.0 32.0 - 34.5 %    RDW 13.2 11.5 - 15.0 fL    Platelets 814 222 - 439 E9/L    MPV 11.3 7.0 - 12.0 fL    Neutrophils % 75.4 43.0 - 80.0 %    Immature Granulocytes % 0.4 0.0 - 5.0 %    Lymphocytes % 13.1 (L) 20.0 - 42.0 %    Monocytes % 9.0 2.0 - 12.0 %    Eosinophils % 1.3 0.0 - 6.0 %    Basophils % 0.8 0.0 - 2.0 %    Neutrophils # 7.69 (H) 1.80 - 7.30 E9/L    Immature Granulocytes # 0.04 E9/L    Lymphocytes # 1.34 (L) 1.50 - 4.00 E9/L    Monocytes # 0.92 0.10 - 0.95 E9/L    Eosinophils # 0.13 0.05 - 0.50 E9/L    Basophils # 0.08 0.00 - 0.20 E9/L   Comprehensive Metabolic Panel   Result Value Ref Range    Sodium 129 (L) 132 - 146 mmol/L    Potassium 4.4 3.5 - 5.0 mmol/L    Chloride 92 (L) 98 - 107 mmol/L    CO2 23 22 - 29 mmol/L    Anion Gap 14 7 - 16 mmol/L    Glucose 522 (HH) 74 - 99 mg/dL    BUN 11 6 - 20 mg/dL    CREATININE 0.8 0.5 - 1.0 mg/dL    GFR Non-African American >60 >=60 mL/min/1.73    GFR African American >60     Calcium 9.2 8.6 - 10.2 mg/dL    Total Protein 7.5 6.4 - 8.3 g/dL    Alb 3.9 3.5 - 5.2 g/dL    Total Bilirubin 0.9 0.0 - 1.2 mg/dL    Alkaline Phosphatase 89 35 - 104 U/L    ALT 8 0 - 32 U/L    AST 13 0 - 31 U/L   Lipase   Result Value Ref Range    Lipase 4 (L) 13 - 60 U/L   Lactic acid, plasma   Result Value Ref Range    Lactic Acid 2.8 (H) 0.5 - 2.2 mmol/L   Osmolality, Serum   Result Value Ref Range    Osmolality 298 285 - 310 mOsm/Kg   Beta-Hydroxybutyrate   Result Value Ref Range    Beta-Hydroxybutyrate 0.22 0.02 - 0.27 mmol/L   pH, venous   Result Value Ref Range    pH, Lalo 7.35 7.30 - 7.42   Urinalysis   Result Value Ref Range    Color, UA Yellow Straw/Yellow    Clarity, UA Clear Clear    Glucose, Ur >=1000 (A) Negative mg/dL    Bilirubin Urine Negative Negative    Ketones, Urine TRACE (A) Negative mg/dL    Specific Gravity, UA <=1.005 1.005 - 1.030    Blood, Urine TRACE (A) Negative    pH, UA 5.5 5.0 - 9.0    Protein, UA Negative Negative mg/dL    Urobilinogen, Urine noted.  Time:  19:44:44    Rate: 70   Rhythm: Sinus. Interpretation: Normal sinus rhythm, rate 70, intervals within the final limits, rightward axis, no acute ST or T-wave abnormalities to suggest infarction or ischemia. ED Course / Medical Decision Making     Medications   trimethobenzamide (TIGAN) injection 200 mg (200 mg Intramuscular Given 3/29/19 2131)   0.9 % sodium chloride bolus (0 mLs Intravenous Stopped 3/29/19 2059)   ondansetron (ZOFRAN) injection 4 mg (4 mg Intravenous Given 3/29/19 2008)   morphine injection 2 mg (2 mg Intravenous Given 3/29/19 2100)   diphenhydrAMINE (BENADRYL) injection 25 mg (25 mg Intravenous Given 3/29/19 2228)   0.9 % sodium chloride bolus (0 mLs Intravenous Stopped 3/30/19 0016)        Re-Evaluations:  3/29/19      Time: 00:10 am    Patient seen and reexamined the bedside. Patients symptoms are improving. Results are explained to patient in plain language, treatment plan reviewed, patient is agreeable with plan for discharge at this point. Understands that will need to call to arrange follow-up. Consultations:             None    Procedures:   none    MDM:  38-year-old female presenting to the ER for evaluation of hyperglycemia, nausea and body aches. She states she feels similar to when she was developing diabetic acidosis in the past per she is a type I diabetic and has an insulin pump, change insulin infusion set today and feeling this afterward. She checked her sugar and it was climbing. She, as a troubleshooting measure, remove the infusion center and replace it. Blood sugars climbed higher than the threshold for her monitor which would be 600 mg/dL prominent her to come in the ER for evaluation. Your blood sugar was in the 400 mg deciliter range with a normal anion gap, negative beta hydroxy butyrate in a non-acidotic pH. She is not hyperosmolar.  She is given 3 L of IV fluid and advised to continue her basal insulin rate and sugars come down to the low 200 mg/dL range. Lactic acid was mildly elevated and improved with hydration. There is no evidence of infection identified. She was given Zofran for nausea initially with some relief and then required re-medication with Tigan due to her allergy to Compazine. She is feeling much better after aggressive hydration and is comfortable with plan for going home, continuing insulin pump, and following up with her doctor for reevaluation within 1 week. She is advised to return to ER if needed for new, changing, worsening symptoms or concerns. Counseling:   I have spoken with the patient and discussed todays results, in addition to providing specific details for the plan of care and counseling regarding the diagnosis and prognosis and are agreeable with the plan. Assessment      1. Hyperglycemia due to type 1 diabetes mellitus ([facility] [address]    2. Insulin pump mechanical complication, initial encounter      This patient's ED course included: a personal history and physicial examination, re-evaluation prior to disposition, multiple bedside re-evaluations, IV medications, cardiac monitoring, continuous pulse oximetry and complex medical decision making and emergency management  This patient has remained hemodynamically stable, improved and been closely monitored during their ED course. Plan   Discharge to home. Patient condition is good. New Medications     Discharge Medication List as of 3/30/2019 12:25 AM      START taking these medications    Details   ondansetron (ZOFRAN ODT) 4 MG disintegrating tablet Take 1 tablet by mouth every 8 hours as needed for Nausea or Vomiting, Disp-12 tablet, R-0Print           Electronically signed by KEYLA Watkins CNP   DD: 3/29/19  **This report was transcribed using voice recognition software. Every effort was made to ensure accuracy; however, inadvertent computerized transcription errors may be present.   Evangelina OF PROVIDER NOTE        KEYLA Watkins CNP  03/30/19 0151

## 2020-10-15 NOTE — PROGRESS NOTES
CRITICAL CARE PROGRESS NOTE      Patient:  Chito Splinter    Unit/Bed:3A-04/004-A  YOB: 1945  MRN: [id number]   PCP: Erich Hurt MD  Date of Admission: 9/24/2020  Chief Complaint:- acute respiratory failure    Assessment and Plan:   1. Acute respiratory failure, hypoxia: Patient became hypoxic and tachypneic while on BiPAP. Intubated the night of 10/12/20. Continue PCV ventilation with lung protection strategies targeting a peak pressure 35 and less. Patient currently 18/12 at 85%. Satting well in the mid 90s. Continue to wean requirement as tolerated. 2. Pleural effusion, right: Large right pleural effusion with lower lobe atelectasis from CT on 10/13/20. Patient is still requiring high pressures for ventilation. Consider right thoracentesis. This would be both diagnostic and therapeutic. Continue pressure ventilation and reduce as tolerated. 3. ARDS: recent history of COVID-19 and pronation therapy. ASA and Lovenox to mitigate microthrombosis. Patient requiring high amounts of FiO2 and Peep. Bronchoscopy 10/13/20 with mucinous return. Negative respiratory panel, culture shows gram positive cocci/normal guido. Covid 19 retest is negative. 10/13/2020 CT chest revealed nonspecific diffuse bilateral consolidation and pleural fluid. Continue supportive ventilation. 4. Distributive versus hypovolemic shock: Patient was febrile overnight. Tmax 101.5. Acetaminophen prn q6. Lactic Clearance 3.2-1.4, today's trend pending. Will continue Zosyn (10/12/20). CT of ABD/PELVIS revealed no acute abnormality. Patient has remained normotensive with white blood cell count downtrending to 9.0. Procal 0.98.  5. Anemia: Borderline macrocytic with MCV of 96.6. Unlikely hemo dilution as patient is net negative intake/output. Given TPA during this hospital stay for R ICA stenosis. Stool occult pending. B12 and folate normal.  Patient's hemoglobin stabilized for 24 hrs, now 8.6. Ferritin 1259.  Follow H/H closely, consider transfuse for Hgb less than 8, since patient requiring increased ventilatory support. 6. DMT2, uncontrolled:  Patient was given Decadron 10 milligrams IVP X1. Will continue to monitor with ACCU and SSI. Most recent blood glucose 78.  7. CVA:  History of recent tPA use, will continue to monitor. Patient taking clopidogrel and Lovenox. Patient's neurologic exam limited by sedation. Minimally responsive to voice. 8. Hypertension, essential: Stable, on metoprolol. 9. Hyperlipidemia: Home rosuvastatin is continued. INITIAL H AND P AND ICU COURSE:  Osiel Montanez is a 67year old  male, transferred to [facility] ICU 10/12/2020 for intubation. Patient is a 67-year-old morbidly obese white male reformed smoker. [facility] has a history of diabetes, hyperlipidemia, and hypertension. Glo Viera was admitted from the emergency room on 9/24/2020 with hypoxemic respiratory failure secondary to [address] had associated acute renal injury.  He required 100% nonrebreather and was started on Decadron and Remdesivir.  He continued to deteriorate and required intubation on 9/27/2020.  Acute lung injury progressed to ARDS and started pronation therapy on 9/29/2020. Flakita Vidales was found to have bilateral pneumonia with Klebsiella pneumoniae and Haemophilus influenza which was treated with Zosyn.  Patient completed 12-day course of Zosyn on 10/5/20. Patient extubated to 10/4/22 intermittent BiPAP. Patient had neurologic decline in the early morning hours of 10/7/2020.  Code stroke was initiated, and patient underwent neurologic assessment.  The decision was made to administer TPA as the patient had left sided weakness and increased confusion.  TPA was administered without difficulty. MRI was negative for acute stroke but CTA showed a severe R ICA stenosis. Neurology felt tPA likely aborted his CVA given his COVID 23 that put him in a hypercoagulable state.  10/10/2020 Patient was transferred to the Hospitalist Service. Consults to Pulmonary and Cardiology services. Patient was confused at times, and was tolerating alternating between HFNC and Bipap. New onset atrial fibrillation which patient did convert with IVP Lopressor. Patient was placed on CPAP with concerns of HUSSEIN. 10/12/2020 Noted increase in work of breathing patient had failed Bipap therapy. He was transferred to ICU for further management and care. Patient received bronchoscopy on 10/13/2020. Right thoracentesis planned for 10/15/2020. Past Medical History:  See HPI. Family History: Mother and Father unknown. Social History: Former smoker, denies any ETOH or illicit drug use. .    ROS   Unable to complete secondary to patient's sedation and ventilatory status. PHYSICAL EXAMINATION:  Temperature 101.5, heart rate 71 normal sinus rhythm, respirations 18, blood pressure 111/52, oxygen saturation 95%  Current vent settings: PCV ventilation, 25/14, 85% FiO2. Body mass index is 29.98 kg/m². General:   Pale, acute ill in appearance  HEENT:  normocephalic and atraumatic. No scleral icterus. PERRL  Neck: supple. No Thyromegaly. Lungs: positive for crackles in bases R>L. Cardiac: RRR-sinus without tachycardia. S1-S2, no extraneous heart sounds. no JVD. Abdomen: soft. Nontender, bowel sounds present  Extremities:  No clubbing, cyanosis, or edema x 4. Vasculature: capillary refill < 3 seconds. Palpable dorsalis pedis pulses. Skin:  warm and dry. Lymph:  No supraclavicular adenopathy. Neurologic:  No appreciable focal deficit. No seizures. Data: (All radiographs, tracings, PFTs, and imaging are personally viewed and interpreted unless otherwise noted).       Sodium 133, potassium 4.8, chloride 99, bicarb 28, BUN 11, creatinine 0.6, glucose 120   White count 9.0, hemoglobin 8.7, hematocrit 27.6, platelets 194       10/13/20 BAL panel negative, culture with gram positive cocci/normal guido   10/13/20 Albumin 2.0, alk phos 83, ALT 64, AST 43, bilirubin 0.5, total protein 5.5   10/13/20 CT chest, abdomen, pelvis revealed bilateral diffuse consolidation of the lungs and right pleural effusion.  Echo 10/7/2020 LVEF 55%.  Cardiac telemetry NSR   10/12/2020 pneumonia panel negative   10/12/2020 urine culture no growth with contaminants   10/6/2020 blood cultures no growth final result. Seen with multidisciplinary ICU team yes. Meets Continued ICU Level Care Criteria:    [x] Yes   [] No - Transfer Planned to listed location:  [] HOSPITALIST CONTACTED-      Case and plan discussed with Dr. Carmen Hernandez. Electronically signed by Luis Issa DO  CRITICAL CARE SPECIALIST  Patient seen by me. Case discussed with resident physician. Right pleural effusion. May benefit from thoracentesis tomorrow. Case discussed with Dr. Angela Thurman. Plans for PEG tube noted. Patient remained stable on mechanical ventilator. Remains critical.  CC time 35 minute. Time does not include procedures. Time was discontiguous. CC time does include my direct assessment and coordination of care. Electronically signed by Silvestre Hernandez MD.

## 2020-10-15 NOTE — PLAN OF CARE
Problem: Nutrition  Goal: Optimal nutrition therapy  Outcome: Ongoing  Nutrition Problem #1: Inadequate oral intake  Intervention: Food and/or Nutrient Delivery: Continue NPO, Continue Current Tube Feeding  Nutritional Goals: Patient will tolerate trickle EN at 10ml/hr-20ml/hr until able to increase to goal

## 2020-10-15 NOTE — PLAN OF CARE
Intensivist plan of care: Consent    Patient: Cosmo Mcmahon  YOB: 1945  MRN: [id number]  Time of call: 10/15/2020 17:50    Spoke with patient's son Ella Pate. Updated family on patient status and procedural planning. Discussed at length the procedural involvement, risks, benefits, complications and alternative options to both chest tube placement for pleural effusion and PEG placement for nutrition administration. General Hui voiced his understanding of both of these procedures and agreed to proceed.       Stacy Ruelas DO  Critical Care Resident Physician

## 2020-10-15 NOTE — FLOWSHEET NOTE
Pt's family was contacted for support, prayer and encouragement. 10/15/20 1543   Encounter Summary   Services provided to: Patient   Referral/Consult From: [facility] Family members   Continue Visiting Yes  (10/15 F)   Complexity of Encounter Low   Length of Encounter 15 minutes   Routine   Type Follow up   Assessment Approachable;Calm   Intervention Peoa;Prayer;Nurtured hope   Outcome Acceptance;Expressed gratitude;Encouraged; Hopeful

## 2020-10-15 NOTE — PROGRESS NOTES
[facility] THERAPY MISSED TREATMENT NOTE  [facility] CCU 3A      Date: 10/15/2020  Patient Name: Zuhair Humphrey        MRN: [id number]    : 1945  (67 y.o.)    REASON FOR MISSED TREATMENT:   Patient is currently not medically appropriate for skilled ST evaluation/treatment d/t current intubation/ventilation (reintubated 10/12). ST plans alejandro continue monitoring the pt's chart and check-in with RN-- ST with plans to re-attempt on a later date/time as schedule permits, pt is medically appropriate, and/or available. Brandyn Stacy MA., [id number]

## 2020-10-16 NOTE — PLAN OF CARE
Continue to assess central line. Problem: Airway Clearance - Ineffective  Goal: Achieve or maintain patent airway  10/16/2020 1744 by Bolivar Lopez RN  Outcome: Ongoing  Note: Patient continues on ventilator. Problem: Breathing Pattern - Ineffective  Goal: Ability to achieve and maintain a regular respiratory rate  10/16/2020 1744 by Bolivar Lopez RN  Outcome: Ongoing  Note: Continue on ventilator 70% FiO2. Problem: Body Temperature -  Risk of, Imbalanced  Goal: Ability to maintain a body temperature within defined limits  10/16/2020 1744 by Bolivar Lopez RN  Outcome: Ongoing  Note: Fevers off and on 101 this morning, improved after tylenol in the afternoon. Problem: Isolation Precautions - Risk of Spread of Infection  Goal: Prevent transmission of infection  10/16/2020 1744 by Bolivar Lopez RN  Outcome: Ongoing  Note: Continue Covid precautions, isolation. Problem: Nutrition Deficits  Goal: Optimize nutrtional status  10/16/2020 1744 by Bolivar Lopez RN  Outcome: Ongoing  Note: PEG tube placed today, tube feed running. Problem: Nutrition  Goal: Optimal nutrition therapy  10/16/2020 1744 by Bolivar Lopez RN  Outcome: Ongoing  Note: PEG tube placed today with tube feed Vital at 10ml/hr. Problem: Restraint Use - Nonviolent/Non-Self-Destructive Behavior:  Goal: Absence of restraint indications  Description: Absence of restraint indications  10/16/2020 1744 by Bolivar Lopez RN  Outcome: Ongoing  Note: Continue restraints, patient pulling at lines/tubes, agitated at times. Problem: Infection - Central Venous Catheter-Associated Bloodstream Infection:  Goal: Will show no infection signs and symptoms  Description: Will show no infection signs and symptoms  10/16/2020 1744 by Bolivar Lopez RN  Outcome: Ongoing  Note: Continue skin assessment. Turn patient every 2 hours. Sacral allevyn to buttocks. Continue to assess central line.       Problem: MECHANICAL VENTILATION  Goal: Will be able to breathe spontaneously, without ventilator support  Description: Will be able to breathe spontaneously, without ventilator support  10/16/2020 0456 by Bebeto Wagner RCP  Outcome: Ongoing   Care plan reviewed with family. family verbalize understanding of the plan of care and contribute to goal setting.

## 2020-10-16 NOTE — CARE COORDINATION
10/16/20, 11:50 AM EDT    DISCHARGE ON GOING [facility] day: 22  Location: 3A-04/004-A Reason for admit: COVID-19 [U07.1]   Procedure: 9/24 Vapotherm initiated  9/27 Intubated  9/27 CVC Right subclavian - 10/5 removed  9/29 - 10/2 Rotoprone therapy   10/1 Convalescent Plasma  10/4 Extubated  10/4 Vapotherm initiated  42/2 PICC right basilic  43/8 Code stroke - TPA given   10/12 Re-intubated. 10/13 CT chest - Nonspecific diffuse bilateral consolidation and pleural fluid.   10/13 Bronchoscopy -mucous to the lower lobes bilaterally. 10/16 Pleural cath placement. Treatment Plan of Care: Intensivist following. Tmax 101.7. Remains intubated and on vent with following settings: PCV+ mode, PEEP 12, FiO2 70%. O2 sats %. Continues with Fentanyl and Precedex gtts. Levophed gtt started. Pt had pigtail drain placed today at bedside. Fluid sent to lab. OG tube with tube feedings. Baby ASA, Peridex, Norvasc (held), Plavix, Precedex gtt, Lovenox, Pepcid iv bid, Fentanyl gtt, Bevespi, Lopressor iv q6hr, Levophed gtt, Protonix iv daily, Zosyn iv q8hr, Crestor, Vit D. Diabetes management, electrolyte replacement. Palliative care. Possible PEG placement, consent has been obtained. Barriers to Discharge: Intubated/vent. PCP: Maxine Roberson MD  Readmission Risk Score: 26%  Patient Goals/Plan/Treatment Preferences: Pt is from home with wife. Current plan includes TCU, continue to monitor.  Pt will be a precert for any placement.

## 2020-10-16 NOTE — PROGRESS NOTES
CRITICAL CARE PROGRESS NOTE      Patient:  Angie Ours    Unit/Bed:3A-04/004-A  YOB: 1945  MRN: [id number]   PCP: Leandro Cavanaugh MD  Date of Admission: 9/24/2020  Chief Complaint:- acute respiratory failure    Assessment and Plan:   1. Acute respiratory failure, hypoxia: Patient became hypoxic and tachypneic while on BiPAP. Intubated the night of 10/12/20. Continue PCV ventilation with lung protection strategies targeting a peak pressure 35 and less. Patient currently 29/12 at 70%. Satting in the mid 80s. Continue to wean requirement as tolerated. 2. Pleural effusion, right: Large right pleural effusion with lower lobe atelectasis from CT on 10/13/20. Patient is still requiring high pressures for ventilation. 10/16/20 pigtail catheter placed in the right lung. Produced serosanguineous discharge. Pleural fluid analysis pending, lights criteria when resulted. Will draw qday cytology for 3 occurrences to evaluate for malignant cause of the effusion. Continue pressure ventilation and reduce as tolerated. 3. ARDS: recent history of COVID-19 and pronation therapy. ASA and Lovenox to mitigate microthrombosis. Patient requiring high amounts of FiO2 and Peep. Bronchoscopy 10/13/20 with mucinous return. Negative respiratory panel, culture shows gram positive cocci/normal guido resulted as staph epidermidis. Covid 19 retest is negative. 10/13/2020 CT chest revealed nonspecific diffuse bilateral consolidation and pleural fluid. Continue supportive ventilation. 4. Distributive versus hypovolemic shock: Patient was febrile overnight. Tmax 101.5. Acetaminophen prn q6. Lactic Clearance 3.2-1.4, new lactic pending. Will continue Zosyn (10/12/20). CT of ABD/PELVIS revealed no acute abnormality. Patient has remained normotensive with white blood cell stable, 10.7. 10/14/20 Procal 0.98.  5. Anemia: Borderline macrocytic with MCV of 96.6.   Unlikely hemo dilution as patient is net negative intake/output. Given TPA during this hospital stay for R ICA stenosis. Stool occult pending. B12 and folate normal.  Patient's hemoglobin stabilized for 24 hrs, now 8.5. Ferritin 1259. Follow H/H closely, consider transfuse for Hgb less than 8, since patient requiring increased ventilatory support. 6. DMT2, uncontrolled:  Patient was given Decadron 10 milligrams IVP X1. Will continue to monitor with ACCU and SSI. Most recent blood glucose 113.  7. CVA:  History of recent tPA use, will continue to monitor. Patient taking clopidogrel and Lovenox. Patient's neurologic exam limited by sedation. Minimally responsive to voice. 8. Hypertension, essential: Stable, on metoprolol. 9. Hyperlipidemia: Home rosuvastatin is continued. INITIAL H AND P AND ICU COURSE:  Nena Grigsby is a 67year old  male, transferred to [facility] ICU 10/12/2020 for intubation. Patient is a 56-year-old morbidly obese white male reformed smoker. Rafael Byrne has a history of diabetes, hyperlipidemia, and hypertension. Karey Finley was admitted from the emergency room on 9/24/2020 with hypoxemic respiratory failure secondary to [address] [place] [address] had associated acute renal injury.  He required 100% nonrebreather and was started on Decadron and Remdesivir.  He continued to deteriorate and required intubation on 9/27/2020.  Acute lung injury progressed to ARDS and started pronation therapy on 9/29/2020. [facility] was found to have bilateral pneumonia with Klebsiella pneumoniae and Haemophilus influenza which was treated with Zosyn.  Patient completed 12-day course of Zosyn on 10/5/20. Patient extubated to 10/4/22 intermittent BiPAP. Patient had neurologic decline in the early morning hours of 10/7/2020.  Code stroke was initiated, and patient underwent neurologic assessment.  The decision was made to administer TPA as the patient had left sided weakness and increased confusion.  TPA was administered without difficulty.  MRI was negative for acute stroke but CTA showed a severe R ICA stenosis. Neurology felt tPA likely aborted his CVA given his COVID 23 that put him in a hypercoagulable state. 10/10/2020 Patient was transferred to the Hospitalist Service. Consults to Pulmonary and Cardiology services. Patient was confused at times, and was tolerating alternating between HFNC and Bipap. New onset atrial fibrillation which patient did convert with IVP Lopressor. Patient was placed on CPAP with concerns of HUSSEIN. 10/12/2020 Noted increase in work of breathing patient had failed Bipap therapy. He was transferred to ICU for further management and care. Patient received bronchoscopy on 10/13/2020. PEG and chest tube consent obtained from son on 10/15/2020. Right chest tube performed 10/16/2020. It produced a moderate amount of serosanguineous fluid. Body fluid pathology is pending. Cytology to be performed for 3 days to evaluate for malignant cause of the effusion. Past Medical History:  See HPI. Family History: Mother and Father unknown. Social History: Former smoker, denies any ETOH or illicit drug use. .    ROS   Unable to complete secondary to patient's sedation and ventilatory status. PHYSICAL EXAMINATION:  Temperature 100.9, heart rate 69, respirations 26, blood pressure 126/55, oxygen saturation 94%   Current vent settings: PCV ventilation, 29/12, 70% FiO2. Body mass index is 32.69 kg/m². General:   Pale, acute ill in appearance  HEENT:  normocephalic and atraumatic. No scleral icterus. PERRL  Neck: supple. No Thyromegaly. Lungs: positive for diffuse crackles in R lung. Cardiac: RRR-sinus without tachycardia. S1-S2, no extraneous heart sounds. no JVD. Abdomen: soft. Nontender, bowel sounds present  Extremities:  No clubbing, cyanosis, or edema x 4. Vasculature: capillary refill < 3 seconds. Palpable dorsalis pedis pulses. Skin:  warm and dry. Lymph:  No supraclavicular adenopathy. Neurologic:  No appreciable focal deficit. No seizures. Data: (All radiographs, tracings, PFTs, and imaging are personally viewed and interpreted unless otherwise noted).  Sodium 132, potassium 4.5, chloride 95, bicarb 30, BUN 10, creatinine 0.6, glucose 117, calcium 7.9   White count 10.7, hemoglobin 8.5, hematocrit 26.7, platelets 094   Pleural fluid analysis pending   Lactic acid pending       10/13/20 BAL panel negative, culture with gram positive cocci/normal guido   10/13/20 Albumin 2.0, alk phos 83, ALT 64, AST 43, bilirubin 0.5, total protein 5.5   10/13/20 CT chest, abdomen, pelvis revealed bilateral diffuse consolidation of the lungs and right pleural effusion.  Echo 10/7/2020 LVEF 55%.  Cardiac telemetry NSR   10/12/2020 pneumonia panel negative   10/12/2020 urine culture no growth with contaminants   10/6/2020 blood cultures no growth final result. Seen with multidisciplinary ICU team yes. Meets Continued ICU Level Care Criteria:    [x] Yes   [] No - Transfer Planned to listed location:  [] HOSPITALIST CONTACTED- DR     Case and plan discussed with Dr. Duran Dai. Electronically signed by Desean Nelson DO  CRITICAL CARE SPECIALIST  Patient seen by me. Case discussed with resident physician. Right pleural effusion with serosanguineous. Awaiting dialysis and cytology. Concerning for possible pulmonary embolism. Remains hypoxemic requiring 70% FiO2. Remains critically ill. .    CC time 35 minute. Time does not include procedures. Time was discontiguous. CC time does include my direct assessment and coordination of care. Electronically signed by Cinthia Dai MD.

## 2020-10-16 NOTE — PROCEDURES
PLEURAL CATHETER PLACEMENT:        Risks and benefits to the procedure were discussed. Alternatives and their risks were discussed as well. Indication:  Right loculated pleural effusion    US Interpretation:   Large right pleural effusion with 2 septations. Complications:   None    Fluid:    Serosanguinous. Side:    right-sided  Pleural catheter: [facility]. Procedure Note:  Informed consent was obtained prior to procedure. The patient was in the left lateral decubitus position and fluid level was identified with percussion and subsequent US. Utilizing maximum barrier precautions, patient was prepped with chlorhexidine prep, and patient was draped with sterile glove, sterile gown, sterile OR towels, mask and hair net. Patient received 18 cc of 1% lidocaine at entry point. Utilizing a  needle was placed intracostally until fluid was returned. Utilizing a scalpel, a small incision was made in the skin at access point. Utilizing Rollinger syringe, an introducer needle was placed until fluid was returned. Utilizing a guide wire, it was placed into the thoracic cavity, and introducer needle was withdrawn. Progressive dilatation was subsequently performed. Pleural catheter was attached to hollow stiffening device and placed into the thoracic cavity. Guide wire and dilator were removed simultaneously, with good fluid returned from the pleural catheter. The catheter was subsequently placed to the pleurevac with good fluid return. Secondary cleansing with chlorhexidine prep. Biopatch placed. Pleural catheter was secured to the skin with suture and subsequent Tegaderm dressing. Blood loss was less than 5 cc. Electronically signed by Tamela Aguilar M.D.  9/3/2018 8:59 AM

## 2020-10-16 NOTE — PLAN OF CARE
Problem: Falls - Risk of:  Goal: Will remain free from falls  Description: Will remain free from falls  Outcome: Ongoing  Goal: Absence of physical injury  Description: Absence of physical injury  Outcome: Ongoing     Problem: Skin Integrity:  Goal: Will show no infection signs and symptoms  Description: Will show no infection signs and symptoms  Outcome: Ongoing  Goal: Absence of new skin breakdown  Description: Absence of new skin breakdown  Outcome: Ongoing     Problem: Airway Clearance - Ineffective  Goal: Achieve or maintain patent airway  Outcome: Ongoing     Problem: Gas Exchange - Impaired  Goal: Absence of hypoxia  Outcome: Ongoing     Problem: Breathing Pattern - Ineffective  Goal: Ability to achieve and maintain a regular respiratory rate  Outcome: Ongoing     Problem:  Body Temperature -  Risk of, Imbalanced  Goal: Ability to maintain a body temperature within defined limits  Outcome: Ongoing     Problem: Isolation Precautions - Risk of Spread of Infection  Goal: Prevent transmission of infection  Outcome: Ongoing     Problem: Nutrition Deficits  Goal: Optimize nutrtional status  Outcome: Ongoing     Problem: Nutrition  Goal: Optimal nutrition therapy  Outcome: Ongoing     Problem: Restraint Use - Nonviolent/Non-Self-Destructive Behavior:  Goal: Absence of restraint indications  Description: Absence of restraint indications  Outcome: Ongoing  Goal: Absence of restraint-related injury  Description: Absence of restraint-related injury  Outcome: Ongoing     Problem: Infection - Central Venous Catheter-Associated Bloodstream Infection:  Goal: Will show no infection signs and symptoms  Description: Will show no infection signs and symptoms  Outcome: Ongoing     Problem: Pain:  Goal: Pain level will decrease  Description: Pain level will decrease  Outcome: Ongoing  Goal: Control of acute pain  Description: Control of acute pain  Outcome: Ongoing  Goal: Control of chronic pain  Description: Control of chronic pain  Outcome: Ongoing     Problem: MECHANICAL VENTILATION  Goal: Will be able to breathe spontaneously, without ventilator support  Description: Will be able to breathe spontaneously, without ventilator support  10/16/2020 0456 by Monroe Wharton RCP  Outcome: Ongoing

## 2020-10-16 NOTE — PLAN OF CARE
Problem: Airway Clearance - Ineffective  Goal: Achieve or maintain patent airway  10/16/2020 1920 by Víctor Funk RCP  Outcome: Ongoing

## 2020-10-16 NOTE — PROGRESS NOTES
EGD with PEG complete, photos taken, no specimens taken , pt tolerated procedure well    Scope Number gif 571 used.

## 2020-10-16 NOTE — CONSULTS
[address] [place] [address]                                  CONSULTATION    PATIENT NAME: Kaylan Lima                   :        1945  MED REC NO:   [id number]                           ROOM:       0004  ACCOUNT NO:   [de-identified]                           ADMIT DATE: 2020  PROVIDER:     Jo Rabago. Zoe Amaya MD    CONSULT DATE:  10/15/2020    CHIEF COMPLAINT:  COVID, prolonged intubation, need for feeding tube. HISTORY OF PRESENT ILLNESS:  The patient is a 69-year-old white male who  was admitted to the hospital on 2020, 21 days ago with a diagnosis  of COVID. The patient has been on and off the ventilator, currently is  intubated via orotracheal intubation and is in need of feeding tube. Surgery had been consulted for same. PAST MEDICAL HISTORY:  Positive for diabetes, hyperlipidemia,  hypertension, and the COVID as mentioned above. PAST SURGICAL HISTORY:  The patient has had no prior abdominal  surgeries. MEDICATIONS:  Documented in the chart. ALLERGIES:  BEE VENOM. SOCIAL HISTORY:  The patient apparently is a smoker in the past, unknown  of the current nature of same. FAMILY HISTORY:  Not contributable. REVIEW OF SYSTEMS:  10-point review of systems is not able to be  obtained due to intubation and sedation. PHYSICAL EXAMINATION:  GENERAL:  The patient is a 69-year-old white male who appears his age. He is intubated, resting in bed. HEAD, EYES, EARS, NOSE, THROAT:  Pupils are equal.  TMJs are normal.  He  has an endotracheal tube and an NG tube in place. NECK:  Soft. No palpable masses. CARDIAC:  S1, S2.  CHEST:  Respirations are clear. ABDOMEN:  Mildly obese. No evidence of any scars that I can see. UPPER AND LOWER EXTREMITIES:  No obvious deformities of the upper  extremities. Lower extremities show mild edema bilaterally. Toenails  show poor hygiene.     ASSESSMENT AND PLAN:  COVID with prolonged intubation, in need of  feeding tube. The patient is on Plavix and aspirin. Also on Lovenox,  but we will stop the Lovenox. There is some certain risk obviously with  performing a PEG tube on Plavix and aspirin, but the patient is in need  of same. We will proceed with PEG tube. YENNI SENA [facility] TREATMENT [facility], MD    D: 10/16/2020 16:28:04       T: 10/16/2020 16:39:06     [email or web address]  Job#: [id number]     Doc#: [id number]    CC:

## 2020-10-16 NOTE — PLAN OF CARE
Vent setting optimized to achieve target tidal volume, respiratory rate and ideal oxygen saturations. SBT will be performed when appropriate. Problem: MECHANICAL VENTILATION  Goal: Will be able to breathe spontaneously, without ventilator support  Description: Will be able to breathe spontaneously, without ventilator support  Outcome: Ongoing  Pt's was not placed on SPONT due to high PEEP(12) and FiO2(70%) settings. Will attempt SPONT at a later date.

## 2020-10-16 NOTE — PROGRESS NOTES
[facility] [address]  PHYSICAL THERAPY MISSED TREATMENT NOTE  [facility] 3A    Date: 10/16/2020  Patient Name: Ezra Wood        MRN: [id number]   : 1945  (67 y.o.)  Gender: male   Referring Practitioner: Gab Crook MD  Diagnosis: COVID-19         REASON FOR MISSED TREATMENT:  Patient currently intubated and not appropriate for physical therapy. Will sign off on patient. When patient becomes appropriate for physical therapy services, please place new orders. Thank you.       Gab Estrada, PT, DPT

## 2020-10-16 NOTE — PLAN OF CARE
Problem: Nutrition  Goal: Optimal nutrition therapy  10/16/2020 1155 by Delores Acevedo RD, LD  Outcome: Ongoing   Nutrition Problem #1: Inadequate oral intake  Intervention: Food and/or Nutrient Delivery: Continue NPO(When able recommend resume previous TF regimen after PEG placed.)  Nutritional Goals: Patient will tolerate trickle EN at 10ml/hr-20ml/hr until able to increase to goal

## 2020-10-16 NOTE — PROGRESS NOTES
Pharmacy Note  Vancomycin Consult    Moni Ewing is a 67 y.o. male started on Vancomycin for MRSE PNA; consult received from Dr. Jl Doss to manage therapy. Also receiving the following antibiotics: zosyn. Patient Active Problem List   Diagnosis    COVID-19    Left hemiparesis ([facility]    [facility]    Acute respiratory distress syndrome (ARDS) due to COVID-19 virus [facility])    New onset atrial fibrillation (HCC)    Pneumonia due to COVID-19 virus    CAP (community acquired pneumonia) due to Klebsiella pneumoniae ([facility] [address]    Sepsis [facility])    Stroke-like symptoms    Acute metabolic encephalopathy    Delirium    Stenosis of right carotid artery    Dysphagia    Hypocalcemia    Hyponatremia    Type 2 diabetes mellitus with hyperglycemia (HCC)    Hyperlipidemia    Essential hypertension       Allergies:  Bee venom     Temp max: 101.1    Recent Labs     10/15/20  0533 10/16/20  0350   BUN 11 10       Recent Labs     10/15/20  0533 10/16/20  0350   CREATININE 0.6 0.6       Recent Labs     10/15/20  0533 10/16/20  0350   WBC 9.0 10.7         Intake/Output Summary (Last 24 hours) at 10/16/2020 1220  Last data filed at 10/16/2020 0543  Gross per 24 hour   Intake 1441.71 ml   Output 1280 ml   Net 161.71 ml       Cultures/Sensitivites  Date Source Result   10/1/2020 UC ngtd   10/6/2020 BCx2 ngtd   10/11/2020 BCx2 ngtd   10/12/2020 PNA panel negative   10/12/2020 ETT No bacteria   10/12/2020 UC Contaminants   10/13/2020 PNA panel negative   10/13/2020 Bronch Lavage MRSE         Ht Readings from Last 1 Encounters:   10/09/20 5' 8\" (1.727 m)        Wt Readings from Last 1 Encounters:   10/16/20 215 lb (97.5 kg)         Body mass index is 32.69 kg/m². Estimated Creatinine Clearance: 119 mL/min (based on SCr of 0.6 mg/dL). Goal Trough Level: 15-20 mcg/mL    Assessment/Plan:  Vancomycin 1500mg q12h     Thank you for the consult. Will continue to follow.

## 2020-10-16 NOTE — PLAN OF CARE
Problem: Airway Clearance - Ineffective  Goal: Achieve or maintain patent airway  10/16/2020 0857 by Vannesa Alvares RCP  Outcome: Ongoing

## 2020-10-16 NOTE — PROGRESS NOTES
Comprehensive Nutrition Assessment    Type and Reason for Visit:  Reassess    Nutrition Recommendations/Plan:   After PEG, when able recommend resume Vital 1.2 tubefeeding at 20 ml/hr. Flush 1 (2.5 ounce) bottle proteinex BID. Free water flush per MD.    Nutrition Assessment:    Pt. declining from a nutritional standpoint AEB tubefeedings on hold for PEG placement. Remains at risk for further nutritional compromise r/t continued NPO status due to intubation, increased nutrition needs to support wound healing, and underlying medical condition (COVID +, acute respiratory failure, intubation, rt pleural effusion, s/p right pigtail catheter placement, ARDS, CVA with TPA 10/7 which likely aborted a CVA per MRI (MRI negative for CVA) but CTA show severe ICA stenosis, hx DM, HLD, HTN, and need for continued nutrition support. Nutrition recommendations/interventions as per above. Malnutrition Assessment:  Malnutrition Status:  Insufficient data(+covid patient)    Context:  Acute Illness     Findings of the 6 clinical characteristics of malnutrition:  Energy Intake:  (EN or NPO since admit)  Weight Loss:  Unable to assess(no weight hx in EMR)     Body Fat Loss:  Unable to assess(+covid)     Muscle Mass Loss:  Unable to assess(+covid)    Fluid Accumulation:  No significant fluid accumulation     Strength:  Not Performed    Estimated Daily Nutrient Needs:  Energy (kcal):  0976-3668 (30-32 kcals/kg for late active phase); Weight Used for Energy Requirements:  Ideal(70 kg - ideal weight)     Protein (g):  140 (2.0 grams/kg ideal weight); Weight Used for Protein Requirements:  (70 kg - ideal weight)        Fluid (ml/day):  per MD; Weight Used for Fluid Requirements:  (n/a)      Nutrition Related Findings:  Covid positive. Intubated 10/12/20. Bronch 10/13/20, 10/16/20-rt pigtail cath placed d/t large rt pleural effusion. MAP 77. Sodium 132, Glucose 117, Potassium 4.5, BUN 10, Creatinine 0.6.   Lopressor, precedex, crestor, levophed, vasopressin, versed, lantus, vitamin D, norvasc, fentayl, humalog, zosyn, protonix. Last BM 10/14/20.  368 ml TF intake (77% of prescribed intake). TF's had been at 20 ml/hr and tolerating but currently on hold now for PEG placement. Wounds:  Stage II(coccyx)       Current Nutrition Therapies:    Current Tube Feeding (TF) Orders:  · Feeding Route: Orogastric, planning PEG 10/16/20  · Formula: Semi-Elemental(Vital 1.2)  · Schedule: Continuous(20 ml/hr) on hold   · Additives/Modulars: (Proteinex 2 go 2.5 oz. BID (208 kcals, 52 gm protein))  · Water Flushes: per MD  · Goal TF & Flush Orders Provides: Vital 1.2 at 20 ml/hr with Proteinex BID = 784 kcals, 88 gm protein, 53 gm CHO, 2 gm fiber, 405 mg Potassium, 202 mg Phosphorus and 389 ml free water/day      Anthropometric Measures:  · Height: 5' 8\" (172.7 cm)  · Current Body Weight: 215 lb (97.5 kg)(10/16/20 +1 UE and LE edema)   · Admission Body Weight: 220 lb (99.8 kg)(9/24/20, no weight source)    · Usual Body Weight: (no weight hx available in EMR, patient intubated)     · Ideal Body Weight: 154 lbs;   · BMI: 32.7  · BMI Categories: Obese Class 1 (BMI 30.0-34. 9)       Nutrition Diagnosis:   · Inadequate oral intake related to impaired respiratory function as evidenced by NPO or clear liquid status due to medical condition, intubation, nutrition support - enteral nutrition      Nutrition Interventions:   Food and/or Nutrient Delivery:  Continue NPO(When able recommend resume previous TF regimen after PEG placed.)  Nutrition Education/Counseling:  No recommendation at this time   Coordination of Nutrition Care:  Continued Inpatient Monitoring    Goals:  Patient will tolerate trickle EN at 10ml/hr-20ml/hr until able to increase to goal       Nutrition Monitoring and Evaluation:   Behavioral-Environmental Outcomes:  (n/a)   Food/Nutrient Intake Outcomes:  Enteral Nutrition Intake/Tolerance  Physical Signs/Symptoms Outcomes:  Biochemical Data, GI Status, Fluid Status or Edema, Hemodynamic Status, Skin, Weight     Discharge Planning:     Too soon to determine     Electronically signed by Samanta Rodriguez RD, ADRIAN on 10/16/20 at 11:56 AM EDT    Contact: [phone]

## 2020-10-17 NOTE — PROGRESS NOTES
Critical care Note:      CC: Acute respiratory failure secondary to COVID-19 pneumonia  HPI:   Patient is a 65-year-old morbidly obese white male reformed smoker. Ayaan Flores has a history of diabetes, hyperlipidemia, and hypertension.     Ele Hussein was admitted from the emergency room on 9/24/2020 with hypoxemic respiratory failure secondary to [address] had associated acute renal injury.  He required 100% nonrebreather and was started on Decadron and Remdesivir.  He continued to deteriorate and required intubation on 9/27/2020.  Acute lung injury progressed to ARDS and started pronation therapy on 9/29/2020. Marck Ricardo was found to have bilateral pneumonia with Klebsiella pneumoniae and Haemophilus influenza which was treated with Zosyn.  Patient completed 12-day course of Zosyn on 10/5/20. Patient extubated to 10/4/22 intermittent BiPAP. Patient had neurologic decline in the early morning hours of 10/7/2020.  Code stroke was initiated, and patient underwent neurologic assessment.  The decision was made to administer TPA as the patient had left sided weakness and increased confusion.  TPA was administered without difficulty. MRI was negative for acute stroke but CTA showed a severe R ICA stenosis. Neurology felt tPA likely aborted his CVA given his COVID 23 that put him in a hypercoagulable state. 10/10/2020 Patient was transferred to the Hospitalist Service. Consults to Pulmonary and Cardiology services. Patient was confused at times, and was tolerating alternating between HFNC and Bipap. New onset atrial fibrillation which patient did convert with IVP Lopressor. Patient was placed on CPAP with concerns of HUSSEIN. 10/12/2020 Noted increase in work of breathing patient had failed Bipap therapy. He was transferred to ICU for further management and care. Patient received bronchoscopy on 10/13/2020. PEG and chest tube consent obtained from son on 10/15/2020. Right chest tube performed 10/16/2020.   It produced a moderate bilateral.  No added sounds  Cardiac: RRR, no JVD  Abdomen: soft. Nontender. Extremities:  No clubbing, cyanosis, or edema x 4. Vasculature: capillary refill < 3 seconds. + 2 LE pulses   Skin:  warm and dry. Psych: Patient sedated, intubated. Lymph:  No supraclavicular adenopathy. Neurologic:  No focal deficit. No seizures     Data: (All radiographs, tracings, PFTs, and imaging are personally viewed and interpreted unless otherwise noted). Results for Jaleel Godfrey (MRN [id number]) as of 10/17/2020 19:02   Ref.  Range 10/17/2020 05:39   Sodium Latest Ref Range: 135 - 145 meq/L 131 (L)   Potassium Latest Ref Range: 3.5 - 5.2 meq/L 3.6   Chloride Latest Ref Range: 98 - 111 meq/L 99   CO2 Latest Ref Range: 23 - 33 meq/L 24   BUN Latest Ref Range: 7 - 22 mg/dL 6 (L)   Creatinine Latest Ref Range: 0.4 - 1.2 mg/dL 0.4   Anion Gap Latest Ref Range: 8.0 - 16.0 meq/L 8.0   Est, Glom Filt Rate Latest Units: ml/min/1.73m2 >90   Glucose Latest Ref Range: 70 - 108 mg/dL 240 (H)   Calcium Latest Ref Range: 8.5 - 10.5 mg/dL 6.4 (L)   WBC Latest Ref Range: 4.8 - 10.8 thou/mm3 9.5   RBC Latest Ref Range: 4.70 - 6.10 mill/mm3 2.38 (L)   Hemoglobin Quant Latest Ref Range: 14.0 - 18.0 gm/dl 7.3 (L)   Hematocrit Latest Ref Range: 42.0 - 52.0 % 23.4 (L)   MCV Latest Ref Range: 80.0 - 94.0 fL 98.3 (H)   MCH Latest Ref Range: 26.0 - 33.0 pg 30.7   MCHC Latest Ref Range: 32.2 - 35.5 gm/dl 31.2 (L)   MPV Latest Ref Range: 9.4 - 12.4 fL 9.2 (L)   RDW-CV Latest Ref Range: 11.5 - 14.5 % 13.7   RDW-SD Latest Ref Range: 35.0 - 45.0 fL 48.6 (H)   Platelet Count Latest Ref Range: 130 - 400 thou/mm3 401 (H)   Lymphocytes Absolute Latest Ref Range: 1.0 - 4.8 thou/mm3 0.6 (L)   Monocytes Absolute Latest Ref Range: 0.4 - 1.3 thou/mm3 0.4   Eosinophils Absolute Latest Ref Range: 0.0 - 0.4 thou/mm3 0.6 (H)   Basophils Absolute Latest Ref Range: 0.0 - 0.1 thou/mm3 0.0   Seg Neutrophils Latest Units: % 81.5   Segs Absolute Latest Ref Range: 1.8 - 7.7 thou/mm3 7.7             Assessment and Plan:          1. Acute respiratory failure, hypoxia/ARDS: Patient became hypoxic and tachypneic while on BiPAP. Intubated the night of 10/12/20. Continue PCV ventilation with lung protection strategies targeting a peak pressure 35 and less. Patient currently 29/12 at 70%. Satting in the mid 80s. Continue to wean requirement as tolerated. recent history of COVID-19 and pronation therapy. ASA and Lovenox to mitigate microthrombosis. Patient requiring high amounts of FiO2 and Peep. Bronchoscopy 10/13/20 with mucinous return. Negative respiratory panel, culture shows gram positive cocci/normal guido resulted as staph epidermidis. Covid 19 retest is negative. 10/13/2020 CT chest revealed nonspecific diffuse bilateral consolidation and pleural fluid. Continue supportive ventilation. Patient remains intubated on mechanical ventilation. He is currently on pressure support of 16, PEEP of 12, FiO2 of 60%. He is on Zosyn. He is on Levophed at 4. He is on fentanyl. He is also on Precedex. Right chest tube drained around 180 mL. He has a PEG tube that was placed. 2. Pleural effusion, right: Large right pleural effusion with lower lobe atelectasis from CT on 10/13/20. Patient is still requiring high pressures for ventilation. 10/16/20 pigtail catheter placed in the right lung. Produced serosanguineous discharge. Pleural fluid analysis pending, lights criteria when resulted. Will draw qday cytology for 3 occurrences to evaluate for malignant cause of the effusion. Continue pressure ventilation and reduce as tolerated. 180 mL of drainage over 24 hours  3. Distributive versus hypovolemic shock: Patient was febrile overnight. Tmax 101.5. Acetaminophen prn q6. Lactic Clearance 3.2-1.4, new lactic pending. Will continue Zosyn (10/12/20). CT of ABD/PELVIS revealed no acute abnormality.   Patient has remained normotensive with white blood cell stable, 10.7. 10/14/20 Procal 0.98.  4. Anemia: Borderline macrocytic with MCV of 96.6. Unlikely hemo dilution as patient is net negative intake/output. Given TPA during this hospital stay for R ICA stenosis. Stool occult pending. B12 and folate normal.  Patient's hemoglobin stabilized for 24 hrs, now 8.5. Ferritin 1259. Follow H/H closely, consider transfuse for Hgb less than 8, since patient requiring increased ventilatory support. 5. DMT2, uncontrolled:  Patient was given Decadron 10 milligrams IVP X1. Will continue to monitor with ACCU and SSI. Most recent blood glucose 113. 6. CVA:  History of recent tPA use, will continue to monitor. Patient taking clopidogrel and Lovenox. Patient's neurologic exam limited by sedation. Minimally responsive to voice.        CCT 33 mins   Electronically signed by Brice Singer MD on 10/17/2020 at 7:02 PM

## 2020-10-17 NOTE — PLAN OF CARE
Ongoing  Goal: Control of chronic pain  Description: Control of chronic pain  Outcome: Ongoing  . Bethany Adrian Care plan reviewed with patient and family. Patient and family verbalize understanding of the plan of care and contribute to goal setting.

## 2020-10-17 NOTE — OP NOTE
[address] [place] [address]                                OPERATIVE REPORT    PATIENT NAME: Sterling Moses                   :        1945  MED REC NO:   [id number]                           ROOM:       0004  ACCOUNT NO:   [de-identified]                           ADMIT DATE: 2020  PROVIDER:     Anselmo Ramos. Daina Farley MD    DATE OF PROCEDURE:  10/16/2020    PREOPERATIVE DIAGNOSES: COVID, prolonged intubation and need of feeding  tube. POSTOPERATIVE DIAGNOSES: COVID, prolonged intubation and need of feeding  tube. OPERATIONS:  1.  EGD. 2.  Percutaneous endoscopic gastrostomy tube placement. SURGEON:  Anselmo Ramos. MD Miranda    ANESTHESIA:  Precedex and fentanyl already onboard. BLOOD LOSS:  3 mL. INDICATIONS FOR PROCEDURE:  The patient is a 48-year-old white male with  COVID, intubated, in need of feeding tube. DESCRIPTION OF PROCEDURE:  The patient was kept in the critical care  bed. The patient was ventilated. We did up the fentanyl and Precedex  and then the Olympus endoscope was inserted in the back of the throat. It easily slid down through the esophagus down to the EG junction, into  the body of the stomach, into the antrum and into the pylorus and into  the first part of the duodenum, was normal.  The scope was withdrawn  back into the body of the stomach. CO2 was insufflated, bringing the  stomach up to the abdominal wall. We could see the light shining  through the abdominal wall in the subcostal region just to the left of  the midline. Xylocaine was used to infiltrate this area. A small  incision was made and then a needle was passed directly into the stomach  under direct visualization. Guidewire was passed through the needle as  we grabbed the guidewire with a snare through the scope, then removed  the scope, snare, guidewire assembly out through the back of the mouth.    We then slid the PEG

## 2020-10-17 NOTE — PLAN OF CARE
Problem: Impaired respiratory status  Goal: Able to breathe comfortably  Description: Able to breathe comfortably  Outcome: Not Met This Shift   Patient is continued on ventilator, will monitor and adjust ventilator settings as needed.

## 2020-10-17 NOTE — PLAN OF CARE
Problem: Falls - Risk of:  Goal: Will remain free from falls  Description: Will remain free from falls  10/16/2020 2358 by Qasim Acosta RN  Outcome: Ongoing  Note: Pt free from falls this shift. Bed alarm on, 2/4 side rails up, call light in reach, fall band on, nonskid socks on, clear pathway, bed in locked and lowest position. Will continue to monitor. Problem: Falls - Risk of:  Goal: Absence of physical injury  Description: Absence of physical injury  10/16/2020 2358 by Qasim Acosta RN  Outcome: Ongoing  Note: Pt free from physical injury this shift. Bed alarm on, 2/4 side rails up, call light in reach, fall band on, nonskid socks on, clear pathway, bed in locked and lowest position. Will continue to monitor. Problem: Skin Integrity:  Goal: Will show no infection signs and symptoms  Description: Will show no infection signs and symptoms  10/16/2020 2358 by Qasim Acosta RN  Outcome: Ongoing  Note:   Vitals:    10/16/20 2300   BP: (!) 125/58   Pulse: 70   Resp: 15   Temp: 100.6 °F (38.1 °C)   SpO2: 96%     Pt receiving Tylenol for fever and pt is on IV antibiotics. Will continue to monitor. Problem: Skin Integrity:  Goal: Absence of new skin breakdown  Description: Absence of new skin breakdown  10/16/2020 2358 by Qasim Acosta RN  Outcome: Ongoing  Note: No new signs of skin breakdown noted this shift. Pt is cleaned when incontinent and is being repositioned every two hours with heels and elbows on pillows. Will continue to monitor. Problem: Airway Clearance - Ineffective  Goal: Achieve or maintain patent airway  10/16/2020 2358 by Qasim Acosta RN  Outcome: Ongoing  Note: Pt on vent. Clear airway maintained. Suctioned PRN. Problem: Gas Exchange - Impaired  Goal: Absence of hypoxia  10/16/2020 2358 by Qasim Acosta RN  Outcome: Ongoing  Note: Pt O2 sat in upper 90s this shift. Pt on vent.      Problem: Breathing Pattern - Ineffective  Goal: Ability to achieve and maintain a RN  Outcome: Ongoing  Note:   Vitals:    10/16/20 2300   BP: (!) 125/58   Pulse: 70   Resp: 15   Temp: 100.6 °F (38.1 °C)   SpO2: 96%     Pt receiving Tylenol for fever and pt is on IV antibiotics. Will continue to monitor. Problem: Pain:  Goal: Pain level will decrease  Description: Pain level will decrease  10/16/2020 2375 by Gisella Polk RN  Outcome: Ongoing  Note: Pt unable to communicate pain at this time. Pt on fentanyl gtt. Unable to communicate care plan with pt as pt is intubated and sedated.

## 2020-10-17 NOTE — PROGRESS NOTES
Pharmacy Vancomycin Consult     Vancomycin Day: 2  Current Dosinmg q12h    Temp max:  101.7    Recent Labs     10/16/20  0350 10/17/20  0539   BUN 10 6*       Recent Labs     10/16/20  0350 10/17/20  0539   CREATININE 0.6 0.4       Recent Labs     10/16/20  0350 10/17/20  0539   WBC 10.7 9.5         Intake/Output Summary (Last 24 hours) at 10/17/2020 1615  Last data filed at 10/17/2020 1323  Gross per 24 hour   Intake 2952.39 ml   Output 2035 ml   Net 917.39 ml       Cultures/Sensitivites  Date Source Result   10/1/2020 UC ngtd   10/6/2020 BCx2 ngtd   10/11/2020 BCx2 ngtd   10/12/2020 PNA panel negative   10/12/2020 ETT No bacteria   10/12/2020 UC Contaminants   10/13/2020 PNA panel negative   10/13/2020 Bronch Lavage MRSE       Ht Readings from Last 1 Encounters:   10/09/20 5' 8\" (1.727 m)        Wt Readings from Last 1 Encounters:   10/17/20 217 lb 12.8 oz (98.8 kg)         Body mass index is 33.12 kg/m². Estimated Creatinine Clearance: 179 mL/min (based on SCr of 0.4 mg/dL). Trough: 12.0    Assessment/Plan:  No change in vanc dose due to risk of accumulation in 74yo patient        Flynn [facility]. D., BCPS, BCCCP 10/17/2020 4:18 PM

## 2020-10-18 NOTE — PROGRESS NOTES
CRITICAL CARE PROGRESS NOTE      Patient:  Cecil Denver    Unit/Bed:3A-04/004-A  YOB: 1945  MRN: [id number]   PCP: Alexia Bedoya MD  Date of Admission: 9/24/2020  Chief Complaint:- acute respiratory failure    Assessment and Plan:    1. Acute respiratory failure, hypoxia:  Orally intubated 10/12/2020 Continue PCV ventilation with lung protection strategies targeting a peak pressure 35 and less. Patient still requires high levels of pressure support unable to wean. 2. Right pleural effusion:  10/16/2020 pigtail catheter placed. Fluid evaluation positive for exudative. Culture is pending. 10/13-respiratory culture obtained from a Bronch- Staph Epidermidis-susceptible to Vancomycin Day #2. Luther Ra ? ARDS vs Malignancy. TSH is pending. 10/19-CT of Chest is pending. 3. ALI:  Recent history of COVID-19, ASA to mitigate microthrombosis. 4. Septic Shock:  Levophed to maintain MAP>65. Zosyn will be stopped, Continue Vancomycin  5. Hyponatremia, chronic:  Monitor, TSH level is pending, poor free water access. 10/16-PEG was placed will order free water with repeat in the AM and monitor. 6. Anemia, macrocytic, chronic:  Trend and monitor target H/H 7  7. Thrombocytosis :  Likely reactive, secondary to above, monitor. 8. DMT2, uncontrolled:  A1c 9/2020=7.5, Monitor with ACCU and SSI. Home regimen on Glucophage and Glimepiride. Will start low dose Lantus and monitor. 9. CVA, ischemic: s/p tPA 10/7/2020. CTA showed a severe R ICA stenosis. Neurology felt tPA likely aborted his CVA given his COVID 23 that put him in a hypercoagulable state. Patient will need further PT/OT/Speech once extubated, will continue to monitor   10. Dyslipidemia: history, Crestor to be continued. 11. Essential hypertension: patient is currently on pressors to support blood pressure  12.  Obesity:   BMI 32.54    INITIAL H AND P AND ICU COURSE:  Kurt Botello is a 67year old  male, transferred to [facility] ICU 10/12/2020 for intubation.      Patient has a significant history of diabetes, hyperlipidemia, and hypertension.     Brittaney Bennett was admitted from the emergency room on 9/24/2020 with hypoxemic respiratory failure secondary to [address] had associated acute renal injury.  He required 100% nonrebreather and was started on Decadron and Remdesivir.  He continued to deteriorate and required intubation on 9/27/2020.  Acute lung injury progressed to ARDS and started pronation therapy on 9/29/2020. Christi Goltz was found to have bilateral pneumonia with Klebsiella pneumoniae and Haemophilus influenza which was treated with Zosyn.  Patient completed 12-day course of Zosyn on 10/5/20. Patient extubated to 10/4/22 intermittent BiPAP. Patient had neurologic decline in the early morning hours of 10/7/2020.  Code stroke was initiated, and patient underwent neurologic assessment.  The decision was made to administer TPA as the patient had left sided weakness and increased confusion.  TPA was administered without difficulty. MRI was negative for acute stroke but CTA showed a severe R ICA stenosis. Neurology felt tPA likely aborted his CVA given his COVID 23 that put him in a hypercoagulable state. 10/10/2020 Patient was transferred to the Hospitalist Service. Consults to Pulmonary and Cardiology services. Patient was confused at times, and was tolerating alternating between HFNC and Bipap. New onset atrial fibrillation which patient did convert with IVP Lopressor. Patient was placed on CPAP with concerns of HUSSEIN. 10/12/2020 Noted increase in work of breathing patient had failed Bipap therapy. Patient received bronchoscopy on 10/13/2020. PEG and chest tube consent obtained from son on 10/15/2020. Right chest tube performed 10/16/2020. It produced a moderate amount of serosanguineous fluid. Body fluid pathology is pending. Cytology to be performed for 3 days to evaluate for malignant cause of the effusion. Past Medical History:  See HPI.   Family History: Mother and Father unknown. Social History: Former smoker denies any ETOH or illicit drug use. ROS   Positive for fever, intubated and sedated, NV-[id number] drainage from chest tube. Scheduled Meds:   vancomycin  1,500 mg Intravenous Q12H    vancomycin (VANCOCIN) intermittent dosing (placeholder)   Other RX Placeholder    potassium bicarb-citric acid  30 mEq Oral BID    piperacillin-tazobactam  3.375 g Intravenous Q8H    clopidogrel  75 mg Per NG tube Daily    insulin lispro  0-18 Units Subcutaneous Q4H    rosuvastatin  40 mg Per NG tube Nightly    sodium chloride flush  10 mL Intravenous 2 times per day    chlorhexidine  15 mL Mouth/Throat BID    famotidine (PEPCID) injection  20 mg Intravenous BID    metoprolol  5 mg Intravenous Q6H    enoxaparin  1 mg/kg Subcutaneous Q12H    calcium replacement protocol   Other RX Placeholder    pantoprazole  40 mg Intravenous Daily    And    sodium chloride (PF)  10 mL Intravenous Daily    glycopyrrolate-formoterol  2 puff Inhalation BID    aspirin  81 mg Per NG tube Daily    lidocaine 1 % injection  5 mL Intradermal Once    [Held by provider] amLODIPine  10 mg Per NG tube Daily    Vitamin D  1,000 Units Per NG tube Daily    phosphorus replacement protocol   Other RX Placeholder     Continuous Infusions:   dexmedetomidine 1.1 mcg/kg/hr (10/18/20 0546)    fentaNYL (SUBLIMAZE) 500 mcg in sodium chloride 0.9 % 100 mL 175 mcg/hr (10/18/20 0419)    norepinephrine 4 mcg/min (10/18/20 0458)    dextrose         PHYSICAL EXAMINATION:  T:  Tmax 102. P:  82. RR:  14. B/P:  95/47. FiO2:  50. O2 Sat:  95.  I/O:  2912/2090  Body mass index is 33.12 kg/m². GCS:   8  PC: 18/8: TV: 500: RRTotal: 16: Ti:1 sec:   General:   Pale, acutely ill in appearance  HEENT:  normocephalic and atraumatic. No scleral icterus. PERR  Neck: supple. No Thyromegaly. Lungs: clear to auscultation, diminished right. No retractions.  CT-serosanginous drainage, Orally intubated and connected to PCV mode of ventilation. Cardiac: RRR. S1S2  No JVD. Abdomen: soft. Nontender, bowel sounds present, PEG tube in place  Extremities:  No clubbing, cyanosis, or edema x 4. Vasculature: capillary refill < 3 seconds. Palpable dorsalis pedis pulses. Skin:  warm and dry. Psych: Intubated and sedated  Affect appropriate  Lymph:  No supraclavicular adenopathy. Neurologic:  No focal deficit. No seizures. Data: (All radiographs, tracings, PFTs, and imaging are personally viewed and interpreted unless otherwise noted).  10/18/2020-sodium 129 potassium 0.3 chlorides 92 CO2 was 28 BUN 6 creatinine 0.6 glucose 187   White count 10.6 hemoglobin 10.3 hematocrit 26.9 platelet count 148   Chest x-ray-altered focal consolidative opacity seen throughout the lungs bilaterally.  Echo 10/7/2020 LVEF 55%   10/13/2020 Covid-19 negative   Cardiac telemetry-NSR        Seen with multidisciplinary ICU team yes. Meets Continued ICU Level Care Criteria:    [x] Yes   [] No - Transfer Planned to listed location:  [] HOSPITALIST CONTACTED-      Case and plan discussed with Dr. Krystle Moore.         Electronically signed by ARABELLA Jensen - CNP  CRITICAL CARE SPECIALIST

## 2020-10-18 NOTE — PLAN OF CARE
Problem: Falls - Risk of:  Goal: Will remain free from falls  Description: Will remain free from falls  Outcome: Ongoing  Note: Assessment & interventions provided throughout shift. Bed locked & in low position, call light in reach, side-rails up x2, bed/chair alarm utilized, non-slip socks on when ambulating, reminded patient to use call light to call for assistance. Problem: Skin Integrity:  Goal: Will show no infection signs and symptoms  Description: Will show no infection signs and symptoms  Outcome: Ongoing  Note: Ongoing assessment & interventions provided throughout shift. Skin assessments provided. Encouraging/assisting patient to turn as needed. Turning patient every two hours with pillow support under arms and legs. Checking skin under bilateral wrist restraints frequently. Problem: Airway Clearance - Ineffective  Goal: Achieve or maintain patent airway  Outcome: Ongoing  Note: Patient intubated/sedated currently at 50% FiO2. Continuing to monitor for opportunities to wean oxygen and ventilator support. Problem: Body Temperature -  Risk of, Imbalanced  Goal: Ability to maintain a body temperature within defined limits  Outcome: Ongoing  Note: Patient with fever overnight and low grade 99 F this afternoon. Continuing to monitor body regulation. Problem: Isolation Precautions - Risk of Spread of Infection  Goal: Prevent transmission of infection  Outcome: Ongoing  Note: Patient maintains in contact and droplet plus isolation for COVID19. Problem: Nutrition Deficits  Goal: Optimize nutrtional status  Outcome: Ongoing  Note: Patient with Vital 1.2 being administered via PEG tube. Blood glucose being checked every 4 hours with sliding scale insulin coverage being optimized.      Problem: Restraint Use - Nonviolent/Non-Self-Destructive Behavior:  Goal: Absence of restraint indications  Description: Absence of restraint indications  Outcome: Ongoing  Note: Patient continues with bilateral soft wrist restraints. Continuing to use verbal redirection and hiding of lines/tubes and continuing to monitor for opportunity to discontinue. Still applicable at this time. No signs of skin breakdown bilaterally beneath restraints. Care plan reviewed with patient and family member. Patients family member verbalizes understanding of the care plan and contributed to goal setting.

## 2020-10-18 NOTE — PLAN OF CARE
Vitals:    10/17/20 2200   BP: (!) 117/51   Pulse: 69   Resp: 16   Temp:    SpO2: 95%     Temp elevated and treated with Tylenol ATC. Problem: Isolation Precautions - Risk of Spread of Infection  Goal: Prevent transmission of infection  Outcome: Ongoing  Note: Pt is in droplet plus isolation to help prevent the transmission of infection. Problem: Nutrition Deficits  Goal: Optimize nutrtional status  Outcome: Ongoing  Note: Pt receiving Vital 1.2 via PEG tube at 10ml/hr. Problem: Nutrition  Goal: Optimal nutrition therapy  Outcome: Ongoing  Note: Pt receiving Vital 1.2 via PEG tube at 10ml/hr. Problem: Restraint Use - Nonviolent/Non-Self-Destructive Behavior:  Goal: Absence of restraint indications  Description: Absence of restraint indications  Outcome: Ongoing  Note: Pt pulling against restraints. Restraints in place for pt safety due to lines, ETT, and pollock. Problem: Restraint Use - Nonviolent/Non-Self-Destructive Behavior:  Goal: Absence of restraint-related injury  Description: Absence of restraint-related injury  Outcome: Ongoing  Note: Pt pulling against restraints. Restraints in place for pt safety due to lines, ETT, and pollock. Problem: Infection - Central Venous Catheter-Associated Bloodstream Infection:  Goal: Will show no infection signs and symptoms  Description: Will show no infection signs and symptoms  Outcome: Ongoing  Note:   Vitals:    10/17/20 2200   BP: (!) 117/51   Pulse: 69   Resp: 16   Temp:    SpO2: 95%     Pt receiving Tylenol for fever and pt is on IV antibiotics. Will continue to monitor. Problem: Pain:  Goal: Pain level will decrease  Description: Pain level will decrease  Outcome: Ongoing  Note: Pt unable to communicate pain at this time. Pt on fentanyl gtt. Unable to communicate care plan with pt as pt is intubated and sedated. Family updated via phone call.

## 2020-10-18 NOTE — PROGRESS NOTES
At 1500 GMT this  placed a call to patient's family in an attempt to provide spiritual and emotional support. Patient's family has requested a zoom meeting but did not provide the time for the meeting. This  attempted to contact patient's family by phone to assess spiritual needs for support. There is no answer by patient's family. This  and Spiritual care will follow up during the next rounding in an effort for spiritual support for the family.

## 2020-10-18 NOTE — PLAN OF CARE
Problem: MECHANICAL VENTILATION  Goal: Will be able to breathe spontaneously, without ventilator support  Description: Will be able to breathe spontaneously, without ventilator support  Outcome: Ongoing  Note: Patient occasionally breathes over set ventilator rate.

## 2020-10-18 NOTE — PROGRESS NOTES
Daughter, Marquise Dyer, called for update. Daughter updated on POC with all questions answered. Marquise Dyer would like to pass along that on Ugo 10/18 at [id number], she would like to call unit and speak to pt via phone and offer encouragement.

## 2020-10-18 NOTE — PROGRESS NOTES
Called and updated patients spouse Guillermina Normaning who is up in [address] [place] [address] currently. Spoke with her regarding plan for CT scans tonight. Discussed with the nursing staff and with patients spouse the possibility of patient coming down via wheelchair with appropriate PPE to see spouse via the hallway prior to discharge planned for patients spouse tomorrow. Will pass along to staff tomorrow.

## 2020-10-18 NOTE — PLAN OF CARE
Problem: MECHANICAL VENTILATION  Goal: Will be able to breathe spontaneously, without ventilator support  Description: Will be able to breathe spontaneously, without ventilator support  Outcome: Ongoing  Note: Vent setting optimized to achieve target tidal volume, respiratory rate and ideal oxygen saturations. SBT will be performed when appropriate.

## 2020-10-18 NOTE — PROGRESS NOTES
This RN facilitated phone call with patients family in the room on speaker phone to allow family to speak with patient. Patient appreciated phone call and stated that they would reach out to spiritual care for zoom phone call tomorrow.

## 2020-10-18 NOTE — PROGRESS NOTES
Called and updated patients daughter Cha Hickman regarding plan of care and status currently. She appreciated the call and we are still pending phone call this evening at [phone] with patient for encouragement from family.

## 2020-10-19 NOTE — PROGRESS NOTES
Comprehensive Nutrition Assessment    Type and Reason for Visit:  Reassess(TF management)    Nutrition Recommendations/Plan:   Increase Vital 1.2 to 30 ml/hr  Continue bolus 1 proteinex 2GO BID  Continue free water flushes per MD order (200 ml 4 times/day)    Nutrition Assessment:  Pt improving from a nutritional standpoint AEB patient is tolerating trophic EN. 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 Nutrition recommendations/interventions as per above. Malnutrition Assessment:  Malnutrition Status:  Insufficient data(+covid patient)    Context:  Acute Illness     Findings of the 6 clinical characteristics of malnutrition:  Energy Intake:  (EN or NPO since admit)  Weight Loss:  Unable to assess(no weight hx in EMR)     Body Fat Loss:  Unable to assess(+covid)     Muscle Mass Loss:  Unable to assess(+covid)    Fluid Accumulation:  No significant fluid accumulation     Strength:  Not Performed    Estimated Daily Nutrient Needs:  Energy (kcal):  7258-9773 (30-32 kcals/kg for late active phase); Weight Used for Energy Requirements:  Ideal(70 kg - ideal weight)     Protein (g):  140 (2.0 grams/kg ideal weight); Weight Used for Protein Requirements:  (70 kg - ideal weight)        Fluid (ml/day):  per MD; Weight Used for Fluid Requirements:  (n/a)      Nutrition Related Findings:  +covid; intubated 10/12; bronch 10/13; right pigtail cath placed due to large right pleural effusion 10/16. MAP: 82. Spoke to RN who reports that patient is tolerating EN at 20 ml/hr (note patient recieved 52% of Rx tube feeding volume over the last 24 hours). RN reports that patient does have bowel sounds. BM x 1 10/14. Labs: sodium: 130, POC: 91.  Meds: lantus, humalog, vanc, vitamin D, precedex, fentanyl, levophed      Wounds:  Stage II, Unstageable(stage 2 coccyx, unstageable buttock)       Current Nutrition Therapies:    Current Tube Feeding (TF) Orders:  · Feeding Route: PEG(placed 10/16)  · Formula: Semi-Elemental(Vital 1.2)  · Schedule: (will increase to 30 ml/hr on 10/19)  · Additives/Modulars: (Proteinex 2 go 2.5 oz. BID (208 kcals, 52 gm protein))  · Water Flushes: per MD  · Current TF & Flush Orders Provides: Vital 1.2 at 20 ml/hr~ 480 mls 576 kcals, 36 grams protein, 53 grams CHO, 2 grams fiber, 389 ml water, 810 mg potassium, and 405 mg phosphorus per 24 hours.   · Goal TF & Flush Orders Provides: Vital 1.2 at 30 ml/hr with protienex 2GO BID = 1072 kcals (864 vital, 208 protein), 106 grams protein (54 vital, 52 protein), 80 grams CHO, 4 grams fiber, 1384 ml water (584 vital, 800 MD flushes)    Anthropometric Measures:  · Height: 5' 8\" (172.7 cm)  · Current Body Weight: 214 lb (97.1 kg)(10/18, bedscale, +1 edmea)   · Admission Body Weight: 220 lb (99.8 kg)(9/24/20, no weight source)    · Usual Body Weight: (no weight hx available in EMR, patient intubated)     · Ideal Body Weight: 154 lbs;    · BMI: 32.5  · Adjusted Body Weight:  ; (78 kg for TPN doing)    · BMI Categories: Obese Class 1 (BMI 30.0-34.9)(32.54)       Nutrition Diagnosis:   · Inadequate oral intake related to impaired respiratory function as evidenced by NPO or clear liquid status due to medical condition, intubation, nutrition support - enteral nutrition    Nutrition Interventions:   Food and/or Nutrient Delivery:  Continue NPO, Modify Tube Feeding  Nutrition Education/Counseling:  No recommendation at this time   Coordination of Nutrition Care:  Continued Inpatient Monitoring, Interdisciplinary Rounds    Goals:  Patient will tolerated EN appropriate for late phase covid-19 infection       Nutrition Monitoring and Evaluation:   Behavioral-Environmental Outcomes:  (n/a)   Food/Nutrient Intake Outcomes:  Enteral Nutrition Intake/Tolerance  Physical Signs/Symptoms Outcomes:  Biochemical Data, GI Status, Fluid Status or Edema, Hemodynamic Status, Weight, Skin     Discharge Planning:     Too soon to determine     Electronically signed by Karolyn Guevara RD, ADRIAN on 10/19/20 at 12:24 PM EDT    Contact: [phone]

## 2020-10-19 NOTE — PLAN OF CARE
Problem: Falls - Risk of:  Goal: Will remain free from falls  Description: Will remain free from falls  Outcome: Ongoing  Note: Patient absent of falls this shift. Bed in lowest position. Goal: Absence of physical injury  Description: Absence of physical injury  Outcome: Ongoing     Problem: Skin Integrity:  Goal: Will show no infection signs and symptoms  Description: Will show no infection signs and symptoms  Outcome: Ongoing  Note: Patient with temperature this shift. Tylenol given per orders. Central line dressing clean, dry, and intact. Goal: Absence of new skin breakdown  Description: Absence of new skin breakdown  Outcome: Ongoing  Note: No new skin breakdown noted this shift. Patient assisted to turn and reposition every 2 hours in bed. EPC applied to coccyx. Extremities elevated on pillows. Problem: Airway Clearance - Ineffective  Goal: Achieve or maintain patent airway  Outcome: Ongoing  Note: Patient continues with ETT at 24 cm at the lip. Problem: Gas Exchange - Impaired  Goal: Absence of hypoxia  Outcome: Ongoing  Note: SpO2> 90% on Ventilator. PEEP: 10 FiO2: 55% currently. Patient tolerating ventilator at this time. Problem: Breathing Pattern - Ineffective  Goal: Ability to achieve and maintain a regular respiratory rate  Outcome: Ongoing  Note: Respiratory rate regular. Problem: Body Temperature -  Risk of, Imbalanced  Goal: Ability to maintain a body temperature within defined limits  Outcome: Ongoing  Note: T: 98.4 F currently. Patient had prior temperature this shift. Patient packed with ice packs and Tylenol given per orders. Problem: Isolation Precautions - Risk of Spread of Infection  Goal: Prevent transmission of infection  Outcome: Ongoing  Note: Patient continues in Droplet Plus precautions. Proper PPE worn by staff and hand hygiene preformed. Problem: Nutrition Deficits  Goal: Optimize nutrtional status  Outcome: Ongoing  Note: Patient continues to remain NPO.

## 2020-10-19 NOTE — PLAN OF CARE
Problem: Nutrition  Goal: Optimal nutrition therapy  Outcome: Ongoing   Nutrition Problem #1: Inadequate oral intake  Intervention: Food and/or Nutrient Delivery: Continue NPO, Modify Tube Feeding  Nutritional Goals: Patient will tolerated EN appropriate for late phase covid-19 infection

## 2020-10-19 NOTE — PLAN OF CARE
Problem: Falls - Risk of:  Goal: Will remain free from falls  Description: Will remain free from falls  Outcome: Ongoing  Note: Free from falls this shift, at this time. Call light and bedside table within reach. Bed alarm on. Bed wheels locked and bed in lowest position. Pt oriented to own abilities and is encouraged to use call light for needs. Problem: Falls - Risk of:  Goal: Absence of physical injury  Description: Absence of physical injury  Outcome: Ongoing  Note: Pt is absent of physical injury at this time. Will continue to assess and monitor. Problem: Skin Integrity:  Goal: Will show no infection signs and symptoms  Description: Will show no infection signs and symptoms  10/19/2020 1939 by Jael Jimenez RN  Outcome: Ongoing  Note: Pt has no new signs and symptoms of infection at this time. Will continue to assess and monitor. Problem: Skin Integrity:  Goal: Absence of new skin breakdown  Description: Absence of new skin breakdown  Outcome: Ongoing  Note: Pt is absent of new skin breakdown at this time. Pt is turned every two hours and skin is assessed often. Will continue to assess and monitor. Problem: Airway Clearance - Ineffective  Goal: Achieve or maintain patent airway  Outcome: Ongoing  Note: Pt's vent settings were increased due to pt not meeting SpO2 sat goals this shift. Problem: Gas Exchange - Impaired  Goal: Absence of hypoxia  Outcome: Ongoing  Note: Pt is still on the ventilator, pt is not tolerating it as well as yesterday. Respiratory was brought in to assess pt more. Will continue to assess and continue to monitor. Problem: Breathing Pattern - Ineffective  Goal: Ability to achieve and maintain a regular respiratory rate  Outcome: Ongoing  Note: Pt is still on the ventilator and needs assistance on maintaining a regular respiratory rate. Problem:  Body Temperature -  Risk of, Imbalanced  Goal: Ability to maintain a body temperature within defined symptoms of pain this shift, will continue to assess and monitor. Problem: Pain:  Goal: Control of chronic pain  Description: Control of chronic pain  Outcome: Ongoing  Note: Pt is showing no signs and symptoms of pain this shift, will continue to assess and monitor. Problem: Serum Glucose Level - Abnormal:  Goal: Ability to maintain appropriate glucose levels has stabilized  Description: Ability to maintain appropriate glucose levels has stabilized  Outcome: Ongoing  Note: Pt's blood glucose was within normal limits except around 1800, it was 183. Pt was given insulin. Care plan reviewed with patient. Patient verbalize understanding of the plan of care and contribute to goal setting.

## 2020-10-19 NOTE — PROGRESS NOTES
This RN was notified about pt's vancomycin level of 29.2. This RN stopped the vancomycin drip right away and contacted pharmacy.

## 2020-10-19 NOTE — PROGRESS NOTES
Pharmacy Vancomycin Consult     Vancomycin Day: 4  Current Dosin mg Q12H     Temp max:  99.1    Recent Labs     10/18/20  1400 10/19/20  0338   BUN 6* 8       Recent Labs     10/18/20  1400 10/19/20  0338   CREATININE 0.5 0.8       Recent Labs     10/18/20  0535 10/19/20  0338   WBC 10.6 15.3*         Intake/Output Summary (Last 24 hours) at 10/19/2020 1626  Last data filed at 10/19/2020 1350  Gross per 24 hour   Intake 3727.21 ml   Output 2760 ml   Net 967.21 ml       Ht Readings from Last 1 Encounters:   10/09/20 5' 8\" (1.727 m)        Wt Readings from Last 1 Encounters:   10/18/20 214 lb (97.1 kg)         Body mass index is 32.54 kg/m². Estimated Creatinine Clearance: 89 mL/min (based on SCr of 0.8 mg/dL). Trough: 29.2 mcg/ml    Assessment/Plan:  Hold Vancomycin, current dose was only infusing for 10 minutes, RN stopped it. Check random vancomycin level 10/20/20 0600 to assess if Q24H frequency would be appropriate.       Gina Kahn PharmD 10/19/2020 4:34 PM

## 2020-10-19 NOTE — PROGRESS NOTES
Procal 0.98.  6. Anemia, worsened: Borderline macrocytic with MCV of 96.6. Unlikely hemo dilution as patient is net negative intake/output. Given TPA during this hospital stay for R ICA stenosis. Stool occult pending. B12 and folate normal.  Patient's hemoglobin deteriorated overnight to level of 6.7, 1 unit PRBCs ordered. Posttransfusion H&H. Anemia studies ferritin 1499, iron 20, TIBC 100. Indicative of anemia of chronic disease. 7. DMT2, uncontrolled:  Patient was given Decadron 10 milligrams IVP X1. Will continue to monitor with ACCU and SSI. Most recent blood glucose 113.  8. CVA:  History of recent tPA use, will continue to monitor. Patient taking clopidogrel and Lovenox. Patient's neurologic exam limited by sedation. Minimally responsive to voice. 9. Hypertension, essential: Stable, on metoprolol. 10. Hyperlipidemia: Home rosuvastatin is continued. INITIAL H AND P AND ICU COURSE:  Kenton Cabot is a 67year old  male, transferred to [facility] ICU 10/12/2020 for intubation. Patient is a 48-year-old morbidly obese white male reformed smoker. Isadora Grade has a history of diabetes, hyperlipidemia, and hypertension. Korina Hugo was admitted from the emergency room on 9/24/2020 with hypoxemic respiratory failure secondary to [facility] had associated acute renal injury.  He required 100% nonrebreather and was started on Decadron and Remdesivir.  He continued to deteriorate and required intubation on 9/27/2020.  Acute lung injury progressed to ARDS and started pronation therapy on 9/29/2020. Thi Duff was found to have bilateral pneumonia with Klebsiella pneumoniae and Haemophilus influenza which was treated with Zosyn.  Patient completed 12-day course of Zosyn on 10/5/20. Patient extubated to 10/4/22 intermittent BiPAP.  Patient had neurologic decline in the early morning hours of 10/7/2020.  Code stroke was initiated, and patient underwent neurologic assessment.  The decision was made to administer present  Extremities:  No clubbing, cyanosis. Mild bilateral pitting edema +1 up to mid tibia. Vasculature: capillary refill < 3 seconds. Palpable dorsalis pedis pulses. Skin:  warm and dry. Lymph:  No supraclavicular adenopathy. Neurologic:  No appreciable focal deficit. No seizures. Data: (All radiographs, tracings, PFTs, and imaging are personally viewed and interpreted unless otherwise noted).  Sodium 130, potassium 4.9, chloride 94, bicarb 30, BUN 8, creatinine 0.8, glucose 149, calcium 7.5, procalcitonin 0.54   Cortisol 12.93   White count 15.3, hemoglobin 6.7, hematocrit 21.9, platelets 914.  Ferritin 1499, iron 20, TIBC 100   Pleural fluid: Glucose 157, , protein 3.6, mononuclear 22.9, poly-Thorne for nuclear 77, total nucleated 3087.  10/19/2020: CT head no acute hemorrhage.  10/19/2020: CT chest without contrast with worsening cavitation within the right lower lobe, persistent pleural effusion, new pneumoperitoneum secondary to PEG placement.  10/17/2020 acid-fast culture and smear no bacilli seen culture pending.  10/13/20 BAL culture with gram positive cocci/normal guido staph epidermidis methicillin-resistant.  10/13/20 Albumin 2.0, alk phos 83, ALT 64, AST 43, bilirubin 0.5, total protein 5.5   10/13/20 CT chest, abdomen, pelvis revealed bilateral diffuse consolidation of the lungs and right pleural effusion.  Echo 10/7/2020 LVEF 55%.  Cardiac telemetry NSR   10/12/2020 pneumonia panel negative   10/12/2020 urine culture no growth with contaminants   10/6/2020 blood cultures no growth final result. Seen with multidisciplinary ICU team yes. Meets Continued ICU Level Care Criteria:    [x] Yes   [] No - Transfer Planned to listed location:  [] HOSPITALIST CONTACTED-      Case and plan discussed with Dr. Jaxon Dickinson. Electronically signed by Overton Eisenmenger, DO  CRITICAL CARE SPECIALIST  Patient seen by me. Case discussed with resident physician. Patient will require bronchoscopy in the morning to further differentiate pneumonia from ARDS from pulmonary abscess. Unable to wean from mechanical ventilator secondary to high FiO2 and PEEP requirements. Continues with right sided chest tube with right-sided pleural effusion. CC time 35 minutes. Time was discontiguous. Time includes my direct assessment of the patient and coordination of care. Electronically signed by Yetta Lions Marylen Perish MD.

## 2020-10-20 NOTE — PROGRESS NOTES
CRITICAL CARE PROGRESS NOTE      Patient:  Steven England    Unit/Bed:3A-04/004-A  YOB: 1945  MRN: [id number]   PCP: Claudell Donath, MD  Date of Admission: 9/24/2020  Chief Complaint:- acute respiratory failure    Assessment and Plan:   1. Acute respiratory failure with hypoxia, improving: Patient became hypoxic and tachypneic while on BiPAP, subsequently intubated the night of 10/12/20. Continue PCV ventilation with lung protection strategies targeting a peak pressure 35 and less. Patient currently 22/6 at 80%. Satting in the upper 90s. Continue to wean requirement as tolerated. 2. Exudative pleural effusion, right: Large right pleural effusion with lower lobe atelectasis from CT on 10/13/20.  10/16/20 pigtail catheter placed in the right lung. Produced serosanguineous discharge. Pleural fluid analysis glucose 157, , protein 3.6, total nucleated cells 3087. Positive Lights criteria, exudative effusion. First 2 sets of cytology without malignant cells, third pending. Continue pressure ventilation and reduce as tolerated. Patient's chest tube with large clot burden. Chemical pneumolysis today. 3. Right lower lobe pulmonary abscess: Seems to be enlarging on radiograph. Sputum growing MRSE. Molecular pneumonia panel resulting resistant staph aureus by PCR. No improvement on vancomycin. Likely secondary to PVL production. Will begin linezolid with cessation of vanc. Adding doxycycline for anaerobic coverage. Patient will require 6 weeks of antibiotic therapy. Otherwise, may require percutaneous drainage into the abscess. 4. ARDS: recent history of COVID-19 and pronation therapy. ASA and Lovenox to mitigate microthrombosis being held secondary to anemia. Bronchoscopy 10/13/20 with mucinous return. Negative respiratory panel, culture shows gram positive cocci/normal guido resulted as staph epidermidis. Covid 19 retest is negative.     5. Septic shock: Patient was febrile overnight. Tmax 101.3. Acetaminophen prn q6. Lactic Clearance 3.2-1.4. CT of ABD/PELVIS revealed no acute abnormality. Patient receiving Levophed with white blood cell count stable. Procalcitonin downtrending. Continue current antibiotics that include linezolid and doxycycline. 6. Anemia, worsened: Borderline macrocytic with MCV of 96.6. Unlikely hemo dilution as patient is net negative intake/output. Given TPA during this hospital stay for R ICA stenosis. Stool occult pending. B12 and folate normal.  Patient's hemoglobin deteriorated 10/19/2020 to level of 6.7, 1 unit PRBCs ordered. Posttransfusion H&H 8.7. Anemia studies ferritin 1499, iron 20, TIBC 100. Indicative of anemia of chronic disease. Follow. Transfuse as necessary for hemoglobin less than 7.  7. DMT2, uncontrolled:  Patient was given Decadron 10 milligrams IVP X1. Will continue to monitor with ACCU and SSI. Most recent blood glucose 160.  8. CVA:  History of recent tPA use, will continue to monitor. Patient taking clopidogrel and Lovenox. Patient's neurologic exam limited by sedation. Minimally responsive to voice. 9. Hypertension, essential: Stable, on metoprolol. 10. Hyperlipidemia: Home rosuvastatin is continued. INITIAL H AND P AND ICU COURSE:  Haydee Sales is a 67year old  male, transferred to [facility] ICU 10/12/2020 for intubation. Patient is a 71-year-old morbidly obese white male reformed smoker. Grecia Smoker has a history of diabetes, hyperlipidemia, and hypertension.     Yanelis Bobo was admitted from the emergency room on 9/24/2020 with hypoxemic respiratory failure secondary to [address] had associated acute renal injury.  He required 100% nonrebreather and was started on Decadron and Remdesivir.  He continued to deteriorate and required intubation on 9/27/2020.  Acute lung injury progressed to ARDS and started pronation therapy on 9/29/2020. Elise Bertrand was found to have bilateral pneumonia with Klebsiella pneumoniae and Haemophilus influenza which was treated with Zosyn.  Patient completed 12-day course of Zosyn on 10/5/20. Patient extubated to 10/4/22 intermittent BiPAP. Patient had neurologic decline in the early morning hours of 10/7/2020.  Code stroke was initiated, and patient underwent neurologic assessment.  The decision was made to administer TPA as the patient had left sided weakness and increased confusion.  TPA was administered without difficulty. MRI was negative for acute stroke but CTA showed a severe R ICA stenosis. Neurology felt tPA likely aborted his CVA given his COVID 23 that put him in a hypercoagulable state. 10/10/2020 Patient was transferred to the Hospitalist Service. Consults to Pulmonary and Cardiology services. Patient was confused at times, and was tolerating alternating between HFNC and Bipap. New onset atrial fibrillation which patient did convert with IVP Lopressor. Patient was placed on CPAP with concerns of HUSSEIN. 10/12/2020 Noted increase in work of breathing patient had failed Bipap therapy. He was transferred to ICU for further management and care. Patient received bronchoscopy on 10/13/2020. PEG and chest tube consent obtained from son on 10/15/2020. Right chest tube performed 10/16/2020. It produced a moderate amount of serosanguineous fluid. 1019/20 CT scan revealing increased cavitation size consider percutaneous drainage and chemical pneumolysis for effusion improvement. Past Medical History:  See HPI. Family History: Mother and Father unknown. Social History: Former smoker, denies any ETOH or illicit drug use. .    ROS   Unable to complete secondary to patient's sedation and ventilatory status. PHYSICAL EXAMINATION:  Temperature 100.8, heart rate 74, respirations 12, blood pressure 98/51, oxygen saturation 96%. Current vent settings: PCV ventilation, 22/8, 80% FiO2. Body mass index is 32.49 kg/m².      General:   Pale, acute ill in appearance  HEENT:  normocephalic and atraumatic. No scleral icterus. PERRL. BAL with bilateral mucinous discharge and airway. Neck: supple. No Thyromegaly. Lungs: positive for diffuse crackles in R lung. Right posterior chest tube with serosanguineous drainage. Cardiac: RRR-sinus without tachycardia. S1-S2, no extraneous heart sounds. no JVD. Abdomen: soft. Nontender, bowel sounds present  Extremities:  No clubbing, cyanosis. Mild bilateral pitting edema +1 up to mid tibia. Vasculature: capillary refill < 3 seconds. Palpable dorsalis pedis pulses. Skin:  warm and dry. Lymph:  No supraclavicular adenopathy. Neurologic:  No appreciable focal deficit. No seizures. Data: (All radiographs, tracings, PFTs, and imaging are personally viewed and interpreted unless otherwise noted). CBC and BMP pending. Posttransfusion H&H 8.7.  10/20/2020 BAL resulting staph aureus by PCR with resistance gene detected.  10/20/2020 first 2 cytologic studies negative for malignant cells.  Ferritin 1499, iron 20, TIBC 100   Pleural fluid: Glucose 157, , protein 3.6, mononuclear 22.9, poly-Thorne for nuclear 77, total nucleated 3087.  10/19/2020: CT head no acute hemorrhage.  10/19/2020: CT chest without contrast with worsening cavitation within the right lower lobe, persistent pleural effusion, new pneumoperitoneum secondary to PEG placement.  10/17/2020 acid-fast culture and smear no bacilli seen culture pending.  10/13/20 BAL culture with gram positive cocci/normal guido staph epidermidis methicillin-resistant.  10/13/20 Albumin 2.0, alk phos 83, ALT 64, AST 43, bilirubin 0.5, total protein 5.5   10/13/20 CT chest, abdomen, pelvis revealed bilateral diffuse consolidation of the lungs and right pleural effusion.  Echo 10/7/2020 LVEF 55%.  Cardiac telemetry NSR   10/12/2020 pneumonia panel negative   10/12/2020 urine culture no growth with contaminants   10/6/2020 blood cultures no growth final result.         Seen with multidisciplinary ICU team yes. Meets Continued ICU Level Care Criteria:    [x] Yes   [] No - Transfer Planned to listed location:  [] HOSPITALIST CONTACTED-      Case and plan discussed with Dr. Jaxon Dickinson. Electronically signed by Overton Eisenmenger, DO  CRITICAL CARE SPECIALIST  Patient seen by me. Case discussed with resident physician. Patient remains critically ill on mechanical ventilator. Patient underwent bronchoscopy 10/20/2020 which showed extensive mucus production to the lower lobes. CT scan showed increasing abscess to the right lower lobe. Patient growing MRSA and PCR demonstrates MRSA. Infiltrates and abscess increased despite vancomycin therapy. Concern for PVL gene production. Patient transitioned to linezolid on 10/20/2020 given concerns for PVL. CC time 35 minutes. Time does not include procedures. Time was discontiguous. Time includes my direct assessment of the patient and coordination of care. Electronically signed by Pam Dickinson MD.

## 2020-10-20 NOTE — PLAN OF CARE
Advance Care Planning (ACP) Provider Note - Comprehensive     Date of ACP Conversation: 01/03/18  Persons included in Conversation:  patient  Length of ACP Conversation in minutes:  16 minutes    Authorized Decision Maker (if patient is incapable of making informed decisions): This person is: Other Legally Authorized Decision Maker (e.g. Next of Kin)          General ACP for ALL Patients with Decision Making Capacity:   Importance of advance care planning, including choosing a healthcare agent to communicate patient's healthcare decisions if patient lost the ability to make decisions, such as after a sudden illness or accident  Understanding of the healthcare agent role was assessed and information provided  Exploration of values, goals, and preferences if recovery is not expected, even with continued medical treatment in the event of: Imminent death  Severe, permanent brain injury  \"In these circumstances, what matters most to you? \"  Care focused more on comfort or quality of life.     Review of Existing Advance Directive:  pt does not have an AD    For Serious or Chronic Illness:  Understanding of medical condition      Interventions Provided:  Recommended completion of Advance Directive form after review of ACP materials and conversation with prospective healthcare agent Problem: Falls - Risk of:  Goal: Will remain free from falls  Description: Will remain free from falls  Outcome: Ongoing  Note: Call light in reach, bed in lowest position, and bed alarm activated. Education given on use of call light before ambulation and when in need of assistance. Patient unable to express understanding. Hourly visual checks performed and charted. Toileting offered to patient. No falls this shift, at any time. Arm band and falling star in place. Will continue to monitor. Problem: Falls - Risk of:  Goal: Absence of physical injury  Description: Absence of physical injury  Outcome: Ongoing  Note: Call light in reach, bed in lowest position, and bed alarm activated. Education given on use of call light before ambulation and when in need of assistance. Patient unable to express understanding. Hourly visual checks performed and charted. Toileting offered to patient. No falls this shift, at any time. Arm band and falling star in place. Will continue to monitor. Problem: Skin Integrity:  Goal: Will show no infection signs and symptoms  Description: Will show no infection signs and symptoms  Outcome: Ongoing  Note: Dressing remains clean, dry, and intact. Tubing labeled and all ports capped. CHG bath given daily. Will continue to monitor. Problem: Skin Integrity:  Goal: Absence of new skin breakdown  Description: Absence of new skin breakdown  Outcome: Ongoing  Note: No new signs of skin breakdown. Skin warm, dry, and intact. Mucous membranes pink and moist.  Assistance with turns/ambulation provided every 2 hours and PRN. Will continue to monitor. Problem: Airway Clearance - Ineffective  Goal: Achieve or maintain patent airway  Outcome: Ongoing  Note: Patient remains intubated. Weaning patient as tolerated. Problem: Gas Exchange - Impaired  Goal: Absence of hypoxia  Outcome: Ongoing  Note: Patient remains on the ventilator.  Patient's oxygen level and PEEP were increased due to hypoxia. Problem: Breathing Pattern - Ineffective  Goal: Ability to achieve and maintain a regular respiratory rate  Outcome: Ongoing  Note: Patient's respiratory rate remains within normal limits on the ventilator. Problem: Body Temperature -  Risk of, Imbalanced  Goal: Ability to maintain a body temperature within defined limits  Outcome: Ongoing  Note: Patient presented with a low grade fever. One dose of tylenol given. Patient afebrile at this time. Problem: Isolation Precautions - Risk of Spread of Infection  Goal: Prevent transmission of infection  Outcome: Ongoing  Note: Droplet plus precautions remain in place. Problem: Nutrition Deficits  Goal: Optimize nutrtional status  Outcome: Ongoing  Note: Patient continues on semi-elemental TF infusing at 30 ml/hr. Patient tolerating well. No residuals noted. Problem: Restraint Use - Nonviolent/Non-Self-Destructive Behavior:  Goal: Absence of restraint indications  Description: Absence of restraint indications  Outcome: Ongoing  Note: Patient continues to reach for ETT and pollock while performing ROM exercises. Problem: Restraint Use - Nonviolent/Non-Self-Destructive Behavior:  Goal: Absence of restraint-related injury  Description: Absence of restraint-related injury  Outcome: Ongoing  Note: No signs of injury noted. ROM exercises performed every 2 hours and PRN. Problem: Infection - Central Venous Catheter-Associated Bloodstream Infection:  Goal: Will show no infection signs and symptoms  Description: Will show no infection signs and symptoms  Outcome: Ongoing  Note: Dressing remains clean, dry, and intact. Tubing labeled and all ports capped. CHG bath given daily. Will continue to monitor. Problem: Pain:  Goal: Pain level will decrease  Description: Pain level will decrease  Outcome: Ongoing  Note: Patient unable to use 0-10 pain scale. Per the CPOT scale patient is free from pain at this time.  Patient has a pain goal of \"no pain. \" Agreeable to take PRN pain medications. Problem: Serum Glucose Level - Abnormal:  Goal: Ability to maintain appropriate glucose levels has stabilized  Description: Ability to maintain appropriate glucose levels has stabilized  Outcome: Ongoing  Note: Patient's last CHEM was 142. 3 units of insulin given per the sliding scale. Problem: Urinary Elimination:  Goal: Complications related to the disease process, condition or treatment will be avoided or minimized  Description: Complications related to the disease process, condition or treatment will be avoided or minimized  Outcome: Ongoing  Note: No signs or symptoms noted at this time. Mccoy care performed this shift. Will continue to monitor. Care plan reviewed with patient and family. Patient and family verbalize understanding of the plan of care and contribute to goal setting.

## 2020-10-20 NOTE — PROCEDURES
BRONCHOSCOPY    Indication: Multilobar pneumonia with abscess formation to the right lower lobe  Risks and benefits to the procedure were discussed. Alternatives and their risks were discussed as well. Sedation: Ketamine      EBL:  None. Findings:   Diffuse bilateral mucus production. Samples:   Right lower lobe BAL with 120 cc lavage and 30 cc returned    Complications:  None    Procedure:  After informed consent was obtained, patient received sedation as detailed above. Utilizing the endotracheal tube, the bronchoscope was advanced to the level of the trachea and subsequently the nick. The nick was noted to be crisp. Scope was taken to the left and right lung fields. Samples taken as noted above. Bronchoscope was withdrawn. Electronically signed by Tamela Aguilar M.D.

## 2020-10-20 NOTE — PLAN OF CARE
Problem: Falls - Risk of:  Goal: Will remain free from falls  Description: Will remain free from falls  10/20/2020 0220 by Unique Rivas RN  Outcome: Met This Shift  Note: Patient remains free from falls this shift. Fall precautions in place with bed exit alarmed. Problem: Nutrition  Goal: Optimal nutrition therapy  10/20/2020 0220 by Unique Rivas RN  Outcome: Met This Shift  Note: Patient on vital at 20 ml/hr. Tolerating well. Problem: Restraint Use - Nonviolent/Non-Self-Destructive Behavior:  Goal: Absence of restraint-related injury  Description: Absence of restraint-related injury  10/20/2020 0220 by Unique Rivas RN  Outcome: Met This Shift  Note: Circulation checked every 2 hours. Pulses noted bilaterally. No restraint injury noted this shift. Problem: Falls - Risk of:  Goal: Absence of physical injury  Description: Absence of physical injury  10/20/2020 0220 by Unique Rivas RN  Outcome: Ongoing  Note: Hourly rounding in place to anticipate patient needs, such as assistance with pain, toileting, or repositioning, in order to decrease risk for injuries such as falls. Problem: Falls - Risk of:  Goal: Absence of physical injury  Description: Absence of physical injury  10/20/2020 0220 by Unique Rivas RN  Outcome: Ongoing  Note: Hourly rounding in place to anticipate patient needs, such as assistance with pain, toileting, or repositioning, in order to decrease risk for injuries such as falls. Problem: Skin Integrity:  Goal: Will show no infection signs and symptoms  Description: Will show no infection signs and symptoms  10/20/2020 0220 by Unique Rivas RN  Outcome: Ongoing  Note: Patient mildly febrile. No signs of redness, warmth, or swelling noted at PICC site.       Problem: Skin Integrity:  Goal: Absence of new skin breakdown  Description: Absence of new skin breakdown  10/20/2020 0220 by Unique Rivas RN  Outcome: Ongoing  Note: Patient's skin remains intact this shift with no new signs of impaired skin integrity noted. Patient turned and repositioned every 2 hours. Special mattress in place to decrease pressure on high risk areas. Problem: Airway Clearance - Ineffective  Goal: Achieve or maintain patent airway  10/20/2020 0220 by Isidro Peterson RN  Outcome: Ongoing  Note: Patient intubated and sedated. Problem: Gas Exchange - Impaired  Goal: Absence of hypoxia  10/20/2020 0220 by Isidro Peterson RN  Outcome: Ongoing  Note: Patient able to maintain oxygen saturation greater than 90% on ventilator. Problem: Breathing Pattern - Ineffective  Goal: Ability to achieve and maintain a regular respiratory rate  10/20/2020 0220 by Isidro Peterson RN  Outcome: Ongoing  Note: Patient able to maintain regular respiratory rate on ventilator. Problem: Body Temperature -  Risk of, Imbalanced  Goal: Ability to maintain a body temperature within defined limits  10/20/2020 0220 by Isidro Peterson RN  Outcome: Ongoing  Note: Patient slightly febrile. Tylenol provided by day shift RN. Temperature decreased. Problem: Isolation Precautions - Risk of Spread of Infection  Goal: Prevent transmission of infection  10/20/2020 0220 by Isidro Peterson RN  Outcome: Ongoing  Note: Patient continues in droplet plus isolation. Problem: Nutrition Deficits  Goal: Optimize nutrtional status  10/20/2020 0220 by Isidro Peterson RN  Outcome: Ongoing  Note: Patient on vital at 20 ml/hr. Tolerating well. Problem: Restraint Use - Nonviolent/Non-Self-Destructive Behavior:  Goal: Absence of restraint indications  Description: Absence of restraint indications  10/20/2020 0220 by Isidro Peterson RN  Outcome: Ongoing  Note: Patient continues to require restraints for pulling at lines, tubes, and removal of equipment.       Problem: Infection - Central Venous Catheter-Associated Bloodstream Infection:  Goal: Will show no infection signs and symptoms  Description: Will show no infection signs and symptoms  10/20/2020 0220 by Abdifatah Lee RN  Outcome: Ongoing  Note: Patient mildly febrile. No signs of redness, warmth, or swelling noted at PICC site. Problem: Pain:  Goal: Pain level will decrease  Description: Pain level will decrease  10/20/2020 0220 by Abdifatah Lee RN  Outcome: Ongoing  Note: CPOT used to assess pain. Problem: Serum Glucose Level - Abnormal:  Goal: Ability to maintain appropriate glucose levels has stabilized  Description: Ability to maintain appropriate glucose levels has stabilized  10/20/2020 0220 by Abdifatah Lee RN  Outcome: Ongoing  Note: Patient's blood sugar controlled with insulin per physician's order. Care plan reviewed with patient. Patient unable to verbalize understanding of the plan of care and contribute to goal setting at this time.

## 2020-10-20 NOTE — PLAN OF CARE
Patient continues on ventilator; tolerating well.   Will continue to monitor patients respiratory status; continue to wean as patient tolerates

## 2020-10-20 NOTE — PROGRESS NOTES
Patient noted to be extremely agitated and bucking the ventilator while respiratory therapist was in the room. Precedex increased to 1.4 mcg/kg/hr, and PRN versed given. Patient more tolerant with ventilator after medication administration. Will monitor.

## 2020-10-20 NOTE — PROGRESS NOTES
[facility] THERAPY MISSED TREATMENT NOTE  [facility] CCU 3A      Date: 10/20/2020  Patient Name: Nhi Pate        MRN: [id number]    : 1945  (67 y.o.)    REASON FOR MISSED TREATMENT:   Patient is currently not medically appropriate for skilled ST evaluation/treatment d/t current intubation/ventilation. ST plans to continue monitoring the pt's chart and check-in with RN-- ST with plans to re-attempt on a later date/time as schedule permits, pt is medically appropriate, and/or available.      Micheline Agustin M.S. Shilpa Brandon 10/20/2020

## 2020-10-20 NOTE — PROGRESS NOTES
Pharmacy Vancomycin Consult     Vancomycin Day: 5  Current Dosing: on hold  Temp Max:  98.8    Recent Labs     10/18/20  1400 10/19/20  0338   BUN 6* 8       Recent Labs     10/18/20  1400 10/19/20  0338   CREATININE 0.5 0.8       Recent Labs     10/18/20  0535 10/19/20  0338   WBC 10.6 15.3*         Intake/Output Summary (Last 24 hours) at 10/20/2020 0636  Last data filed at 10/20/2020 0400  Gross per 24 hour   Intake 3880.38 ml   Output 2995 ml   Net 885.38 ml         Ht Readings from Last 1 Encounters:   10/09/20 5' 8\" (1.727 m)        Wt Readings from Last 1 Encounters:   10/20/20 213 lb 11.2 oz (96.9 kg)         Body mass index is 32.49 kg/m². Estimated Creatinine Clearance: 89 mL/min (based on SCr of 0.8 mg/dL). Trough: random level=22.6    Plan: Will continue to hold vancomycin until this pm at 1800--will restart vancomycin dose at 1500mg q24h.

## 2020-10-20 NOTE — PROGRESS NOTES
morning and patient appears to be tolerating it well. Patient's last documented BM was 10/14, I spoke to Dr. Luis Aguilar so that he is aware. Labs: POC: 163, sodium: 130. Meds: ABT, lantus, humalog, vitamin d, precedex, fentanyl, levophed. Wounds:  Stage II, Unstageable(stage 2 coccyx, unstageable buttock)       Current Nutrition Therapies:    Current Tube Feeding (TF) Orders:  · Feeding Route: PEG(placed 10/16)  · Formula: Semi-Elemental(Vital 1.2)  · Schedule: (vital 1.2 increased to 30 ml/hr on the morning of 10/20)  · Additives/Modulars: (Proteinex 2 go 2.5 oz.  BID (208 kcals, 52 gm protein))  · Water Flushes: per MD  · Current TF & Flush Orders Provides: Vital 1.2 at 30 ml/hr with proteinex 2GO BID = 1072 kcals (864 vital, 208 protien), 106 grams protein (54 vital, 52 protein), 80 grams CHO, 4 grams fiber, 1384 ml water (584 vital, 800 MD flushes)  · Goal TF & Flush Orders Provides: Vital 1.2 at 75 ml/hr = 1800 ml, 2160 kcals, 135 grams protein, 199 gram CHO, 9 grams fiber, 2260 ml water (1460 vital, 800 MD flushes)    Anthropometric Measures:  · Height: 5' 8\" (172.7 cm)  · Current Body Weight: 213 lb (96.6 kg)(10/20, bedscale with +1 edema)   · Admission Body Weight: 220 lb (99.8 kg)(9/24/20, no weight source)    · Usual Body Weight: (no weight hx available in EMR, patient intubated)     · Ideal Body Weight: 154 lbs;   · BMI: 32.4  · Adjusted Body Weight:  ; (78 kg for TPN doing)   · BMI Categories: Obese Class 1 (BMI 30.0-34.9)(32.49)       Nutrition Diagnosis:   · Inadequate oral intake related to impaired respiratory function as evidenced by NPO or clear liquid status due to medical condition, intubation, nutrition support - enteral nutrition    Nutrition Interventions:   Food and/or Nutrient Delivery:  Continue Current Tube Feeding  Nutrition Education/Counseling:  No recommendation at this time   Coordination of Nutrition Care:  Continued Inpatient Monitoring, Interdisciplinary Rounds    Goals:  Patient will tolerated EN appropriate for late phase covid-19 infection       Nutrition Monitoring and Evaluation:   Behavioral-Environmental Outcomes:  (n/a)   Food/Nutrient Intake Outcomes:  Enteral Nutrition Intake/Tolerance  Physical Signs/Symptoms Outcomes:  Biochemical Data, GI Status, Constipation, Fluid Status or Edema, Hemodynamic Status, Weight, Skin     Discharge Planning:     Too soon to determine     Electronically signed by Batool Elise RD, LD on 10/20/20 at 11:39 AM EDT    Contact: .[phone]

## 2020-10-20 NOTE — CARE COORDINATION
10/20/20, 8:43 AM EDT    DISCHARGE ON GOING EVALUATION    [address] day: 26  Location: 3A-04/004-A Reason for admit: COVID-19 [U07.1]   Procedure:  9/24 Vapotherm initiated  9/27 Intubated  9/27 CVC Right subclavian - 10/5 removed  9/29 - 10/2 Rotoprone therapy   10/1 Convalescent Plasma  10/4 Extubated  10/4 Vapotherm initiated  50/0 PICC right basilic  17/3 Code stroke - TPA given   10/12 Re-intubated. 10/13 CT chest - Nonspecific diffuse bilateral consolidation and pleural fluid.   10/13 Bronchoscopy -mucous to the lower lobes bilaterally. 10/16 Pleural cath placement.   10/16 EGD with PEG tube placement  10-19-20 Transfused PRC's. Treatment Plan of Care: Tmax 101.3. Remains vented with FIO2 89%, PEEP 10, O2 flow 60L. Precedex, Fentanyl, Versed, Levophed, Doxy and Vanc. PEG with Vital 1.2 at 20/hr, goal 30/hr. Transfused PRC's yesterday for Hgb 6.7, today 8.7. Barriers to Discharge: Remains vented. PCP: Xochitl Murdock MD  Readmission Risk Score: 27%  Patient Goals/Plan/Treatment Preferences: Pt is from home with wife. Current plan includes TCU, continue to monitor.  Pt will be a precert for any placement.

## 2020-10-20 NOTE — PROCEDURES
Chemical Pneumolysis    Risks and benefits to the procedure were discussed. Alternatives and their risks were discussed as well. Indication:  Loculated pleural effusion. Procedure:  After informed consent was obtained, 20 mg tPA +5 mg DNase were administered through the chest tube. Chest tube was clamped. Nurse was instructed on how to unclamp the chest tube. Nurse was instructed when to unclamp the chest tube. Procedure was performed without difficulty. There was no blood loss.     Electronically signed by Veronica Olivia M.D.

## 2020-10-20 NOTE — PLAN OF CARE
Problem: MECHANICAL VENTILATION  Goal: Will be able to breathe spontaneously, without ventilator support  Description: Will be able to breathe spontaneously, without ventilator support  10/20/2020 0834 by Pola Gonzalez RCP  Outcome: Ongoing   Vent setting optimized to achieve target tidal volume, respiratory rate and ideal oxygen saturations. SBT will be performed when appropriate.        SBT not indicated at this time

## 2020-10-20 NOTE — PLAN OF CARE
Problem: MECHANICAL VENTILATION  Goal: Will be able to breathe spontaneously, without ventilator support  Description: Will be able to breathe spontaneously, without ventilator support  Outcome: Ongoing  Note: Vent setting optimized to achieve target tidal volume, respiratory rate and ideal oxygen saturations. Patient is currently on 35/6 Rate of 12 and 40%. Will continue to wean as patient tolerates. SBT will be performed when appropriate.

## 2020-10-20 NOTE — PROGRESS NOTES
Dr. Prasanna Suárez on floor to place cath flow in patient's chest tube. Cath flow administered at 1509. Per Dr. Prasanna Suárez let cath flow dwell for 3 hours and then unclamp patient's chest tube.

## 2020-10-20 NOTE — PROGRESS NOTES
[facility] THERAPY MISSED TREATMENT NOTE  [facility] CCU 3A  3A-04/004-A      Date: 10/20/2020  Patient Name: Navarro Anguiano        CSN: [id number]   : 1945  (67 y.o.)  Gender: male   Referring Practitioner: Dr. García Hinton MD  Diagnosis: COVID 19         REASON FOR MISSED TREATMENT: Pt continues to be intubated this date. OT will discontinue services at this time- please reconsult when pt is appropriate for therapeutic intevention.

## 2020-10-21 NOTE — PLAN OF CARE
Problem: Nutrition  Goal: Optimal nutrition therapy  Outcome: Ongoing   Nutrition Problem #1: Inadequate oral intake  Intervention: Food and/or Nutrient Delivery: Modify Current Tube Feeding  Nutritional Goals: Patient will tolerated EN appropriate for late phase covid-19 infection

## 2020-10-21 NOTE — PROGRESS NOTES
CRITICAL CARE PROGRESS NOTE      Patient:  Angie Ours    Unit/Bed:3A-04/004-A  YOB: 1945  MRN: [id number]   PCP: Leandro Cavanaugh MD  Date of Admission: 9/24/2020  Chief Complaint:- acute respiratory failure    Assessment and Plan:   1. Acute respiratory failure with hypoxia, improving: Patient became hypoxic and tachypneic while on BiPAP, subsequently intubated the night of 10/12/20. Continue PCV ventilation with lung protection strategies targeting a peak pressure 35 and less. Patient currently 25/12 at 60%. Satting in the low 90s. Continue to wean requirement as tolerated. 2. Exudative pleural effusion, right: Large right pleural effusion with lower lobe atelectasis from CT on 10/13/20.  10/16/20 pigtail catheter placed in the right lung. Produced serosanguineous discharge. Pleural fluid analysis; glucose 157, , protein 3.6, total nucleated cells 3087. Positive Lights criteria, exudative effusion. All 3 sets of cytology without malignant cells. Continue pressure ventilation and reduce as tolerated. Patient's chest tube with large clot burden. Chemical pneumolysis completed 10-20-20. Noncontrasted CT scan chest in the morning to determine extent of pleural fluid. Difficult to differentiate pleural fluid from consolidated lung on radiograph. 3. Right lower lobe pulmonary abscess: Enlarging on CT. Sputum growing MRSE. Molecular pneumonia panel resulting resistant staph aureus by PCR. No improvement on vancomycin. Likely secondary to PVL production. Will begin linezolid with cessation of vanc. Adding doxycycline for anaerobic coverage. Patient will require 6 weeks of antibiotic therapy. Day 2/42 linezolid. Otherwise, may require percutaneous drainage into the abscess. 4. ARDS: recent history of COVID-19 and pronation therapy. ASA and Lovenox to mitigate microthrombosis being held secondary to anemia. Bronchoscopy 10/13/20 with mucinous return.  Negative respiratory panel, culture shows gram positive cocci/normal guido resulted as staph epidermidis. Covid 19 retest is negative. 5. Septic shock: Patient was febrile overnight. Acetaminophen prn q6. Lactic Clearance 3.2-1.4. CT of ABD/PELVIS revealed no acute abnormality. Patient receiving Levophed with white blood cell count with mild increase. Procalcitonin downtrending, redraw tomorrow. Continue current antibiotics that include linezolid. 6. Acute kidney injury: Baseline creatinine less than 1.  10/21/2020 patient creatinine of 1.7. Patient recently switched to linezolid. Urine creatinine, eosinophils, sodium for evaluation of prerenal etiology and AIN. Redraw BMP today. 1800 cc urine output over last 24 hours. Continue free water flushes. Monitor. Hold Lovenox. 7. Anemia, stable: Normocytic with MCV of 96.6. Unlikely hemo dilution as patient is net negative intake/output. Given TPA during this hospital stay for R ICA stenosis. B12 and folate normal.  Patient's hemoglobin deteriorated 10/19/2020 to level of 6.7, 1 unit PRBCs ordered. Posttransfusion H&H 8.7. Anemia studies indicative of anemia of chronic disease. Follow. Transfuse as necessary for hemoglobin less than 7. Will restart patient's low-dose ASA and Plavix as patient has had steady hemoglobin for 2 days. 8. DMT2, uncontrolled:  Patient was given Decadron 10 milligrams IVP X1. Will continue to monitor with ACCU and SSI. Most recent blood glucose 169.  9. CVA:  History of recent tPA use, will continue to monitor. Clopidogrel and aspirin restarted. Patient's neurologic exam limited by sedation. Minimally responsive to voice. 10. Constipation: Patient 1 week without a bowel movement. Dulcolax and soapsuds enema as needed. 11. Hypertension, essential: Stable, on metoprolol. 12. Hyperlipidemia: Home rosuvastatin is continued.     INITIAL H AND P AND ICU COURSE:  Rosa Maria Santana is a 67year old  male, transferred to [facility] ICU 10/12/2020 for intubation. Patient is a 23-year-old morbidly obese white male reformed smoker. Mckayla Bee has a history of diabetes, hyperlipidemia, and hypertension. Glo Viera was admitted from the emergency room on 9/24/2020 with hypoxemic respiratory failure secondary to [address] had associated acute renal injury.  He required 100% nonrebreather and was started on Decadron and Remdesivir.  He continued to deteriorate and required intubation on 9/27/2020.  Acute lung injury progressed to ARDS and started pronation therapy on 9/29/2020. Flakita Vidales was found to have bilateral pneumonia with Klebsiella pneumoniae and Haemophilus influenza which was treated with Zosyn.  Patient completed 12-day course of Zosyn on 10/5/20. Patient extubated to 10/4/22 intermittent BiPAP. Patient had neurologic decline in the early morning hours of 10/7/2020.  Code stroke was initiated, and patient underwent neurologic assessment.  The decision was made to administer TPA as the patient had left sided weakness and increased confusion.  TPA was administered without difficulty. MRI was negative for acute stroke but CTA showed a severe R ICA stenosis. Neurology felt tPA likely aborted his CVA given his COVID 23 that put him in a hypercoagulable state. 10/10/2020 Patient was transferred to the Hospitalist Service. Consults to Pulmonary and Cardiology services. Patient was confused at times, and was tolerating alternating between HFNC and Bipap. New onset atrial fibrillation which patient did convert with IVP Lopressor. Patient was placed on CPAP with concerns of HUSSEIN. 10/12/2020 Noted increase in work of breathing patient had failed Bipap therapy. He was transferred to ICU for further management and care. Patient received bronchoscopy on 10/13/2020. PEG and chest tube consent obtained from son on 10/15/2020. Right chest tube performed 10/16/2020. It produced a moderate amount of serosanguineous fluid.    10/19/20 CT scan revealing increased cavitation size consider percutaneous drainage and chemical pneumolysis for effusion improvement, patient switched to doxycycline and linezolid. 10/21/2020 acute kidney injury with creatinine of 1.7. Work-up to evaluate prerenal etiology or AIN secondary to antibiotic change. Past Medical History:  See HPI. Family History: Mother and Father unknown. Social History: Former smoker, denies any ETOH or illicit drug use. .    ROS   Unable to complete secondary to patient's sedation and ventilatory status. PHYSICAL EXAMINATION:  Temperature 100.8, heart rate 89 sinus, respirations 16, blood pressure 104/57, oxygen saturation 91% on ventilator. Current vent settings: PCV ventilation, 25/12, 60% FiO2. Body mass index is 32.49 kg/m². General:   Pale, acute ill in appearance  HEENT:  normocephalic and atraumatic. No scleral icterus. PERRL. Neck: supple. No Thyromegaly. Lungs: positive for diffuse rhonchi in R lung. Right posterior chest tube with serosanguineous drainage. Cardiac: RRR-sinus without tachycardia. S1-S2, no extraneous heart sounds. no JVD. Abdomen: soft. Nontender, bowel sounds present  Extremities:  No clubbing, cyanosis. Mild bilateral pitting edema +1 up to mid tibia. Vasculature: capillary refill < 3 seconds. Palpable dorsalis pedis pulses. Skin:  warm and dry. Lymph:  No supraclavicular adenopathy. Neurologic:  No appreciable focal deficit. No seizures. Data: (All radiographs, tracings, PFTs, and imaging are personally viewed and interpreted unless otherwise noted). Sodium 134, potassium 5.1, chloride 101, bicarb 25, BUN 21, creatinine 1.7, glucose 169, calcium 6.7  White count 20, hemoglobin 8.4, hematocrit 27.8, platelets 192       10/20/2020 BAL resulting staph aureus by PCR with resistance gene detected.  10/20/2020 3 cytologic studies negative for malignant cells.    Ferritin 1499, iron 20, TIBC 100   Pleural fluid: Glucose 157, , protein 3.6, mononuclear 22.9, poly-Thorne for nuclear 77, total nucleated 3087.  10/19/2020: CT head no acute hemorrhage.  10/19/2020: CT chest without contrast with worsening cavitation within the right lower lobe, persistent pleural effusion, new pneumoperitoneum secondary to PEG placement.  10/17/2020 acid-fast culture and smear no bacilli seen culture pending.  10/13/20 BAL culture with gram positive cocci/normal guido staph epidermidis methicillin-resistant.  10/13/20 Albumin 2.0, alk phos 83, ALT 64, AST 43, bilirubin 0.5, total protein 5.5   10/13/20 CT chest, abdomen, pelvis revealed bilateral diffuse consolidation of the lungs and right pleural effusion.  Echo 10/7/2020 LVEF 55%.  Cardiac telemetry NSR   10/12/2020 pneumonia panel negative   10/12/2020 urine culture no growth with contaminants   10/6/2020 blood cultures no growth final result. Seen with multidisciplinary ICU team yes. Meets Continued ICU Level Care Criteria:    [x] Yes   [] No - Transfer Planned to listed location:  [] HOSPITALIST CONTACTED-      Case and plan discussed with Dr. Felice Escobedo. Electronically signed by [place], DO  CRITICAL CARE SPECIALIST  Patient seen by me. Case discussed with resident physician. Patient remains critically ill on mechanical ventilator. Patient underwent bronchoscopy 10/20/2020 which showed extensive mucus production to the lower lobes. CT scan showed increasing abscess to the right lower lobe. Patient growing MRSA and PCR demonstrates MRSA. Infiltrates and abscess increased despite vancomycin therapy. Concern for PVL gene production. Patient transitioned to linezolid on 10/20/2020 given concerns for PVL. Discontinue doxycycline. CT scan chest in the morning. Italicized font represents changes to the note made by me. CC time 35 minutes. Time does not include procedures. Time was discontiguous.   Time includes my direct assessment of the patient and coordination of care.  Electronically signed by Abdirashid Covington.  Tyrone Lerma MD.

## 2020-10-21 NOTE — CARE COORDINATION
10/21/20, 3:13 PM EDT    DISCHARGE ON GOING EVALUATION    [address] day: 27  Location: 3A-04/004-A Reason for admit: COVID-19 [U07.1]   Procedure:   9/24 Vapotherm initiated  9/27 Intubated  9/27 CVC Right subclavian - 10/5 removed  9/29 - 10/2 Rotoprone therapy   10/1 Convalescent Plasma  10/4 Extubated  10/4 Vapotherm initiated  69/5 PICC right basilic  01/7 Code stroke - TPA given   10/12 Re-intubated. 10/13 CT chest: Nonspecific diffuse bilateral consolidation and pleural fluid.   10/13 Bronchoscopy -mucous to the lower lobes bilaterally. 10/16 Right Pleural cath placement.   10/16 PEG tube placement  10/19 CT Head: No acute stroke or hemorrhage  10/19 CT Chest: Relatively stable bilateral consolidations. A cavitation within consolidated right lower lobe has increased in size; Persistent right pleural effusion, despite placement of pleural drain; Interval development of moderate sized left pleural effusion; Pneumoperitoneum associated with recent placement of percutaneous gastrostomy tube   10/20 Bronch w/washings: Diffuse bilateral mucus production  10/20 TPA/DNase to CT  10/21 CXR:   1. Cardiomegaly. Pigtail catheter pleural space right side. ET tube in good position. Right arm PICC line with catheter tip in SVC. Esophageal tube present on yesterday study has been removed. 2. No pneumothorax. Questionable tiny bilateral pleural effusions. 3. Moderately severe mixed infiltrates scattered throughout both lungs, worse on the left. Slight improvement from prior. Treatment Plan of Care: Bronched yesterday and washing sent. TPA and DNase to chest tube yesterday. COVID 19 test sent yesterday and CMV by PCR sent yesterday. CT Chest ordered. Remains on vent w/ETT on PCV, peep 12, 70% FIO2, sats 94%. Tmax 101.8. NSR. Unable to follow commands; LEVY x4 to painful stim. CT to -20 sx, no leak, with 40 ml out in 24 hrs. Dietitian and Palliative Care following.  Respiratory culture +MRSJACK. COVID negative on 10/13 - 2nd test pending. Free water flushed 200 ml QID. Cardiac monitoring, I&O, daily weight, oral care, CT care, PEG w/TF, SCDs, pollock care. Precedex @ 1.4 mcg/kg/hr, fentanyl @ 200 mcg/hr, levo @ 4 mcg/min, asa, plavix, pepcid, inhaler, lantus, SSI Q4H, IV zyvox, prn IV versed, protonix, K+, crestor, vit D, Electrolyte replacement protocols. Na+ 134, Creat 1.7, wbc 20, hgb 8.8. Barriers to Discharge: on vent  PCP: David Clark MD  Readmission Risk Score: 29%  Patient Goals/Plan/Treatment Preferences: From home with wife. Plan pending clinical course. Will need SLP/PT/OT when appropriate. Will require precert for placement.

## 2020-10-21 NOTE — PROGRESS NOTES
Comprehensive Nutrition Assessment    Type and Reason for Visit:  Reassess(TF management)    Nutrition Recommendations/Plan:   Increase Vital 1.2 to 40 ml/hr   Continue bolus of 1 proteinex 2GO (2.5 oz liquid protein bottle) BID  Continue water flushes per MD (200 ml 4 times/day)  Continue Bowel meds per MD    Nutrition Assessment:     Pt improving from a nutritional standpoint AEB patient's tube feeding was increased to 30 ml/hr and is tolerating. Remains at risk for further nutritional compromise r/t admitted with +covid, intubated since 10/12, overweight, increased needs to support wound healing, CVA with TPA which likely aborted a CVA CTA shows severe ICA stenosis, no documented BM for 7d since 10/14, and underlying medical condition (hx: DM, HLD, HTN). Nutrition recommendations/interventions as per above. Malnutrition Assessment:  Malnutrition Status:  Insufficient data(+covid patient)    Context:  Acute Illness     Findings of the 6 clinical characteristics of malnutrition:  Energy Intake:  (EN or NPO since admit)  Weight Loss:  Unable to assess(no weight hx in EMR)     Body Fat Loss:  Unable to assess(+covid)     Muscle Mass Loss:  Unable to assess(+covid)    Fluid Accumulation:  No significant fluid accumulation     Strength:  Not Performed    Estimated Daily Nutrient Needs:  Energy (kcal):  1124-6732 (30-32 kcals/kg for late active phase);  Weight Used for Energy Requirements:  Ideal(70 kg - ideal weight)     Protein (g):  140 (2.0 grams/kg ideal weight); Weight Used for Protein Requirements:  (70 kg - ideal weight)        Fluid (ml/day):  per MD;      Nutrition Related Findings:  covid; intubated 10/12; tolerating TF this am at 30ml/hour - will increase today; last recorded BM was 10/14 (7d) spoke with RN this am and she had given glycolax and planned to give suppository and enema today also; abd. soft; received 88% of Rx TF volume past 24h; meds include IV doxycycline, lantus, humalog, vitamin D, precedex, fentanyl; glucose 147  BUN 21  creatinine 1.7  MAP 67  K+ 5.1      Wounds:  Stage II, Unstageable(stage 2 coccyx, unstageable buttock)       Current Nutrition Therapies:    Current Tube Feeding (TF) Orders:  · Feeding Route: PEG(placed 10/16)  · Formula: Semi-Elemental(Vital 1.2)  · Schedule: Continuous(increased to 40ml/hour 10/21)  · Additives/Modulars: (Proteinex 2 go 2.5 oz.  BID (208 kcals, 52 gm protein))  · Water Flushes: per MD - 200ml 4x daily  · Current TF & Flush Orders Provides: Vital 1.2 40ml/hour will provide 1360 kcals, 124 gms protein, 106 gms cho, 5 gms fiber, 929ml free H20 (Vital + modular) (1579ml with MD flush)/24h  · Goal TF & Flush Orders Provides: Vital 1.2 at 75 ml/hr = 1800 ml, 2160 kcals, 135 grams protein, 199 gram CHO, 9 grams fiber, 2260 ml water (1460 vital, 800 MD flushes)      Anthropometric Measures:  · Height: 5' 8\" (172.7 cm)  · Current Body Weight: 213 lb (96.6 kg)(10/21 with +1 edema)   · Admission Body Weight: 220 lb (99.8 kg)(9/24/20, no weight source)    · Usual Body Weight: (no weight hx available in EMR, patient intubated)     · Ideal Body Weight: 154 lbs;   · BMI: 32.4  · BMI Categories: Obese Class 1 (BMI 30.0-34.9)(32.49)       Nutrition Diagnosis:   · Inadequate oral intake related to impaired respiratory function as evidenced by NPO or clear liquid status due to medical condition, intubation, nutrition support - enteral nutrition      Nutrition Interventions:   Food and/or Nutrient Delivery:  Continue NPO, Modify Tube Feeding  Nutrition Education/Counseling:  No recommendation at this time   Coordination of Nutrition Care:  Continued Inpatient Monitoring, Interdisciplinary Rounds    Goals:  Patient will tolerated EN appropriate for late phase covid-19 infection       Nutrition Monitoring and Evaluation:   Behavioral-Environmental Outcomes:  (n/a)   Food/Nutrient Intake Outcomes:  Enteral Nutrition Intake/Tolerance  Physical Signs/Symptoms Outcomes: Biochemical Data, GI Status, Constipation, Fluid Status or Edema, Hemodynamic Status, Weight, Skin     Discharge Planning:     Too soon to determine     Electronically signed by Eda Shelton RD, LD on 10/21/20 at 1:13 PM EDT    Contact: [phone]

## 2020-10-22 NOTE — PLAN OF CARE
Problem: MECHANICAL VENTILATION  Goal: Will be able to breathe spontaneously, without ventilator support  Description: Will be able to breathe spontaneously, without ventilator support  10/22/2020 0846 by Domingo Snellen, RCP  Outcome: Ongoing   Vent setting optimized to achieve target tidal volume, respiratory rate and ideal oxygen saturations. SBT will be performed when appropriate.     SBT not indicated at this time

## 2020-10-22 NOTE — PROGRESS NOTES
[facility] THERAPY MISSED TREATMENT NOTE  [facility] CCU 3A      Date: 10/22/2020  Patient Name: Pearl Malone        MRN: [id number]    : 1945  (67 y.o.)    REASON FOR MISSED TREATMENT:   Patient is currently not medically appropriate for skilled ST evaluation/treatment d/t current intubation/ventilation. ST plans to continue monitoring the pt's chart and check-in with RN-- ST with plans to re-attempt on a later date/time as schedule permits, pt is medically appropriate, and/or available.      Frieda Reddy M.S. Caludio Muro 10/22/2020

## 2020-10-22 NOTE — PROGRESS NOTES
Call received from patient's daughter-in-law, Charanjit Becerril. Updated on patient's condition and POC. Charanjit Becerril voiced understanding and denied any further questions.

## 2020-10-22 NOTE — PROGRESS NOTES
This RN and Te Egan RCP at bedside. Patient's Spo2 89-91% and patient having HR bursts of 110-140 with frequent PVCs and PACs. EKG ordered for verify rhythm. This RN spoke with Kimmie Chapman CNP and informed her of patient's hypoxia and tachycardia. Brady Carter spoke with Dr. Keira Louis and stated to have Te Egan RCP change ventilator settings to pressure control ventilation, rate of 12, and PC 20. This RN informed Ramandeep Christianson. [facility] EKG tech notified of STAT order.

## 2020-10-22 NOTE — FLOWSHEET NOTE
10/22/20 4932   Provider Notification   Reason for Communication Critical Value (comment)  (K+ 6.0)   Provider Name Kathryn Tiwari   Provider Notification Physician   Method of Communication Secure Message   Response See orders   Notification Time 9899     Notified Dr. Kathryn Tiwari of patient's critical K+ of 6.0. See orders for urine collections.

## 2020-10-22 NOTE — CARE COORDINATION
10/22/20, 3:12 PM EDT    DISCHARGE ON GOING [address] [facility] [facility] day: 28  Location: 3A-04/004-A Reason for admit: COVID-19 [U07.1]   Procedure: 9/24 Vapotherm initiated  9/27 Intubated  9/27 CVC Right subclavian - 10/5 removed  9/29 - 10/2 Rotoprone therapy   10/1 Convalescent Plasma  10/4 Extubated  10/4 Vapotherm initiated  77/7 PICC right basilic  03/4 Code stroke - TPA given   10/12 Re-intubated. 10/13 Bronchoscopy -mucous to the lower lobes bilaterally. 10/16 Right Pleural cath placement.   10/16 PEG tube placement  10/20 Bronch w/washings: Diffuse bilateral mucus production  10/20 TPA/DNase to CT  Treatment Plan of Care: Intensivist following. Tmax 102.2F. Remains intubated with the following vent settings: CPAP. FiO2 80%. Fentanyl and Precedex gtts. Levophed gtt. Tube feedings. Respiratory PCR on 10/20 negative for Coronavirus, positive for Staph aureus. WBC 21.3. Hgb 8.4. Potassium 6.0. Sodium 130. Tylenol prn, Peridex, Pepcid iv bid, Lasix iv bid, Zyvox iv q12hr. Lopressor iv q6hr. Barriers to Discharge: vent/pressors. PCP: Jeannette Deleon MD  Readmission Risk Score: 35%  Patient Goals/Plan/Treatment Preferences: From home with wife. Plan pending clinical course. Will need SLP/PT/OT when appropriate. Will require precert for placement.

## 2020-10-22 NOTE — PROGRESS NOTES
CRITICAL CARE PROGRESS NOTE      Patient:  Chito Splinter    Unit/Bed:3A-04/004-A  YOB: 1945  MRN: [id number]   PCP: Erich Hurt MD  Date of Admission: 9/24/2020  Chief Complaint:- acute respiratory failure    Assessment and Plan:   1. Acute respiratory failure with hypoxia, improving: Patient became hypoxic and tachypneic while on BiPAP, subsequently intubated the night of 10/12/20. Continue PCV ventilation with lung protection strategies targeting a peak pressure 35 and less. Air leak today, hemopneumothorax. Ventilatory settings adjusted, 10/0 at 70%. Patient maintaining adequate tidal volumes at this time and is not tachypneic. Satting in the low 90s. Continue to wean requirement as tolerated. 2. Exudative pleural effusion, right: Large right pleural effusion with lower lobe atelectasis from CT on 10/13/20.  10/16/20 pigtail catheter placed in the right lung. Produced serosanguineous discharge. Pleural fluid analysis; glucose 157, , protein 3.6, total nucleated cells 3087. Positive Lights criteria, exudative effusion. All 3 sets of cytology without malignant cells. Chemical pneumolysis completed on 10/20/2020. CT evaluation following the pneumolysis on 10/22/2020 revealed large right pneumothorax with effusion. Initiated lung protective strategies of ventilation at 10/0 70% FiO2. Ensure patient remains nontachypneic as this signifies adequate tidal volumes and ventilation. 3. Right lower lobe pulmonary abscess: Sputum growing MRSE. Molecular pneumonia panel resulting resistant staph aureus by PCR. No improvement on vancomycin. Likely secondary to PVL production. Will begin linezolid with cessation of vanc. Patient will require 6 weeks of antibiotic therapy. Day 3/42 linezolid. Otherwise, may require percutaneous drainage of the abscess. 4. ARDS: recent history of COVID-19 and pronation therapy.  ASA and Lovenox to mitigate microthrombosis being held secondary to anemia. Bronchoscopy 10/13/20 with mucinous return. Negative respiratory panel, culture shows gram positive cocci/normal guido resulted as staph epidermidis. Covid 19 retest is negative. 5. Septic shock: Patient was febrile overnight with low-grade temperature. Acetaminophen prn q6. Lactic Clearance 3.2-1.4. CT of ABD/PELVIS revealed no acute abnormality. Patient receiving Levophed with white blood cell count with mild increase. Procalcitonin 10/22/20, 2.21. Continue current antibiotics. 6. Acute kidney injury, RTA type IV: Baseline creatinine less than 1.  10/22/2020 patient creatinine of 2.4. No urine eosinophils. No serum anion gap. 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 Further evaluation with serum aldosterone, serum renin, and serum cortisol. Patient receiving bicarb drip. Added fluticasone and Lasix. Redraw BMP daily. Hold Lovenox. 7. Hyperkalemia: Most recent potassium of 6. Secondary to #6. Kayexalate and continue bicarb drip. No EKG changes. 10 units IV insulin administered. Monitor with BMP. 8. Anemia, stable: Normocytic with MCV of 99.7. Given TPA during this hospital stay for R ICA stenosis. B12 and folate normal.  Patient's hemoglobin deteriorated 10/19/2020 to level of 6.7, 1 unit PRBCs ordered. Posttransfusion H&H 8.7. Anemia studies indicative of anemia of chronic disease. Follow. Transfuse as necessary for hemoglobin less than 7. Will restart patient's low-dose ASA and Plavix. 9. DMT2, uncontrolled:  Patient was given Decadron 10 milligrams IVP X1. Will continue to monitor with ACCU and SSI. Most recent blood glucose 204. 10. CVA:  History of recent tPA use, will continue to monitor. Clopidogrel and aspirin restarted. Patient's neurologic exam limited by sedation. 11. Constipation: Patient 1 week without a bowel movement. Dulcolax and soapsuds enema as needed.   12. Hypertension, essential: Stable, on metoprolol. 13. Hyperlipidemia: Home rosuvastatin is continued. INITIAL H AND P AND ICU COURSE:  Kenton Cabot is a 67year old  male, transferred to [facility] ICU 10/12/2020 for intubation. Patient is a 76-year-old morbidly obese white male reformed smoker. [facility] has a history of diabetes, hyperlipidemia, and hypertension. Korina Hugo was admitted from the emergency room on 9/24/2020 with hypoxemic respiratory failure secondary to [address] had associated acute renal injury.  He required 100% nonrebreather and was started on Decadron and Remdesivir.  He continued to deteriorate and required intubation on 9/27/2020.  Acute lung injury progressed to ARDS and started pronation therapy on 9/29/2020. Thi Duff was found to have bilateral pneumonia with Klebsiella pneumoniae and Haemophilus influenza which was treated with Zosyn.  Patient completed 12-day course of Zosyn on 10/5/20. Patient extubated to 10/4/22 intermittent BiPAP. Patient had neurologic decline in the early morning hours of 10/7/2020.  Code stroke was initiated, and patient underwent neurologic assessment.  The decision was made to administer TPA as the patient had left sided weakness and increased confusion.  TPA was administered without difficulty. MRI was negative for acute stroke but CTA showed a severe R ICA stenosis. Neurology felt tPA likely aborted his CVA given his COVID 23 that put him in a hypercoagulable state. 10/10/2020 Patient was transferred to the Hospitalist Service. Consults to Pulmonary and Cardiology services. Patient was confused at times, and was tolerating alternating between HFNC and Bipap. New onset atrial fibrillation which patient did convert with IVP Lopressor. Patient was placed on CPAP with concerns of HUSSEIN. 10/12/2020 Noted increase in work of breathing patient had failed Bipap therapy. He was transferred to ICU for further management and care. Patient received bronchoscopy on 10/13/2020.   PEG and chest tube consent obtained from son on 10/15/2020. Right chest tube performed 10/16/2020. It produced a moderate amount of serosanguineous fluid. 10/19/20 CT scan revealing increased cavitation size consider percutaneous drainage and chemical pneumolysis for effusion improvement, patient switched to doxycycline and linezolid. 10/21/2020 acute kidney injury with creatinine of 1.7. Work-up to evaluate prerenal etiology or AIN secondary to antibiotic change. 10/22/20 renal labs indicate RTA 4. Initiation of steroids and loop diuretic. Follow with BMP. CT chest with large right hemopneumothorax. Adjusted ventilator settings to reduce barotrauma and maintain oxygenation. Past Medical History:  See HPI. Family History: Mother and Father unknown. Social History: Former smoker, denies any ETOH or illicit drug use. .    ROS   Unable to complete secondary to patient's sedation and ventilatory status. PHYSICAL EXAMINATION:  Temperature 100.6, heart rate 92, respirations 15, blood pressure 106/61, oxygen saturation 98%   Current vent settings: PCV ventilation, 10/0, 70% FiO2. Body mass index is 32.6 kg/m². General:   Pale, acute ill in appearance  HEENT:  normocephalic and atraumatic. No scleral icterus. PERRL. Neck: supple. No Thyromegaly. Lungs: positive for diffuse rhonchi in R lung with end expiratory wheezes. Right posterior chest tube with serosanguineous drainage. Cardiac: RRR-sinus without tachycardia. S1-S2, no extraneous heart sounds. no JVD. Abdomen: soft. Nontender, bowel sounds present  Extremities:  No clubbing, cyanosis. Mild bilateral pitting edema +1 up to mid tibia. Vasculature: capillary refill < 3 seconds. Palpable dorsalis pedis pulses. Skin:  warm and dry. Neurologic:  No appreciable focal deficit. No seizures. Data: (All radiographs, tracings, PFTs, and imaging are personally viewed and interpreted unless otherwise noted).       Sodium 130, potassium 6.0, chloride 95, bicarb 27, BUN 34, creatinine 2.4, glucose 204, calcium 7.4, procalcitonin 2.21  White count 21.3, hemoglobin 8.4, hematocrit 20.8, platelets 239. Urine chloride less than 20, urine sodium less than 20, urine creatinine 104, urine potassium 41.       10/22/2020 Noncon CT chest with large right hydropneumothorax   10/20/2020 BAL resulting staph aureus by PCR with resistance gene detected.  10/20/2020 3 cytologic studies negative for malignant cells.  Ferritin 1499, iron 20, TIBC 100   Pleural fluid: Glucose 157, , protein 3.6, mononuclear 22.9, poly-Thorne for nuclear 77, total nucleated 3087.  10/19/2020: CT head no acute hemorrhage.  10/19/2020: CT chest without contrast with worsening cavitation within the right lower lobe, persistent pleural effusion, new pneumoperitoneum secondary to PEG placement.  10/17/2020 acid-fast culture and smear no bacilli seen culture pending.  10/13/20 BAL culture with gram positive cocci/normal guido staph epidermidis methicillin-resistant.  10/13/20 Albumin 2.0, alk phos 83, ALT 64, AST 43, bilirubin 0.5, total protein 5.5   10/13/20 CT chest, abdomen, pelvis revealed bilateral diffuse consolidation of the lungs and right pleural effusion.  Echo 10/7/2020 LVEF 55%.  Cardiac telemetry NSR   10/12/2020 pneumonia panel negative   10/12/2020 urine culture no growth with contaminants   10/6/2020 blood cultures no growth final result. Seen with multidisciplinary ICU team yes. Meets Continued ICU Level Care Criteria:    [x] Yes   [] No - Transfer Planned to listed location:  [] HOSPITALIST CONTACTED-      Case and plan discussed with Dr. Katie Cash. Electronically signed by Cody Monae DO  CRITICAL CARE SPECIALIST  Patient seen by me. Case discussed with resident physician. Patient remains critically ill on mechanical ventilator. Patient underwent bronchoscopy 10/20/2020 which showed extensive mucus production to the lower lobes.   CT scan showed increasing abscess to the right lower lobe. Patient growing MRSA and PCR demonstrates MRSA. Infiltrates and abscess increased despite vancomycin therapy. Concern for PVL gene production. Patient transitioned to linezolid on 10/20/2020 given concerns for PVL. Discontinue doxycycline. CT scan chest 10/22/2020 showed large pneumothorax despite having draining chest tube. Lung appears to be partially tethered to the pleura and some areas in collapsed and others. There is a pleural rind on the outside of parts of the right lung. I am concerned that the right limb may be entrapped. Intensify suction on the chest tube to see if the lung can be pulled open. Patient may require surgical decortication to allow the lung to expand. Currently, the patient is too critically ill for surgical decortication. Patient was noted to have a small left pleural effusion on CT scan. Will assess pleural fluid with ultrasound for possible chest tube placement. Patient has atrial fibrillation with rapid ventricular response. Amiodarone initiated. Awaiting echocardiogram.  Awaiting troponin. Anticoagulated. Chemical ventilator dependent. CC time 35 minutes. Time does not include procedures. Time was discontiguous. Time includes my direct assessment of the patient and coordination of care. Electronically signed by Tamela Aguilar MD.

## 2020-10-22 NOTE — FLOWSHEET NOTE
10/22/20 0143   Provider Notification   Reason for Communication Review case  (increased chest tube output)   Provider Name Deanna Brought   Provider Notification Advance Practice Clinician (CNS, NP, CNM, CRNA, PA)   Method of Communication Secure Message   Response No new orders   Notification Time 0143       Notified Trygve Brought that patient's chest tube has had 1110 mls out since last assessment. No new orders at this time.

## 2020-10-22 NOTE — PROGRESS NOTES
Comprehensive Nutrition Assessment    Type and Reason for Visit:  Reassess(TF management)    Nutrition Recommendations/Plan:   Increase Vital 1.2 to 50 ml/hr  Continue to bolus 1 proteinex 2GO (2.5 oz liquid protein bottle) BID  Continue water flushes per MD (200 ml 4 times/day)  Consider Culturelle or other bowel meds as per MD    Nutrition Assessment:  Pt improving from a nutritional standpoint AEB patient appears to be tolerating enteral nutrition. Remains at risk for further nutritional compromise r/t admitted with +covid, intubated since 10/12, overweight, increased needs to support wound healing, CVA with TPA with likely aborted a CVA, CTA show severe ICA stenosis, no documented BM since 10/14, and underlying medical condition (hx: DM, HLD, HTN). Nutrition recommendations/interventions as per above. Malnutrition Assessment:  Malnutrition Status:  Insufficient data(+covid patient)    Context:  Acute Illness     Findings of the 6 clinical characteristics of malnutrition:  Energy Intake:  (EN or NPO since admit)  Weight Loss:  Unable to assess(no weight hx in EMR)     Body Fat Loss:  Unable to assess(+covid)     Muscle Mass Loss:  Unable to assess(+covid)    Fluid Accumulation:  No significant fluid accumulation     Strength:  Not Performed    Estimated Daily Nutrient Needs:  Energy (kcal):  2953-5909 (30-32 kcals/kg for late active phase); Weight Used for Energy Requirements:  Ideal(70 kg - ideal weight)     Protein (g):  140 (2.0 grams/kg ideal weight); Weight Used for Protein Requirements:  (70 kg - ideal weight)        Fluid (ml/day):  per MD; Weight Used for Fluid Requirements:  (n/a)      Nutrition Related Findings:  +covid; intubated 10/12; MAP: 117. Patient's tube feeding is at 40 ml/hr and note that patient received 84% of Rx tube feeding over the last 24 hours. Last BM documented was on 10/14. Not able to speak to RN as she is busy with antoher patient.  Labs: sodium: 130, Potassium: 6.0, BUN: 34, Creatinine: 2.4, POC: 221. Meds: Lantus, Humalog, Linezolid, Vitamin D, Precedex, Fentanyl, Levophed. Wounds:  Stage II, Unstageable(stage 2 coccyx, unstageable buttock)       Current Nutrition Therapies:    Current Tube Feeding (TF) Orders:  · Feeding Route: PEG(placed 10/16)  · Formula: Semi-Elemental(Vital 1.2)  · Schedule: (plan to increase to 50 ml/hr on 10/22)  · Additives/Modulars: (Proteinex 2 go 2.5 oz.  BID (208 kcals, 52 gm protein))  · Water Flushes: per MD - 200ml 4x daily  · Current TF & Flush Orders Provides: Vital 1.2 at 50 ml/hr with protein modular BID = 1648 kcals (1440 vital, 208 protein), 142 grams protein (90 vital, 52 protein),133 grams CHO, 6 grams fiber, 1773 ml (973 ml vital, 800 ml MD flushes)  · Goal TF & Flush Orders Provides: Vital 1.2 at 75 ml/hr = 1800 ml, 2160 kcals, 135 grams protein, 199 gram CHO, 9 grams fiber, 2260 ml water (1460 vital, 800 MD flushes)    Anthropometric Measures:  · Height: 5' 8\" (172.7 cm)  · Current Body Weight: 214 lb (97.1 kg)(10/22, bedscale with +1 edema)   · Admission Body Weight: 220 lb (99.8 kg)(9/24/20, no weight source)    · Usual Body Weight: (no weight hx available in EMR, patient intubated)     · Ideal Body Weight: 154 lbs;   · BMI: 32.5  · Adjusted Body Weight:  ; (78 kg for TPN doing)    · BMI Categories: Obese Class 1 (BMI 30.0-34.9)(32.60)       Nutrition Diagnosis:   · Inadequate oral intake related to impaired respiratory function as evidenced by NPO or clear liquid status due to medical condition, intubation, nutrition support - enteral nutrition    Nutrition Interventions:   Food and/or Nutrient Delivery:  Continue NPO, Modify Tube Feeding  Nutrition Education/Counseling:  No recommendation at this time   Coordination of Nutrition Care:  Continued Inpatient Monitoring, Interdisciplinary Rounds    Goals:  Patient will tolerated EN appropriate for late phase covid-19 infection       Nutrition Monitoring and Evaluation: Behavioral-Environmental Outcomes:  (n/a)   Food/Nutrient Intake Outcomes:  Enteral Nutrition Intake/Tolerance  Physical Signs/Symptoms Outcomes:  Biochemical Data, GI Status, Fluid Status or Edema, Hemodynamic Status, Weight, Skin     Discharge Planning:     Too soon to determine     Electronically signed by Jonny Drake RD, LD on 10/22/20 at 11:10 AM EDT    Contact: [phone]

## 2020-10-23 NOTE — PROGRESS NOTES
Daily    bisacodyl  10 mg Rectal Daily    fludrocortisone  0.1 mg PEG Tube Daily    linezolid  600 mg Intravenous Q12H    insulin glargine  7 Units Subcutaneous Nightly    clopidogrel  75 mg Per NG tube Daily    insulin lispro  0-18 Units Subcutaneous Q4H    rosuvastatin  40 mg Per NG tube Nightly    sodium chloride flush  10 mL Intravenous 2 times per day    chlorhexidine  15 mL Mouth/Throat BID    metoprolol  5 mg Intravenous Q6H    [Held by provider] enoxaparin  1 mg/kg Subcutaneous Q12H    calcium replacement protocol   Other RX Placeholder    [Held by provider] pantoprazole  40 mg Intravenous Daily    And    [Held by provider] sodium chloride (PF)  10 mL Intravenous Daily    glycopyrrolate-formoterol  2 puff Inhalation BID    aspirin  81 mg Per NG tube Daily    lidocaine 1 % injection  5 mL Intradermal Once    [Held by provider] amLODIPine  10 mg Per NG tube Daily    Vitamin D  1,000 Units Per NG tube Daily    phosphorus replacement protocol   Other RX Placeholder     Continuous Infusions:    furosemide (LASIX) 1mg/ml infusion 20 mg/hr (10/23/20 0718)    vasopressin (Septic Shock) infusion 0.04 Units/min (10/23/20 0428)    amiodarone 0.5 mg/min (10/22/20 2303)    fentaNYL (SUBLIMAZE) 1250 mcg in sodium chloride 0.9 % 250 mL 200 mcg/kg/hr (10/23/20 0317)    sodium bicarbonate infusion 100 mL/hr at 10/22/20 2009    dexmedetomidine 1.4 mcg/kg/hr (10/23/20 0445)    norepinephrine 22 mcg/min (10/23/20 0317)    dextrose       PRN Meds:  albuterol, sodium chloride flush, acetaminophen **OR** acetaminophen, promethazine **OR** ondansetron, fentanNYL, midazolam, promethazine **OR** [DISCONTINUED] ondansetron, potassium chloride, potassium chloride, magnesium sulfate, polyethylene glycol, glucose, dextrose, glucagon (rDNA), dextrose    Input/Output:       I/O last 3 completed shifts: In: 7223.3 [I.V.:5374.3; NG/GT:1849]  Out: 3428 [Urine:225; Chest Tube:955].       Patient Vitals for the past 96 hrs (Last 3 readings):   Weight   10/23/20 0300 254 lb (115.2 kg)   10/22/20 0434 214 lb 6.4 oz (97.3 kg)   10/21/20 0600 213 lb 11.1 oz (96.9 kg)       Vital Signs:   Temperature:  Temp: (bathing pt at this time)  TMax:   Temp (24hrs), Av.9 °F (38.3 °C), Min:99.3 °F (37.4 °C), Max:102.6 °F (39.2 °C)    Respirations:  Resp: 17  Pulse:   Pulse: 92  BP:    BP: (!) 130/42  BP Range: Systolic (05RZT), TOE:866 , Min:83 , TQL:572       Diastolic (44GOT), CQS:00, Min:39, Max:110      Physical Examination:     General:  Intubated, sedated  HEENT: NC/AT/ MMM +endotracheal tube  Chest:               Coarse breath sounds  Cardiac:  S1 S2   Abdomen: Soft, +PEG tube  Neuro:  sedated  SKIN:  No rashes, good skin turgor. Extremities:  Edema 1+ in legs and edema noted in arms    Labs:       Recent Labs     10/21/20  0528 10/21/20  1400 10/22/20  0550 10/23/20  0450   WBC 20.0*  --  21.3* 19.0*   RBC 2.83*  --  2.89* 2.59*   HGB 8.4* 8.8* 8.4* 7.6*   HCT 27.8* 29.4* 28.8* 27.0*   MCV 98.2*  --  99.7* 104.2*   MCH 29.7  --  29.1 29.3   MCHC 30.2*  --  29.2* 28.1*   *  --  534* 561*   MPV 9.1*  --  9.1* 9.3*      BMP:   Recent Labs     10/22/20  0550 10/22/20  1356 10/23/20  0156 10/23/20  0450   *  --  128* 128*   K 6.0* 5.8* 5.9* 5.9*   CL 95*  --  90* 90*   CO2 27  --  27 27   BUN 34*  --  37* 39*   CREATININE 2.4*  --  2.8* 3.1*   GLUCOSE 213*  --  256* 266*   CALCIUM 7.4*  --  7.0* 7.0*      Phosphorus:   No results for input(s): PHOS in the last 72 hours. Magnesium:  No results for input(s): MG in the last 72 hours. Albumin:  No results for input(s): LABALBU in the last 72 hours.   BNP:    No results found for: BNP  RICHARD:    No results found for: RICHARD  SPEP:  Lab Results   Component Value Date    PROT 5.7 10/16/2020     UPEP:   No results found for: LABPE  C3:   No results found for: C3  C4:   No results found for: C4  MPO ANCA:   No results found for: MPO  PR3 ANCA:   No results found for: PR3  Anti-GBM: No results found for: GBMABIGG  Hep BsAg:       No results found for: HEPBSAG  Hep C AB:        No results found for: HEPCAB    Urinalysis/Chemistries:      Lab Results   Component Value Date    NITRU NEGATIVE 10/12/2020    COLORU RED 10/17/2020    COLORU DK YELLOW 10/12/2020    PHUR 5.0 10/12/2020    LABCAST 0-4 C. GRAN 10/12/2020    WBCUA 0-2 10/12/2020    RBCUA 15-20 10/12/2020    MUCUS THREADS 10/12/2020    YEAST NONE SEEN 10/12/2020    BACTERIA MODERATE 10/12/2020    SPECGRAV 1.018 10/12/2020    LEUKOCYTESUR NEGATIVE 10/12/2020    UROBILINOGEN 1.0 10/12/2020    BILIRUBINUR NEGATIVE 10/12/2020    BLOODU MODERATE 10/12/2020    KETUA NEGATIVE 10/12/2020    AMORPHOUS URATES 10/12/2020     Urine Sodium:   No results found for: NATALIIA  Urine Potassium:  No results found for: KUR  Urine Chloride:  No results found for: CLUR  Urine Osmolarity:   Lab Results   Component Value Date    OSMOU 319 10/18/2020     Urine Protein:   No components found for: TOTALPROTEIN, URINE   Urine Creatinine:   No results found for: LABCREA  Urine Eosinophils:  No components found for: UEOS        Impression and Plan:  1. Oligoanuric SANTI in setting of septic shock likely ATN although urine studies appear prerenal.  He has been on antibiotics. Urine eos negative. Appearing like ATN. At this time he is having hyperkalemia and poor urine output we will need to start him on CRRT for clearance. 2. Hyperkalemia from SANTI: kayexalate 15 grams x 1 and will start CRRT as above  3. Hyponatremia from decreased water excretion from SANTI: should improve with CRRT  4. Hyperphosphatemia from SANTI: should improve with CRRT  5. Hypocalcemia:high Ca bath with CRRT  6. Septic Shock  7. Acute hypoxic respiratory failure due to ARDS  8, Respiratory acidosis  9. Large right pleural effusion with abscess  10. COVID-19  11. Anemia  12. DM        Please don't hesitate to call with any questions.   Electronically signed by Carlin Ramirez DO on 10/23/2020 at 7:33 AM

## 2020-10-23 NOTE — PLAN OF CARE
Problem: Falls - Risk of:  Goal: Will remain free from falls  Description: Will remain free from falls  Outcome: Ongoing  Note: Pt free from falls this shift. Bed alarm on, 2/4 side rails up, call light in reach, fall band on, nonskid socks on, clear pathway, bed in locked and lowest position. Will continue to monitor. Problem: Falls - Risk of:  Goal: Absence of physical injury  Description: Absence of physical injury  Outcome: Ongoing  Note: Pt free from physical injury this shift. Bed alarm on, 2/4 side rails up, call light in reach, fall band on, nonskid socks on, clear pathway, bed in locked and lowest position. Will continue to monitor. Problem: Skin Integrity:  Goal: Will show no infection signs and symptoms  Description: Will show no infection signs and symptoms  Outcome: Ongoing  Note:   Vitals:    10/23/20 0600   BP: (!) 130/42   Pulse: 92   Resp: 17   Temp:    SpO2: 96%       Pt receiving Tylenol for fever and pt is on IV antibiotics. Will continue to monitor. Problem: Skin Integrity:  Goal: Absence of new skin breakdown  Description: Absence of new skin breakdown  Outcome: Ongoing  Note: No new signs of skin breakdown noted this shift. Pt is cleaned when incontinent and is being repositioned every two hours with heels and elbows on pillows. Will continue to monitor. Problem: Airway Clearance - Ineffective  Goal: Achieve or maintain patent airway  Outcome: Ongoing  Note: Pt on vent. Clear airway maintained. Suctioned PRN. Problem: Gas Exchange - Impaired  Goal: Absence of hypoxia  Outcome: Ongoing  Note: Pt O2 sat in upper 90s this shift. Pt on vent. Problem: Breathing Pattern - Ineffective  Goal: Ability to achieve and maintain a regular respiratory rate  Outcome: Ongoing  Note: Pt on vent and respirations between 20-30 this shift. Problem:  Body Temperature -  Risk of, Imbalanced  Goal: Ability to maintain a body temperature within defined limits  Outcome: Ongoing  Note: Vitals:    10/23/20 0600   BP: (!) 130/42   Pulse: 92   Resp: 17   Temp:    SpO2: 96%     Temp elevated and treated with Tylenol ATC. Problem: Isolation Precautions - Risk of Spread of Infection  Goal: Prevent transmission of infection  Outcome: Ongoing  Note: Pt is in droplet plus isolation to help prevent the transmission of infection. Problem: Nutrition Deficits  Goal: Optimize nutrtional status  Outcome: Ongoing  Note: Pt receiving Vital 1.2 via PEG tube at 10ml/hr. Problem: Nutrition  Goal: Optimal nutrition therapy  Outcome: Ongoing  Note: Pt receiving Vital 1.2 via PEG tube at 10ml/hr. Problem: Restraint Use - Nonviolent/Non-Self-Destructive Behavior:  Goal: Absence of restraint indications  Description: Absence of restraint indications  Outcome: Ongoing  Note: Pt pulling against restraints. Restraints in place for pt safety due to lines, ETT, and pollock. Problem: Restraint Use - Nonviolent/Non-Self-Destructive Behavior:  Goal: Absence of restraint-related injury  Description: Absence of restraint-related injury  Outcome: Ongoing  Note: Pt pulling against restraints. Restraints in place for pt safety due to lines, ETT, and pollock. Problem: Infection - Central Venous Catheter-Associated Bloodstream Infection:  Goal: Will show no infection signs and symptoms  Description: Will show no infection signs and symptoms  Outcome: Ongoing  Note:   Vitals:    10/23/20 0600   BP: (!) 130/42   Pulse: 92   Resp: 17   Temp:    SpO2: 96%     Pt receiving Tylenol for fever and pt is on IV antibiotics. Will continue to monitor. Problem: Pain:  Goal: Pain level will decrease  Description: Pain level will decrease  Outcome: Ongoing  Note: Pt unable to communicate pain at this time. Pt on fentanyl gtt. Unable to communicate care plan with pt as pt is intubated and sedated. Family updated via phone call.

## 2020-10-23 NOTE — PROCEDURES
While patient sitting position, the area of the left chest wall mid scapular line for fifth intercostal space was prepped sterilized then 1/2 cm incision was made, a 12 Swedish pigtail catheter was inserted under ultrasound guidance with a good return of 200 mL clear yellowish pleural fluid was sent for analysis. Patient tolerated the procedure very well with no complications, chest tube was secured in place, chest x-ray was reviewed, good position no pneumothorax. Fluid was sent for analysis including LDH protein glucose, pH, Gram stain C&S.

## 2020-10-23 NOTE — PROGRESS NOTES
Pharmacy Renal Adjustment    Darrius Aguilar is a 67 y.o. male. Pharmacy renally adjust the following medications per P&T approved policy: famotidine    Recent Labs     10/23/20  0156 10/23/20  0450   BUN 37* 39*       Recent Labs     10/23/20  0156 10/23/20  0450   CREATININE 2.8* 3.1*       Estimated Creatinine Clearance: 25 mL/min (A) (based on SCr of 3.1 mg/dL [facility] AT [facility])). Calculated CrCl:    Height:   Ht Readings from Last 1 Encounters:   10/09/20 5' 8\" (1.727 m)     Weight:  Wt Readings from Last 1 Encounters:   10/23/20 254 lb (115.2 kg)     Baseline SCr: 0.8    Plan: Adjustments based on renal function:          Decrease famotidine 20 mg BID  to daily. Lovenox is currently on hold. If restarted, frequency may need decreased to Q24H for CrCl <30 ml/min. Patient also had significant weight change so dose may need increased. Will monitor.     Gordy MottaD, BCPS  10/23/2020  6:47 AM

## 2020-10-23 NOTE — PROGRESS NOTES
3970 - received report from Neri Damon, [facility]. Spoke with Janetkvng in [place] who has HIPPA - she is thoroughly updated. 0830 - assessment per flowsheet - pt. Is unresponsive with GCS of 3 - will call LOOP.   0910 - Pt. appears to be having ST elevation on monitor - Stat EKG ordered and called for. [address]- Dr. Velasquez Coppola is in, updated and POC discussed in depth. ECHO tech in and test complete. EKG tech in and test complete - reviewed EKG with Dr. Constantino Hines - also sent to Dr. Velvet Augustin. 0046 - Dr. Verdin Case to see pt., is updated by Dr. Velasquez Coppola. POC discussed. 0930 - Called Palliative Care to speak with family about code status and goals of care. 1010 - labs are drawn  1115 - 1330 - 's Maryam Gallegos to room, place Left Pleural drain - This RN, Ondina Valderrama RN and Stephanie RT help position pt in sitting position for this procedure. When this is complete, pt. Repositioned and they then place a temporary dialysis cath in [address]. Alvina Stapleton RN is updated. Assessment per flowsheet. 1430 - pt.'s family to see pt (wife & son) - others outside of room. 1500 - set up CRRT  1530 - CRRT initiated - no issues.

## 2020-10-23 NOTE — PROGRESS NOTES
CRITICAL CARE PROGRESS NOTE      Patient:  Ashleigh Ontiveros    Unit/Bed:3A-04/004-A  YOB: 1945  MRN: [id number]   PCP: Sam Spencer MD  Date of Admission: 9/24/2020  Chief Complaint:- acute respiratory failure    Assessment and Plan:   1. Acute respiratory failure with hypoxia and hypercapnia, worsened: 10/22/2020 patient developed air leak in right chest tube. Subsequent imaging revealed right hemopneumothorax. Patient placed on CPAP level ventilation to prevent further barotrauma, air trapping. Today, the patient is experiencing worsening hypoxia with hypercarbia and severe acidosis. Left-sided pleural effusion worsening. Ventilatory settings adjusted to [facility] VC with a PEEP of 0, tidal volume 400, respiratory rate 25, and PIP limit of 40. Left-sided pigtail catheter placed with lights criteria work-up on pleural fluid. Satting in the low 90s. Stat noncontrast CT chest when patient stable enough to travel. Patient's condition critical.  2. Empyema, right: Large right pleural effusion with lower lobe atelectasis from CT on 10/13/20.  10/16/20 pigtail catheter placed in the right lung. Produced serosanguineous discharge. Pleural fluid analysis; glucose 157, , protein 3.6, total nucleated cells 3087. Positive Lights criteria, exudative effusion. All 3 sets of cytology without malignant cells. 10/20/2020 respiratory culture growing MRSA. Patient receiving linezolid. Chemical pneumolysis completed on 10/20/2020. CT evaluation following the pneumolysis on 10/22/2020 revealed large right pneumothorax with effusion. Patient now receiving volume mode secondary to EKG changes and inability to ventilate. 3. Right lower lobe pulmonary abscess: Sputum growing MRSE. Molecular pneumonia panel resulting resistant staph aureus by PCR. No improvement on vancomycin. Likely secondary to garrett joseph leukocidin production. Will begin linezolid with cessation of vanc.   Patient will require 6 weeks of antibiotic therapy. Day 4/42 linezolid. Otherwise, may require percutaneous drainage of the abscess. 4. ARDS: recent history of COVID-19 and pronation therapy. ASA and Lovenox to mitigate microthrombosis being held secondary to anemia. Bronchoscopy 10/13/20 with mucinous return. Negative respiratory panel, culture shows gram positive cocci/normal guido resulted as staph epidermidis. Covid 19 retest is negative. 5. Septic shock: Secondary to #2. Patient was febrile overnight with low-grade temperature. Acetaminophen prn q6. Lactic Clearance 3.2-1.4. CT of ABD/PELVIS revealed no acute abnormality. Patient receiving Levophed with white blood cell count with mild increase. Procalcitonin 10/22/20, 2.21. Continue current antibiotics. 6. Acute kidney injury, RTA type IV: Baseline creatinine less than 1. Creatinine increasing steadily. No urine eosinophils. No serum anion gap. 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 Patient receiving bicarb drip. Added fluticasone. Lasix stopped secondary to CRRT. 7. Hyperkalemia: Most recent potassium of 6. Secondary to #6. Kayexalate and continue bicarb drip. Monitor with BMP. 8. Hyponatremia: Currently 126, follow with BMP. Adjusted maintenance fluids to half-normal saline instead of D5.  9. Anemia, stable: Normocytic with MCV of 99.7. Given TPA during this hospital stay for R ICA stenosis. B12 and folate normal.  Patient's hemoglobin deteriorated 10/19/2020 to level of 6.7, 1 unit PRBCs ordered. Posttransfusion H&H 8.7. Anemia studies indicative of anemia of chronic disease. Follow. Transfuse as necessary for hemoglobin less than 7. Restarted patient's low-dose ASA and Plavix. 10. DMT2, uncontrolled:  Continue to monitor with ACCU and SSI. Most recent blood glucose 231. 11. CVA:  History of recent tPA use, will continue to monitor. Clopidogrel and aspirin restarted.   Patient's neurologic exam limited by sedation. 12. Constipation: Rectal tube in place. Dulcolax. 13. Hypertension, essential: Stable, on metoprolol. 14. Hyperlipidemia: Home rosuvastatin is continued. INITIAL H AND P AND ICU COURSE:  Yoel Olivia is a 67year old  male, transferred to [facility] ICU 10/12/2020 for intubation. Patient is a 23-year-old morbidly obese white male reformed smoker. Eladio Angel has a history of diabetes, hyperlipidemia, and hypertension. Ruba Nunez was admitted from the emergency room on 9/24/2020 with hypoxemic respiratory failure secondary to [address] had associated acute renal injury.  He required 100% nonrebreather and was started on Decadron and Remdesivir.  He continued to deteriorate and required intubation on 9/27/2020.  Acute lung injury progressed to ARDS and started pronation therapy on 9/29/2020. Cassius Mahan was found to have bilateral pneumonia with Klebsiella pneumoniae and Haemophilus influenza which was treated with Zosyn.  Patient completed 12-day course of Zosyn on 10/5/20. Patient extubated to 10/4/22 intermittent BiPAP. Patient had neurologic decline in the early morning hours of 10/7/2020.  Code stroke was initiated, and patient underwent neurologic assessment.  The decision was made to administer TPA as the patient had left sided weakness and increased confusion.  TPA was administered without difficulty. MRI was negative for acute stroke but CTA showed a severe R ICA stenosis. Neurology felt tPA likely aborted his CVA given his COVID 23 that put him in a hypercoagulable state. 10/10/2020 Patient was transferred to the Hospitalist Service. Consults to Pulmonary and Cardiology services. Patient was confused at times, and was tolerating alternating between HFNC and Bipap. New onset atrial fibrillation which patient did convert with IVP Lopressor. Patient was placed on CPAP with concerns of HUSSEIN. 10/12/2020 Noted increase in work of breathing patient had failed Bipap therapy.  He was transferred to ICU for further management and care. Patient received bronchoscopy on 10/13/2020. PEG and chest tube consent obtained from son on 10/15/2020. Right chest tube performed 10/16/2020. It produced a moderate amount of serosanguineous fluid. 10/19/20 CT scan revealing increased cavitation size consider percutaneous drainage and chemical pneumolysis for effusion improvement, patient switched to doxycycline and linezolid. 10/21/2020 acute kidney injury with creatinine of 1.7. Work-up to evaluate prerenal etiology or AIN secondary to antibiotic change. 10/22/20 renal labs indicate RTA 4. Initiation of steroids and loop diuretic. Follow with BMP. CT chest with large right hemopneumothorax. Adjusted ventilator settings to reduce barotrauma and maintain oxygenation. 10/23/2020 deterioration of patient's condition to critical status. Unable to contact family to discuss code status. Worsening renal failure, increasing hypoxia and hypercapnia. Plan to start CRRT.  10/23/2020 patient becoming increasingly acidotic with enlarging left pleural effusion. Left pigtail catheter placed to suction. Right IJ dialysis catheter placed. Initiation of CRRT. Spoke at length with family, it is agreed patient's CODE STATUS to be switched to DNR CCA. Past Medical History:  See HPI. Family History: Mother and Father unknown. Social History: Former smoker, denies any ETOH or illicit drug use. .    ROS   Unable to complete secondary to patient's sedation and ventilatory status. PHYSICAL EXAMINATION:  Temperature 99.5, heart rate 82, respirations 26, blood pressure 124/81, oxygen saturation 94% on 100% FiO2. ABG pH 7.14, PCO2 76, PO2 66, bicarb 26, oxygen saturation 84. Current vent settings: AC VC with PEEP 0, tidal volume 400, respiratory rate 25. Body mass index is 38.62 kg/m². General:   Pale, acute ill in appearance  HEENT:  normocephalic and atraumatic. No scleral icterus. PERRL. Neck: supple.   No Thyromegaly. Lungs: diffuse rhonchi bilaterally with end expiratory wheezes. Right posterior chest tube with serosanguineous drainage. Cardiac: RRR-sinus without tachycardia. S1-S2, no extraneous heart sounds. no JVD. Abdomen: soft. Bowel sounds diminished. 10 cm x 5 cm area of ecchymosis to the left of the umbilicus. Extremities:  No clubbing, cyanosis. Mild bilateral pitting edema +1 up to mid tibia. Vasculature: capillary refill < 3 seconds. Weakly palpable dorsalis pedis pulses. Skin:  warm and dry. Neurologic:  No appreciable focal deficit. No seizures. Data: (All radiographs, tracings, PFTs, and imaging are personally viewed and interpreted unless otherwise noted). Sodium 126, potassium 6.0, chloride 87, bicarb 27, BUN 38, creatinine 3.1, magnesium 2.3, glucose 287, calcium 6.6, phosphorus 8.8  White count 19.0, hemoglobin 10.6, hematocrit 27.0, platelets 506. CT chest, abdomen, pelvis pending     10/22/2020 Noncon CT chest with large right hydropneumothorax   10/20/2020 BAL resulting staph aureus by PCR with resistance gene detected.  10/20/2020 3 cytologic studies negative for malignant cells.  Ferritin 1499, iron 20, TIBC 100   Pleural fluid: Glucose 157, , protein 3.6, mononuclear 22.9, poly-Thorne for nuclear 77, total nucleated 3087.  10/19/2020: CT head no acute hemorrhage.  10/19/2020: CT chest without contrast with worsening cavitation within the right lower lobe, persistent pleural effusion, new pneumoperitoneum secondary to PEG placement.  10/17/2020 acid-fast culture and smear no bacilli seen culture pending.  10/13/20 BAL culture with gram positive cocci/normal guido staph epidermidis methicillin-resistant.  10/13/20 Albumin 2.0, alk phos 83, ALT 64, AST 43, bilirubin 0.5, total protein 5.5   10/13/20 CT chest, abdomen, pelvis revealed bilateral diffuse consolidation of the lungs and right pleural effusion.  Echo 10/7/2020 LVEF 55%.    Cardiac direct assessment of the patient and coordination of care. Electronically signed by Diana Avalos MD.

## 2020-10-23 NOTE — PLAN OF CARE
Problem: MECHANICAL VENTILATION  Goal: Will be able to breathe spontaneously, without ventilator support  Description: Will be able to breathe spontaneously, without ventilator support  10/23/2020 0815 by Richi Calles RCP  Outcome: Ongoing   Vent setting optimized to achieve target tidal volume, respiratory rate and ideal oxygen saturations. SBT will be performed when appropriate.

## 2020-10-23 NOTE — PROGRESS NOTES
Palliative care asked to see due to change in patient's condition. Discussed in detail with Dr. Jaja Frances and Dr. Kathleen Staton. Family is en route to hospital to further discuss plan/goals of care. Patient's spouse and children/spouses present in waiting room. Full update given by Dr. Jaja Frances and Dr. Kathleen Staton. Many questions answered. Plan is to continue aggressive measures/treatment. Family does request code status of DNR-CCA. Will continue to watch how patient does over the next day or two. Family questioned what the process is if patient does not improve and what comfort care looks like. Discussed the process of withdrawing from life support and focusing on symptom control. They were thankful for discussion and know that a decision may be needed in the days to come. Patient's wife and son were taken back to see patient. Rev. José Antonio Panda called and did prayer. Much emotional support provided to family. Primary RN [facility] aware. Palliative care will continue to follow.

## 2020-10-23 NOTE — PROCEDURES
Central Line Placement: Risks and benefits to the procedure were discussed. Alternatives and their risks were discussed as well. Patient was placed in the attention position. Patient was placed in Trendelenburg position. Patient was prepped using Chlorhexidene prep. Patient was draped utilizing sterile gloves, hair net, mask, sterile gown and sterile OR towels. Maximum barrier precautions were utilized. Utilizing the Right IJ 20 cm length and 20 Hungarian mahurkar catheter, patient received 5 cc of 1% lidocaine. Under ultrasound guidance, catheter was inserted following modified Seldinger technique, and secured in place, patient tolerated the procedure very well with no complication, chest x-ray was reviewed, good positioning, no pneumothorax. EBL:   Less than 5 mL      Indication: Acute kidney injury.

## 2020-10-23 NOTE — PROCEDURES
Bladder scan was completed by SAMI Umanzor at 2335. The residual amount of 14 ml was resulted to [employer].

## 2020-10-23 NOTE — CARE COORDINATION
10/23/20, 4:56 PM EDT    DISCHARGE ON GOING [address] [facility] [facility] day: 29  Location: 3A-04/004-A Reason for admit: COVID-19 [U07.1]   Procedure:   9/24 Vapotherm initiated  9/27 Intubated  9/27 CVC Right subclavian - 10/5 removed  9/29 - 10/2 Rotoprone therapy   10/1 Convalescent Plasma  10/4 Extubated  10/4 Vapotherm initiated  39/9 PICC right basilic  91/7 Code stroke - TPA given   10/12 Re-intubated. 10/13 CT chest: Nonspecific diffuse bilateral consolidation and pleural fluid.   10/13 Bronchoscopy -mucous to the lower lobes bilaterally. 10/16 Right Pleural cath placement.   10/16 PEG tube placement  10/19 CT Head: No acute stroke or hemorrhage  10/19 CT Chest: Relatively stable bilateral consolidations. A cavitation within consolidated right lower lobe has increased in size; Persistent right pleural effusion, despite placement of pleural drain; Interval development of moderate sized left pleural effusion; Pneumoperitoneum associated with recent placement of percutaneous gastrostomy tube   10/20 Bronch w/washings: Diffuse bilateral mucus production  10/20 TPA/DNase to CT  10/22 CT Chest:   Moderate to large right pneumothorax. Right chest tube in place. Diffuse left worse than right airspace disease with areas of    consolidation, increased in the left lung. Small bilateral pleural effusions. Persistent scattered pneumoperitoneum, previously thought to be related to    PEG tube placement. 10/23 TESSIO RIJ  10/23 Left CT placed  10/23 CRRT initiated  10/23 CXR:   Persistent, diffuse bilateral left greater than right airspace opacities consistent with pneumonia. Patient with known Covid. Bilateral pigtail drains within the pleural space. Bilateral pleural effusions, possibly loculated. No clear pleural edge is    identified however tiny right apical pneumothorax cannot be excluded as reported previously. Continued progress imaging is advised.      Treatment Plan of Care: Nephrology consulted for anuric SANTI. CRRT started today. Left chest tube placed and pleural fld sent for culture. CT Chest/Abd/Pelvis ordered. BLE venous dopplers ordered. Remains on vent w/ETT on AC/APV, 100% FIO2, sats 99%. Tmax 100.4. Afib 80's. Unable to follow commands; no movement to painful stim. CT to -20 sx, no leak. Dietitian and Palliative Care following. [facility]. Respiratory culture +MRSE. COVID negative on 10/13 - 2nd test pending. Free water flushed 200 ml QID. Cardiac monitoring, I&O, daily weight, oral care, CT care, PEG w/TF, SCDs, pollock care. Amio @ 0.5 mg/min, Precedex @ 1.3 mcg/kg/hr, fentanyl @ 150 mcg/hr, levo @ 27 mcg/min, becky @ 50 mcg/min, bicarb drip to start, vasopressin @ 0.04 units/min, asa, plavix, pepcid, florinef, inhaler, lantus, SSI Q4H, IV zyvox, prn IV versed, crestor, vit D, Electrolyte replacement protocols. Na+ 126, K+ 6, Cl 87, BUN 38, Creat 3.1, Ical 0.9, Phos 8.8, pro-bnp 39159, wbc 19, hgb 7.6, INR 1.41. Barriers to Discharge: on vent/CRRT  PCP: Reyna Case MD  Readmission Risk Score: 47%  Patient Goals/Plan/Treatment Preferences: From home with wife. Plan pending clinical course.

## 2020-10-23 NOTE — PROGRESS NOTES
[facility] THERAPY MISSED TREATMENT NOTE  [facility] CCU 3A      Date: 10/23/2020  Patient Name: Marcello Harrington        MRN: [id number]    : 1945  (67 y.o.)    REASON FOR MISSED TREATMENT:  Attempted to see pt at 2360 for completion of dysphagia interventions. Pt maintains with intubation with ROCKY Meyer reporting continuous medical decline with request to d/c ST services at date. Please re-consult should further needs arise.        Donny Shannon M.A., [address] [place] [address]

## 2020-10-23 NOTE — PROGRESS NOTES
2342-Parish Hu notified of 100ml of urine output in 4 hours this shift. Bladder scan showed 14 ml in bladder and pollock was irrigated. Pt is receiving lasix BID. When turning pt, it appeared that fluid was accumulating in right buttock. Waiting for response.

## 2020-10-23 NOTE — FLOWSHEET NOTE
[place] [id number] PROGRESS NOTE      Patient: Angie Xie  Room #: 3A-04/004-A            YOB: 1945  Age: 67 y.o. Gender: male            Admit Date & Time: 9/24/2020 10:05 AM    Assessment:  Pt is a 67year old male admitted for Covid-19 virus.  received call from palliative nurse and nursing supervisor asking for spiritual support for family as patent is struggling.  arrived and met patient's family at the entrance of the room. Family is tearful and anxious as the anticipate death and grieve for their love one. Family is calm. Interventions:  During this encounter, I read supporting scriptures and prayed for calm and peace. I also offered words of consolation and emotional support. Outcomes:  Patient's family shared gratitude and engaged in conversation. Plan:  1. SC will continue to provide emotional and spiritual support to family.     Electronically signed by Westley Trejo on 10/23/2020 at 3:25 PM.  [facility]  [phone]

## 2020-10-23 NOTE — FLOWSHEET NOTE
10/23/20 0543   Provider Notification   Reason for Communication Evaluate   Provider Name Dr. Martha Walls   Provider Notification Physician   Method of Communication Secure Message   Response Waiting for response   Notification Time 0543     Consult sent for anuric SANTI. Waiting for response. 0615-Ally Adame returned page.  Will add to care team. Also notified him of creat 3.1 this AM.

## 2020-10-24 NOTE — PLAN OF CARE
Problem: Falls - Risk of:  Goal: Will remain free from falls  Description: Will remain free from falls  Outcome: Ongoing  Note: Call light in reach, bed in lowest position, and bed alarm activated. Education given on use of call light before ambulation and when in need of assistance. Hourly visual checks performed and charted. Toileting offered to patient. No falls this shift, at any time. Arm band and falling star in place. Will continue to monitor. Problem: Falls - Risk of:  Goal: Absence of physical injury  Description: Absence of physical injury  Outcome: Ongoing  Note: Patient remains free from physical injury this shift. Frequent checks performed and charted. Problem: Skin Integrity:  Goal: Will show no infection signs and symptoms  Description: Will show no infection signs and symptoms  Outcome: Ongoing  Note: Patient afebrile this shift. Patient's skin does not show any other sign or symptom of infection this shift. Problem: Skin Integrity:  Goal: Will show no infection signs and symptoms  Description: Will show no infection signs and symptoms  Outcome: Ongoing  Note: Patient afebrile this shift. Patient's skin does not show any other sign or symptom of infection this shift. Problem: Skin Integrity:  Goal: Absence of new skin breakdown  Description: Absence of new skin breakdown  Outcome: Ongoing  Note: No signs of skin breakdown. Skin warm, dry, and intact. Mucous membranes pink and moist.  Assistance with turns/ambulation provided PRN. Will continue to monitor. Problem: Airway Clearance - Ineffective  Goal: Achieve or maintain patent airway  Outcome: Ongoing  Note: Patient remains orally intubated this shift. Problem: Gas Exchange - Impaired  Goal: Absence of hypoxia  Outcome: Ongoing  Note: Patient's oxygen saturation remains greater than 90% on current ventilator settings. Problem:  Body Temperature -  Risk of, Imbalanced  Goal: Ability to maintain a body temperature within defined limits  Outcome: Ongoing  Note: Patient is afebrile this shift. Problem: Isolation Precautions - Risk of Spread of Infection  Goal: Prevent transmission of infection  Outcome: Ongoing  Note: Gowns, gloves, face sheild, and proper mask worn when providing patient care this shift. Problem: Nutrition Deficits  Goal: Optimize nutrtional status  Outcome: Ongoing  Note: Patient is on tube feedings this shift. Problem: Infection - Central Venous Catheter-Associated Bloodstream Infection:  Goal: Will show no infection signs and symptoms  Description: Will show no infection signs and symptoms  Outcome: Ongoing  Note: Patient afebrile this shift. Patient's skin does not show any other sign or symptom of infection this shift. Problem: Serum Glucose Level - Abnormal:  Goal: Ability to maintain appropriate glucose levels has stabilized  Description: Ability to maintain appropriate glucose levels has stabilized  Outcome: Ongoing  Note: Patient receiving sliding scale insulin this shift. Care plan reviewed with patient's family. Patient unable to verbalize understanding of the plan of care and contributed to goal setting.

## 2020-10-24 NOTE — PROGRESS NOTES
Hospitalist Progress Note      [facility]    Unit/Bed:3A-04/004-A  PCP: Petrona Olea MD  Date of Admission: 9/24/2020    Assessment and Plan:        1. Acute hypoxemic and hypercapnic respiratory failure status post oral intubation  2. Right chest empyema status post right chest tube placement on 10/20/2020  3. Right lower lobe pulmonary abscess. 4. Left exudative pleural effusion status post chest tube placement. 5. Nonoliguric acute kidney injury status post CRRT-nephrology to follow, consider discontinue IV bicarbonate drip. 6. Septic shock secondary to multilobar pneumonia and empyema-continue IV antibiotics, wean off Jarred-Synephrine, continue with Levophed and vasopressin and wean off as tolerated. 7. Electrolytes abnormality. 8. History of CVA. 9. Poor underlying prognosis. HPI:  Rosa Maria Santana is a 67year old  male, transferred to [facility] ICU 10/12/2020 for intubation.      Patient is a 65-year-old morbidly obese white male reformed smoker. Chelsy Zamora has a history of diabetes, hyperlipidemia, and hypertension.     Teto Reina was admitted from the emergency room on 9/24/2020 with hypoxemic respiratory failure secondary to [address] [place] [address] Street had associated acute renal injury.  He required 100% nonrebreather and was started on Decadron and Remdesivir.  He continued to deteriorate and required intubation on 9/27/2020.  Acute lung injury progressed to ARDS and started pronation therapy on 9/29/2020. Karmen Ndiaye was found to have bilateral pneumonia with Klebsiella pneumoniae and Haemophilus influenza which was treated with Zosyn.  Patient completed 12-day course of Zosyn on 10/5/20. Patient extubated to 10/4/22 intermittent BiPAP.  Patient had neurologic decline in the early morning hours of 10/7/2020.  Code stroke was initiated, and patient underwent neurologic assessment.  The decision was made to administer TPA as the patient had left sided weakness and increased confusion.  TPA was administered without difficulty. MRI was negative for acute stroke but CTA showed a severe R ICA stenosis. Neurology felt tPA likely aborted his CVA given his COVID 23 that put him in a hypercoagulable state. 10/10/2020 Patient was transferred to the Hospitalist Service. Consults to Pulmonary and Cardiology services. Patient was confused at times, and was tolerating alternating between HFNC and Bipap. New onset atrial fibrillation which patient did convert with IVP Lopressor. Patient was placed on CPAP with concerns of HUSSEIN. 10/12/2020 Noted increase in work of breathing patient had failed Bipap therapy. He was transferred to ICU for further management and care. Patient received bronchoscopy on 10/13/2020. PEG and chest tube consent obtained from son on 10/15/2020. Right chest tube performed 10/16/2020. It produced a moderate amount of serosanguineous fluid. 10/19/20 CT scan revealing increased cavitation size consider percutaneous drainage and chemical pneumolysis for effusion improvement, patient switched to doxycycline and linezolid. 10/21/2020 acute kidney injury with creatinine of 1.7. Work-up to evaluate prerenal etiology or AIN secondary to antibiotic change. 10/22/20 renal labs indicate RTA 4. Initiation of steroids and loop diuretic. Follow with BMP. CT chest with large right hemopneumothorax. Adjusted ventilator settings to reduce barotrauma and maintain oxygenation. 10/23/2020 deterioration of patient's condition to critical status. Unable to contact family to discuss code status. Worsening renal failure, increasing hypoxia and hypercapnia. Plan to start CRRT.  10/23/2020 patient becoming increasingly acidotic with enlarging left pleural effusion. Left pigtail catheter placed to suction. Right IJ dialysis catheter placed. Initiation of CRRT.   Spoke at length with family, it is agreed patient's CODE STATUS to be switched to DNR CCA. Follow-up:  10/24/2020 : Patient tolerated CRRT, still on dexamethasone therapy, 7 mcg Levophed, 50 mcg Irena-Synephrine, 0.04 vasopressin. Has a left superficial lower extremity venous thrombosis, on Versed drip, and bicarbonate drip per nephrology. ROS : Unable to obtain, sedated, intubated.     Medications:     midazolam 2 mg/hr (10/24/20 9208)    vasopressin (Septic Shock) infusion 0.04 Units/min (10/24/20 0553)    sodium chloride      IV infusion builder      phenylephrine (IRENA-SYNEPHRINE) 50mg/250mL infusion Stopped (10/24/20 1311)    dialysis builder 2,500 mL/hr at 10/24/20 1050    prismaSol BGK 4/2.5 1,000 mL/hr (10/24/20 0554)    prismaSol BGK 4/2.5 1,000 mL/hr (10/24/20 0554)    amiodarone 0.5 mg/min (10/24/20 1106)    fentaNYL (SUBLIMAZE) 1250 mcg in sodium chloride 0.9 % 250 mL 175 mcg/hr (10/24/20 1333)    dexmedetomidine 1.1 mcg/kg/hr (10/24/20 1335)    norepinephrine 10 mcg/min (10/24/20 0830)    dextrose        heparin (porcine)  5,000 Units Subcutaneous 3 times per day    calcium gluconate IVPB  1 g Intravenous Once    famotidine (PEPCID) injection  20 mg Intravenous Daily    bisacodyl  10 mg Rectal Daily    fludrocortisone  0.1 mg PEG Tube Daily    linezolid  600 mg Intravenous Q12H    insulin glargine  7 Units Subcutaneous Nightly    clopidogrel  75 mg Per NG tube Daily    insulin lispro  0-18 Units Subcutaneous Q4H    rosuvastatin  40 mg Per NG tube Nightly    sodium chloride flush  10 mL Intravenous 2 times per day    chlorhexidine  15 mL Mouth/Throat BID    metoprolol  5 mg Intravenous Q6H    [Held by provider] enoxaparin  1 mg/kg Subcutaneous Q12H    calcium replacement protocol   Other RX Placeholder    [Held by provider] pantoprazole  40 mg Intravenous Daily    And    [Held by provider] sodium chloride (PF)  10 mL Intravenous Daily    glycopyrrolate-formoterol  2 puff Inhalation BID    aspirin  81 mg Per NG tube Daily    lidocaine 1 % injection  5 mL Intradermal Once    [Held by provider] amLODIPine  10 mg Per NG tube Daily    Vitamin D  1,000 Units Per NG tube Daily    phosphorus replacement protocol   Other RX Placeholder       Vital Signs:   BP (!) 122/53   Pulse 69   Temp 95.9 °F (35.5 °C) (Esophageal)   Resp 29   Ht 5' 8\" (1.727 m)   Wt 248 lb 6.4 oz (112.7 kg)   SpO2 98%   BMI 37.77 kg/m²      Intake/Output Summary (Last 24 hours) at 10/24/2020 1358  Last data filed at 10/24/2020 1315  Gross per 24 hour   Intake 9838 ml   Output 7909 ml   Net 1929 ml        General:    acute ill in appearance  HEENT:   PERRL. Pale conjunctiva. Neck: supple. No Thyromegaly. Lungs: diffuse rhonchi bilaterally with end expiratory wheezes. Bilateral posterior chest tube with serosanguineous drainage. Cardiac: RRR-sinus without tachycardia. Abdomen: soft. Bowel sounds diminished. 10 cm x 5 cm area of ecchymosis to the left of the umbilicus. Extremities:  No clubbing, cyanosis. +1 bilateral lower extremity pitting edema. Neurologic:  Intact cranial nerves, otherwise limited, unable to obtain. Data: (All radiographs, tracings, PFTs, and imaging are personally viewed and interpreted unless otherwise noted). Sodium 132, potassium 4.3, creatinine 1.6, phosphorus 3.0, calcium 7.5, white blood cell count 12.3, hemoglobin 9.2.   Critical care time spent with patient care 35 minutes, further evaluations are to follow.       Electronically signed by Jensen Elizabeth MD on 10/24/2020 at 1:58 PM

## 2020-10-24 NOTE — PROGRESS NOTES
Renal Progress Note  [facility]    Patient :  Sonia Wagner; 67 y.o. MRN# [id number]  Location:  3A-04/004-A  Attending:  Paola Hernandez MD  Admit Date:  9/24/2020   Hospital Day: 30      Subjective:     Nephrology is following for SANTI on CRRT. Patient seen and examined on CRRT. Tolerating well. Net UF 50 ml/hour started overnight. Vasopressor requirement decreasing. Levo down to 7 mcg. Still on Jarred and Vaso. No urine output. On 90% FiO2. Having CT Chest, abd, pelvis today.         Outpatient Medications:     Medications Prior to Admission: amLODIPine (NORVASC) 10 MG tablet, Take 10 mg by mouth daily   atorvastatin (LIPITOR) 10 MG tablet, Take 10 mg by mouth nightly   glimepiride (AMARYL) 2 MG tablet, Take 3 mg by mouth 2 times daily  metFORMIN (GLUCOPHAGE) 1000 MG tablet, Take 1,000 mg by mouth 2 times daily (with meals)   hydroCHLOROthiazide (HYDRODIURIL) 12.5 MG tablet, Take 12.5 mg by mouth daily  Zinc Acetate (GALZIN) 25 MG capsule, Take 1 capsule by mouth daily  Cholecalciferol (VITAMIN D3) 125 MCG (5000 UT) CAPS, Take by mouth    Current Medications:     Scheduled Meds:    famotidine (PEPCID) injection  20 mg Intravenous Daily    bisacodyl  10 mg Rectal Daily    fludrocortisone  0.1 mg PEG Tube Daily    linezolid  600 mg Intravenous Q12H    insulin glargine  7 Units Subcutaneous Nightly    clopidogrel  75 mg Per NG tube Daily    insulin lispro  0-18 Units Subcutaneous Q4H    rosuvastatin  40 mg Per NG tube Nightly    sodium chloride flush  10 mL Intravenous 2 times per day    chlorhexidine  15 mL Mouth/Throat BID    metoprolol  5 mg Intravenous Q6H    [Held by provider] enoxaparin  1 mg/kg Subcutaneous Q12H    calcium replacement protocol   Other RX Placeholder    [Held by provider] pantoprazole  40 mg Intravenous Daily    And    [Held by provider] sodium chloride (PF)  10 mL Intravenous Daily    glycopyrrolate-formoterol  2 puff Inhalation BID    aspirin  81 mg Per NG tube Daily    lidocaine 1 % injection  5 mL Intradermal Once    [Held by provider] amLODIPine  10 mg Per NG tube Daily    Vitamin D  1,000 Units Per NG tube Daily    phosphorus replacement protocol   Other RX Placeholder     Continuous Infusions:    midazolam 1 mg/hr (10/24/20 0807)    vasopressin (Septic Shock) infusion 0.04 Units/min (10/24/20 0553)    sodium chloride      IV infusion builder      phenylephrine (IRENA-SYNEPHRINE) 50mg/250mL infusion 50 mcg/min (10/24/20 0458)    dialysis builder 2,500 mL/hr at 10/24/20 0554    prismaSol BGK 4/2.5 1,000 mL/hr (10/24/20 0554)    prismaSol BGK 4/2.5 1,000 mL/hr (10/24/20 0554)    amiodarone 0.5 mg/min (10/23/20 2321)    fentaNYL (SUBLIMAZE) 1250 mcg in sodium chloride 0.9 % 250 mL 200 mcg/hr (10/24/20 0206)    dexmedetomidine 1.2 mcg/kg/hr (10/24/20 0458)    norepinephrine 7 mcg/min (10/24/20 0658)    dextrose       PRN Meds:  potassium chloride, magnesium sulfate, calcium gluconate **OR** calcium gluconate **OR** calcium gluconate **OR** calcium gluconate, sodium phosphate IVPB **OR** sodium phosphate IVPB **OR** sodium phosphate IVPB **OR** sodium phosphate IVPB, albuterol, sodium chloride flush, acetaminophen **OR** acetaminophen, promethazine **OR** ondansetron, fentanNYL, midazolam, promethazine **OR** [DISCONTINUED] ondansetron, potassium chloride, potassium chloride, magnesium sulfate, polyethylene glycol, glucose, dextrose, glucagon (rDNA), dextrose    Input/Output:       I/O last 3 completed shifts: In: 4264 [I.V.:6511; Blood:620; NG/GT:881]  Out: 9975 [Stool:50; Chest Tube:1130].       Patient Vitals for the past 96 hrs (Last 3 readings):   Weight   10/23/20 1130 257 lb 9.6 oz (116.8 kg)   10/23/20 0300 254 lb (115.2 kg)   10/22/20 0434 214 lb 6.4 oz (97.3 kg)       Vital Signs:   Temperature:  Temp: 99.1 °F (37.3 °C)  TMax:   Temp (24hrs), Av.3 °F (37.4 °C), Min:98.4 °F (36.9 °C), Max:100.4 °F (38 °C)    Respirations:  Resp: 25  Pulse:   Pulse: 82  BP:    BP: (!) 119/52  BP Range: Systolic (21ARO), XXA:686 , Min:71 , MVZ:596       Diastolic (09KBQ), RAVI:19, Min:39, Max:112      Physical Examination:     General:  Intubated, sedated  HEENT: NC/AT/ MMM +endotracheal tube  Chest:               Coarse breath sounds, right sided chest tube  Cardiac:  S1 S2   Abdomen: Soft, +PEG tube  Neuro:  sedated  SKIN:  No rashes, good skin turgor. Extremities:  Edema 2+ in legs and edema noted in arms    Labs:       Recent Labs     10/23/20  1810 10/24/20  0007 10/24/20  0607   WBC 17.5* 13.2* 12.8*   RBC 2.54* 3.49* 3.34*   HGB 7.4* 10.2* 9.7*   HCT 25.7* 33.5* 31.7*   .2* 96.0* 94.9*   MCH 29.1 29.2 29.0   MCHC 28.8* 30.4* 30.6*   * 437* 383   MPV 9.3* 9.4 9.4      BMP:   Recent Labs     10/23/20  1810 10/24/20  0007 10/24/20  0607   * 131* 133*   K 4.6 4.5 4.5   CL 89* 93* 97*   CO2 26 25 26   BUN 31* 21 16   CREATININE 2.7* 1.8* 1.5*   GLUCOSE 179* 75 67*   CALCIUM 7.5* 7.5* 7.7*      Phosphorus:     Recent Labs     10/23/20  1810 10/24/20  0007 10/24/20  0607   PHOS 6.2* 4.4 3.3     Magnesium:    Recent Labs     10/23/20  1810 10/24/20  0007 10/24/20  0607   MG 2.1 2.2 2.2     Albumin:  No results for input(s): LABALBU in the last 72 hours. BNP:    No results found for: BNP  RICHARD:    No results found for: RICHARD  SPEP:  Lab Results   Component Value Date    PROT 5.6 10/23/2020     UPEP:   No results found for: LABPE  C3:   No results found for: C3  C4:   No results found for: C4  MPO ANCA:   No results found for: MPO  PR3 ANCA:   No results found for: PR3  Anti-GBM:   No results found for: GBMABIGG  Hep BsAg:       No results found for: HEPBSAG  Hep C AB:        No results found for: HEPCAB    Urinalysis/Chemistries:      Lab Results   Component Value Date    NITRU NEGATIVE 10/12/2020    COLORU DARK YELLOW 10/23/2020    COLORU DK YELLOW 10/12/2020    PHUR 5.0 10/12/2020    LABCAST 0-4 C. GRAN 10/12/2020    WBCUA 0-2 10/12/2020 RBCUA 15-20 10/12/2020    MUCUS THREADS 10/12/2020    YEAST NONE SEEN 10/12/2020    BACTERIA MODERATE 10/12/2020    SPECGRAV 1.018 10/12/2020    LEUKOCYTESUR NEGATIVE 10/12/2020    UROBILINOGEN 1.0 10/12/2020    BILIRUBINUR NEGATIVE 10/12/2020    BLOODU MODERATE 10/12/2020    KETUA NEGATIVE 10/12/2020    AMORPHOUS URATES 10/12/2020     Urine Sodium:   No results found for: NATALIIA  Urine Potassium:  No results found for: KUR  Urine Chloride:  No results found for: CLUR  Urine Osmolarity:   Lab Results   Component Value Date    OSMOU 319 10/18/2020     Urine Protein:   No components found for: TOTALPROTEIN, URINE   Urine Creatinine:   No results found for: LABCREA  Urine Eosinophils:  No components found for: UEOS        Impression and Plan:  1. Anuric SANTI due to ATN from septic shock, now on CRRT  -tolerating well, continue net UF 50 ml/hour  -stop bicarb drip    2. Hyperkalemia: improved  3. Hyponatremia :improved  4. Hyperphosphatemia :improving  5. Hypocalcemia: replacement  6. Septic Shock  7. Acute hypoxic respiratory failure due to ARDS  8, Respiratory acidosis : improving  9. Large right pleural effusion with abscess  10. COVID-19  11. Anemia  12. DM        Please don't hesitate to call with any questions.   Electronically signed by Marlyn Dixon DO on 10/24/2020 at 8:14 AM

## 2020-10-24 NOTE — PLAN OF CARE
Problem: Falls - Risk of:  Goal: Will remain free from falls  Description: Will remain free from falls  Outcome: Ongoing  Note: Free from falls- remains in bed this shift. Fall precautions in place     Problem: Falls - Risk of:  Goal: Absence of physical injury  Description: Absence of physical injury  Outcome: Ongoing  Note: Free from injury     Problem: Skin Integrity:  Goal: Will show no infection signs and symptoms  Description: Will show no infection signs and symptoms  Outcome: Ongoing  Note: Remains on antibiotics     Problem: Skin Integrity:  Goal: Absence of new skin breakdown  Description: Absence of new skin breakdown  Outcome: Ongoing  Note: Skin care completed - patient turned and repositioned q2h and prn     Problem: Airway Clearance - Ineffective  Goal: Achieve or maintain patent airway  Outcome: Ongoing  Note: Remains intubated on ventilator support     Problem: Gas Exchange - Impaired  Goal: Absence of hypoxia  Outcome: Ongoing  Note: Weaning ventilator settings as patient tolerates     Problem: Breathing Pattern - Ineffective  Goal: Ability to achieve and maintain a regular respiratory rate  Outcome: Ongoing  Note: Continues to require deep sedation for ventilator synchrony     Problem:  Body Temperature -  Risk of, Imbalanced  Goal: Ability to maintain a body temperature within defined limits  Outcome: Ongoing  Note: Continue to monitor temperature - remains afebrile this shift     Problem: Isolation Precautions - Risk of Spread of Infection  Goal: Prevent transmission of infection  Outcome: Ongoing  Note: Remains in droplet plus isolation     Problem: Nutrition Deficits  Goal: Optimize nutrtional status  Outcome: Ongoing  Note: Tube feed held this shift- continue to monitor     Problem: Nutrition  Goal: Optimal nutrition therapy  Outcome: Ongoing  Note: Tube feed held this shift, continue to monitor     Problem: Restraint Use - Nonviolent/Non-Self-Destructive Behavior:  Goal: Absence of restraint indications  Description: Absence of restraint indications  Outcome: Ongoing  Note: Restraints remain off this shift- remains sedated     Problem: Restraint Use - Nonviolent/Non-Self-Destructive Behavior:  Goal: Absence of restraint-related injury  Description: Absence of restraint-related injury  Outcome: Ongoing  Note: Free from restraint related injruy     Problem: Infection - Central Venous Catheter-Associated Bloodstream Infection:  Goal: Will show no infection signs and symptoms  Description: Will show no infection signs and symptoms  Outcome: Ongoing  Note: Remains on antibiotics     Problem: Pain:  Goal: Pain level will decrease  Description: Pain level will decrease  Outcome: Ongoing  Note: Pain rated per cpot - remains on fentanyl drip for pain/sedation     Problem: Pain:  Goal: Control of acute pain  Description: Control of acute pain  Outcome: Ongoing  Note: Pain rated per cpot - remains on fentanyl drip for pain/sedation     Problem: Pain:  Goal: Control of chronic pain  Description: Control of chronic pain  Outcome: Ongoing  Note: Pain rated per cpot - remains on fentanyl drip for pain/sedation     Problem: MECHANICAL VENTILATION  Goal: Will be able to breathe spontaneously, without ventilator support  Description: Will be able to breathe spontaneously, without ventilator support  10/24/2020 1302 by Minnie Beck RCP  Outcome: Ongoing     Problem: Serum Glucose Level - Abnormal:  Goal: Ability to maintain appropriate glucose levels has stabilized  Description: Ability to maintain appropriate glucose levels has stabilized  Outcome: Ongoing  Note: Continue to monitor blood glucose levels, insulin sliding scale per orders     Problem: Urinary Elimination:  Goal: Signs and symptoms of infection will decrease  Description: Signs and symptoms of infection will decrease  Outcome: Ongoing  Note: Remains on antibiotics, free from signs of uti     Problem: Urinary Elimination:  Goal: Complications related to the disease process, condition or treatment will be avoided or minimized  Description: Complications related to the disease process, condition or treatment will be avoided or minimized  Outcome: Ongoing  Note: Continue pollock care     Care plan reviewed with patient and family per phone. Patient unable to verbalize, daughter verbalizes understanding per phone conversations of the plan of care and contribute to goal setting.

## 2020-10-24 NOTE — PLAN OF CARE
Patient continues on ventilator; tolerating well. Will continue to monitor patients respiratory status.   Will  monitor for weaning readiness

## 2020-10-24 NOTE — PROGRESS NOTES
General surgery notified today after CT abdomen revealed moderate free air which may be leaking around the PEG-tube. Patient is status post EGD and PEG tube placement on 10/16/2020 by Dr. Darcy Burns. Patient was seen and evaluated today. Abdomen was soft. No bleeding or drainage noted around PEG tube site. PEG tube was loosely moveable through tract. Nurse reported she hooked PEG tube to suction and had 1 L output. PEG tube was gently retracted until internal bolster was brought up against stomach wall and the T crossbar was moved down over abdominal wall. Case discussed and care coordinated with general surgeon on call, Dr. Nicole Riley.     Electronically signed by Zhang Cisneros PA-C on 10/24/2020 at 4:31 PM

## 2020-10-25 NOTE — PROGRESS NOTES
Renal Progress Note  [facility]    Patient :  Lupe Diaz; 67 y.o. MRN# [id number]  Location:  3A-04/004-A  Attending:  Freedom Kenyon MD  Admit Date:  9/24/2020   Hospital Day: 32      Subjective:     Nephrology is following for SANTI on CRRT. Patient seen and examined on CRRT. Tolerating well, net UF 50 ml/hour. Patient off Neosynephrine so Levophed requirements were higher but this is coming down now on 22 mcg of levophed. Also on vaso. 70% FiO2 on vent. Minimal urine output. Imaging reviewed.         Outpatient Medications:     Medications Prior to Admission: amLODIPine (NORVASC) 10 MG tablet, Take 10 mg by mouth daily   atorvastatin (LIPITOR) 10 MG tablet, Take 10 mg by mouth nightly   glimepiride (AMARYL) 2 MG tablet, Take 3 mg by mouth 2 times daily  metFORMIN (GLUCOPHAGE) 1000 MG tablet, Take 1,000 mg by mouth 2 times daily (with meals)   hydroCHLOROthiazide (HYDRODIURIL) 12.5 MG tablet, Take 12.5 mg by mouth daily  Zinc Acetate (GALZIN) 25 MG capsule, Take 1 capsule by mouth daily  Cholecalciferol (VITAMIN D3) 125 MCG (5000 UT) CAPS, Take by mouth    Current Medications:     Scheduled Meds:    dextrose        heparin (porcine)  5,000 Units Subcutaneous 3 times per day    famotidine (PEPCID) injection  20 mg Intravenous Daily    bisacodyl  10 mg Rectal Daily    fludrocortisone  0.1 mg PEG Tube Daily    linezolid  600 mg Intravenous Q12H    insulin glargine  7 Units Subcutaneous Nightly    clopidogrel  75 mg Per NG tube Daily    insulin lispro  0-18 Units Subcutaneous Q4H    rosuvastatin  40 mg Per NG tube Nightly    sodium chloride flush  10 mL Intravenous 2 times per day    chlorhexidine  15 mL Mouth/Throat BID    metoprolol  5 mg Intravenous Q6H    [Held by provider] enoxaparin  1 mg/kg Subcutaneous Q12H    calcium replacement protocol   Other RX Placeholder    [Held by provider] pantoprazole  40 mg Intravenous Daily    And    [Held by provider] sodium chloride (PF)  10 mL Intravenous Daily    glycopyrrolate-formoterol  2 puff Inhalation BID    aspirin  81 mg Per NG tube Daily    lidocaine 1 % injection  5 mL Intradermal Once    [Held by provider] amLODIPine  10 mg Per NG tube Daily    Vitamin D  1,000 Units Per NG tube Daily    phosphorus replacement protocol   Other RX Placeholder     Continuous Infusions:    midazolam 3 mg/hr (10/25/20 0729)    vasopressin (Septic Shock) infusion 0.04 Units/min (10/25/20 0024)    sodium chloride      IV infusion builder      phenylephrine (IRENA-SYNEPHRINE) 50mg/250mL infusion Stopped (10/24/20 1311)    dialysis builder 2,500 mL/hr at 10/25/20 0610    prismaSol BGK 4/2.5 1,000 mL/hr (10/25/20 0610)    prismaSol BGK 4/2.5 1,000 mL/hr (10/25/20 0610)    amiodarone 0.5 mg/min (10/24/20 2251)    fentaNYL (SUBLIMAZE) 1250 mcg in sodium chloride 0.9 % 250 mL 175 mcg/hr (10/25/20 0026)    dexmedetomidine 1 mcg/kg/hr (10/25/20 0344)    norepinephrine 22 mcg/min (10/25/20 0604)    dextrose       PRN Meds:  potassium chloride, magnesium sulfate, calcium gluconate **OR** calcium gluconate **OR** calcium gluconate **OR** calcium gluconate, sodium phosphate IVPB **OR** sodium phosphate IVPB **OR** sodium phosphate IVPB **OR** sodium phosphate IVPB, albuterol, sodium chloride flush, acetaminophen **OR** acetaminophen, promethazine **OR** ondansetron, fentanNYL, midazolam, promethazine **OR** [DISCONTINUED] ondansetron, potassium chloride, potassium chloride, magnesium sulfate, polyethylene glycol, glucose, dextrose, glucagon (rDNA), dextrose    Input/Output:       I/O last 3 completed shifts: In: 4028 [I.V.:3950; NG/GT:78]  Out: 5301 [Urine:40; Emesis/NG output:1275; Chest Tube:280].       Patient Vitals for the past 96 hrs (Last 3 readings):   Weight   10/25/20 0600 243 lb (110.2 kg)   10/24/20 1245 248 lb 6.4 oz (112.7 kg)   10/23/20 1130 257 lb 9.6 oz (116.8 kg)       Vital Signs:   Temperature:  Temp: 99.1 °F (37.3 °C)  TMax:   Temp (24hrs), Av °F (37.2 °C), Min:95.9 °F (35.5 °C), Max:100 °F (37.8 °C)    Respirations:  Resp: 16  Pulse:   Pulse: 72  BP:    BP: (!) 123/53  BP Range: Systolic (37AWW), QPD:186 , Min:90 , IEX:562       Diastolic (80TOG), UPE:96, Min:40, Max:86      Physical Examination:     Physical exam limited due to COVID-19, report per RN and visual inspection  General:  Intubated, sedated  HEENT: Endotracheal tube  Chest:               Coarse breath sounds, right sided chest tube  Cardiac:  S1 S2   Abdomen: Soft, +PEG tube  Neuro:  sedated  SKIN:  No rashes, good skin turgor. Extremities:  Edema in legs    Labs:       Recent Labs     10/24/20  1751 10/25/20  0020 10/25/20  0612   WBC 10.4 13.9* 16.8*   RBC 3.17* 3.27* 3.17*   HGB 9.3* 9.5* 9.3*   HCT 29.6* 30.5* 29.6*   MCV 93.4 93.3 93.4   MCH 29.3 29.1 29.3   MCHC 31.4* 31.1* 31.4*    367 313   MPV 9.3* 9.3* 9.1*      BMP:   Recent Labs     10/24/20  1751 10/25/20  0020 10/25/20  0612   * 132* 132*   K 4.7 4.3 4.3   CL 98 98 96*   CO2 23 23 23   BUN 13 10 9   CREATININE 1.1 1.0 0.8   GLUCOSE 59* 71 51*   CALCIUM 7.5* 7.1* 7.2*      Phosphorus:     Recent Labs     10/24/20  1751 10/25/20  0020 10/25/20  0612   PHOS 2.5 1.9* 2.4     Magnesium:    Recent Labs     10/24/20  1751 10/25/20  0020 10/25/20  0612   MG 2.1 2.2 2.2     Albumin:  No results for input(s): LABALBU in the last 72 hours.   BNP:    No results found for: BNP  RICHARD:    No results found for: RICHARD  SPEP:  Lab Results   Component Value Date    PROT 5.6 10/23/2020     UPEP:   No results found for: LABPE  C3:   No results found for: C3  C4:   No results found for: C4  MPO ANCA:   No results found for: MPO  PR3 ANCA:   No results found for: PR3  Anti-GBM:   No results found for: GBMABIGG  Hep BsAg:       No results found for: HEPBSAG  Hep C AB:        No results found for: HEPCAB    Urinalysis/Chemistries:      Lab Results   Component Value Date    NITRU NEGATIVE 10/12/2020    COLORU

## 2020-10-25 NOTE — PLAN OF CARE
Patient continues on ventilator; tolerating well.   Will continue to monitor patients respiratory status; wean when patient tolerates

## 2020-10-25 NOTE — PLAN OF CARE
Problem: Falls - Risk of:  Goal: Will remain free from falls  Description: Will remain free from falls  10/25/2020 0154 by Dolly Jones RN  Outcome: Ongoing  Note: Call light in reach, bed in lowest position, and bed alarm activated. Education given on use of call light before ambulation and when in need of assistance. Hourly visual checks performed and charted. Toileting offered to patient. No falls this shift, at any time. Arm band and falling star in place. Will continue to monitor. Problem: Falls - Risk of:  Goal: Absence of physical injury  Description: Absence of physical injury  10/25/2020 0154 by Dolly Jones RN  Outcome: Ongoing  Note: Patient remains free from physical injury this shift. Frequent checks performed and charted on patient. Problem: Skin Integrity:  Goal: Will show no infection signs and symptoms  Description: Will show no infection signs and symptoms  10/25/2020 0154 by Dolly Jones RN  Outcome: Ongoing  Note: Patient afebrile this shift. Patient does not show any other sign or symptom of infection related to skin integrity this shift. Problem: Skin Integrity:  Goal: Absence of new skin breakdown  Description: Absence of new skin breakdown  10/25/2020 0154 by Dolly Jones RN  Outcome: Ongoing  Note: No signs of skin breakdown. Skin warm, dry, and intact. Mucous membranes pink and moist.  Assistance with turns/ambulation provided PRN. Will continue to monitor. Problem: Airway Clearance - Ineffective  Goal: Achieve or maintain patent airway  10/25/2020 0154 by Dolly Jones RN  Outcome: Ongoing  Note: Patient remains orally intubated this shift. Problem: Gas Exchange - Impaired  Goal: Absence of hypoxia  10/25/2020 0154 by Dolly Jones RN  Outcome: Ongoing  Note: Patient's oxygen saturation remains greater than 90% on current ventilator settings.       Problem: Breathing Pattern - Ineffective  Goal: Ability to achieve and maintain a regular respiratory rate  10/25/2020 0154 by James Goetz RN  Outcome: Ongoing  Note: Patient remains reliable on ventilator support. Problem: Isolation Precautions - Risk of Spread of Infection  Goal: Prevent transmission of infection  10/25/2020 0154 by James Goetz RN  Outcome: Ongoing  Note: Gown, gloves, eye protection, and proper mask worn at all times when providing care for patient. Problem: Infection - Central Venous Catheter-Associated Bloodstream Infection:  Goal: Will show no infection signs and symptoms  Description: Will show no infection signs and symptoms  10/25/2020 0154 by James Goetz RN  Outcome: Ongoing  Note: Patient afebrile this shift. Patient does not show any other sign or symptom of infection related to skin integrity this shift. Problem: Serum Glucose Level - Abnormal:  Goal: Ability to maintain appropriate glucose levels has stabilized  Description: Ability to maintain appropriate glucose levels has stabilized  10/25/2020 0154 by James Goetz RN  Outcome: Ongoing  Note: Patient's most recent blood glucose level is 80. Care plan reviewed with patient. Patient unable to verbalize understanding of the plan of care and contributed to goal setting.

## 2020-10-25 NOTE — PLAN OF CARE
Patient continues on ventilator; RR and VT decreased pt tolerated well.   Will continue to monitor patients respiratory status

## 2020-10-25 NOTE — PLAN OF CARE
Problem: Falls - Risk of:  Goal: Will remain free from falls  Description: Will remain free from falls  Outcome: Ongoing  Note: Free from falls, remains in bed this shift     Problem: Falls - Risk of:  Goal: Absence of physical injury  Description: Absence of physical injury  Outcome: Ongoing  Note: Free from injury     Problem: Skin Integrity:  Goal: Absence of new skin breakdown  Description: Absence of new skin breakdown  Outcome: Ongoing  Note: Turned and repositioned, skin care completed     Problem: Airway Clearance - Ineffective  Goal: Achieve or maintain patent airway  Outcome: Ongoing  Note: Remains intubated on ventilator support     Problem: Gas Exchange - Impaired  Goal: Absence of hypoxia  Outcome: Ongoing  Note: Weaning ventilator settings as tolerates- new chest tube placed this evening     Problem: Breathing Pattern - Ineffective  Goal: Ability to achieve and maintain a regular respiratory rate  Outcome: Ongoing  Note: Remains sedated on ventilator     Problem:  Body Temperature -  Risk of, Imbalanced  Goal: Ability to maintain a body temperature within defined limits  Outcome: Ongoing  Note: Remains afebrile this shift     Problem: Isolation Precautions - Risk of Spread of Infection  Goal: Prevent transmission of infection  Outcome: Ongoing  Note: Remains in droplet plus precautions     Problem: Nutrition  Goal: Optimal nutrition therapy  Outcome: Ongoing  Note: To initiate tube feed at 10ml/hr     Problem: Pain:  Goal: Pain level will decrease  Description: Pain level will decrease  Outcome: Ongoing  Note: Pain rated per cpot - remains on fentanyl drip     Problem: Serum Glucose Level - Abnormal:  Goal: Ability to maintain appropriate glucose levels has stabilized  Description: Ability to maintain appropriate glucose levels has stabilized  Outcome: Ongoing  Note: Continue to monitor blood glucose levels - treat with ssi or dextrose as needed     Problem: Skin Integrity:  Goal: Will show no

## 2020-10-25 NOTE — PROGRESS NOTES
Hospitalist Progress Note      Patient:  Luisito Roldan [facility]    Unit/Bed:3A-04/004-A  PCP: Alyse Yang MD  Date of Admission: 9/24/2020    Assessment and Plan:        1. Acute hypoxemic and hypercapnic respiratory failure status post oral intubation  2. Right chest empyema status post right chest tube placement on 10/20/2020  3. Right tension pneumothorax- R-chest tube adjusted, proceed with another chest tube placement, patient with persistent hypotension and worsening hypoxemia. 4. Left exudative pleural effusion status post chest tube placement. 5. Nonoliguric acute kidney injury status post CRRT-nephrology to follow. 6. Septic shock secondary to multilobar pneumonia and empyema-continue IV antibiotics. 7.  Electrolytes abnormality. 8.  History of CVA. 9.  Poor underlying prognosis. 10. Persistent hypoglycemia-discontinue Lantus patient is currently n.p.o. per surgery request.         11.  Significant ileus status post holding PEG tube placement and surgery consultation.     HPI:  Haydee Sales is a 67year old  male, transferred to [facility] ICU 10/12/2020 for intubation.      Patient is a 50-year-old morbidly obese white male reformed smoker. [facility] has a history of diabetes, hyperlipidemia, and hypertension.     Yanelis Bobo was admitted from the emergency room on 9/24/2020 with hypoxemic respiratory failure secondary to [address] had associated acute renal injury.  He required 100% nonrebreather and was started on Decadron and Remdesivir.  He continued to deteriorate and required intubation on 9/27/2020.  Acute lung injury progressed to ARDS and started pronation therapy on 9/29/2020. Elise Bertrand was found to have bilateral pneumonia with Klebsiella pneumoniae and Haemophilus influenza which was treated with Zosyn.  Patient completed 12-day course of Zosyn on 10/5/20.  Patient extubated to 10/4/22 intermittent BiPAP. Patient had neurologic decline in the early morning hours of 10/7/2020.  Code stroke was initiated, and patient underwent neurologic assessment.  The decision was made to administer TPA as the patient had left sided weakness and increased confusion.  TPA was administered without difficulty. MRI was negative for acute stroke but CTA showed a severe R ICA stenosis. Neurology felt tPA likely aborted his CVA given his COVID 23 that put him in a hypercoagulable state. 10/10/2020 Patient was transferred to the Hospitalist Service. Consults to Pulmonary and Cardiology services. Patient was confused at times, and was tolerating alternating between HFNC and Bipap. New onset atrial fibrillation which patient did convert with IVP Lopressor. Patient was placed on CPAP with concerns of HUSSEIN. 10/12/2020 Noted increase in work of breathing patient had failed Bipap therapy.  He was transferred to ICU for further management and care.  Patient received bronchoscopy on 10/13/2020.  PEG and chest tube consent obtained from son on 10/15/2020.  Right chest tube performed 10/16/2020. Daylene Cardinal produced a moderate amount of serosanguineous fluid.   10/19/20 CT scan revealing increased cavitation size consider percutaneous drainage and chemical pneumolysis for effusion improvement, patient switched to doxycycline and linezolid.  10/21/2020 acute kidney injury with creatinine of 1.7.  Work-up to evaluate prerenal etiology or AIN secondary to antibiotic change.  10/22/20 renal labs indicate RTA 4.  Initiation of steroids and loop diuretic.  Follow with BMP.  CT chest with large right hemopneumothorax.  Adjusted ventilator settings to reduce barotrauma and maintain oxygenation.  10/23/2020 deterioration of patient's condition to critical status.  Unable to contact family to discuss code status.  Worsening renal failure, increasing hypoxia and hypercapnia.  Plan to start CRRT.  10/23/2020 patient becoming increasingly acidotic with enlarging left pleural effusion.  Left pigtail catheter placed to suction.  Right IJ dialysis catheter placed.  Initiation of CRRT.  Spoke at length with family, it is agreed patient's CODE STATUS to be switched to DNR CCA.     Follow-up:  10/24/2020 : Patient tolerated CRRT, still on dexamethasone therapy, 7 mcg Levophed, 50 mcg Irena-Synephrine, 0.04 vasopressin. Has a left superficial lower extremity venous thrombosis, on Versed drip, and bicarbonate drip per nephrology. 10/25/2020: Patient was reported to be hypoglycemic, tube feed was on hold by surgery secondary to ileus, patient received Lantus, currently on D10W, had an episode last night to be hypotensive, increased respiratory rate, hypoxic, noted to have worsening right tension pneumothorax, chest tube was adjusted.   PEG tube to suction per surgery request, patient with underlying ileus, volume obtained about 1200 mL.     ROS : Unable to obtain, sedated, intubated.     Medications:     dextrose 20 mL/hr at 10/25/20 0915    midazolam 3 mg/hr (10/25/20 0729)    vasopressin (Septic Shock) infusion 0.04 Units/min (10/25/20 0840)    sodium chloride      IV infusion builder      phenylephrine (IRENA-SYNEPHRINE) 50mg/250mL infusion Stopped (10/24/20 1311)    dialysis builder 2,500 mL/hr at 10/25/20 1245    prismaSol BGK 4/2.5 1,000 mL/hr (10/25/20 1140)    prismaSol BGK 4/2.5 1,000 mL/hr (10/25/20 1141)    amiodarone 0.5 mg/min (10/25/20 1121)    fentaNYL (SUBLIMAZE) 1250 mcg in sodium chloride 0.9 % 250 mL 150 mcg/hr (10/25/20 0930)    dexmedetomidine 1 mcg/kg/hr (10/25/20 1210)    norepinephrine 21 mcg/min (10/25/20 1309)    dextrose        dextrose        calcium gluconate IVPB  1 g Intravenous Once    heparin (porcine)  5,000 Units Subcutaneous 3 times per day    famotidine (PEPCID) injection  20 mg Intravenous Daily    bisacodyl  10 mg Rectal Daily    fludrocortisone  0.1 mg PEG Tube Daily    linezolid  600 mg Intravenous Q12H    clopidogrel  75 mg Per NG tube Daily    insulin lispro  0-18 Units Subcutaneous Q4H    rosuvastatin  40 mg Per NG tube Nightly    sodium chloride flush  10 mL Intravenous 2 times per day    chlorhexidine  15 mL Mouth/Throat BID    metoprolol  5 mg Intravenous Q6H    [Held by provider] enoxaparin  1 mg/kg Subcutaneous Q12H    calcium replacement protocol   Other RX Placeholder    [Held by provider] pantoprazole  40 mg Intravenous Daily    And    [Held by provider] sodium chloride (PF)  10 mL Intravenous Daily    glycopyrrolate-formoterol  2 puff Inhalation BID    aspirin  81 mg Per NG tube Daily    lidocaine 1 % injection  5 mL Intradermal Once    [Held by provider] amLODIPine  10 mg Per NG tube Daily    Vitamin D  1,000 Units Per NG tube Daily    phosphorus replacement protocol   Other RX Placeholder       Vital Signs:   BP (!) 120/49   Pulse 69   Temp 98.8 °F (37.1 °C) (Esophageal)   Resp 22   Ht 5' 8\" (1.727 m)   Wt 243 lb (110.2 kg)   SpO2 95%   BMI 36.95 kg/m²      Intake/Output Summary (Last 24 hours) at 10/25/2020 1319  Last data filed at 10/25/2020 1300  Gross per 24 hour   Intake 3473 ml   Output 5207 ml   Net -1734 ml        General:    acute ill in appearance, on mechanical ventilation. HEENT:   PERRL. Pale conjunctiva. Neck: supple.  No Thyromegaly. Lungs: diffuse rhonchi bilaterally with end expiratory wheezes.    Bilateral posterior chest tube with serosanguineous drainage. Cardiac: RRR-no murmurs. Abdomen: Distended, diminished bowel sounds. Extremities:  No clubbing, cyanosis.    +1 bilateral lower extremity pitting edema. Neurologic:  Intact cranial nerves, otherwise limited, unable to obtain.     Data: (All radiographs, tracings, PFTs, and imaging are personally viewed and interpreted unless otherwise noted).    Sodium 134, potassium 4.3, creatinine 1.8, white blood cell count 18.9, hemoglobin 9.0.     Critical care time spent with patient care 45 minutes.       Electronically signed by Kar Dailey MD on 10/25/2020 at 1:19 PM

## 2020-10-25 NOTE — PROCEDURES
Chest Tube Placement Procedure Note    Indication: Right tension pneumothorax status post previous thoracentesis and previous chest tube placement. Consent: Emergent procedure. Pre-Medication: Lidocaine 1% 10 mL injected in place. Procedure: The patient was placed in a left decubitus position with the head of the bed at 30 degrees. Local anesthesia over the insertion site was right third intercostal space mid axillary line. An incision was made half inch blunt dissection up and over the rib was performed until access was obtained into the pleural cavity. A 28 [place] chest tube was placed and connected to Heimlich valve, pleural VAC, aortic arch heard through the chest tube then was connected to a wall suction -40 cm of water. Patient tolerated the procedure very well with no complications, chest x-ray showed good positioning with no complications.

## 2020-10-25 NOTE — PROGRESS NOTES
2250 patient's ventilator alarming, high frequency. Versed increased with ordered bolus. 2252 patient hypotensive. Levophed increased. No change in patient's respiratory status. 2257 stat chest x-ray requested. 2315 results called to this RN regarding Chest Xray. Shaheen Woodall DNP notified.

## 2020-10-26 NOTE — PROGRESS NOTES
Comprehensive Nutrition Assessment    Type and Reason for Visit:  Reassess(TF management)    Nutrition Recommendations/Plan:   If unable to use bowel recommend TPN: dose weight of 82 kg and start at 15 kcals/kg, 2.0 grams protein/kg, and 30% lipids  If able to use gut recommend restart trophic tube feeding: Vital 1.2 at 10 ml/hr, free water per MD  Consider culturelle    Nutrition Assessment:  Pt. declining from a nutritional standpoint AEB NPO, TF on hold. 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 Nutrition recommendations/interventions as per above. Malnutrition Assessment:  Malnutrition Status:  Insufficient data(+covid patient)    Context:  Acute Illness     Findings of the 6 clinical characteristics of malnutrition:  Energy Intake:  (EN or NPO since admit)  Weight Loss:  Unable to assess(no weight hx in EMR)     Body Fat Loss:  Unable to assess(+covid)     Muscle Mass Loss:  Unable to assess(+covid)    Fluid Accumulation:  No significant fluid accumulation     Strength:  Not Performed    Estimated Daily Nutrient Needs:  Energy (kcal):  4787-4245 (30-32 kcals/kg for late active phase); Weight Used for Energy Requirements:  Ideal(70 kg - ideal weight)     Protein (g):  140 (2.0 grams/kg ideal weight); Weight Used for Protein Requirements:  (70 kg - ideal weight)        Fluid (ml/day):  per MD; Weight Used for Fluid Requirements:  (n/a)      Nutrition Related Findings:  +covid; intubated 10/12; MAP: 67. Tube feeding was decreased to 10 ml/hr yesterday. Today tube feeding is off due to altered GI function (possible bowel perforation). Note patient had 50 ml rectal tube output in the last 24 hours.  Spoke to SYD WEBBER RN who reports that tube feeding is off and not sure of nutrition plan yet. Patient continues on CVVH. Meds: Florinef, Dulcolax, ATB, Vitamin D, Vasopressin, Fentanyl, Levophed, Precedex, Versed. Wounds:  Stage II, Unstageable(stage 2 coccyx, unstageable buttock)       Current Nutrition Therapies:    Current Tube Feeding (TF) Orders:  · Feeding Route: PEG(placed 10/16)  · Formula: Semi-Elemental(Vital 1.2)  · Schedule: (current on hold)  · Additives/Modulars: (Proteinex 2 go 2.5 oz.  BID (208 kcals, 52 gm protein))  · Water Flushes: per MD - 200ml 4x daily  · Current TF & Flush Orders Provides: hold  · Goal TF & Flush Orders Provides: Vital 1.2 at 75 ml/hr = 1800 ml, 2160 kcals, 135 grams protein, 199 gram CHO, 9 grams fiber, 2260 ml water (1460 vital, 800 MD flushes)    Anthropometric Measures:  · Height: 5' 8\" (172.7 cm)  · Current Body Weight: 256 lb (116.1 kg)(10/26, bedscale with +1 and +2 edema)   · Admission Body Weight: 220 lb (99.8 kg)(9/24/20, no weight source)    · Usual Body Weight: (no weight hx available in EMR, patient intubated)     · Ideal Body Weight: 154 lbs;   · BMI: 38.9  · Adjusted Body Weight:  ; (82 kg - for possible TPN dosing)    · BMI Categories: Obese Class 2 (BMI 35.0 -39.9)(38.92)       Nutrition Diagnosis:   · Inadequate oral intake related to impaired respiratory function as evidenced by NPO or clear liquid status due to medical condition, intubation    Nutrition Interventions:   Food and/or Nutrient Delivery:  Continue NPO(TF vs TPN)  Nutrition Education/Counseling:  No recommendation at this time   Coordination of Nutrition Care:  Continued Inpatient Monitoring, Interdisciplinary Rounds    Goals:  Patient will receive adequate nutrition in 1-4 days       Nutrition Monitoring and Evaluation:   Behavioral-Environmental Outcomes:  (n/a)   Food/Nutrient Intake Outcomes:  Enteral Nutrition Intake/Tolerance  Physical Signs/Symptoms Outcomes:  Biochemical Data, GI Status, Fluid Status or Edema, Hemodynamic Status, Weight, Skin     Discharge Planning:     Too soon to determine     Electronically signed by Lydia Pandey, WISAM, LD on 10/26/20 at 11:14 AM EDT    Contact: [phone]

## 2020-10-26 NOTE — PLAN OF CARE
Problem: Falls - Risk of:  Goal: Will remain free from falls  Description: Will remain free from falls  10/26/2020 0454 by Aris Mello RN  Outcome: Ongoing  Note: Pt has had no falls this time this shift. Pt is sedated on vent with CRRT. Bed alarm on, fall band on, fall sign posted. Problem: Falls - Risk of:  Goal: Absence of physical injury  Description: Absence of physical injury  10/26/2020 0454 by Aris Mello RN  Outcome: Ongoing  Note: Pt has no evidence of physical injury this shift. Problem: Skin Integrity:  Goal: Will show no infection signs and symptoms  Description: Will show no infection signs and symptoms  10/26/2020 0454 by Aris Mello RN  Outcome: Ongoing  Note: Pt is currently afebrile, WBC 20.8. Pt is currently on zyvox. Problem: Skin Integrity:  Goal: Absence of new skin breakdown  Description: Absence of new skin breakdown  10/26/2020 0454 by Aris Mello RN  Outcome: Ongoing  Note: Pt has stage II on coccyx and bruising throughout. No signs of new skin breakdown with each assessment. Turning pt q2h. Problem: Airway Clearance - Ineffective  Goal: Achieve or maintain patent airway  10/26/2020 0454 by Aris Mello RN  Outcome: Ongoing  Note: Pt is currently sedated on vent. Third chest tube placed due to pneumothorax. Problem: Gas Exchange - Impaired  Goal: Absence of hypoxia  10/26/2020 0454 by Aris Mello RN  Outcome: Ongoing  Note: Pt O2 sats 98% on 60% FiO2. Problem: Breathing Pattern - Ineffective  Goal: Ability to achieve and maintain a regular respiratory rate  10/26/2020 0454 by Aris Mello RN  Outcome: Ongoing  Note: Pt RR currently 20. No distress noted. Problem: Body Temperature -  Risk of, Imbalanced  Goal: Ability to maintain a body temperature within defined limits  10/26/2020 0454 by Aris Mello RN  Outcome: Ongoing  Note: Pt has been normothermic this shift.      Problem: Isolation Precautions - Risk of Spread of Infection  Goal: Prevent transmission of infection  10/26/2020 0454 by Gilberto Snider RN  Outcome: Ongoing  Note: Pt currently placed in droplet plus isolation on COVID ICU. Problem: Nutrition Deficits  Goal: Optimize nutrtional status  Outcome: Ongoing  Note: Pt tube feeding currently on hold due to possible bowel perforation noted on CT. General surgery notified. Problem: Nutrition  Goal: Optimal nutrition therapy  10/26/2020 0454 by Gilberto Snider RN  Outcome: Ongoing  Note: Pt tube feeding currently on hold due to possible bowel perforation noted on CT. General surgery notified. Problem: Infection - Central Venous Catheter-Associated Bloodstream Infection:  Goal: Will show no infection signs and symptoms  Description: Will show no infection signs and symptoms  10/26/2020 0454 by Gilberto Snider RN  Outcome: Ongoing  Note: Pt is currently afebrile, WBC 20.8. Pt is currently on zyvox. Problem: Pain:  Goal: Pain level will decrease  Description: Pain level will decrease  10/26/2020 0454 by Gilberto Snider RN  Outcome: Ongoing  Note: Pt is currently sedated on vent. Using CPOT score for pain assessment. Problem: Pain:  Goal: Control of acute pain  Description: Control of acute pain  Outcome: Ongoing  Note: Pt is currently sedated on vent. Using CPOT score for pain assessment. Problem: Serum Glucose Level - Abnormal:  Goal: Ability to maintain appropriate glucose levels has stabilized  Description: Ability to maintain appropriate glucose levels has stabilized  10/26/2020 0454 by Gilberto Snider RN  Outcome: Ongoing  Note: Pt started on D5W at 25ml/hr due to hypoglycemia. Problem: Urinary Elimination:  Goal: Signs and symptoms of infection will decrease  Description: Signs and symptoms of infection will decrease  Outcome: Ongoing  Note: Pt is currently afebrile, WBC 20.8. Pt is on zyvox.      Problem: Urinary Elimination:  Goal: Complications related to the disease process, condition or treatment will be avoided or minimized  Description: Complications related to the disease process, condition or treatment will be avoided or minimized  Outcome: Ongoing  Note: Mccoy care q shift. Problem: Restraint Use - Nonviolent/Non-Self-Destructive Behavior:  Goal: Absence of restraint indications  Description: Absence of restraint indications  Outcome: Completed     Problem: Restraint Use - Nonviolent/Non-Self-Destructive Behavior:  Goal: Absence of restraint-related injury  Description: Absence of restraint-related injury  Outcome: Completed     Care plan reviewed with family. Family verbalized understanding of the plan of care and contribute to goal setting.

## 2020-10-26 NOTE — PLAN OF CARE
Problem: Falls - Risk of:  Goal: Will remain free from falls  Description: Will remain free from falls  10/26/2020 1550 by Kim Higgins RN  Outcome: Met This Shift  Note: Fall armband on. Falling star on door. Siderails up times 4. Unable to use the call light. Problem: Falls - Risk of:  Goal: Absence of physical injury  Description: Absence of physical injury  10/26/2020 1550 by Kim Higgins RN  Outcome: Met This Shift  Note: No physical injury noted. Problem: Gas Exchange - Impaired  Goal: Absence of hypoxia  10/26/2020 1550 by Kim Higgins RN  Outcome: Met This Shift  Note: Pulse ox levels stable. FiO2 vent at 50%. Problem: Breathing Pattern - Ineffective  Goal: Ability to achieve and maintain a regular respiratory rate  10/26/2020 1550 by Kim Higgins RN  Outcome: Met This Shift  Note: Respiratory rate stable, regular. Problem: Pain:  Goal: Control of acute pain  Description: Control of acute pain  10/26/2020 1550 by Kim Higgins RN  Outcome: Met This Shift  Note: No signs of pain or discomfort noted. Problem: Serum Glucose Level - Abnormal:  Goal: Ability to maintain appropriate glucose levels has stabilized  Description: Ability to maintain appropriate glucose levels has stabilized  10/26/2020 1550 by Kim Higgins RN  Outcome: Met This Shift  Note: Glucose levels stable. Problem: Skin Integrity:  Goal: Absence of new skin breakdown  Description: Absence of new skin breakdown  10/26/2020 1550 by Kim Higgins RN  Outcome: Ongoing  Note: No new skin breakdown noted. Coccyx and buttocks continue with existing pressure injury. Purple areas noted. Problem: Airway Clearance - Ineffective  Goal: Achieve or maintain patent airway  10/26/2020 1550 by Kim Higgins RN  Note: Suction orally and ETT as needed.      Problem: Nutrition Deficits  Goal: Optimize nutrtional status  10/26/2020 1550 by Kim Higgins RN  Outcome: Not Met This Shift  Note: Tube feeds on hold. PEG tube clamped. Care plan reviewed with daughter in law. Daughter in law verbalize understanding of the plan of care and contribute to goal setting.

## 2020-10-26 NOTE — PLAN OF CARE
Problem: Nutrition  Goal: Optimal nutrition therapy  10/26/2020 1119 by Bridger Goodrich RD, ADRIAN  Outcome: Ongoing   Nutrition Problem #1: Inadequate oral intake  Intervention: Food and/or Nutrient Delivery: Continue NPO(TF vs TPN)  Nutritional Goals: Patient will receive adequate nutrition in 1-4 days

## 2020-10-26 NOTE — PROGRESS NOTES
Discussed the case with Dr. Helena Chao and no needs noted at this time. Please call palliative care if needs arise.

## 2020-10-26 NOTE — PROGRESS NOTES
Renal Progress Note  [facility]    Patient :  Maru Ordonez; 67 y.o. MRN# [id number]  Location:  3A-04/004-A  Attending:  Julianna Hill MD  Admit Date:  9/24/2020   Hospital Day: 28      Subjective:     Nephrology is following for SANTI on CRRT. Patient seen and examined on CRRT. Tolerating net UF 50 ml/hour. ? Weight increase today. On vent @ 60 % fiO2, levo at 23 and on vaso. No urine output. Had tension pneumo yesterday. Has 3 chest tubes.       Outpatient Medications:     Medications Prior to Admission: amLODIPine (NORVASC) 10 MG tablet, Take 10 mg by mouth daily   atorvastatin (LIPITOR) 10 MG tablet, Take 10 mg by mouth nightly   glimepiride (AMARYL) 2 MG tablet, Take 3 mg by mouth 2 times daily  metFORMIN (GLUCOPHAGE) 1000 MG tablet, Take 1,000 mg by mouth 2 times daily (with meals)   hydroCHLOROthiazide (HYDRODIURIL) 12.5 MG tablet, Take 12.5 mg by mouth daily  Zinc Acetate (GALZIN) 25 MG capsule, Take 1 capsule by mouth daily  Cholecalciferol (VITAMIN D3) 125 MCG (5000 UT) CAPS, Take by mouth    Current Medications:     Scheduled Meds:    heparin (porcine)  5,000 Units Subcutaneous 3 times per day    famotidine (PEPCID) injection  20 mg Intravenous Daily    bisacodyl  10 mg Rectal Daily    fludrocortisone  0.1 mg PEG Tube Daily    linezolid  600 mg Intravenous Q12H    clopidogrel  75 mg Per NG tube Daily    rosuvastatin  40 mg Per NG tube Nightly    sodium chloride flush  10 mL Intravenous 2 times per day    chlorhexidine  15 mL Mouth/Throat BID    metoprolol  5 mg Intravenous Q6H    [Held by provider] enoxaparin  1 mg/kg Subcutaneous Q12H    calcium replacement protocol   Other RX Placeholder    [Held by provider] pantoprazole  40 mg Intravenous Daily    And    [Held by provider] sodium chloride (PF)  10 mL Intravenous Daily    glycopyrrolate-formoterol  2 puff Inhalation BID    aspirin  81 mg Per NG tube Daily    lidocaine 1 % injection  5 mL Intradermal Once  [Held by provider] amLODIPine  10 mg Per NG tube Daily    Vitamin D  1,000 Units Per NG tube Daily    phosphorus replacement protocol   Other RX Placeholder     Continuous Infusions:    dextrose 25 mL/hr at 10/25/20 2253    midazolam 3 mg/hr (10/25/20 0729)    vasopressin (Septic Shock) infusion 0.04 Units/min (10/26/20 0407)    sodium chloride      IV infusion builder      phenylephrine (IRENA-SYNEPHRINE) 50mg/250mL infusion Stopped (10/24/20 1311)    dialysis builder 2,500 mL/hr at 10/26/20 0825    prismaSol BGK 4/2.5 1,000 mL/hr (10/26/20 0823)    prismaSol BGK 4/2.5 1,000 mL/hr (10/26/20 0078)    amiodarone 0.5 mg/min (10/26/20 0002)    fentaNYL (SUBLIMAZE) 1250 mcg in sodium chloride 0.9 % 250 mL 150 mcg/hr (10/26/20 0326)    dexmedetomidine 0.7 mcg/kg/hr (10/26/20 0900)    norepinephrine 23 mcg/min (10/26/20 0430)    dextrose       PRN Meds:  potassium chloride, magnesium sulfate, calcium gluconate **OR** calcium gluconate **OR** calcium gluconate **OR** calcium gluconate, sodium phosphate IVPB **OR** sodium phosphate IVPB **OR** sodium phosphate IVPB **OR** sodium phosphate IVPB, albuterol, sodium chloride flush, acetaminophen **OR** acetaminophen, promethazine **OR** ondansetron, fentanNYL, midazolam, promethazine **OR** [DISCONTINUED] ondansetron, potassium chloride, potassium chloride, magnesium sulfate, polyethylene glycol, glucose, dextrose, glucagon (rDNA), dextrose    Input/Output:       I/O last 3 completed shifts: In: 4080 [I.V.:5205; NG/GT:59]  Out: 5011 [Urine:3; Emesis/NG output:200; Stool:50; Chest Tube:326].       Patient Vitals for the past 96 hrs (Last 3 readings):   Weight   10/26/20 0403 256 lb (116.1 kg)   10/25/20 0600 243 lb (110.2 kg)   10/24/20 1245 248 lb 6.4 oz (112.7 kg)       Vital Signs:   Temperature:  Temp: 98.4 °F (36.9 °C)  TMax:   Temp (24hrs), Av.8 °F (37.1 °C), Min:98.4 °F (36.9 °C), Max:99 °F (37.2 °C)    Respirations:  Resp: 20  Pulse:   Pulse: 67  BP:    BP: (!) 117/49  BP Range: Systolic (62BVL), OCJ:960 , Min:81 , ZQD:019       Diastolic (79XXR), DEBORAH:64, Min:35, Max:126      Physical Examination:     Physical exam limited due to COVID-19, report per RN and visual inspection  General:  Intubated, sedated  HEENT: Endotracheal tube  Chest:               Intubated, right chest tube x 2 on right, 1 CT on left  Cardiac:  S1 S2   Abdomen: Soft, +PEG tube  Neuro:  sedated  SKIN:  No rashes  Extremities:  Edema in legs    Labs:       Recent Labs     10/25/20  1940 10/26/20  0058 10/26/20  0600   WBC 20.3* 20.8* 20.2*   RBC 3.14* 3.15* 3.15*   HGB 9.1* 9.1* 9.1*   HCT 29.4* 29.5* 29.6*   MCV 93.6 93.7 94.0   MCH 29.0 28.9 28.9   MCHC 31.0* 30.8* 30.7*    269 240   MPV 9.4 9.4 9.4      BMP:   Recent Labs     10/25/20  1940 10/26/20  0058 10/26/20  0600   * 131* 132*   K 4.2 4.3 4.4   CL 98 96* 97*   CO2 22* 23 22*   BUN 7 6* 8   CREATININE 0.7 0.6 0.8   GLUCOSE 76 98 133*   CALCIUM 7.2* 7.4* 7.5*      Phosphorus:     Recent Labs     10/25/20  1940 10/26/20  0058 10/26/20  0600   PHOS 2.1* 2.1* 2.9     Magnesium:    Recent Labs     10/25/20  1940 10/26/20  0058 10/26/20  0600   MG 2.2 2.2 2.1     Albumin:  No results for input(s): LABALBU in the last 72 hours. BNP:    No results found for: BNP  RICHARD:    No results found for: RICHARD  SPEP:  Lab Results   Component Value Date    PROT 5.6 10/23/2020     UPEP:   No results found for: LABPE  C3:   No results found for: C3  C4:   No results found for: C4  MPO ANCA:   No results found for: MPO  PR3 ANCA:   No results found for: PR3  Anti-GBM:   No results found for: GBMABIGG  Hep BsAg:       No results found for: HEPBSAG  Hep C AB:        No results found for: HEPCAB    Urinalysis/Chemistries:      Lab Results   Component Value Date    NITRU NEGATIVE 10/12/2020    COLORU DARK YELLOW 10/23/2020    COLORU DK YELLOW 10/12/2020    PHUR 5.0 10/12/2020    LABCAST 0-4 C. GRAN 10/12/2020    WBCUA 0-2 10/12/2020    RBCUA 15-20 10/12/2020    MUCUS THREADS 10/12/2020    YEAST NONE SEEN 10/12/2020    BACTERIA MODERATE 10/12/2020    SPECGRAV 1.018 10/12/2020    LEUKOCYTESUR NEGATIVE 10/12/2020    UROBILINOGEN 1.0 10/12/2020    BILIRUBINUR NEGATIVE 10/12/2020    BLOODU MODERATE 10/12/2020    KETUA NEGATIVE 10/12/2020    AMORPHOUS URATES 10/12/2020     Urine Sodium:   No results found for: NATALIIA  Urine Potassium:  No results found for: KUR  Urine Chloride:  No results found for: CLUR  Urine Osmolarity:   Lab Results   Component Value Date    OSMOU 319 10/18/2020     Urine Protein:   No components found for: TOTALPROTEIN, URINE   Urine Creatinine:   No results found for: LABCREA  Urine Eosinophils:  No components found for: UEOS        Impression and Plan:  1. Anuric SANTI due to ATN from septic shock and contrast nephropathy on CRRT  -continue with CVVHDF current RX, net UF 50 ml/hour still with edema    2. Lytes: stable, replace per protocol  3. Acute hypoxic resp failure  4. Septic shock  5. Empyema and exudative pleural effusions s/p B/L chest tubes  6. Leukocytosis  7. Anemia  8. COVID-19  9. S/p CVA  10. DM        Please don't hesitate to call with any questions.   Electronically signed by Carlin Ramirez DO on 10/26/2020 at 9:31 AM

## 2020-10-26 NOTE — FLOWSHEET NOTE
received a phone call from pt's daughter that there had been a zoom meeting set up an hour ago that was postponed due to a decline in the pt's condition. The zoom meeting was still open, but they had not been admitted. This  told the daughter to leave the meeting.  went to the floor and spoke with the pt's nurse and was told to restart the zoom meeting. Family joined the meeting and  offered them words of comfort. Pt's nurse took the telehealth device into the pt's room. Spiritual care to continue to provide support and hope. 10/25/20 1955   Encounter Summary   Services provided to: Family   Referral/Consult From: Family   Support System Children;Family members   Continue Visiting Yes  (10/25)   Complexity of Encounter Moderate   Length of Encounter 30 minutes   Routine   Type Follow up   Assessment Approachable;Tearful   Intervention Active listening;Explored feelings, thoughts, concerns; Empowerment;Sustaining presence/ Ministry of presence   Outcome Comfort;Engaged in conversation

## 2020-10-26 NOTE — PROGRESS NOTES
CRITICAL CARE PROGRESS NOTE      Patient:  Aayush Bolivar    Unit/Bed:3A-04/004-A  YOB: 1945  MRN: [id number]   PCP: Victor M Skelton MD  Date of Admission: 9/24/2020  Chief Complaint:- acute respiratory failure    Assessment and Plan:   1. Acute respiratory failure with hypoxia and hypercapnia, worsened: 10/22/2020 patient developed air leak in right chest tube. Subsequent imaging revealed right hemopneumothorax. Patient patient placed on CPAP level ventilation to prevent further barotrauma, air trapping. Today, the patient is experiencing worsening hypoxia with hypercarbia and severe acidosis. Left-sided pleural effusion worsening. Ventilatory settings adjusted to [facility] with a PEEP of 0, tidal volume 450, respiratory rate 25, and PIP limit of 40. Left-sided pigtail catheter placed for exudative pleural effusion. Satting well. 2. Empyema, right: Large right pleural effusion with lower lobe atelectasis from CT on 10/13/20.  10/16/20 pigtail catheter placed in the right lung. Produced serosanguineous discharge. Pleural fluid analysis; glucose 157, , protein 3.6, total nucleated cells 3087. Positive Lights criteria, exudative effusion. All 3 sets of cytology without malignant cells. 10/20/2020 respiratory culture growing MRSA. Patient receiving linezolid. Chemical pneumolysis completed on 10/20/2020. CT evaluation following the pneumolysis on 10/22/2020 revealed large right pneumothorax with effusion. Patient now receiving volume mode without PEEP secondary to pneumothorax. Over the weekend patient received right large bore chest tube for tension pneumothorax. 3. Exudative pleural effusion, left: Patient received left pigtail catheter on 10/23/2020. CT from 10/26/2020 revealed improved left effusion with adequate chest tube placement. Continue antibiotics. 4. Right lower lobe pulmonary abscess: Sputum growing MRSE.   Molecular pneumonia panel resulting resistant staph aureus by PCR. No improvement on vancomycin. Likely secondary to garrett joseph leukocidin production. Will begin linezolid with cessation of vanc. Patient will require 6 weeks of antibiotic therapy. Day 7/42 linezolid. Otherwise, may require percutaneous drainage of the abscess. 5. ARDS: recent history of COVID-19 and pronation therapy. ASA and Lovenox to mitigate microthrombosis being held secondary to anemia. Bronchoscopy 10/13/20 with mucinous return. Negative respiratory panel, culture shows gram positive cocci/normal guido resulted as staph epidermidis. Covid 19 retest is negative. 6. Septic shock: Secondary to #2. Patient was afebrile overnight. Acetaminophen prn q6. Lactic Clearance 3.2-1.4. Patient receiving Levophed with white blood cell count with mild increase. Procalcitonin 10/22/20, 2.21. Continue current antibiotics. 7. Acute kidney injury, RTA type IV: Baseline creatinine less than 1. Creatinine increasing steadily. No urine eosinophils. No serum anion gap. 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 Patient receiving bicarb drip. Added fluticasone. Lasix stopped secondary to CRRT. 8. Hyperkalemia, resolved: Most recent potassium of 4.4. Kayexalate and continue bicarb drip. Monitor with BMP. 9. Hyponatremia, improved: Currently 132, follow with BMP. Adjusted maintenance fluids to half-normal saline instead of D5.  10. Anemia, stable: Normocytic with MCV of 99.7. Given TPA during this hospital stay for R ICA stenosis. B12 and folate normal.  Patient's hemoglobin deteriorated 10/19/2020 to level of 6.7, 1 unit PRBCs ordered. Posttransfusion H&H 8.7. Anemia studies indicative of anemia of chronic disease. Follow. Transfuse as necessary for hemoglobin less than 7. Restarted patient's low-dose ASA and Plavix. 11. DMT2, uncontrolled:  Continue to monitor with ACCU and SSI. Most recent blood glucose 231.   12. CVA:  History of recent tPA use, will continue to monitor. Clopidogrel and aspirin restarted. Patient's neurologic exam limited by sedation. 13. Constipation: Rectal tube in place. Dulcolax. 14. Hypertension, essential: Stable, on metoprolol. 15. Hyperlipidemia: Home rosuvastatin is continued. INITIAL H AND P AND ICU COURSE:  Nena Grigsby is a 67year old  male, transferred to [facility] ICU 10/12/2020 for intubation. Patient is a 70-year-old morbidly obese white male reformed smoker. [facility] has a history of diabetes, hyperlipidemia, and hypertension. Karey Finley was admitted from the emergency room on 9/24/2020 with hypoxemic respiratory failure secondary to [address] had associated acute renal injury.  He required 100% nonrebreather and was started on Decadron and Remdesivir.  He continued to deteriorate and required intubation on 9/27/2020.  Acute lung injury progressed to ARDS and started pronation therapy on 9/29/2020. [facility] was found to have bilateral pneumonia with Klebsiella pneumoniae and Haemophilus influenza which was treated with Zosyn.  Patient completed 12-day course of Zosyn on 10/5/20. Patient extubated to 10/4/22 intermittent BiPAP. Patient had neurologic decline in the early morning hours of 10/7/2020.  Code stroke was initiated, and patient underwent neurologic assessment.  The decision was made to administer TPA as the patient had left sided weakness and increased confusion.  TPA was administered without difficulty. MRI was negative for acute stroke but CTA showed a severe R ICA stenosis. Neurology felt tPA likely aborted his CVA given his COVID 23 that put him in a hypercoagulable state. 10/10/2020 Patient was transferred to the Hospitalist Service. Consults to Pulmonary and Cardiology services. Patient was confused at times, and was tolerating alternating between HFNC and Bipap. New onset atrial fibrillation which patient did convert with IVP Lopressor. Patient was placed on CPAP with concerns of HUSSEIN. 10/12/2020 Noted increase in work of breathing patient had failed Bipap therapy. He was transferred to ICU for further management and care. Patient received bronchoscopy on 10/13/2020. PEG and chest tube consent obtained from son on 10/15/2020. Right chest tube performed 10/16/2020. It produced a moderate amount of serosanguineous fluid. 10/19/20 CT scan revealing increased cavitation size consider percutaneous drainage and chemical pneumolysis for effusion improvement, patient switched to doxycycline and linezolid. 10/21/2020 acute kidney injury with creatinine of 1.7. Work-up to evaluate prerenal etiology or AIN secondary to antibiotic change. 10/22/20 renal labs indicate RTA 4. Initiation of steroids and loop diuretic. Follow with BMP. CT chest with large right hemopneumothorax. Adjusted ventilator settings to reduce barotrauma and maintain oxygenation. 10/23/2020 deterioration of patient's condition to critical status. Unable to contact family to discuss code status. Worsening renal failure, increasing hypoxia and hypercapnia. Plan to start CRRT.  10/23/2020 patient becoming increasingly acidotic with enlarging left pleural effusion. Left pigtail catheter placed to suction. Right IJ dialysis catheter placed. Initiation of CRRT. Spoke at length with family, it is agreed patient's CODE STATUS to be switched to DNR CCA. 10/26/2020 over the weekend, patient received additional large bore right-sided chest tube for tension pneumothorax. Patient still requiring pressors, CT chest revealed mild amount of free air in the abdomen. Likely secondary to PEG tube placement. No abdominal rigidity. Abdominal x-ray under fluoroscopy pending. Past Medical History:  See HPI. Family History: Mother and Father unknown. Social History: Former smoker, denies any ETOH or illicit drug use. .    ROS   Unable to complete secondary to patient's sedation and ventilatory status.     PHYSICAL hemorrhage.  10/19/2020: CT chest without contrast with worsening cavitation within the right lower lobe, persistent pleural effusion, new pneumoperitoneum secondary to PEG placement.  10/17/2020 acid-fast culture and smear no bacilli seen culture pending.  10/13/20 BAL culture with gram positive cocci/normal guido staph epidermidis methicillin-resistant.  10/13/20 Albumin 2.0, alk phos 83, ALT 64, AST 43, bilirubin 0.5, total protein 5.5   10/13/20 CT chest, abdomen, pelvis revealed bilateral diffuse consolidation of the lungs and right pleural effusion.  Echo 10/7/2020 LVEF 55%.  Cardiac telemetry NSR   10/12/2020 pneumonia panel negative   10/12/2020 urine culture no growth with contaminants   10/6/2020 blood cultures no growth final result. Seen with multidisciplinary ICU team yes. Meets Continued ICU Level Care Criteria:    [x] Yes   [] No - Transfer Planned to listed location:  [] HOSPITALIST CONTACTED-      Case and plan discussed with Dr. Jessica Gilliland.         Electronically signed by Susie Harrington DO  CRITICAL CARE SPECIALIST

## 2020-10-26 NOTE — CARE COORDINATION
10/26/20, 11:46 AM EDT    DISCHARGE ON GOING [address] [facility] day: 32  Location: 3A-04/004-A Reason for admit: COVID-19 [U07.1]   Procedure:   9/24 Vapotherm initiated  9/27 Intubated  9/27 CVC Right subclavian - 10/5 removed  9/29 - 10/2 Rotoprone therapy   10/1 Convalescent Plasma  10/4 Extubated  10/4 Vapotherm initiated  22/5 PICC right basilic  19/2 Code stroke - TPA given   10/12 Re-intubated. 10/13 CT chest: Nonspecific diffuse bilateral consolidation and pleural fluid.   10/13 Bronchoscopy -mucous to the lower lobes bilaterally. 10/16 Right Pleural cath placement.   10/16 PEG tube placement  10/19 CT Head: No acute stroke or hemorrhage  10/19 CT Chest: Relatively stable bilateral consolidations. A cavitation within consolidated right lower lobe has increased in size; Persistent right pleural effusion, despite placement of pleural drain; Interval development of moderate sized left pleural effusion; Pneumoperitoneum associated with recent placement of percutaneous gastrostomy tube   10/20 Bronch w/washings: Diffuse bilateral mucus production  10/20 TPA/DNase to CT  10/22 PEG tube placed  10/23 TESSIO RIJ  10/23 Left CT placed  10/23 CRRT initiated  10/24 Chest tube-left placed  10/25 Chest tube-right placed   10/26 CT chest:   Mild pneumoperitoneum. Perforated bowel should be excluded. Nasogastric tube is in the distal esophagus. Small to moderate loculated right hydropneumothorax. Extensive airspace disease bilaterally suspicious for atypical infection    recommend follow-up. Indeterminate area of cavitation posteriorly right lower lobe. Cavitary    pneumonia is a possibility. Recommend follow-up. Free fluid in the upper abdomen. Treatment Plan of Care: Intensivist and nephro following. 1:1. Remains on vent: AC/PC, 60%. Remains on CRRT. Not making urine. Creatinine 0.8, bun 22. WBC 20.2. Hgb 9.1. Amiodarone gtt @ 0.5mg. Precedex @ 08.mcg.  Fentanyl gtt @ 150mcg. Levo @ 23mcg. Vasopressin @ 0.04u. Versed @ 2mg. Jarred off. Nebs. IV pepcid. IV zyvox. Barriers to Discharge: Await medical clearance. PCP: Tj Tuttle MD  Readmission Risk Score: 42%  Patient Goals/Plan/Treatment Preferences: Plans pending progress.

## 2020-10-27 NOTE — PROGRESS NOTES
CRITICAL CARE PROGRESS NOTE      Patient:  Salina Duarte    Unit/Bed:3A-04/004-A  YOB: 1945  MRN: [id number]   PCP: Shiv White MD  Date of Admission: 9/24/2020  Chief Complaint: acute respiratory failure    Assessment and Plan:   1. Acute respiratory failure with hypoxia and hypercapnia, worsened: 10/23/20 Left-sided pigtail catheter placed for exudative pleural effusion. 10/25/2020 patient developed air leak in right atrium. Large bore right chest tube placed for tension pneumothorax. Patient ventilation updated with low PEEP to allow for improved air leak and barotrauma reduction. Satting well on current settings. 2. Complicated parapneumonic effusion stage III, right: Large right pleural effusion with lower lobe atelectasis from CT on 10/13/20.  10/16/20 pigtail catheter placed in the right lung. Positive Lights criteria, exudative effusion with pleural enhancement on CT, indicative of pleural peel. All 3 sets of cytology without malignant cells. 10/20/2020 respiratory culture growing MRSA. Patient receiving linezolid day 8/42. Chemical pneumolysis completed on 10/20/2020 for multiple loculations. Right large bore chest tube no longer with air leak. 10/27/2020 chest x-ray revealed improved pneumothorax. 3. Exudative pleural effusion, left: Patient received left pigtail catheter on 10/23/2020. CT from 10/26/2020 revealed improved left effusion with adequate chest tube placement. Continue antibiotics. 4. Right lower lobe pulmonary abscess: Sputum growing MRSE. Molecular pneumonia panel resulting resistant staph aureus by PCR. No improvement on vancomycin. Likely secondary to garrett joseph leukocidin production. Transitioned to Linezolid day 8/42. Patient will require 6 weeks of antibiotic therapy. Otherwise, may require percutaneous drainage of the abscess. 5. Pneumoperitoneum: This is remained persistent on imaging. Associated with PEG tube placement, surgery following. 10/24/2020 PEG tube bumper was tightened. 10/26/2020 gastric tube x-ray with contrast revealed no leak. Patient's abdomen has remained soft and nonsurgical.  6. ARDS: recent history of COVID-19 and pronation therapy. ASA and Lovenox to mitigate microthrombosis being held secondary to anemia. Bronchoscopy 10/13/20 with mucinous return. Covid 19 retest is negative. Continue pressure ventilation. 7. Septic shock: Secondary to #2. Patient was afebrile overnight. Acetaminophen prn q6. Lactic acid increase from 1.4-4.3. Patient receiving Levophed and vasopressin. Procalcitonin 10/22/20, 2.21. Continue current antibiotics. 8. Acute kidney injury, RTA type IV: Baseline creatinine less than 1. Creatinine increasing steadily. No urine eosinophils. No serum anion gap. 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 Lasix stopped secondary to CRRT. 9. Hyponatremia, improved: Currently 134, follow with BMP. Adjusted maintenance fluids to normal saline per nephrology. Continue CRRT. 10. Anemia, stable: Normocytic with MCV of 99.7. Given TPA during this hospital stay for R ICA stenosis. B12 and folate normal.  Patient's hemoglobin deteriorated 10/19/2020 to level of 6.7, 1 unit PRBCs ordered. Posttransfusion H&H 8.7. Anemia studies indicative of anemia of chronic disease. Follow. Transfuse as necessary for hemoglobin less than 7. Restarted patient's low-dose ASA and Plavix. 11. DMT2, uncontrolled:  Continue to monitor with ACCU and SSI. Most recent blood glucose 100. 12. Atrial fibrillation, paroxysmal: Beta-blocker held secondary to patient's hypotension. He has not had any recent runs of RVR. Continue telemetry monitoring. 13. CVA:  History of recent tPA use, will continue to monitor. Clopidogrel and aspirin restarted. Patient's neurologic exam limited by sedation. 14. Constipation: Rectal tube in place. Dulcolax.   15. Hypertension, essential: Patient receiving pressure support with Levophed and vasopressin. 16. Hyperlipidemia: Home rosuvastatin is continued  17. Hyperkalemia, resolved: Most recent potassium of 4.4. Kayexalate and continue bicarb drip. Monitor with BMP. INITIAL H AND P AND ICU COURSE:  Cosmo Mcmahon is a 67year old  male, transferred to [facility] ICU 10/12/2020 for intubation. Patient is a 25-year-old morbidly obese white male reformed smoker. Gregg Mckeon has a history of diabetes, hyperlipidemia, and hypertension. Franchesca Garcia was admitted from the emergency room on 9/24/2020 with hypoxemic respiratory failure secondary to [address] [place] [address] Street had associated acute renal injury.  He required 100% nonrebreather and was started on Decadron and Remdesivir.  He continued to deteriorate and required intubation on 9/27/2020.  Acute lung injury progressed to ARDS and started pronation therapy on 9/29/2020. Sotero Dale was found to have bilateral pneumonia with Klebsiella pneumoniae and Haemophilus influenza which was treated with Zosyn.  Patient completed 12-day course of Zosyn on 10/5/20. Patient extubated to 10/4/22 intermittent BiPAP. Patient had neurologic decline in the early morning hours of 10/7/2020.  Code stroke was initiated, and patient underwent neurologic assessment.  The decision was made to administer TPA as the patient had left sided weakness and increased confusion.  TPA was administered without difficulty. MRI was negative for acute stroke but CTA showed a severe R ICA stenosis. Neurology felt tPA likely aborted his CVA given his COVID 23 that put him in a hypercoagulable state. 10/10/2020 Patient was transferred to the Hospitalist Service. Consults to Pulmonary and Cardiology services. Patient was confused at times, and was tolerating alternating between HFNC and Bipap. New onset atrial fibrillation which patient did convert with IVP Lopressor. Patient was placed on CPAP with concerns of HUSSEIN.  10/12/2020 Noted increase in work of breathing patient had failed Bipap therapy. He was transferred to ICU for further management and care. Patient received bronchoscopy on 10/13/2020. PEG and chest tube consent obtained from son on 10/15/2020. Right chest tube performed 10/16/2020. It produced a moderate amount of serosanguineous fluid. 10/19/20 CT scan revealing increased cavitation size consider percutaneous drainage and chemical pneumolysis for effusion improvement, patient switched to doxycycline and linezolid. 10/21/2020 acute kidney injury with creatinine of 1.7. Work-up to evaluate prerenal etiology or AIN secondary to antibiotic change. 10/22/20 renal labs indicate RTA 4. Initiation of steroids and loop diuretic. Follow with BMP. CT chest with large right hemopneumothorax. Adjusted ventilator settings to reduce barotrauma and maintain oxygenation. 10/23/2020 deterioration of patient's condition to critical status. Unable to contact family to discuss code status. Worsening renal failure, increasing hypoxia and hypercapnia. Plan to start CRRT.  10/23/2020 patient becoming increasingly acidotic with enlarging left pleural effusion. Left pigtail catheter placed to suction. Right IJ dialysis catheter placed. Initiation of CRRT. Spoke at length with family, it is agreed patient's CODE STATUS to be switched to DNR CCA. 10/26/2020 over the weekend, patient received additional large bore right-sided chest tube for tension pneumothorax. Patient still requiring pressors, CT chest consistent mild amount of free air in the abdomen. Likely secondary to PEG tube placement. No abdominal rigidity. Abdominal x-ray under fluoroscopy pending. 10/27/2020 patient requiring more pressor support. No PEG tube leak. Condition is worsening. Will make sure to flush both pigtails to ensure adequate drainage. Plan to speak with the family this afternoon.       Past Medical History: COVID-19, atrial fibrillation, pneumonia, type 2 diabetes, hyperlipidemia, CT chest with large right hydropneumothorax   10/20/2020 BAL resulting staph aureus by PCR with resistance gene detected.  10/20/2020 3 cytologic studies negative for malignant cells.  Ferritin 1499, iron 20, TIBC 100   Pleural fluid: Glucose 157, , protein 3.6, mononuclear 22.9, poly-Thorne for nuclear 77, total nucleated 3087.  10/19/2020: CT head no acute hemorrhage.  10/19/2020: CT chest without contrast with worsening cavitation within the right lower lobe, persistent pleural effusion, new pneumoperitoneum secondary to PEG placement.  10/17/2020 acid-fast culture and smear no bacilli seen culture pending.  10/13/20 BAL culture with gram positive cocci/normal guido staph epidermidis methicillin-resistant.  10/13/20 Albumin 2.0, alk phos 83, ALT 64, AST 43, bilirubin 0.5, total protein 5.5   10/13/20 CT chest, abdomen, pelvis revealed bilateral diffuse consolidation of the lungs and right pleural effusion.  Echo 10/7/2020 LVEF 55%.  Cardiac telemetry NSR   10/12/2020 pneumonia panel negative   10/12/2020 urine culture no growth with contaminants   10/6/2020 blood cultures no growth final result. Seen with multidisciplinary ICU team yes. Meets Continued ICU Level Care Criteria:    [x] Yes   [] No - Transfer Planned to listed location:  [] HOSPITALIST CONTACTED-      Case and plan discussed with Dr. Randy Breaux. Electronically signed by Steffi Coulter DO  CRITICAL CARE SPECIALIST  Patient seen by me. Case discussed with resident physician. Family updated by me. Patient remains critically ill on mechanical ventilator with bilateral chest tubes. Patient with MRSA abscess and is undergoing 6 weeks of linezolid therapy. CC time 35 minutes. Time does not include procedures. Time was discontiguous. Time does include my direct assessment of the patient and coordination of care. Electronically signed by Dorita Breaux MD.

## 2020-10-27 NOTE — PLAN OF CARE
Problem: Nutrition  Goal: Optimal nutrition therapy  Outcome: Ongoing   Nutrition Problem #1: Inadequate oral intake  Intervention: Food and/or Nutrient Delivery: Continue NPO, Continue Current Tube Feeding  Nutritional Goals: Patient will tolerated enteral nutrition appropriate for late phase covid

## 2020-10-27 NOTE — PROGRESS NOTES
Pharmacy Heparin Consult    Lupe Diaz is a 67 y.o. male. Pharmacy has been consulted to adjust heparin.     Height:   Ht Readings from Last 1 Encounters:   10/09/20 5' 8\" (1.727 m)     Weight:  Wt Readings from Last 1 Encounters:   10/27/20 253 lb 4.8 oz (114.9 kg)       Heparin Indication: new DVT/recent stroke  Bolus Permitted: No  Goal aPTT: 60 to 80    Note patient received heparin 5000 units sq at 1434 today    Plan:  Bolus: None  Rate: 8.7 units/kg/hr (1000 units/hr)  Next aPTT: tonight at 2400  (patient getting q6h labs at 0600, 1200, 1800 and 2400)    Cathy Sheets PharmD, BCPS 10/27/2020 5:04 PM

## 2020-10-27 NOTE — PLAN OF CARE
Problem: Falls - Risk of:  Goal: Will remain free from falls  Description: Will remain free from falls  10/26/2020 2111 by Leonarda Vance RN  Outcome: Ongoing  Note: Pt has had no falls this time this shift. Pt is sedated on vent with CRRT. Bed alarm on, fall band on, fall sign posted. Problem: Falls - Risk of:  Goal: Absence of physical injury  Description: Absence of physical injury  10/26/2020 2111 by Leonarda Vance RN  Outcome: Ongoing  Note: Pt has no evidence of physical injury this shift. Problem: Skin Integrity:  Goal: Will show no infection signs and symptoms  Description: Will show no infection signs and symptoms  10/26/2020 2111 by Leonarda Vance RN  Outcome: Ongoing  Note: Pt has been afebrile, WBC 20.1. Pt is on zyvox. Problem: Skin Integrity:  Goal: Absence of new skin breakdown  Description: Absence of new skin breakdown  10/26/2020 2111 by Leonarda Vance RN  Outcome: Ongoing  Note: Pt has stage II on coccyx and bruising throughout. No signs of new skin breakdown with each assessment. Turning pt q2h. Problem: Airway Clearance - Ineffective  Goal: Achieve or maintain patent airway  10/26/2020 2111 by Leonarda Vance RN  Outcome: Ongoing  Note: Pt is currently sedated on vent. Chest tubes in place at -40 wall suction due to pneumothorax. Problem: Gas Exchange - Impaired  Goal: Absence of hypoxia  10/26/2020 2111 by Leonarda Vance RN  Outcome: Ongoing  Note: Pt O2 sats 96% on 40% FiO2. Problem: Breathing Pattern - Ineffective  Goal: Ability to achieve and maintain a regular respiratory rate  10/26/2020 2111 by Leonarda Vance RN  Outcome: Ongoing  Note: Pt RR currently 20. No distress noted. Problem: Body Temperature -  Risk of, Imbalanced  Goal: Ability to maintain a body temperature within defined limits  Outcome: Ongoing  Note: Pt has been normothermic this shift.      Problem: Isolation Precautions - Risk of Spread of Infection  Goal: Prevent transmission of infection  Outcome: Ongoing  Note: Pt currently placed in droplet plus isolation on [facility] ICU. Problem: Nutrition Deficits  Goal: Optimize nutrtional status  10/26/2020 2111 by Bakari Lanier RN  Outcome: Ongoing  Note: Continuous tube feeds restarted, vital at 10ml/hr to stimulate bowel motility. Problem: Nutrition  Goal: Optimal nutrition therapy  10/26/2020 2111 by Bakari Lanier, RN  Outcome: Ongoing  Note: Continuous tube feeding, vital at 10ml/hr. Problem: Infection - Central Venous Catheter-Associated Bloodstream Infection:  Goal: Will show no infection signs and symptoms  Description: Will show no infection signs and symptoms  10/26/2020 2111 by Bakari Lanier, RN  Outcome: Ongoing  Note: Pt has been afebrile, WBC 20.1. Pt is on zyvox. Problem: Pain:  Goal: Pain level will decrease  Description: Pain level will decrease  Outcome: Ongoing  Note: Pt is currently sedated on vent. Using CPOT score for pain assessment. Problem: Pain:  Goal: Control of acute pain  Description: Control of acute pain  10/26/2020 2111 by Bakari Lanier RN  Outcome: Ongoing  Note: Pt is currently sedated on vent. Using CPOT score for pain assessment. Problem: Serum Glucose Level - Abnormal:  Goal: Ability to maintain appropriate glucose levels has stabilized  Description: Ability to maintain appropriate glucose levels has stabilized  10/26/2020 2111 by Bakari Lanier, ROCKY  Outcome: Ongoing  Note: Pt on D5W at 25ml/hr due to hypoglycemia. Problem: Urinary Elimination:  Goal: Signs and symptoms of infection will decrease  Description: Signs and symptoms of infection will decrease  Outcome: Ongoing  Note: Pt is currently afebrile, WBC 20.1. Pt is on zyvox.      Problem: Urinary Elimination:  Goal: Complications related to the disease process, condition or treatment will be avoided or minimized  Description: Complications related to the disease process, condition or treatment will be avoided or minimized  Outcome: Ongoing  Note: Mccoy care q shift. Care plan reviewed with family. Family verbalized understanding of the plan of care and contribute to goal setting.

## 2020-10-27 NOTE — PROGRESS NOTES
Min:86 , LWT:965       Diastolic (01SIA), GGR:23, Min:43, Max:57      Physical Examination:     Physical exam limited due to COVID-19, report per RN and visual inspection  General:  Intubated, sedated  HEENT: Endotracheal tube  Chest:               Intubated, +chest tubes  Cardiac:  S1 S2   Neuro:  sedated  SKIN:  No rashes  Extremities:  +edema    Labs:       Recent Labs     10/26/20  1750 10/27/20  0024 10/27/20  0635   WBC 20.1* 22.7* 23.6*   RBC 3.14* 3.18* 2.99*   HGB 9.0* 9.2* 8.8*   HCT 29.6* 30.0* 28.5*   MCV 94.3* 94.3* 95.3*   MCH 28.7 28.9 29.4   MCHC 30.4* 30.7* 30.9*    194 173   MPV 9.5 9.6 9.8      BMP:   Recent Labs     10/26/20  1750 10/27/20  0024 10/27/20  0635   * 132* 134*   K 4.2 4.2 4.3   CL 99 96* 100   CO2 22* 22* 22*   BUN 6* 5* 5*   CREATININE 0.6 0.7 0.6   GLUCOSE 92 80 100   CALCIUM 7.5* 7.4* 7.4*      Phosphorus:     Recent Labs     10/26/20  1750 10/27/20  0024 10/27/20  0635   PHOS 1.9* 1.8* 2.4     Magnesium:    Recent Labs     10/26/20  1750 10/27/20  0024 10/27/20  0635   MG 2.2 2.2 2.2     Albumin:  No results for input(s): LABALBU in the last 72 hours. BNP:    No results found for: BNP  RICHARD:    No results found for: RICHARD  SPEP:  Lab Results   Component Value Date    PROT 5.6 10/23/2020     UPEP:   No results found for: LABPE  C3:   No results found for: C3  C4:   No results found for: C4  MPO ANCA:   No results found for: MPO  PR3 ANCA:   No results found for: PR3  Anti-GBM:   No results found for: GBMABIGG  Hep BsAg:       No results found for: HEPBSAG  Hep C AB:        No results found for: HEPCAB    Urinalysis/Chemistries:      Lab Results   Component Value Date    NITRU NEGATIVE 10/12/2020    COLORU DARK YELLOW 10/23/2020    COLORU DK YELLOW 10/12/2020    PHUR 5.0 10/12/2020    LABCAST 0-4 C. GRAN 10/12/2020    WBCUA 0-2 10/12/2020    RBCUA 15-20 10/12/2020    MUCUS THREADS 10/12/2020    YEAST NONE SEEN 10/12/2020    BACTERIA MODERATE 10/12/2020    SPECGRAV 1.018 10/12/2020    LEUKOCYTESUR NEGATIVE 10/12/2020    UROBILINOGEN 1.0 10/12/2020    BILIRUBINUR NEGATIVE 10/12/2020    BLOODU MODERATE 10/12/2020    KETUA NEGATIVE 10/12/2020    AMORPHOUS URATES 10/12/2020     Urine Sodium:   No results found for: NATALIIA  Urine Potassium:  No results found for: KUR  Urine Chloride:  No results found for: CLUR  Urine Osmolarity:   Lab Results   Component Value Date    OSMOU 319 10/18/2020     Urine Protein:   No components found for: TOTALPROTEIN, URINE   Urine Creatinine:   No results found for: LABCREA  Urine Eosinophils:  No components found for: UEOS        Impression and Plan:  1. Anuric SANTI due to ATN from septic shock and contrast nephropathy on CRRT  -continue with CVVHDF current RX, net UF 50 ml/hour still with edema    2. Lytes: stable, replace per protocol phos and iCal are low  3. Acute hypoxic resp failure  4. Septic shock  5. Empyema and exudative pleural effusions s/p B/L chest tubes  6. Leukocytosis  7. Anemia  8. COVID-19  9. S/p CVA  10. DM        Please don't hesitate to call with any questions.   Electronically signed by Marleny Llanes DO on 10/27/2020 at 7:43 AM

## 2020-10-27 NOTE — PLAN OF CARE
Problem: MECHANICAL VENTILATION  Goal: Will be able to breathe spontaneously, without ventilator support  Description: Will be able to breathe spontaneously, without ventilator support  10/27/2020 0608 by Eva Lim RCP  Outcome: Ongoing  Note: Vent setting optimized to achieve target tidal volume, respiratory rate and ideal oxygen saturations. Patient is currently on 35/4 Rate of 20 and 35%. Will continue to wean as patient tolerates. SBT will be performed when appropriate.

## 2020-10-27 NOTE — CARE COORDINATION
10/27/20, 9:56 AM EDT    DISCHARGE ON GOING EVALUATION    [address] day: 33  Location: 3A-04/004-A Reason for admit: COVID-19 [U07.1]   Procedure: 9/24 Vapotherm initiated  9/27 Intubated  9/27 CVC Right subclavian - 10/5 removed  9/29 - 10/2 Rotoprone therapy   10/1 Convalescent Plasma  10/4 Extubated  10/4 Vapotherm initiated  80/3 PICC right basilic  65/9 Code stroke - TPA given   10/12 Re-intubated. 10/13 Bronchoscopy -mucous to the lower lobes bilaterally. 10/16 Right Pleural cath placement.   10/16 PEG tube placement  10/20 Bronch w/washings: Diffuse bilateral mucus production  10/20 TPA/DNase to CT  10/22 PEG tube placed  10/23 TESSIO RIJ  10/23 Left CT placed  10/23 CRRT initiated  10/24 Chest tube-left placed  10/25 Chest tube-right placed  Treatment Plan of Care: Intensivist and Nephrology following. Afebrile. Remains intubated with the following vent settings: PCV+ mode, PEEP of 4, Rate 20. FiO2 35%. Sats 92-94%. Amiodarone gtt, Levophed gtt, Vasopressin gtt, Fentanyl gtt. Peridex, Baby ASA, Plavix, Dulcolax, Pepcid iv daily, Florinef, Bevespi bid, Heparin subq q8hr, Zyvox iv q12hr, Lopressor iv q6hr. Electrolyte replacements. Continues with CRRT. Tube feeds restarted via PEG with Vital at 10/hr. Mccoy. Chest tubes remain in place bilaterally. WBC up to 23.6 (20.1). Hgb 8.8 (9.0). BUN 5, creatinine 0.6. Remains anuric. Palliative Care following. Barriers to Discharge: Vent/CRRT/chest tubes. PCP: Melina Valdovinos MD  Readmission Risk Score: 39%  Patient Goals/Plan/Treatment Preferences: From home with wife. Plan pending clinical course. Will need SLP/PT/OT when appropriate.  Will require precert for placement.

## 2020-10-27 NOTE — PROGRESS NOTES
Daughter in law- Cristina Calhoun phoned in- update given and questions answered. 1300- Dr Katie Cash at bedside to evaluate. 36- Family updated again on condition- permission given for grandson and wife to come in sometime the next couple of days.

## 2020-10-27 NOTE — PROGRESS NOTES
Comprehensive Nutrition Assessment    Type and Reason for Visit:  Reassess(TF management)    Nutrition Recommendations/Plan:   Continue Vital 1.2 at 10 ml/hr  Continue bolus 1 proteinex 2GO (2.5 oz liquid protein bottle) BID  Free water per MD - 200 4 times/day  Consider culturelle and prokinetic    Nutrition Assessment:  Pt. with no improvement from a nutritional standpoint AEB patient restarted on trophic tube feeding. Remains at risk for further nutritional compromise r/t admitted with +covid, intubated since 10/12, advanced age, obese, increased nutrient needs to support healing, CVA with TPA which likely aborted CVA, CTA showed severe ICA stenosis, minimal stool output, and underlying medical condition (hx: DM, HLD, HTN). Nutrition recommendations/interventions as per above. Malnutrition Assessment:  Malnutrition Status:  Insufficient data(+covid patient)    Context:  Acute Illness     Findings of the 6 clinical characteristics of malnutrition:  Energy Intake:  (EN or NPO since admit)  Weight Loss:  Unable to assess(no weight hx in EMR)     Body Fat Loss:  Unable to assess(+covid)     Muscle Mass Loss:  Unable to assess(+covid)    Fluid Accumulation:  No significant fluid accumulation     Strength:  Not Performed    Estimated Daily Nutrient Needs:  Energy (kcal):  1672-9490 (30-32 kcals/kg for late active phase); Weight Used for Energy Requirements:  Ideal(70 kg - ideal weight)     Protein (g):  140 (2.0 grams/kg ideal weight); Weight Used for Protein Requirements:  (70 kg - ideal weight)        Fluid (ml/day):  per MD; Method Used for Fluid Requirements:  (n/a)      Nutrition Related Findings:  +covid; intubated 10/12; MAP: 61. Patient continues on CVVH. Spoke to Carolee Bower RN who reports that patient is tolerating tube feeding at 10 ml/hr, some stool in rectual tube. RN reports hypoactive bowel sounds . Meds: Florinef, Dulcolax, ATB, Vitamin D, Vasopressin, Fentanyl, Levophed, Versed.       Wounds: Stage II, Unstageable(stage 2 coccyx, unstageable buttock)       Current Nutrition Therapies:    Current Tube Feeding (TF) Orders:  · Feeding Route: PEG(placed 10/16)  · Formula: Semi-Elemental(Vital 1.2)  · Schedule: (TF held yesterday, restarted and now at 10 ml/hr)  · Additives/Modulars: (Proteinex 2 go 2.5 oz.  BID (208 kcals, 52 gm protein))  · Water Flushes: per MD - 200ml 4x daily  · Current TF & Flush Orders Provides: Vital 1.2 at 10 ml/hr = 240 ml, 496 kcals (288 vital, 208 protein), 70 grams protein (18 vital, 52 protein, 27 grams CHO, 1 grams fiber, 995 ml water (195 vital, 800 MD flushes)  · Goal TF & Flush Orders Provides: Vital 1.2 at 75 ml/hr = 1800 ml, 2160 kcals, 135 grams protein, 199 gram CHO, 9 grams fiber, 2260 ml water (1460 vital, 800 MD flushes)    Anthropometric Measures:  · Height: 5' 8\" (172.7 cm)  · Current Body Weight: 253 lb (114.8 kg)(10/27 bedscale with +1 and +2 edema)   · Admission Body Weight: 220 lb (99.8 kg)(9/24/20, no weight source)    · Usual Body Weight: (no weight hx available in EMR, patient intubated)     · Ideal Body Weight: 154 lbs;   · BMI: 38.5  · Adjusted Body Weight:  ; No Adjustment   · BMI Categories: Obese Class 2 (BMI 35.0 -39.9)(38.51)       Nutrition Diagnosis:   · Inadequate oral intake related to impaired respiratory function as evidenced by NPO or clear liquid status due to medical condition, intubation    Nutrition Interventions:   Food and/or Nutrient Delivery:  Continue NPO, Continue Current Tube Feeding  Nutrition Education/Counseling:  No recommendation at this time   Coordination of Nutrition Care:  Continue to monitor while inpatient, Interdisciplinary Rounds    Goals:  Patient will tolerated enteral nutrition appropriate for late phase covid       Nutrition Monitoring and Evaluation:   Behavioral-Environmental Outcomes:  (n/a)   Food/Nutrient Intake Outcomes:  Enteral Nutrition Intake/Tolerance  Physical Signs/Symptoms Outcomes:  Biochemical Data, GI Status, Fluid Status or Edema, Hemodynamic Status, Weight, Skin     Discharge Planning:     Too soon to determine     Electronically signed by Feliberto Churchill RD, LD on 10/27/20 at 1:25 PM EDT    Contact: [phone]

## 2020-10-27 NOTE — PROGRESS NOTES
Surg.  Asked to see patient yesterday for pneumoperitoneum patient did have PEG tube placed CT scan shows no obvious abnormality the transverse colon Gastrografin did die given via PEG tube shows PEG tube in proper position in the stomach no leak I believe pneumoperitoneum is secondary to PEG tube placement

## 2020-10-27 NOTE — PLAN OF CARE
Problem: MECHANICAL VENTILATION  Goal: Will be able to breathe spontaneously, without ventilator support  Description: Will be able to breathe spontaneously, without ventilator support  10/27/2020 0950 by Rocío Hong RCP  Outcome: Not Met This Shift   Patient continued on the ventilator and is not a candidate for spontaneous breathing trials  at this time

## 2020-10-28 NOTE — PROGRESS NOTES
EPINEPHrine 5 mcg/min (10/28/20 0751)    dextrose 25 mL/hr at 10/25/20 2253    midazolam 2 mg/hr (10/28/20 0726)    phenylephrine (IRENA-SYNEPHRINE) 50mg/250mL infusion 300 mcg/min (10/28/20 0653)    prismaSol BGK 4/2.5 1,000 mL/hr (10/28/20 0748)    prismaSol BGK 4/2.5 1,000 mL/hr (10/28/20 0746)    amiodarone 0.5 mg/min (10/27/20 2207)    fentaNYL (SUBLIMAZE) 1250 mcg in sodium chloride 0.9 % 250 mL 50 mcg/hr (10/28/20 0009)    dexmedetomidine Stopped (10/27/20 0231)    norepinephrine 50 mcg/min (10/28/20 0423)    dextrose       PRN Meds:  potassium chloride, magnesium sulfate, calcium gluconate **OR** calcium gluconate **OR** calcium gluconate **OR** calcium gluconate, sodium phosphate IVPB **OR** sodium phosphate IVPB **OR** sodium phosphate IVPB **OR** sodium phosphate IVPB, albuterol, sodium chloride flush, acetaminophen **OR** acetaminophen, promethazine **OR** ondansetron, fentanNYL, midazolam, promethazine **OR** [DISCONTINUED] ondansetron, potassium chloride, potassium chloride, magnesium sulfate, polyethylene glycol, glucose, dextrose, glucagon (rDNA), dextrose    Input/Output:       I/O last 3 completed shifts: In: 8092 [I.V.:3049; NG/GT:517]  Out: 5839 [Chest Tube:130].       Patient Vitals for the past 96 hrs (Last 3 readings):   Weight   10/28/20 0341 235 lb 14.4 oz (107 kg)   10/27/20 0403 253 lb 4.8 oz (114.9 kg)   10/26/20 0403 256 lb (116.1 kg)       Vital Signs:   Temperature:  Temp: 98.1 °F (36.7 °C)  TMax:   Temp (24hrs), Av.2 °F (36.8 °C), Min:98.1 °F (36.7 °C), Max:98.6 °F (37 °C)    Respirations:  Resp: 23  Pulse:   Pulse: 99  BP:    BP: 102/69  BP Range: Systolic (70GFJ), MNN:84 , Min:61 , WWX:242       Diastolic (30ETT), FQE:24, Min:15, Max:71      Physical Examination:     Physical exam limited due to COVID-19, report per RN and visual inspection  General:  Intubated, sedated  HEENT: Endotracheal tube  Chest:               Intubated, +chest tubes  Cardiac:  S1 S2   Neuro: sedated  SKIN:  No rashes  Extremities:  +edema    Labs:       Recent Labs     10/27/20  1850 10/27/20  2301 10/28/20  0600   WBC 21.8* 25.4* 23.0*   RBC 2.85* 3.12* 3.13*   HGB 8.3* 9.1* 9.0*   HCT 27.8* 29.8* 30.8*   MCV 97.5* 95.5* 98.4*   MCH 29.1 29.2 28.8   MCHC 29.9* 30.5* 29.2*    178 187   MPV 10.4 10.1 10.2      BMP:   Recent Labs     10/27/20  1850 10/27/20  2301 10/28/20  0600   * 132* 130*   K 4.2 4.8 5.0   CL 91* 97* 95*   CO2 20* 19* 16*   BUN 4* 4* 4*   CREATININE 0.5 0.6 0.5   GLUCOSE 395* 118* 182*   CALCIUM 6.9* 7.3* 7.4*      Phosphorus:     Recent Labs     10/27/20  1850 10/27/20  2301 10/28/20  0600   PHOS 14.3* 2.4 3.1     Magnesium:    Recent Labs     10/27/20  1850 10/27/20  2301 10/28/20  0600   MG 2.1 2.2 2.4     Albumin:    Recent Labs     10/28/20  0600   LABALBU 1.2*     BNP:    No results found for: BNP  RICHARD:    No results found for: RICHARD  SPEP:  Lab Results   Component Value Date    PROT 5.3 10/28/2020     UPEP:   No results found for: LABPE  C3:   No results found for: C3  C4:   No results found for: C4  MPO ANCA:   No results found for: MPO  PR3 ANCA:   No results found for: PR3  Anti-GBM:   No results found for: GBMABIGG  Hep BsAg:       No results found for: HEPBSAG  Hep C AB:        No results found for: HEPCAB    Urinalysis/Chemistries:      Lab Results   Component Value Date    NITRU NEGATIVE 10/12/2020    COLORU DARK YELLOW 10/23/2020    COLORU DK YELLOW 10/12/2020    PHUR 5.0 10/12/2020    LABCAST 0-4 C. GRAN 10/12/2020    WBCUA 0-2 10/12/2020    RBCUA 15-20 10/12/2020    MUCUS THREADS 10/12/2020    YEAST NONE SEEN 10/12/2020    BACTERIA MODERATE 10/12/2020    SPECGRAV 1.018 10/12/2020    LEUKOCYTESUR NEGATIVE 10/12/2020    UROBILINOGEN 1.0 10/12/2020    BILIRUBINUR NEGATIVE 10/12/2020    BLOODU MODERATE 10/12/2020    KETUA NEGATIVE 10/12/2020    AMORPHOUS URATES 10/12/2020     Urine Sodium:   No results found for: NATALIIA  Urine Potassium:  No results found for:

## 2020-10-28 NOTE — PROCEDURES
Patient Name: Michael Wheatley [facility] [facility] Record Number: [id number]  Date: 10/27/2020   Time: 11:19 PM   Room/Bed: [facility]     Arterial Line Placement Procedure Note                     Indication: Need for serial blood work, arterial blood gases, severe hypotension, shock and sepsis    Consent: Unable to be obtained due to the emergent nature of this procedure. Reid's Test: Normal    Procedure: The skin over the right brachial artery was prepped with Chloraprep. Local anesthesia was obtained by infiltration using 1% Lidocaine without epinephrine. A 4 [place] arterial line catheter was then inserted, using a modified Seldinger technique, into the vessel. The transducer set was then attached and securely fastened to the skin without sutures. Waveforms on the monitor were observed and found to be adequate. The patient had good distal perfusion after the procedure. The site was then dressed in a sterile fashion. The patient tolerated the procedure well. Complications: None    Electronically Signed by: DEIDRA Hu DNP, APRN-ANJU

## 2020-10-28 NOTE — PROCEDURES
Central Line Placement: Risks and benefits to the procedure were discussed. Alternatives and their risks were discussed as well. Patient was placed in the attention position. Patient was placed in Trendelenburg position. Patient was prepped using Chlorhexidene prep. Patient was draped utilizing sterile gloves, hair net, mask, sterile gown and sterile OR towels. Maximum barrier precautions were utilized. Utilizing the left subclavian approach, patient received 5 cc of 1% lidocaine. Utilizing an introducer needle, it was placed subcutaneously advanced under the clavicle and leveled flat. It was subsequently advanced toward the thoracic inlet, until venous return was obtained. A guide wire was placed through the introducer needle. The introducer needle was subsequently withdrawn leaving the guide wire in place. Utilizing a scalpel, a small incision was made in the skin. A punch dilator was placed over the guide wire and subsequently withdrawn leaving the guide wire in place. A Zoll catheter was subsequently placed over the guide wire. The guide wire was subsequently withdrawn leaving this catheter in place. All ports had good venous return and were subsequently flushed with saline. The catheter was secured using 2.0 silk. Secondary cleansing with chlorhexidine prep was subsequently placed. Tegaderm with bio- patch dressing was utilized for secondary securing and protection. There were no complications. 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 Electronically signed by Blair Savage MD

## 2020-10-28 NOTE — PLAN OF CARE
Problem: Falls - Risk of:  Goal: Will remain free from falls  Description: Will remain free from falls  Outcome: Ongoing  Note: Remains intubated & sedated. In bed this shift with bed alarm on. No falls this shift      Problem: Skin Integrity:  Goal: Will show no infection signs and symptoms  Description: Will show no infection signs and symptoms  Outcome: Ongoing  Note: Afebrile this shift. On IV antibiotics     Problem: Skin Integrity:  Goal: Absence of new skin breakdown  Description: Absence of new skin breakdown  Outcome: Ongoing  Note: See flowsheet & wound charting - repositioning every 2 hours while in bed, arms & legs elevated on pillows. No evidence of new skin breakdown noted this shift, continuing to assess      Problem: Airway Clearance - Ineffective  Goal: Achieve or maintain patent airway  10/27/2020 2333 by Mari Santillan RN  Outcome: Ongoing  Note: Remains on mechanical ventilation      Problem: Gas Exchange - Impaired  Goal: Absence of hypoxia  10/27/2020 2333 by Mari Santillan RN  Outcome: Ongoing  Note: On 45% FIO2     Problem: Breathing Pattern - Ineffective  Goal: Ability to achieve and maintain a regular respiratory rate  10/27/2020 2333 by Mari Santillan RN  Outcome: Ongoing  Note: Remains on mechanical ventilation      Problem:  Body Temperature -  Risk of, Imbalanced  Goal: Ability to maintain a body temperature within defined limits  Outcome: Ongoing  Note: Afebrile this shift      Problem: Isolation Precautions - Risk of Spread of Infection  Goal: Prevent transmission of infection  Outcome: Ongoing  Note: Remains in droplet plus contact precautions      Problem: Nutrition Deficits  Goal: Optimize nutrtional status  Outcome: Ongoing  Note: Vital 1.2 @ 10 ml/hr, tolerating well      Problem: Infection - Central Venous Catheter-Associated Bloodstream Infection:  Goal: Will show no infection signs and symptoms  Description: Will show no infection signs and symptoms  Outcome: Ongoing  Note: Site clean & intact, aseptic technique utilized       Problem: Pain:  Goal: Pain level will decrease  Description: Pain level will decrease  Outcome: Ongoing  Note: CPOT to assess pain      Problem: Urinary Elimination:  Goal: Signs and symptoms of infection will decrease  Description: Signs and symptoms of infection will decrease  Outcome: Ongoing  Note: Anuric, afebrile      Problem: Urinary Elimination:  Goal: Complications related to the disease process, condition or treatment will be avoided or minimized  Description: Complications related to the disease process, condition or treatment will be avoided or minimized  Outcome: Ongoing  Note: Mccoy care to be completed this shift      Care plan reviewed with patient. Patient unable to verbalize understanding of the plan of care and contribute to goal setting.

## 2020-10-28 NOTE — PLAN OF CARE
Problem: MECHANICAL VENTILATION  Goal: Will be able to breathe spontaneously, without ventilator support  Description: Will be able to breathe spontaneously, without ventilator support  Outcome: Ongoing   Continues on ventlator

## 2020-10-28 NOTE — PLAN OF CARE
Problem: Airway Clearance - Ineffective  Goal: Achieve or maintain patent airway  Outcome: Ongoing     Problem: Gas Exchange - Impaired  Goal: Absence of hypoxia  Outcome: Ongoing     Problem: Breathing Pattern - Ineffective  Goal: Ability to achieve and maintain a regular respiratory rate  Outcome: Ongoing     Problem: MECHANICAL VENTILATION  Goal: Will be able to breathe spontaneously, without ventilator support  Description: Will be able to breathe spontaneously, without ventilator support  10/27/2020 2159 by Barbara Guerra RCP  Outcome: Ongoing  10/27/2020 0950 by Qing Hale RCP  Outcome: Not Met This Shift

## 2020-10-28 NOTE — PROGRESS NOTES
CRITICAL CARE PROGRESS NOTE      Patient:  Lupe Diaz    Unit/Bed:3A-04/004-A  YOB: 1945  MRN: [id number]   PCP: Juan Raymond MD  Date of Admission: 9/24/2020  Chief Complaint: acute respiratory failure    Assessment and Plan:   1. Acute respiratory failure with hypoxia and hypercapnia, worsened: 10/23/20 Left-sided pigtail catheter placed for exudative pleural effusion. 10/25/2020 patient developed air leak in right atrium. Large bore right chest tube placed for tension pneumothorax. Patient ventilation updated with low PEEP to allow for improved air leak and barotrauma reduction. Satting well on current settings. 2. Complicated parapneumonic effusion stage III, right: Large right pleural effusion with lower lobe atelectasis from CT on 10/13/20.  10/16/20 pigtail catheter placed in the right lung. Positive Lights criteria, exudative effusion with pleural enhancement on CT, indicative of pleural peel. All 3 sets of cytology without malignant cells. 10/20/2020 respiratory culture growing MRSA. Patient receiving linezolid day 9/42. Chemical pneumolysis completed on 10/20/2020 for multiple loculations. Right large bore chest tube no longer with air leak. 10/27/2020 chest x-ray revealed improved pneumothorax. Repeat tomorrow. 3. Hypermetabolic syndrome: Secondary to infection. Worsening acidosis. Increasing blood pressure support. Patient now maxed on Levophed, vasopressin, Jarred-Synephrine with initiation of epinephrine. Discussed progression with family and obtained consent for ZOLL catheter placement. Will begin targeted temperature management at 36 C for 72 hours to decrease the body's metabolic demand and allow for acid clearance. 4. Exudative pleural effusion, left: Patient received left pigtail catheter on 10/23/2020. CT from 10/26/2020 revealed improved left effusion with adequate chest tube placement. Continue antibiotics.   5. Right lower lobe pulmonary abscess: Sputum growing MRSE. Molecular pneumonia panel resulting resistant staph aureus by PCR. No improvement on vancomycin. Likely secondary to garrett joseph leukocidin production. Transitioned to Linezolid day 9/42. Patient will require 6 weeks of antibiotic therapy. Otherwise, may require percutaneous drainage of the abscess. 6. Pneumoperitoneum: This has remained persistent on imaging. Associated with PEG tube placement, surgery following. 10/24/2020 PEG tube bumper was tightened. 10/26/2020 gastric tube x-ray with contrast revealed no leak. Patient's abdomen has increased in girth and firmness. CT abdomen pelvis with IV and oral contrast stat. Bladder manometry to monitor for abdominal compartment syndrome. 7. ARDS: recent history of COVID-19 and pronation therapy. ASA and Lovenox to mitigate microthrombosis being held secondary to anemia. Bronchoscopy 10/13/20 with mucinous return. Covid 19 retest is negative. Continue pressure ventilation. 8. Septic shock: Secondary to #2. Patient was afebrile overnight. Acetaminophen prn q6. Lactic acid increase from 1.4-4.3. Patient receiving 4 vasopressors. Procalcitonin 10/22/20, 2.21. Continue current antibiotics. 9. Acute kidney injury, RTA type IV: Baseline creatinine less than 1. Creatinine increasing steadily. No urine eosinophils. No serum anion gap. 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 Lasix stopped secondary to CRRT. No longer withdrawing fluid secondary to patient's hypotension. 10. Hyponatremia, improved: Currently 130, follow with BMP. Adjusted maintenance fluids to normal saline per nephrology. Continue CRRT. 11. Anemia, stable: Normocytic with MCV of 99.7. Given TPA during this hospital stay for R ICA stenosis. B12 and folate normal.  Patient's hemoglobin deteriorated 10/19/2020 to level of 6.7, 1 unit PRBCs ordered. Posttransfusion H&H 8.7. Anemia studies indicative of anemia of chronic disease. Follow. Transfuse as necessary for hemoglobin less than 7. Restarted patient's low-dose ASA and Plavix. 12. DMT2, uncontrolled:  Continue to monitor with ACCU and SSI. Most recent blood glucose 156. 13. Atrial fibrillation, paroxysmal: Beta-blocker held secondary to patient's hypotension. He has not had any recent runs of RVR. Continue telemetry monitoring. 14. CVA:  History of recent tPA use, will continue to monitor. Clopidogrel and aspirin restarted. Patient's neurologic exam limited by sedation. 15. Constipation: Rectal tube in place and draining. Dulcolax. 16. Hypertension, essential: Patient receiving pressure support. 17. Hyperlipidemia: Home rosuvastatin is continued  18. Hyperkalemia, resolved: Most recent potassium of 4.8. Continue bicarb drip. CRRT. Monitor with BMP. INITIAL H AND P AND ICU COURSE:  Rosa Maria Santana is a 67year old  male, transferred to [facility] ICU 10/12/2020 for intubation. Patient is a 67-year-old morbidly obese white male reformed smoker. Chelsy Zamora has a history of diabetes, hyperlipidemia, and hypertension. Teto Reina was admitted from the emergency room on 9/24/2020 with hypoxemic respiratory failure secondary to [address] [place] [address] had associated acute renal injury.  He required 100% nonrebreather and was started on Decadron and Remdesivir.  He continued to deteriorate and required intubation on 9/27/2020.  Acute lung injury progressed to ARDS and started pronation therapy on 9/29/2020. Karmen Ndiaye was found to have bilateral pneumonia with Klebsiella pneumoniae and Haemophilus influenza which was treated with Zosyn.  Patient completed 12-day course of Zosyn on 10/5/20. Patient extubated to 10/4/22 intermittent BiPAP. Patient had neurologic decline in the early morning hours of 10/7/2020.  Code stroke was initiated, and patient underwent neurologic assessment.  The decision was made to administer TPA as the patient had left sided weakness and increased confusion.   TPA was administered without difficulty. MRI was negative for acute stroke but CTA showed a severe R ICA stenosis. Neurology felt tPA likely aborted his CVA given his COVID 23 that put him in a hypercoagulable state. 10/10/2020 Patient was transferred to the Hospitalist Service. Consults to Pulmonary and Cardiology services. Patient was confused at times, and was tolerating alternating between HFNC and Bipap. New onset atrial fibrillation which patient did convert with IVP Lopressor. Patient was placed on CPAP with concerns of HUSSEIN. 10/12/2020 Noted increase in work of breathing patient had failed Bipap therapy. He was transferred to ICU for further management and care. Patient received bronchoscopy on 10/13/2020. PEG and chest tube consent obtained from son on 10/15/2020. Right chest tube performed 10/16/2020. It produced a moderate amount of serosanguineous fluid. 10/19/20 CT scan revealing increased cavitation size consider percutaneous drainage and chemical pneumolysis for effusion improvement, patient switched to doxycycline and linezolid. 10/21/2020 acute kidney injury with creatinine of 1.7. Work-up to evaluate prerenal etiology or AIN secondary to antibiotic change. 10/22/20 renal labs indicate RTA 4. Initiation of steroids and loop diuretic. Follow with BMP. CT chest with large right hemopneumothorax. Adjusted ventilator settings to reduce barotrauma and maintain oxygenation. 10/23/2020 deterioration of patient's condition to critical status. Unable to contact family to discuss code status. Worsening renal failure, increasing hypoxia and hypercapnia. Plan to start CRRT.  10/23/2020 patient becoming increasingly acidotic with enlarging left pleural effusion. Left pigtail catheter placed to suction. Right IJ dialysis catheter placed. Initiation of CRRT. Spoke at length with family, it is agreed patient's CODE STATUS to be switched to DNR CCA.     10/26/2020 over the weekend, patient received Left subclavian ZOLL catheter in place. Abdomen: Firm, larger in girth than previously. Bowel sounds diminished. 10 cm x 5 cm area of ecchymosis to the left of the umbilicus that is improving. Extremities:  No clubbing, cyanosis. Worsening bilateral pitting edema +2 up to mid tibia. Vasculature: capillary refill <3 seconds. Weakly palpable dorsalis pedis pulses. Skin:  warm and dry. Neurologic:  No appreciable focal deficit. No seizures. Data: (All radiographs, tracings, PFTs, and imaging are personally viewed and interpreted unless otherwise noted). Sodium 130, potassium 4.8, chloride 94, bicarb 13, BUN 5, creatinine 0.5, ionized calcium 1.09, magnesium 2.4, glucose 156, calcium 7.7, phosphorus 3.3  Lactic acid 7.5  Albumin 1.2, alk phos 841, , , bilirubin 0.5, prealbumin less than 3, total protein 5.3  White blood cell count 23.9, hemoglobin 7.9, hematocrit 27.8, platelet count 409       10/27/2020 chest x-ray with improved right pneumothorax and mildly improved left effusion.  10/26/2020 PEG tube evaluation without leak   10/26/2020 CT chest mild pneumoperitoneum, right hydropneumothorax, right lower lobe abscess, left pigtail in place.  10/22/2020 Noncon CT chest with large right hydropneumothorax   10/20/2020 BAL resulting staph aureus by PCR with resistance gene detected.  10/20/2020 3 cytologic studies negative for malignant cells.  Ferritin 1499, iron 20, TIBC 100   Pleural fluid: Glucose 157, , protein 3.6, mononuclear 22.9, poly-Thorne for nuclear 77, total nucleated 3087.  10/19/2020: CT head no acute hemorrhage.  10/19/2020: CT chest without contrast with worsening cavitation within the right lower lobe, persistent pleural effusion, new pneumoperitoneum secondary to PEG placement.  10/17/2020 acid-fast culture and smear no bacilli seen culture pending.    10/13/20 BAL culture with gram positive cocci/normal guido staph epidermidis methicillin-resistant.  10/13/20 Albumin 2.0, alk phos 83, ALT 64, AST 43, bilirubin 0.5, total protein 5.5   10/13/20 CT chest, abdomen, pelvis revealed bilateral diffuse consolidation of the lungs and right pleural effusion.  Echo 10/7/2020 LVEF 55%.  Cardiac telemetry NSR   10/12/2020 pneumonia panel negative   10/12/2020 urine culture no growth with contaminants   10/6/2020 blood cultures no growth final result. Seen with multidisciplinary ICU team yes. Meets Continued ICU Level Care Criteria:    [x] Yes   [] No - Transfer Planned to listed location:  [] HOSPITALIST CONTACTED-      Case and plan discussed with Dr. Kannan Torres. Electronically signed by Jeanne Squires DO  CRITICAL CARE SPECIALIST  Patient seen by me. Case discussed with resident physician. Family updated by me. Patient remains critically ill on mechanical ventilator with bilateral chest tubes. Patient with MRSA abscess and is undergoing 6 weeks of linezolid therapy. Now with recalcitrant hypotension on 4 pressors. Hemoglobin dropping. Will transfuse. Acidemia worsening. Awaiting stat CT scan abdomen and pelvis. Patient undergoing targeted temperature management for hyper metabolic syndrome related to Covid. I am concerned regarding her abdominal process. Awaiting results of stat CT scan abdomen and pelvis. CC time 35 minutes. Time does not include procedures. Time was discontiguous. Time does include my direct assessment of the patient and coordination of care. Electronically signed by Azucena Torres MD.

## 2020-10-29 NOTE — PROGRESS NOTES
1945 - Patient disconnected from CRRT at this time in preparation for transport to CT.    2030 - Patient transported to CT at this time by this RN, Melissa Ortiz, Dana Manzo RN, and RT.    2110 - Returned to room at this time. Ed Lua notified of MAPs 40s-50s while patient is maxed on all pressors. New orders placed. Tiigi 34 Erum Horowitz at bedside, updated on CT results, no improvement with BP. New orders placed. Will contact family. 65 - Son & daughter in law at bedside, Ed Lua en route to speak with family. 2345 - Family decision made to withdraw. 0005 -  at bedside. 0030 - Ventilator support stopped at this time, all gtts stopped. 5 - Time of death 5. Heart sounds auscultated by this RN & Divya Fuenets RN, no heart tones noted.

## 2020-10-29 NOTE — PROGRESS NOTES
[address] [address]  Notice of Patient Passing      Patient Name- Guy Neves Number- [de-identified]   Attending Physician- Emani Ocampo MD    Admitted on-9/24/2020 10:05 AM     On 10/29/2020 at [address] [place] [address] patient was found in 3A04 with:   Absence of vital signs. Absence of neurological response. Confirmed time of death at [address] [place] [address]. Physician or On-call Physician notified of time of death- yes    Family present at time of death- yes,    Spiritual care present at time of death- yes,     Physician was notified and orders were obtained to release the body. Post-Mortem documentation completed; form printed, signed, and given to admitting.     Tara Ruiz RN Nursing Supervisor/ Manager  10/29/20   1:24 AM

## 2020-10-29 NOTE — PROGRESS NOTES
Ventilatory support removed at this time.  Son and Daughter-in-law supportive, in room, and with patient

## 2020-10-29 NOTE — SIGNIFICANT EVENT
I did reach out to Guido Juárez (family) with update on current vital signs and use of medications for blood pressure support. Reviewed CT of ABD/PELVIS with pneumatosis of the stomach, duodenum and proximal jejunum concerning for ischemia. Concerns of possibility of liver infarction, kidney infarction. Patient is currently receiving 3 U PRBCs for H/H 6.8/24.5    Family is refusing surgery consult.    Son is on his way to hospital and likely will withdrawal upon his arrival.

## 2020-10-29 NOTE — FLOWSHEET NOTE
[facility] PROGRESS NOTE      Patient: Ashleigh Ontiveros  Room #: 3A-04/004-A            YOB: 1945  Age: 67 y.o. Gender: male            Admit Date & Time: 9/24/2020 10:05 AM    Assessment:   responded to call from nurse supervisor that pt was to be extubated and family requested spiritual support. Pt's son and daughter-in-law were present. Family was using their phone to let other family members say their goodbyes. Family shared many stories about the pt and how the community has rallied around them as various family members have been sick with Covid. Interventions:   provided a listening and supportive presence and prayed with the family. Outcomes:  Family expressed gratitude for the encounter. Plan:  1. Provide spiritual care as needed. 2.  Follow up with grief packet, condolence card and phone call. Electronically signed by Alvino Gómez on 10/29/2020 at 1:24 AM.  3 [facility]  [phone]       10/29/20 0005   Encounter Summary   Services provided to: Patient and family together   Referral/Consult From: Nurse   Support System Spouse; Children;Family members   Continue Visiting No  (10/29)   Complexity of Encounter High   Length of Encounter 2 hours   Spiritual Assessment Completed Yes   Grief and Life Adjustment   Type Death   Assessment Approachable;Tearful;Grieving   Intervention Active listening;Explored feelings, thoughts, concerns;Prayer;Scripture;Sustaining presence/ Ministry of presence   Outcome Connection/belonging;Comfort;Engaged in conversation;Expressed feelings/needs/concerns

## 2020-10-29 NOTE — DISCHARGE SUMMARY
Hospital Medicine Discharge Summary      Patient Identification:   Jimmie Riedel   : 1945  MRN: [id number]   Account: [de-identified]      Patient's PCP: Tj Tuttle MD    Admit Date: 2020     Discharge Date:   10/29/20 TOD 0030    Preliminary cause of death: Acute respiratory failure, hypoxia, Septic Shock with multiorgan failure, Acute ischemic bowel, Anion gap acidosis, Acute liver injury    Admitting Physician: Thomas Patel MD     Discharge Physician: James Zapata APRN - CNP     Discharge Diagnoses:     Active Hospital Problems    Diagnosis Date Noted    Acute tubular necrosis (HCC) [N17.0]     Acute respiratory failure due to COVID-19 (HCC) [U07.1, J96.00]     Acute respiratory distress syndrome (ARDS) due to COVID-19 virus (HCC) [U07.1, J80]     New onset atrial fibrillation (HCC) [I48.91]     Pneumonia due to COVID-19 virus [U07.1, J12.89]     CAP (community acquired pneumonia) due to Klebsiella pneumoniae ([address] [J15.0]     Sepsis due to COVID-19 ([address] [U07.1, A41.89]     Stroke-like symptoms [R29.90]     Acute metabolic encephalopathy [R65.18]     Delirium [R41.0]     Stenosis of right carotid artery [I65.21]     Dysphagia [R13.10]     Hypocalcemia [E83.51]     Hyponatremia [E87.1]     Type 2 diabetes mellitus with hyperglycemia (HCC) [E11.65]     Hyperlipidemia [E78.5]     Essential hypertension [I10]     Left hemiparesis ([address] [G81.94] 10/07/2020    COVID-19 [U07.1] 2020   *Acute respiratory failure with hypoxia and hypercapnia  *Complicated parapneumonic effusion  *Hypermetabolic syndrome  *Exudative pleural effusion, left  *Right lower lobe pulmonary abscess  *Pneumoperitoneum  *ARDS  *Septic Shock with multi-organ failure  *Acute on chronic anemia  *Paroxysmal atrial fibrillation  *CVA  *Constipation  *DMT2, uncontrolled  *Essential HPTN  *Acute hyperkalemia  *Hyponatremia  *Anion gap acidosis  *Hypoalbuminemia  *Elevated liver enzymes  *Obesity BMI 35.87  *Acute thrombus in the left gastrocnemius vein  *Acute ischemic bowel    The patient was seen and examined on day of discharge and this discharge summary is in conjunction with any daily progress note from day of discharge. Hospital Course:   Cecil Ramírez is a 67 y.o. male admitted to [address] [place] [address] on 9/24/2020 for acute hypoxemic respiratory failure secondary to [address] [facility] had associated acute renal injury.  He required 100% nonrebreather and was started on Decadron and Remdesivir.  He continued to deteriorate and required intubation on 9/27/2020.  Acute lung injury progressed to ARDS and started pronation therapy on 9/29/2020. Bright Iverson was found to have bilateral pneumonia with Klebsiella pneumoniae and Haemophilus influenza which was treated with Zosyn.  Patient completed 12-day course of Zosyn on 10/5/20. Patient extubated to 10/4/22 intermittent BiPAP. Patient had neurologic decline in the early morning hours of 10/7/2020.  Code stroke was initiated, and patient underwent neurologic assessment.  The decision was made to administer TPA as the patient had left sided weakness and increased confusion.  TPA was administered without difficulty. MRI was negative for acute stroke but CTA showed a severe R ICA stenosis. Neurology felt tPA likely aborted his CVA given his COVID 23 that put him in a hypercoagulable state. 10/10/2020 Patient was transferred to the Hospitalist Service. Consults to Pulmonary and Cardiology services. Patient was confused at times, and was tolerating alternating between HFNC and Bipap. New onset atrial fibrillation which patient did convert with IVP Lopressor. Patient was placed on CPAP with concerns of HUSSEIN. 10/12/2020 Noted increase in work of breathing patient had failed Bipap therapy. He was re-intubated. Bronchoscopy was completed on 10/13/2020. PEG and chest tube were inserted on 10/15/2020. Right chest tube performed 10/16/2020. 10/19/20 CT scan revealing increased cavitation size consider percutaneous drainage and chemical pneumolysis for effusion improvement, patient switched to doxycycline and linezolid. 10/21/2020 acute kidney injury with creatinine of 1.7. Work-up to evaluate prerenal etiology or AIN secondary to antibiotic change. 10/22/20 renal labs indicate RTA 4. Initiation of steroids and loop diuretic. CT chest with large right hemopneumothorax. Adjusted ventilator settings to reduce barotrauma and maintain oxygenation. 10/23/2020 Worsening renal failure, increasing hypoxia and hypercapnia. CRRT was started. Patient becoming increasingly acidotic with enlarging left pleural effusion. Left pigtail catheter placed to suction.    10/26/2020 patient received additional large bore right-sided chest tube for tension pneumothorax. Patient still requiring pressors, CT chest consistent mild amount of free air in the abdomen. Likely secondary to PEG tube placement. No abdominal rigidity. 10/27/2020 patient requiring more pressor support. No PEG tube leak. Condition is worsening. 10/28/2020  Persistent hypotension and had required multiple pressors. CT of Head-no acute abnormality. CT of ABD/PELVIS-Pneumatosis of the stomach, duodenum and proximal jejunum concerning for ischemia. Concerns of infarction liver and kidneys. 3U PRBCs given for H/H 6.8/24.5. Family updated on condition. Code status changed to [facility]. Family all in agreement to withdrawal care.        Exam:     Vitals:  Vitals:    10/28/20 2210 10/28/20 2233 10/28/20 2300 10/28/20 2313   BP: (!) 88/40 (!) 91/41 (!) 76/32    Pulse: 60 58 54 51   Resp: 20 20 22 20   Temp: 95.5 °F (35.3 °C) 95.2 °F (35.1 °C)     TempSrc:       SpO2:   (!) 70% (!) 69%   Weight:       Height:         Weight: Weight: 235 lb 14.4 oz (107 kg)     24 hour intake/output:    Intake/Output Summary (Last 24 hours) at 10/29/2020 0050  Last data filed at 10/28/2020 2305  Gross per 24 hour   Intake evaluation with proctoscopy/colonoscopy when    clinically appropriate. 8.  Nasogastric versus orogastric tube at the level of the distal    esophagus. Distended esophagus containing contrast.  Recommend advancing. This document has been electronically signed by: Caron Wise MD on    10/28/2020 09:27 PM      All CT scans at this facility use dose modulation, iterative    reconstruction, and/or weight-based   dosing when appropriate to reduce radiation dose to as low as reasonably    achievable. CT HEAD WO CONTRAST   Final Result   Impression:   1. No acute abnormality of the brain. 2.  Worsening inflammation of the paranasal sinuses and mastoid air cells. This document has been electronically signed by: Caron Wise MD on    10/28/2020 09:02 PM      All CT scans at this facility use dose modulation, iterative    reconstruction, and/or weight-based   dosing when appropriate to reduce radiation dose to as low as reasonably    achievable. XR CHEST PORTABLE   Final Result      Line placement as above. **This report has been created using voice recognition software. It may contain minor errors which are inherent in voice recognition technology. **      Final report electronically signed by Dr. Veronika Bethea on 10/28/2020 7:36 PM      VL DUP LOWER EXTREMITY VENOUS BILATERAL   Final Result   No evidence of deep vein thrombosis. **This report has been created using voice recognition software. It may contain minor errors which are inherent in voice recognition technology. **      Final report electronically signed by Dr. Veronika Bethea on 10/27/2020 8:32 PM      XR CHEST PORTABLE   Final Result   1. Stable tubes and lines. 2.  Similar appearance of multifocal patchy airspace opacities and    consolidation. This document has been electronically signed by: Angel Posadas MD on    10/27/2020 04:33 AM         XR CHEST PORTABLE   Final Result   1.  Poor inflation lungs. Moderate cardiac megaly. ET tube, NG tube, and right jugular dialysis catheter remain in good position. Large bore chest tube projects over right lung base. 2. Pigtail catheters are seen, one right side along left side. Small less than 10% loculated pneumothorax right apex. 3. No pleural effusion. Moderately severe mixed infiltrates scattered throughout both lungs, worse on the left, consistent with pneumonia. Moderate subcutaneous emphysema right side of chest and neck, improved. 4. Overall appearance of chest relatively stable compared to prior. **This report has been created using voice recognition software. It may contain minor errors which are inherent in voice recognition technology. **      Final report electronically signed by Dr. Lucita Canavan on 10/26/2020 4:45 PM      XR INJ CONTRAST GASTRIC TUBE PERC   Final Result   FINDINGS/IMPRESSION: Contrast injected within the PEG tube appears to be confined within the stomach without definite leak. There are stable bilateral chest tubes. **This report has been created using voice recognition software. It may contain minor errors which are inherent in voice recognition technology. **      Final report electronically signed by Dr. Mera Hartley MD on 10/26/2020 5:49 PM      CT CHEST WO CONTRAST   Final Result   Impression:   Mild pneumoperitoneum. Perforated bowel should be excluded. Nasogastric tube is in the distal esophagus. Small to moderate loculated right hydropneumothorax. Extensive airspace disease bilaterally suspicious for atypical infection    recommend follow-up. Indeterminate area of cavitation posteriorly right lower lobe. Cavitary    pneumonia is a possibility. Recommend follow-up. Free fluid in the upper abdomen.       This document has been electronically signed by: Yoana Babcock MD on    10/26/2020 03:45 AM      All CT scans at this facility use dose modulation, iterative reconstruction, and/or weight-based   dosing when appropriate to reduce radiation dose to as low as reasonably    achievable. XR CHEST PORTABLE   Final Result   1. Cardiac silhouette is obscured. Bilateral pigtail catheters remain in place in the pleural space. A large bore chest tube has been inserted right side. The loculated relatively large pneumothorax overlying the right lung base has been evacuated. 2. ET tube and esophageal tube remain in place. Esophageal tube tip in distal third of esophagus. Right jugular dialysis catheter tip the cavoatrial junction. 3. Suggestion of a tiny loculated pneumothorax versus a bullous lesion overlying the lateral aspect of the right apex. Moderate subcutaneous emphysema right side of chest which has worsened since prior study, possibly related to insertion of large bore    chest tube right side. 4. Bibasilar infiltrates in both lungs, worse on left side, consistent with pneumonia. .            **This report has been created using voice recognition software. It may contain minor errors which are inherent in voice recognition technology. **      Final report electronically signed by Dr. Steph Lauernt on 10/25/2020 7:05 PM      XR CHEST PORTABLE   Final Result   1. Cardiothymic silhouette appears normal in size. ET tube and NG tube appear to be in good position. Right jugular dialysis catheter tip the cavoatrial junction. Pigtail catheters are present in the pleural space bilaterally. 2. Persistent loculated 40-50% pneumothorax projected over the right hemidiaphragm, slightly larger than earlier. Mild subcutaneous emphysema right side of chest.   3. Moderate atelectasis/pneumonia right mid and lower lung fields and scattered diffusely in the left lung. The infiltrates appear slightly improved from prior. **This report has been created using voice recognition software. It may contain minor errors which are inherent in voice recognition technology. ** Final report electronically signed by Dr. Gerhard Jones on 10/25/2020 6:03 PM      XR ABDOMEN (KUB) (SINGLE AP VIEW)   Final Result   Findings of mild mid abdominal ileus. No findings to suggest mechanical bowel obstruction. **This report has been created using voice recognition software. It may contain minor errors which are inherent in voice recognition technology. **         Final report electronically signed by Dr. Gerhard Jones on 10/25/2020 6:04 PM      XR CHEST PORTABLE   Final Result   Impression:   Prominent right pneumothorax similar to previous. Extensive consolidation in the lungs mildly increased on the left. This document has been electronically signed by: Lorenzo Rosenberg MD on    10/25/2020 09:26 AM         XR CHEST PORTABLE   Final Result   Increased right lower lung pneumothorax with partial collapse of the right    lung. Bilateral chest tubes remain in place. Similar left greater than right diffuse airspace disease. New right chest wall soft tissue emphysema. This document has been electronically signed by: Erik Draper MD on    10/24/2020 11:32 PM         CT ABDOMEN PELVIS W IV CONTRAST Additional Contrast? Oral   Final Result    CTA chest:   1. No evidence of pulmonary embolus. 2. Persistent prominent hydropneumothorax on the right with chest tube in place and stable cavitary lesion in the right lower lobe. 3. Interval placement of left-sided chest tube with near complete resolution of left pleural effusion. 4. Decreased consolidation in the left upper lung with persistent diffuse patchy capacities and areas of consolidation. CT abdomen pelvis:   1. Interval placement of percutaneous gastric tube which appears to be within the stomach. However, there is moderate free air which may be leaking around the G-tube. 2. Loops of distended small bowel with air-fluid levels and long segment of decompressed small bowel concerning for small bowel obstruction.    3. report electronically signed by Dr. Pari Dillard MD on 10/24/2020 7:30 AM      XR CHEST PORTABLE   Final Result   Persistent, diffuse bilateral left greater than right airspace opacities consistent with pneumonia. Patient with known Covid. Bilateral pigtail drains within the pleural space. Bilateral pleural effusions, possibly loculated. No clear pleural edge is    identified however tiny right apical pneumothorax cannot be excluded as reported previously. Continued progress imaging is advised. **This report has been created using voice recognition software. It may contain minor errors which are inherent in voice recognition technology. **      Final report electronically signed by Dr. José Miguel Womack on 10/23/2020 3:05 PM      XR CHEST PORTABLE   Final Result   1. Poor inflation of the lungs. Heart is obscured but appears moderately enlarged. Severe infiltrate scattered throughout both lungs, consistent with Covid infection. Findings have worsened since prior study. 2. Central line right side, catheter tip in SVC. ET tube in good position. Pigtail catheter pleural space right side. Tiny residual less than 5% pneumothorax right apex. **This report has been created using voice recognition software. It may contain minor errors which are inherent in voice recognition technology. **      Final report electronically signed by Dr. Heri Steen on 10/23/2020 11:10 AM      XR CHEST PORTABLE   Final Result      CT CHEST WO CONTRAST   Final Result   Moderate to large right pneumothorax. Right chest tube in place. Diffuse left worse than right airspace disease with areas of    consolidation, increased in the left lung. Small bilateral pleural effusions. Persistent scattered pneumoperitoneum, previously thought to be related to    PEG tube placement. Support devices as above.       This document has been electronically signed by: Austin Lockwood MD on    10/22/2020 05:23 AM      All CT scans at this reconstruction, and/or weight-based   dosing when appropriate to reduce radiation dose to as low as reasonably    achievable. XR CHEST PORTABLE   Final Result   1. Diffuse multifocal consolidative opacities are seen throughout the lungs bilaterally. There is some aerated lung at the apices bilaterally as well as the central portion of the right midlung. However, overall aeration of the lungs appears worsened    when compared to the previous examination especially at the mid and lower lung zones on the left and at the right midlung. 2. There is a suspected small to moderate right pleural effusion which appears partially loculated. **This report has been created using voice recognition software. It may contain minor errors which are inherent in voice recognition technology. **      Final report electronically signed by Dr. Sharmaine Gibsno on 10/16/2020 3:09 PM      CT ABDOMEN PELVIS WO CONTRAST Additional Contrast? None   Final Result   Impression:      There is tubing in the rectal vault, please correlate. No significant abnormality abdomen or pelvis. This document has been electronically signed by: Toni Lacey MD on    10/13/2020 04:38 AM      All CT scans at this facility use dose modulation, iterative    reconstruction, and/or weight-based   dosing when appropriate to reduce radiation dose to as low as reasonably    achievable. CT CHEST WO CONTRAST   Final Result   Impression:      Nonspecific diffuse bilateral consolidation and pleural fluid. This document has been electronically signed by: Toni Lacey MD on    10/13/2020 04:38 AM      All CT scans at this facility use dose modulation, iterative    reconstruction, and/or weight-based   dosing when appropriate to reduce radiation dose to as low as reasonably    achievable. XR CHEST PORTABLE   Final Result   Impression:   Addition of an NG tube and endotracheal tube, as above.   Otherwise, chest    x-ray appears stable. This document has been electronically signed by: Binta Granda MD on    10/12/2020 09:42 PM         XR CHEST PORTABLE   Final Result   Stable appearance of the chest with bilateral airspace and draped. **This report has been created using voice recognition software. It may contain minor errors which are inherent in voice recognition technology. **      Final report electronically signed by Dr. Darleen Velez on 10/12/2020 8:42 AM      XR CHEST PORTABLE   Final Result   OG catheter below the diaphragm following the stomach curvature. **This report has been created using voice recognition software. It may contain minor errors which are inherent in voice recognition technology. **      Final report electronically signed by Dr. Ulices Zuleta on 10/9/2020 7:12 PM      XR CHEST PORTABLE   Final Result   Mild progression bilateral infiltrates vs edema. This document has been electronically signed by: Ani Cooney MD on    10/09/2020 04:54 AM         XR CHEST PORTABLE   Final Result   Worsening bilateral airspace opacities. Correlation with clinical presentation is advised. Support lines and tubes in satisfactory position. Progress imaging recommended. **This report has been created using voice recognition software. It may contain minor errors which are inherent in voice recognition technology. **      Final report electronically signed by Dr. Ulices Zuleta on 10/8/2020 8:46 PM      CT head without contrast   Final Result       1. No evidence of intracranial hemorrhage. 2. Mild severity chronic small vessel ischemic changes. 3. Layering fluid in the paranasal sinuses with some fluid in the mastoid air cells. **This report has been created using voice recognition software. It may contain minor errors which are inherent in voice recognition technology. **      Final report electronically signed by Dr. Deedee Plascencia on 10/8/2020 11:50 AM      MRI BRAIN WO NIHSS 4 or Above)   Final Result   1. Study is severely limited by motion artifact. Portions of anterior    and posterior circulation are not well evaluated. 2.  Significant stenosis at the origin of the right internal carotid    artery is apparent. Formation with Doppler ultrasound recommended. 3.  No obvious intracranial vascular occlusion, although particularly the    cavernous ICAs and the posterior circulation are not well assessed. 4.  Bilateral upper lobe groundglass opacities concerning for pneumonia. This document has been electronically signed by: Allie Alcala MD    on 10/07/2020 05:31 AM      All CT scans at this facility use dose modulation, iterative    reconstruction, and/or weight-based   dosing when appropriate to reduce radiation dose to as low as reasonably    achievable. Carotid stenosis and measurements are in accordance with the NASCET    criteria. XR CHEST PORTABLE   Final Result   No significant change of bibasilar pneumonia. This document has been electronically signed by: Naz Goins MD on 10/06/2020 07:23 AM         XR CHEST PORTABLE   Final Result   1. Interval placement of right-sided PICC line with tip in superior vena cava. 2. Interval removal of endotracheal tube with decreased lung volumes and persistent bibasilar opacities. **This report has been created using voice recognition software. It may contain minor errors which are inherent in voice recognition technology. **      Final report electronically signed by Dr. Demetri Vale MD on 10/5/2020 4:02 PM      XR CHEST PORTABLE   Final Result   Significant improvement left lung infiltrates. Mild progression right    lung infiltrates. This document has been electronically signed by: Miranda Duane, MD on    10/03/2020 06:27 AM         XR CHEST PORTABLE   Final Result   Moderate progression in left with moderate improvement in right    infiltrates. This document has been electronically signed by: Maria Ines العراقي MD on    10/02/2020 06:24 AM         XR CHEST PORTABLE   Final Result   1. Patchy airspace filling within the bilateral lungs, compatible with    pneumonia. No significant change since the prior study. 2. Orogastric tube within the proximal body of the stomach just distal to    the gastroesophageal junction. The tube may be kinked or partially coiled. This document has been electronically signed by: Fidelina Perez MD on    10/01/2020 10:01 PM         XR CHEST PORTABLE   Final Result   Impression:   Mild progression in right basilar infiltrate with stable to mild    improvement in left infiltrates. This document has been electronically signed by: Maria Ines العراقي MD on    10/01/2020 06:41 AM         XR CHEST PORTABLE   Final Result      1. Interval placement of an endotracheal tube, nasogastric tube and right IJ access central venous catheter. The medical devices appear appropriately positioned. 2. Diffuse infiltrates are scattered throughout the lungs bilaterally. **This report has been created using voice recognition software. It may contain minor errors which are inherent in voice recognition technology. **      Final report electronically signed by Dr Yovany Garay on 9/27/2020 1:15 PM      XR CHEST PORTABLE   Final Result      1. Interval placement of an endotracheal tube, nasogastric tube and right IJ access central venous catheter. The medical devices appear appropriately positioned. 2. Diffuse infiltrates are scattered throughout the lungs bilaterally. **This report has been created using voice recognition software. It may contain minor errors which are inherent in voice recognition technology. **      Final report electronically signed by Dr Yovany Garay on 9/27/2020 1:15 PM      XR CHEST PORTABLE   Final Result   There are infiltrates throughout the lungs bilaterally.  Worse appearance of the chest when compared to the previous study. **This report has been created using voice recognition software. It may contain minor errors which are inherent in voice recognition technology. **      Final report electronically signed by Dr Kali Mccall on 9/27/2020 11:10 AM      XR CHEST PORTABLE   Final Result   1. Slightly increased density in both lung fields. 2. Borderline cardiomegaly. 3. Thoracic spondylosis. **This report has been created using voice recognition software. It may contain minor errors which are inherent in voice recognition technology. **      Final report electronically signed by DR Kirstin Lombardo on 9/24/2020 10:50 AM             Consults:     IP CONSULT TO PHARMACY  IP CONSULT TO SPIRITUAL SERVICES  IP CONSULT TO REHAB/TCU ADMISSION COORDINATOR  IP CONSULT TO DIETITIAN  IP CONSULT TO NEUROLOGY  IP CONSULT TO PHARMACY  PHARMACY TO CHANGE BASE FLUIDS  IP CONSULT TO NEPHROLOGY  IP CONSULT TO PULMONOLOGY  IP CONSULT TO CARDIOLOGY  IP CONSULT TO PHARMACY  IP CONSULT TO DIETITIAN  IP CONSULT TO DIETITIAN  PHARMACY TO DOSE VANCOMYCIN    Disposition: Terminal  Condition at Discharge: Terminal    Code Status:  DNR-CC     Time Spent on discharge is more than 40 minutes     Signed: Thank you Colin Chowdhury MD for the opportunity to be involved in this patient's care.     Electronically signed by ARABELLA Leyva CNP on 10/29/2020 at 12:50 AM

## 2020-10-29 NOTE — PLAN OF CARE
Patient continues on ventilator; tolerating well. Pt went to ct of head and abdomen with no problems.   Will continue to monitor patients respiratory status

## 2020-10-30 LAB — RENIN ACTIVITY: 32.2 NG/ML/HR

## 2020-11-30 LAB — AFB CULTURE & SMEAR: NORMAL

## 2020-12-07 LAB — AFB CULTURE & SMEAR: NORMAL

## 2021-12-29 NOTE — PLAN OF CARE
NON SMOKER. Problem: Falls - Risk of:  Goal: Will remain free from falls  Description: Will remain free from falls  Outcome: Ongoing  Note: Call light in reach, bed in lowest position, and bed alarm activated. Education given on use of call light before ambulation and when in need of assistance. Patient unable to express understanding. Hourly visual checks performed and charted. Toileting offered to patient. No falls this shift, at any time. Arm band and falling star in place. Will continue to monitor. Problem: Falls - Risk of:  Goal: Absence of physical injury  Description: Absence of physical injury  Outcome: Ongoing  Note: Call light in reach, bed in lowest position, and bed alarm activated. Education given on use of call light before ambulation and when in need of assistance. Patient unable to express understanding. Hourly visual checks performed and charted. Toileting offered to patient. No falls this shift, at any time. Arm band and falling star in place. Will continue to monitor. Problem: Skin Integrity:  Goal: Will show no infection signs and symptoms  Description: Will show no infection signs and symptoms  Outcome: Ongoing  Note: Dressing remains clean, dry, and intact. Tubing labeled and all ports capped. CHG bath given daily. Will continue to monitor. Problem: Skin Integrity:  Goal: Absence of new skin breakdown  Description: Absence of new skin breakdown  Outcome: Ongoing  Note: No new signs of skin breakdown. Skin warm, dry, and intact. Mucous membranes pink and moist.  Assistance with turns/ambulation provided every 2 hours and PRN. Will continue to monitor. Problem: Airway Clearance - Ineffective  Goal: Achieve or maintain patent airway  Outcome: Ongoing  Note: Patient remains intubated. Weaning patient as tolerated. Problem: Gas Exchange - Impaired  Goal: Absence of hypoxia  Outcome: Ongoing  Note: Patient remains on the ventilator.  Patient's oxygen level and PEEP were increased due to hypoxia. Problem: Breathing Pattern - Ineffective  Goal: Ability to achieve and maintain a regular respiratory rate  Outcome: Ongoing  Note: Patient's respiratory rate remains within normal limits on the ventilator. Problem: Body Temperature -  Risk of, Imbalanced  Goal: Ability to maintain a body temperature within defined limits  Outcome: Ongoing  Note: Patient presented with a low grade fever. One dose of tylenol given. Patient afebrile at this time. Problem: Isolation Precautions - Risk of Spread of Infection  Goal: Prevent transmission of infection  Outcome: Ongoing  Note: Droplet plus precautions remain in place. Problem: Nutrition Deficits  Goal: Optimize nutrtional status  Outcome: Ongoing  Note: Patient continues on semi-elemental TF infusing at 30 ml/hr. Patient tolerating well. No residuals noted. Problem: Restraint Use - Nonviolent/Non-Self-Destructive Behavior:  Goal: Absence of restraint indications  Description: Absence of restraint indications  Outcome: Ongoing  Note: Patient continues to reach for ETT and pollock while performing ROM exercises. Problem: Restraint Use - Nonviolent/Non-Self-Destructive Behavior:  Goal: Absence of restraint-related injury  Description: Absence of restraint-related injury  Outcome: Ongoing  Note: No signs of injury noted. ROM exercises performed every 2 hours and PRN. Problem: Infection - Central Venous Catheter-Associated Bloodstream Infection:  Goal: Will show no infection signs and symptoms  Description: Will show no infection signs and symptoms  Outcome: Ongoing  Note: Dressing remains clean, dry, and intact. Tubing labeled and all ports capped. CHG bath given daily. Will continue to monitor. Problem: Pain:  Goal: Pain level will decrease  Description: Pain level will decrease  Outcome: Ongoing  Note: Patient unable to use 0-10 pain scale. Per the CPOT scale patient is free from pain at this time.  Patient has a pain goal of \"no pain. \" Agreeable to take PRN pain medications. Problem: Serum Glucose Level - Abnormal:  Goal: Ability to maintain appropriate glucose levels has stabilized  Description: Ability to maintain appropriate glucose levels has stabilized  Outcome: Ongoing  Note: Patient's last CHEM was 240. 6 units of insulin given per the sliding scale. Problem: Urinary Elimination:  Goal: Complications related to the disease process, condition or treatment will be avoided or minimized  Description: Complications related to the disease process, condition or treatment will be avoided or minimized  Outcome: Ongoing  Note: No signs or symptoms noted at this time. [facility] care performed this shift. Will continue to monitor. Care plan reviewed with patient and family. Patient and family verbalize understanding of the plan of care and contribute to goal setting.

## 2023-09-23 NOTE — PROCEDURES
Encounter Date: 9/23/2023       History     Chief Complaint   Patient presents with    Urinary Retention     Arrived via EMS [facility] with c/o suprapubic catheter not draining. Has hx of prostate cancer and has catheter changed monthly per pt     58-year-old male came by EMS complaining of retention of urine patient has a suprapubic catheter wants to be replaced patient's primary care doctor in [employer] patient has a history of CA prostate last time the catheter was replaced 2 months back according to the patient looks like the catheter is blocked patient denies any fever or chills vital signs are stable    The history is provided by the patient and the EMS personnel. No  was used.     Review of patient's allergies indicates:  No Known Allergies  Past Medical History:   Diagnosis Date    Hypertension     Prostate cancer      No past surgical history on file.  No family history on file.     Review of Systems   Genitourinary:  Positive for dysuria and urgency.   All other systems reviewed and are negative.      Physical Exam     Initial Vitals [09/23/23 0504]   BP Pulse Resp Temp SpO2   (!) 161/101 90 18 98.2 °F (36.8 °C) 99 %      MAP       --         Physical Exam    Nursing note and vitals reviewed.  Constitutional: He appears well-developed and well-nourished.   HENT:   Head: Normocephalic and atraumatic.   Eyes: EOM are normal. Pupils are equal, round, and reactive to light.   Neck: Neck supple.   Normal range of motion.  Cardiovascular:  Normal rate.           Pulmonary/Chest: Breath sounds normal.   Abdominal: Abdomen is soft. Bowel sounds are normal.   Musculoskeletal:      Cervical back: Normal range of motion and neck supple.     Neurological: He is alert and oriented to person, place, and time.   Skin: Skin is warm and dry.   Psychiatric: He has a normal mood and affect.         ED Course   Procedures  Labs Reviewed - No data to display       Imaging Results    None          Medications -  ICU PROCEDURE - ENDOTRACHEAL INTUBATION    Cecilio Travis     MRN#: [id number]  19      [email or web address] [email or web address]     : [id number]      INDICATION: Failed Bipap    TIME OUT: taken    Permission obtained, risks/benefits reviewed:    ANESTHESIA:   []Ketamine  []Ativan  [] Morphine  []Propofol  [x]Other medications:Etomidate 30 milligrams and Nimbex 10 milligrams    ESTIMATED BLOOD LOSS:  None. COMPLICATIONS:  []N/A  [] Other:    LARYNGOSCOPIC AIRWAY GRADE (CORMACK-LEHANE):[]1  []2a  [x]2b []3  []4        INTUBATION EQUIPMENT USED:  [x] Direct laryngoscope only    OUTCOME: Successful placement of #  8  Taperguard Evac endotracheal via   [x]Oral route    INSERTION DEPTH:  24    cm from   [x]lip           CONFIRMATION OF TUBE POSITION:   [x]Capnography - Strong & repeatable exhaled CO2 detection   [x]Multiple point auscultation   [x]SpO2 response   [x]STAT X-ray   [x]Bronchoscopic assessment    UNUSUAL FINDINGS:    PROCEDURE:     Using direct video-laryngoscopy, the vocal cords were visualized and the endotracheal tube was placed through the cords under direct vision. Good breath sounds were auscultated bilaterally without sounds over abdomen. Appropriate strong & repeatable exhaled CO2 detection was confirmed.        Electronically signed by ARABELLA Salguero CNP on 2020 at 12:48 PM No data to display  Medical Decision Making  Urinary retention 16 f suprapubic catheter placed removed old catheter new catheter inflated with 10 cc of normal saline free flow of urine in the urinary bag patient feeling much better patient denies any pain advised patient to follow up with primary care doctor                               Clinical Impression:   Final diagnoses:  [R33.9] Urinary retention (Primary)        ED Disposition Condition    Discharge Stable          ED Prescriptions    None       Follow-up Information       Follow up With Specialties Details Why Contact Info    [place]  In 2 days If symptoms worsen              Elmer Potts MD  09/23/23 6602

## (undated) DEVICE — KIT INF CTRL 2OZ LUB TBNG L12FT DBL END BRSH SYR OP4

## (undated) DEVICE — GLOVE ORANGE PI 7 1/2   MSG9075

## (undated) DEVICE — KIT PEG DIA20FR STD PUSH DISP ENDOVIVE

## (undated) DEVICE — BIOGUARD A/W CLEANING ADAPTER

## (undated) DEVICE — SET LNR RED GRN W/ BASE CLEANASCOPE

## (undated) DEVICE — GLOVE ORANGE PI 8   MSG9080

## (undated) DEVICE — SOLUTION IV IRRIG WATER 1000ML POUR BRL 2F7114

## (undated) DEVICE — CONMED SCOPE SAVER BITE BLOCK, 20X27 MM: Brand: SCOPE SAVER

## (undated) DEVICE — APPLICATOR MEDICATED 26 CC SOLUTION CLR STRL CHLORAPREP